# Patient Record
Sex: FEMALE | Race: WHITE | Employment: FULL TIME | URBAN - METROPOLITAN AREA
[De-identification: names, ages, dates, MRNs, and addresses within clinical notes are randomized per-mention and may not be internally consistent; named-entity substitution may affect disease eponyms.]

---

## 2017-04-07 ENCOUNTER — EMPLOYEE WELLNESS (OUTPATIENT)
Dept: OTHER | Age: 47
End: 2017-04-07

## 2017-04-07 LAB
CHOLESTEROL: 201 MG/DL
GLUCOSE BLD-MCNC: 90 MG/DL (ref 70–140)
HDLC SERPL-MCNC: 71 MG/DL
LDL CHOLESTEROL CALCULATED: 117 MG/DL
PATIENT FASTING?: YES
TRIGL SERPL-MCNC: 67 MG/DL

## 2017-09-12 ENCOUNTER — HOSPITAL ENCOUNTER (OUTPATIENT)
Dept: GENERAL RADIOLOGY | Age: 47
Discharge: HOME | End: 2017-09-12
Attending: FAMILY MEDICINE | Admitting: FAMILY MEDICINE

## 2017-09-12 DIAGNOSIS — R05.9 COUGH: ICD-10-CM

## 2017-09-12 DIAGNOSIS — R07.89 OTHER CHEST PAIN: ICD-10-CM

## 2017-09-18 ENCOUNTER — HOSPITAL ENCOUNTER (OUTPATIENT)
Dept: CT IMAGING | Age: 47
Discharge: HOME | End: 2017-09-18
Attending: FAMILY MEDICINE | Admitting: FAMILY MEDICINE

## 2017-09-18 DIAGNOSIS — S22.20XA CLOSED FRACTURE OF STERNUM, UNSPECIFIED PORTION OF STERNUM, INITIAL ENCOUNTER: ICD-10-CM

## 2017-09-18 DIAGNOSIS — S22.20XA CLOSED FRACTURE OF STERNUM: ICD-10-CM

## 2017-10-04 ENCOUNTER — HOSPITAL ENCOUNTER (OUTPATIENT)
Dept: NUCLEAR MEDICINE | Age: 47
Discharge: HOME | End: 2017-10-04
Attending: FAMILY MEDICINE | Admitting: FAMILY MEDICINE

## 2017-10-04 DIAGNOSIS — R07.89 PAIN OF STERNUM: ICD-10-CM

## 2017-10-04 RX ORDER — TC 99M MEDRONATE 20 MG/10ML
25 INJECTION, POWDER, LYOPHILIZED, FOR SOLUTION INTRAVENOUS
Status: COMPLETED | OUTPATIENT
Start: 2017-10-04 | End: 2017-10-04

## 2017-10-04 RX ADMIN — TC 99M MEDRONATE 25 MILLICURIE: 20 INJECTION, POWDER, LYOPHILIZED, FOR SOLUTION INTRAVENOUS at 07:55

## 2017-10-09 ENCOUNTER — HOSPITAL ENCOUNTER (OUTPATIENT)
Dept: OTHER | Age: 47
Discharge: HOME | End: 2017-10-09
Attending: INTERNAL MEDICINE | Admitting: INTERNAL MEDICINE

## 2017-10-09 LAB
ALBUMIN SERPL-MCNC: 4.4 GM/DL (ref 3.4–5)
ALP BLD-CCNC: 77 IU/L (ref 40–128)
ALT SERPL-CCNC: 11 U/L (ref 10–40)
ANION GAP SERPL CALCULATED.3IONS-SCNC: 10 MMOL/L (ref 4–16)
AST SERPL-CCNC: 15 IU/L (ref 15–37)
BILIRUB SERPL-MCNC: 0.5 MG/DL (ref 0–1)
BUN BLDV-MCNC: 14 MG/DL (ref 6–23)
CALCIUM SERPL-MCNC: 9.4 MG/DL (ref 8.3–10.6)
CHLORIDE BLD-SCNC: 104 MMOL/L (ref 99–110)
CO2: 27 MMOL/L (ref 21–32)
CREAT SERPL-MCNC: 0.9 MG/DL (ref 0.6–1.1)
ERYTHROCYTE SEDIMENTATION RATE: 10 MM/HR (ref 0–20)
GFR AFRICAN AMERICAN: >60 ML/MIN/1.73M2
GFR NON-AFRICAN AMERICAN: >60 ML/MIN/1.73M2
GLUCOSE BLD-MCNC: 78 MG/DL (ref 70–140)
LACTATE DEHYDROGENASE: 183 IU/L (ref 120–246)
POTASSIUM SERPL-SCNC: 4.7 MMOL/L (ref 3.5–5.1)
SODIUM BLD-SCNC: 141 MMOL/L (ref 135–145)
TOTAL PROTEIN: 7.1 GM/DL (ref 6.4–8.2)

## 2017-10-12 LAB
IMMUNOFIXATION ELECTROPHORESIS 1: NORMAL
KAPPA QUANT FREE LIGHT CHAINS: 1.47
KAPPA/LAMBDA FREE LIGHT CHAIN RATIO: 1.27
LAMBDA FREE LIGHT CHAINS URINE/ VOL: 1.16

## 2017-10-25 ENCOUNTER — HOSPITAL ENCOUNTER (OUTPATIENT)
Dept: ULTRASOUND IMAGING | Age: 47
Discharge: HOME | End: 2017-10-25
Attending: INTERNAL MEDICINE | Admitting: INTERNAL MEDICINE

## 2017-10-25 DIAGNOSIS — N63.0 BREAST MASS: ICD-10-CM

## 2017-10-25 DIAGNOSIS — R93.1 ABNORMAL COMPUTED TOMOGRAPHY ANGIOGRAPHY OF HEART: ICD-10-CM

## 2017-10-25 DIAGNOSIS — R93.89 ABNORMAL FINDING ON RADIOLOGY EXAM: ICD-10-CM

## 2017-11-02 ENCOUNTER — HOSPITAL ENCOUNTER (OUTPATIENT)
Dept: GENERAL RADIOLOGY | Age: 47
Discharge: HOME | End: 2017-11-02
Attending: INTERNAL MEDICINE | Admitting: INTERNAL MEDICINE

## 2017-11-02 ENCOUNTER — HOSPITAL ENCOUNTER (OUTPATIENT)
Dept: PET IMAGING | Age: 47
Discharge: HOME | End: 2017-11-02

## 2017-11-02 DIAGNOSIS — C50.411 MALIGNANT NEOPLASM OF UPPER-OUTER QUADRANT OF RIGHT FEMALE BREAST, UNSPECIFIED ESTROGEN RECEPTOR STATUS (HCC): ICD-10-CM

## 2017-11-10 ENCOUNTER — HOSPITAL ENCOUNTER (OUTPATIENT)
Dept: INFUSION THERAPY | Age: 47
Discharge: HOME | End: 2017-11-30
Attending: INTERNAL MEDICINE | Admitting: INTERNAL MEDICINE

## 2017-11-10 VITALS
SYSTOLIC BLOOD PRESSURE: 115 MMHG | TEMPERATURE: 98 F | DIASTOLIC BLOOD PRESSURE: 70 MMHG | RESPIRATION RATE: 16 BRPM | HEART RATE: 66 BPM

## 2017-11-10 RX ORDER — ARMODAFINIL 150 MG/1
150 TABLET ORAL DAILY
COMMUNITY
End: 2018-09-11

## 2017-11-10 RX ORDER — LAMOTRIGINE 25 MG/1
500 TABLET ORAL ONCE
Status: COMPLETED | OUTPATIENT
Start: 2017-11-10 | End: 2017-11-10

## 2017-11-10 RX ORDER — SODIUM CHLORIDE 9 MG/ML
INJECTION, SOLUTION INTRAVENOUS ONCE
Status: DISCONTINUED | OUTPATIENT
Start: 2017-11-10 | End: 2017-12-01 | Stop reason: HOSPADM

## 2017-11-10 RX ADMIN — LAMOTRIGINE 500 MG: 25 TABLET ORAL at 08:45

## 2017-11-10 NOTE — PLAN OF CARE
Pt taken to room 5 . Pt oriented to room, call light, bed/chair controls, TV, pt voiced understanding. Plan of care explained to pt, pt voiced understanding.

## 2017-11-10 NOTE — DISCHARGE SUMMARY
Tolerated Chemo very well. Home instructions given with understanding. Discharged walking with her good friend to the front entrance in fair condition to leave per private auto.

## 2017-11-24 ENCOUNTER — HOSPITAL ENCOUNTER (OUTPATIENT)
Dept: INFUSION THERAPY | Age: 47
Discharge: HOME | End: 2017-11-24
Attending: INTERNAL MEDICINE | Admitting: INTERNAL MEDICINE

## 2017-11-24 VITALS
TEMPERATURE: 98 F | SYSTOLIC BLOOD PRESSURE: 122 MMHG | HEART RATE: 69 BPM | RESPIRATION RATE: 16 BRPM | DIASTOLIC BLOOD PRESSURE: 67 MMHG

## 2017-11-24 RX ORDER — LAMOTRIGINE 25 MG/1
500 TABLET ORAL ONCE
Status: COMPLETED | OUTPATIENT
Start: 2017-11-24 | End: 2017-11-24

## 2017-11-24 RX ADMIN — LAMOTRIGINE 500 MG: 25 TABLET ORAL at 08:30

## 2017-11-24 NOTE — PLAN OF CARE
Arrived at Outpatient Infusion Unit for   Chemo. Oriented to room #5 with understanding. Plan of care discussed and understood.

## 2017-11-30 LAB — CA 27.29: 35.4 U/ML

## 2017-12-01 ENCOUNTER — HOSPITAL ENCOUNTER (OUTPATIENT)
Dept: OTHER | Age: 47
Discharge: HOME | End: 2017-12-31
Attending: INTERNAL MEDICINE | Admitting: INTERNAL MEDICINE

## 2017-12-06 DIAGNOSIS — R07.81 RIB PAIN ON RIGHT SIDE: Primary | ICD-10-CM

## 2017-12-07 ENCOUNTER — HOSPITAL ENCOUNTER (OUTPATIENT)
Dept: GENERAL RADIOLOGY | Age: 47
Discharge: HOME | End: 2017-12-07
Attending: SURGERY | Admitting: SURGERY

## 2017-12-07 DIAGNOSIS — R07.81 RIB PAIN ON RIGHT SIDE: ICD-10-CM

## 2017-12-08 ENCOUNTER — HOSPITAL ENCOUNTER (OUTPATIENT)
Dept: ULTRASOUND IMAGING | Age: 47
Discharge: HOME | End: 2017-12-08
Attending: INTERNAL MEDICINE | Admitting: INTERNAL MEDICINE

## 2017-12-08 DIAGNOSIS — C50.411 MALIGNANT NEOPLASM OF UPPER-OUTER QUADRANT OF RIGHT FEMALE BREAST, UNSPECIFIED ESTROGEN RECEPTOR STATUS (HCC): ICD-10-CM

## 2017-12-08 DIAGNOSIS — N63.0 BREAST LUMP: ICD-10-CM

## 2017-12-08 DIAGNOSIS — N63.0 LUMP OR MASS IN BREAST: ICD-10-CM

## 2018-01-01 ENCOUNTER — HOSPITAL ENCOUNTER (OUTPATIENT)
Dept: OTHER | Age: 48
Discharge: HOME | End: 2018-01-31
Attending: INTERNAL MEDICINE | Admitting: INTERNAL MEDICINE

## 2018-01-05 ENCOUNTER — HOSPITAL ENCOUNTER (OUTPATIENT)
Dept: INFUSION THERAPY | Age: 48
Discharge: HOME | End: 2018-01-05
Attending: INTERNAL MEDICINE | Admitting: INTERNAL MEDICINE

## 2018-01-05 VITALS
RESPIRATION RATE: 16 BRPM | DIASTOLIC BLOOD PRESSURE: 84 MMHG | HEART RATE: 66 BPM | SYSTOLIC BLOOD PRESSURE: 134 MMHG | TEMPERATURE: 99.7 F

## 2018-01-05 RX ORDER — LAMOTRIGINE 25 MG/1
500 TABLET ORAL ONCE
Status: COMPLETED | OUTPATIENT
Start: 2018-01-05 | End: 2018-01-05

## 2018-01-05 RX ADMIN — LAMOTRIGINE 500 MG: 25 TABLET ORAL at 08:50

## 2018-01-08 ENCOUNTER — HOSPITAL ENCOUNTER (OUTPATIENT)
Dept: OTHER | Age: 48
Discharge: HOME | End: 2018-01-08
Attending: INTERNAL MEDICINE | Admitting: INTERNAL MEDICINE

## 2018-01-08 LAB
ALBUMIN SERPL-MCNC: 4.7 GM/DL (ref 3.4–5)
ALP BLD-CCNC: 47 IU/L (ref 40–128)
ALT SERPL-CCNC: 14 U/L (ref 10–40)
ANION GAP SERPL CALCULATED.3IONS-SCNC: 16 MMOL/L (ref 4–16)
AST SERPL-CCNC: 19 IU/L (ref 15–37)
BILIRUB SERPL-MCNC: 0.4 MG/DL (ref 0–1)
BUN BLDV-MCNC: 14 MG/DL (ref 6–23)
CALCIUM SERPL-MCNC: 9.6 MG/DL (ref 8.3–10.6)
CHLORIDE BLD-SCNC: 101 MMOL/L (ref 99–110)
CO2: 28 MMOL/L (ref 21–32)
CREAT SERPL-MCNC: 1 MG/DL (ref 0.6–1.1)
GFR AFRICAN AMERICAN: >60 ML/MIN/1.73M2
GFR NON-AFRICAN AMERICAN: 59 ML/MIN/1.73M2
GLUCOSE BLD-MCNC: 91 MG/DL (ref 70–99)
POTASSIUM SERPL-SCNC: 4.3 MMOL/L (ref 3.5–5.1)
SODIUM BLD-SCNC: 145 MMOL/L (ref 135–145)
TOTAL PROTEIN: 7.3 GM/DL (ref 6.4–8.2)

## 2018-02-01 ENCOUNTER — HOSPITAL ENCOUNTER (OUTPATIENT)
Dept: OTHER | Age: 48
Discharge: HOME | End: 2018-02-28
Attending: INTERNAL MEDICINE | Admitting: INTERNAL MEDICINE

## 2018-02-02 ENCOUNTER — HOSPITAL ENCOUNTER (OUTPATIENT)
Dept: INFUSION THERAPY | Age: 48
Discharge: HOME | End: 2018-02-02
Attending: INTERNAL MEDICINE | Admitting: INTERNAL MEDICINE

## 2018-02-02 VITALS
SYSTOLIC BLOOD PRESSURE: 123 MMHG | TEMPERATURE: 98.2 F | HEART RATE: 56 BPM | OXYGEN SATURATION: 97 % | RESPIRATION RATE: 16 BRPM | DIASTOLIC BLOOD PRESSURE: 84 MMHG

## 2018-02-02 RX ORDER — LAMOTRIGINE 25 MG/1
500 TABLET ORAL ONCE
Status: COMPLETED | OUTPATIENT
Start: 2018-02-02 | End: 2018-02-02

## 2018-02-02 RX ADMIN — LAMOTRIGINE 500 MG: 25 TABLET ORAL at 09:10

## 2018-02-02 NOTE — DISCHARGE SUMMARY
Tolerated Chemo well. Home instructions given with undersatnding. Discharged walking with her family to the front entrance in fair condition to leave per private auto. Karyn Aquino

## 2018-02-02 NOTE — PLAN OF CARE
Arrived at Outpatient Infusion Unit for 28 day chemo session. Oriented to room #5 with understanding. Plan of care discussed and understood.

## 2018-03-02 ENCOUNTER — HOSPITAL ENCOUNTER (OUTPATIENT)
Dept: INFUSION THERAPY | Age: 48
Discharge: HOME | End: 2018-03-31
Attending: INTERNAL MEDICINE | Admitting: INTERNAL MEDICINE

## 2018-03-02 VITALS
HEART RATE: 58 BPM | TEMPERATURE: 98.3 F | SYSTOLIC BLOOD PRESSURE: 122 MMHG | DIASTOLIC BLOOD PRESSURE: 76 MMHG | RESPIRATION RATE: 16 BRPM

## 2018-03-02 RX ORDER — LAMOTRIGINE 25 MG/1
500 TABLET ORAL ONCE
Status: COMPLETED | OUTPATIENT
Start: 2018-03-02 | End: 2018-03-02

## 2018-03-02 RX ADMIN — LAMOTRIGINE 500 MG: 25 TABLET ORAL at 08:40

## 2018-03-19 ENCOUNTER — HOSPITAL ENCOUNTER (OUTPATIENT)
Dept: OTHER | Age: 48
Discharge: HOME | End: 2018-03-19
Attending: INTERNAL MEDICINE | Admitting: INTERNAL MEDICINE

## 2018-03-19 LAB
ALBUMIN SERPL-MCNC: 4.6 GM/DL (ref 3.4–5)
ALP BLD-CCNC: 40 IU/L (ref 40–129)
ALT SERPL-CCNC: 11 U/L (ref 10–40)
ANION GAP SERPL CALCULATED.3IONS-SCNC: 12 MMOL/L (ref 4–16)
AST SERPL-CCNC: 15 IU/L (ref 15–37)
BILIRUB SERPL-MCNC: 0.4 MG/DL (ref 0–1)
BUN BLDV-MCNC: 19 MG/DL (ref 6–23)
CALCIUM SERPL-MCNC: 9.3 MG/DL (ref 8.3–10.6)
CHLORIDE BLD-SCNC: 101 MMOL/L (ref 99–110)
CO2: 25 MMOL/L (ref 21–32)
CREAT SERPL-MCNC: 0.9 MG/DL (ref 0.6–1.1)
GFR AFRICAN AMERICAN: >60 ML/MIN/1.73M2
GFR NON-AFRICAN AMERICAN: >60 ML/MIN/1.73M2
GLUCOSE BLD-MCNC: 89 MG/DL (ref 70–99)
POTASSIUM SERPL-SCNC: 4.3 MMOL/L (ref 3.5–5.1)
SODIUM BLD-SCNC: 138 MMOL/L (ref 135–145)
TOTAL PROTEIN: 7.2 GM/DL (ref 6.4–8.2)

## 2018-03-20 VITALS — BODY MASS INDEX: 32.58 KG/M2 | WEIGHT: 208 LBS

## 2018-03-30 ENCOUNTER — HOSPITAL ENCOUNTER (OUTPATIENT)
Dept: INFUSION THERAPY | Age: 48
Discharge: HOME | End: 2018-03-30
Attending: INTERNAL MEDICINE | Admitting: INTERNAL MEDICINE

## 2018-03-30 VITALS
TEMPERATURE: 97.3 F | DIASTOLIC BLOOD PRESSURE: 68 MMHG | SYSTOLIC BLOOD PRESSURE: 117 MMHG | RESPIRATION RATE: 16 BRPM | HEART RATE: 60 BPM

## 2018-03-30 RX ORDER — LAMOTRIGINE 25 MG/1
500 TABLET ORAL ONCE
Status: COMPLETED | OUTPATIENT
Start: 2018-03-30 | End: 2018-03-30

## 2018-03-30 RX ORDER — ACETAMINOPHEN 325 MG/1
650 TABLET ORAL EVERY 6 HOURS PRN
COMMUNITY
End: 2018-09-11

## 2018-03-30 RX ADMIN — LAMOTRIGINE 500 MG: 25 TABLET ORAL at 08:40

## 2018-04-01 ENCOUNTER — HOSPITAL ENCOUNTER (OUTPATIENT)
Dept: OTHER | Age: 48
Discharge: HOME | End: 2018-04-30
Attending: INTERNAL MEDICINE | Admitting: INTERNAL MEDICINE

## 2018-04-20 ENCOUNTER — EMPLOYEE WELLNESS (OUTPATIENT)
Dept: INTERNAL MEDICINE CLINIC | Age: 48
End: 2018-04-20

## 2018-04-20 LAB
CHOLESTEROL: 232 MG/DL
GLUCOSE BLD-MCNC: 80 MG/DL (ref 70–99)
HDLC SERPL-MCNC: 72 MG/DL
LDL CHOLESTEROL CALCULATED: 132 MG/DL
PATIENT FASTING?: YES
TRIGL SERPL-MCNC: 140 MG/DL

## 2018-04-27 ENCOUNTER — HOSPITAL ENCOUNTER (OUTPATIENT)
Dept: INFUSION THERAPY | Age: 48
Discharge: HOME | End: 2018-04-27
Attending: INTERNAL MEDICINE | Admitting: INTERNAL MEDICINE

## 2018-04-27 VITALS
HEART RATE: 64 BPM | RESPIRATION RATE: 16 BRPM | DIASTOLIC BLOOD PRESSURE: 62 MMHG | TEMPERATURE: 98 F | SYSTOLIC BLOOD PRESSURE: 114 MMHG

## 2018-04-27 RX ORDER — SODIUM CHLORIDE 0.9 % (FLUSH) 0.9 %
10 SYRINGE (ML) INJECTION PRN
Status: DISCONTINUED | OUTPATIENT
Start: 2018-04-27 | End: 2018-04-28 | Stop reason: HOSPADM

## 2018-04-27 RX ORDER — LAMOTRIGINE 25 MG/1
500 TABLET ORAL ONCE
Status: COMPLETED | OUTPATIENT
Start: 2018-04-27 | End: 2018-04-27

## 2018-04-27 RX ADMIN — LAMOTRIGINE 500 MG: 25 TABLET ORAL at 09:00

## 2018-04-27 RX ADMIN — Medication 10 ML: at 10:15

## 2018-04-27 RX ADMIN — Medication 10 ML: at 09:30

## 2018-05-01 ENCOUNTER — HOSPITAL ENCOUNTER (OUTPATIENT)
Dept: OTHER | Age: 48
Discharge: HOME | End: 2018-05-31
Attending: INTERNAL MEDICINE | Admitting: INTERNAL MEDICINE

## 2018-05-02 VITALS — BODY MASS INDEX: 32.77 KG/M2 | WEIGHT: 203 LBS

## 2018-05-14 ENCOUNTER — HOSPITAL ENCOUNTER (OUTPATIENT)
Dept: GENERAL RADIOLOGY | Age: 48
Discharge: HOME | End: 2018-05-14
Attending: INTERNAL MEDICINE | Admitting: INTERNAL MEDICINE

## 2018-05-14 DIAGNOSIS — C50.411 MALIGNANT NEOPLASM OF UPPER-OUTER QUADRANT OF RIGHT FEMALE BREAST, UNSPECIFIED ESTROGEN RECEPTOR STATUS (HCC): ICD-10-CM

## 2018-05-25 ENCOUNTER — HOSPITAL ENCOUNTER (OUTPATIENT)
Dept: INFUSION THERAPY | Age: 48
Discharge: HOME | End: 2018-05-25
Attending: INTERNAL MEDICINE | Admitting: INTERNAL MEDICINE

## 2018-05-25 VITALS — HEART RATE: 64 BPM | SYSTOLIC BLOOD PRESSURE: 118 MMHG | RESPIRATION RATE: 16 BRPM | DIASTOLIC BLOOD PRESSURE: 60 MMHG

## 2018-05-25 RX ORDER — LAMOTRIGINE 25 MG/1
500 TABLET ORAL ONCE
Status: COMPLETED | OUTPATIENT
Start: 2018-05-25 | End: 2018-05-25

## 2018-05-25 RX ADMIN — LAMOTRIGINE 500 MG: 25 TABLET ORAL at 09:00

## 2018-06-22 ENCOUNTER — HOSPITAL ENCOUNTER (OUTPATIENT)
Dept: INFUSION THERAPY | Age: 48
Discharge: HOME | End: 2018-06-30
Attending: INTERNAL MEDICINE | Admitting: INTERNAL MEDICINE

## 2018-06-22 VITALS — HEART RATE: 62 BPM | DIASTOLIC BLOOD PRESSURE: 62 MMHG | SYSTOLIC BLOOD PRESSURE: 114 MMHG | RESPIRATION RATE: 16 BRPM

## 2018-06-22 RX ORDER — LAMOTRIGINE 25 MG/1
500 TABLET ORAL ONCE
Status: COMPLETED | OUTPATIENT
Start: 2018-06-22 | End: 2018-06-22

## 2018-06-22 RX ADMIN — LAMOTRIGINE 500 MG: 25 TABLET ORAL at 08:55

## 2018-07-01 ENCOUNTER — HOSPITAL ENCOUNTER (OUTPATIENT)
Dept: OTHER | Age: 48
Discharge: HOME | End: 2018-07-31
Attending: INTERNAL MEDICINE | Admitting: INTERNAL MEDICINE

## 2018-07-20 ENCOUNTER — HOSPITAL ENCOUNTER (OUTPATIENT)
Dept: INFUSION THERAPY | Age: 48
Discharge: HOME | End: 2018-07-20
Attending: INTERNAL MEDICINE | Admitting: INTERNAL MEDICINE

## 2018-07-20 VITALS — SYSTOLIC BLOOD PRESSURE: 108 MMHG | RESPIRATION RATE: 16 BRPM | HEART RATE: 58 BPM | DIASTOLIC BLOOD PRESSURE: 77 MMHG

## 2018-07-20 RX ORDER — LAMOTRIGINE 25 MG/1
500 TABLET ORAL ONCE
Status: COMPLETED | OUTPATIENT
Start: 2018-07-20 | End: 2018-07-20

## 2018-07-20 RX ORDER — 0.9 % SODIUM CHLORIDE 0.9 %
10 VIAL (ML) INJECTION PRN
Status: DISCONTINUED | OUTPATIENT
Start: 2018-07-20 | End: 2018-07-21 | Stop reason: HOSPADM

## 2018-07-20 RX ADMIN — LAMOTRIGINE 500 MG: 25 TABLET ORAL at 09:20

## 2018-08-01 ENCOUNTER — HOSPITAL ENCOUNTER (OUTPATIENT)
Dept: OTHER | Age: 48
Discharge: HOME | End: 2018-08-31
Attending: INTERNAL MEDICINE | Admitting: INTERNAL MEDICINE

## 2018-08-17 ENCOUNTER — HOSPITAL ENCOUNTER (OUTPATIENT)
Dept: INFUSION THERAPY | Age: 48
Discharge: HOME | End: 2018-08-17
Attending: INTERNAL MEDICINE | Admitting: INTERNAL MEDICINE

## 2018-08-17 VITALS
SYSTOLIC BLOOD PRESSURE: 107 MMHG | DIASTOLIC BLOOD PRESSURE: 72 MMHG | TEMPERATURE: 98.1 F | HEART RATE: 60 BPM | RESPIRATION RATE: 16 BRPM

## 2018-08-17 RX ORDER — LAMOTRIGINE 25 MG/1
500 TABLET ORAL ONCE
Status: COMPLETED | OUTPATIENT
Start: 2018-08-17 | End: 2018-08-17

## 2018-08-17 RX ADMIN — LAMOTRIGINE 500 MG: 25 TABLET ORAL at 09:00

## 2018-08-17 NOTE — DISCHARGE SUMMARY
Tolerated chemo well. Home instrucctions given with understanding. Discharged walking with her wonderful family to the front entrance in fair condition to leave per private auto.

## 2018-08-23 ENCOUNTER — HOSPITAL ENCOUNTER (OUTPATIENT)
Dept: OTHER | Age: 48
Discharge: HOME | End: 2018-08-23
Attending: INTERNAL MEDICINE | Admitting: INTERNAL MEDICINE

## 2018-08-23 LAB
ALBUMIN SERPL-MCNC: 4.5 GM/DL (ref 3.4–5)
ALP BLD-CCNC: 41 IU/L (ref 40–128)
ALT SERPL-CCNC: 15 U/L (ref 10–40)
ANION GAP SERPL CALCULATED.3IONS-SCNC: 17 MMOL/L (ref 4–16)
AST SERPL-CCNC: 20 IU/L (ref 15–37)
BILIRUB SERPL-MCNC: 0.4 MG/DL (ref 0–1)
BUN BLDV-MCNC: 20 MG/DL (ref 6–23)
CALCIUM SERPL-MCNC: 10.2 MG/DL (ref 8.3–10.6)
CHLORIDE BLD-SCNC: 100 MMOL/L (ref 99–110)
CO2: 27 MMOL/L (ref 21–32)
CREAT SERPL-MCNC: 1.1 MG/DL (ref 0.6–1.1)
GFR AFRICAN AMERICAN: >60 ML/MIN/1.73M2
GFR NON-AFRICAN AMERICAN: 53 ML/MIN/1.73M2
GLUCOSE BLD-MCNC: 84 MG/DL (ref 70–99)
POTASSIUM SERPL-SCNC: 4.8 MMOL/L (ref 3.5–5.1)
SODIUM BLD-SCNC: 144 MMOL/L (ref 135–145)
TOTAL PROTEIN: 7 GM/DL (ref 6.4–8.2)

## 2018-08-25 LAB — CA 27.29: 24.2 U/ML

## 2018-09-01 ENCOUNTER — HOSPITAL ENCOUNTER (OUTPATIENT)
Dept: OTHER | Age: 48
Discharge: HOME | End: 2018-09-01
Attending: INTERNAL MEDICINE | Admitting: INTERNAL MEDICINE

## 2018-09-14 ENCOUNTER — HOSPITAL ENCOUNTER (OUTPATIENT)
Dept: INFUSION THERAPY | Age: 48
Discharge: HOME | End: 2018-09-14
Attending: INTERNAL MEDICINE | Admitting: INTERNAL MEDICINE

## 2018-09-14 VITALS
DIASTOLIC BLOOD PRESSURE: 90 MMHG | RESPIRATION RATE: 16 BRPM | TEMPERATURE: 97.8 F | SYSTOLIC BLOOD PRESSURE: 126 MMHG | OXYGEN SATURATION: 99 % | HEART RATE: 69 BPM

## 2018-09-14 RX ORDER — LAMOTRIGINE 25 MG/1
500 TABLET ORAL ONCE
Status: COMPLETED | OUTPATIENT
Start: 2018-09-14 | End: 2018-09-14

## 2018-09-14 RX ADMIN — LAMOTRIGINE 500 MG: 25 TABLET ORAL at 09:01

## 2018-09-14 ASSESSMENT — PAIN DESCRIPTION - LOCATION: LOCATION: ABDOMEN

## 2018-09-14 ASSESSMENT — PAIN SCALES - GENERAL: PAINLEVEL_OUTOF10: 4

## 2018-09-14 ASSESSMENT — PAIN DESCRIPTION - DESCRIPTORS: DESCRIPTORS: BURNING

## 2018-09-14 NOTE — PROGRESS NOTES
Tolerated injections without incidence. Given discharge instructions and voiced understanding. Ambulated to exit with sister.

## 2018-10-12 ENCOUNTER — HOSPITAL ENCOUNTER (OUTPATIENT)
Dept: INFUSION THERAPY | Age: 48
Setting detail: INFUSION SERIES
Discharge: HOME | End: 2018-10-12
Payer: COMMERCIAL

## 2018-10-12 VITALS — RESPIRATION RATE: 16 BRPM | SYSTOLIC BLOOD PRESSURE: 119 MMHG | HEART RATE: 60 BPM | DIASTOLIC BLOOD PRESSURE: 79 MMHG

## 2018-10-12 PROCEDURE — 6360000002 HC RX W HCPCS: Performed by: INTERNAL MEDICINE

## 2018-10-12 PROCEDURE — 96372 THER/PROPH/DIAG INJ SC/IM: CPT

## 2018-10-12 PROCEDURE — 96402 CHEMO HORMON ANTINEOPL SQ/IM: CPT

## 2018-10-12 RX ORDER — LAMOTRIGINE 25 MG/1
500 TABLET ORAL ONCE
Status: COMPLETED | OUTPATIENT
Start: 2018-10-12 | End: 2018-10-12

## 2018-10-12 RX ADMIN — FULVESTRANT 500 MG: 50 INJECTION INTRAMUSCULAR at 09:15

## 2018-10-12 NOTE — LETTER
Janneth Pena      Dear, Dr. Britton Room    Your patient Quique Chi,  1970, received Faslodex/Zoladex on 10/12/2018 at the Infusion Unit.     Thank You,        Daquan Damico  TEAM  [phone]

## 2018-10-12 NOTE — PROGRESS NOTES
Pt received 3 injections and tolerated well. Next appt made for next month. Discharge instructions given, voiced understanding. To private auto per self.

## 2018-10-12 NOTE — PROGRESS NOTES
Pt arrived to unit for injections. Reports she had no side effects from last ones given and tolerated well. Agreeable for injections today. Zometa to be given in January 2019; saw  And reports he changed order to q 6 months. Called cancer center for verification and new orders.

## 2018-11-09 ENCOUNTER — HOSPITAL ENCOUNTER (OUTPATIENT)
Dept: ULTRASOUND IMAGING | Age: 48
Discharge: HOME | End: 2018-11-09
Payer: COMMERCIAL

## 2018-11-09 ENCOUNTER — HOSPITAL ENCOUNTER (OUTPATIENT)
Dept: INFUSION THERAPY | Age: 48
Setting detail: INFUSION SERIES
Discharge: HOME | End: 2018-11-09
Payer: COMMERCIAL

## 2018-11-09 VITALS
RESPIRATION RATE: 14 BRPM | SYSTOLIC BLOOD PRESSURE: 125 MMHG | HEART RATE: 86 BPM | OXYGEN SATURATION: 98 % | DIASTOLIC BLOOD PRESSURE: 85 MMHG | TEMPERATURE: 97.5 F

## 2018-11-09 DIAGNOSIS — C50.411 MALIGNANT NEOPLASM OF UPPER-OUTER QUADRANT OF RIGHT FEMALE BREAST, UNSPECIFIED ESTROGEN RECEPTOR STATUS (HCC): ICD-10-CM

## 2018-11-09 DIAGNOSIS — M79.604 RIGHT LEG PAIN: ICD-10-CM

## 2018-11-09 DIAGNOSIS — R60.9 EDEMA, UNSPECIFIED TYPE: ICD-10-CM

## 2018-11-09 PROCEDURE — 93970 EXTREMITY STUDY: CPT

## 2018-11-09 PROCEDURE — 96402 CHEMO HORMON ANTINEOPL SQ/IM: CPT

## 2018-11-09 PROCEDURE — 6360000002 HC RX W HCPCS: Performed by: INTERNAL MEDICINE

## 2018-11-09 PROCEDURE — 96401 CHEMO ANTI-NEOPL SQ/IM: CPT

## 2018-11-09 PROCEDURE — 99211 OFF/OP EST MAY X REQ PHY/QHP: CPT

## 2018-11-09 RX ORDER — LAMOTRIGINE 25 MG/1
500 TABLET ORAL ONCE
Status: COMPLETED | OUTPATIENT
Start: 2018-11-09 | End: 2018-11-09

## 2018-11-09 RX ADMIN — FULVESTRANT 500 MG: 50 INJECTION INTRAMUSCULAR at 09:34

## 2018-11-09 ASSESSMENT — PAIN SCALES - GENERAL
PAINLEVEL_OUTOF10: 0
PAINLEVEL_OUTOF10: 0

## 2018-11-09 NOTE — PROGRESS NOTES
Pt tolerated Faslodex injections and Zoladex injection to abd. Feeling well. Discharge instructions given and verbalized understanding. To private auto per self.

## 2018-11-09 NOTE — PROGRESS NOTES
Pt arrived on unit. Oriented to room, call light, and chair/bed controls with understanding voiced. Pt is aware of and agreeable to POC.

## 2018-11-15 ENCOUNTER — HOSPITAL ENCOUNTER (OUTPATIENT)
Age: 48
Setting detail: SPECIMEN
Discharge: HOME | End: 2018-11-15
Payer: COMMERCIAL

## 2018-11-15 LAB
ALBUMIN SERPL-MCNC: 4.5 GM/DL (ref 3.4–5)
ALP BLD-CCNC: 42 IU/L (ref 40–128)
ALT SERPL-CCNC: 91 U/L (ref 10–40)
ANION GAP SERPL CALCULATED.3IONS-SCNC: 13 MMOL/L (ref 4–16)
AST SERPL-CCNC: 60 IU/L (ref 15–37)
BILIRUB SERPL-MCNC: 0.4 MG/DL (ref 0–1)
BUN BLDV-MCNC: 15 MG/DL (ref 6–23)
CALCIUM SERPL-MCNC: 9.5 MG/DL (ref 8.3–10.6)
CHLORIDE BLD-SCNC: 105 MMOL/L (ref 99–110)
CO2: 24 MMOL/L (ref 21–32)
CREAT SERPL-MCNC: 1 MG/DL (ref 0.6–1.1)
GFR AFRICAN AMERICAN: >60 ML/MIN/1.73M2
GFR NON-AFRICAN AMERICAN: 59 ML/MIN/1.73M2
GLUCOSE BLD-MCNC: 85 MG/DL (ref 70–99)
POTASSIUM SERPL-SCNC: 4.3 MMOL/L (ref 3.5–5.1)
SODIUM BLD-SCNC: 142 MMOL/L (ref 135–145)
TOTAL PROTEIN: 6.9 GM/DL (ref 6.4–8.2)

## 2018-11-15 PROCEDURE — 86300 IMMUNOASSAY TUMOR CA 15-3: CPT

## 2018-11-15 PROCEDURE — 80053 COMPREHEN METABOLIC PANEL: CPT

## 2018-11-18 LAB — CA 27.29: 26.4 U/ML

## 2018-12-07 ENCOUNTER — HOSPITAL ENCOUNTER (OUTPATIENT)
Dept: INFUSION THERAPY | Age: 48
Setting detail: INFUSION SERIES
Discharge: HOME | End: 2018-12-07
Payer: COMMERCIAL

## 2018-12-07 VITALS
DIASTOLIC BLOOD PRESSURE: 79 MMHG | RESPIRATION RATE: 16 BRPM | OXYGEN SATURATION: 98 % | HEART RATE: 68 BPM | TEMPERATURE: 97.1 F | SYSTOLIC BLOOD PRESSURE: 118 MMHG

## 2018-12-07 LAB
ALBUMIN SERPL-MCNC: 4.8 GM/DL (ref 3.4–5)
ALP BLD-CCNC: 45 IU/L (ref 40–129)
ALT SERPL-CCNC: 48 U/L (ref 10–40)
ANION GAP SERPL CALCULATED.3IONS-SCNC: 15 MMOL/L (ref 4–16)
AST SERPL-CCNC: 33 IU/L (ref 15–37)
BILIRUB SERPL-MCNC: 0.5 MG/DL (ref 0–1)
BUN BLDV-MCNC: 15 MG/DL (ref 6–23)
CALCIUM SERPL-MCNC: 9.6 MG/DL (ref 8.3–10.6)
CHLORIDE BLD-SCNC: 101 MMOL/L (ref 99–110)
CO2: 21 MMOL/L (ref 21–32)
CREAT SERPL-MCNC: 0.9 MG/DL (ref 0.6–1.1)
GFR AFRICAN AMERICAN: >60 ML/MIN/1.73M2
GFR NON-AFRICAN AMERICAN: >60 ML/MIN/1.73M2
GLUCOSE BLD-MCNC: 143 MG/DL (ref 70–99)
POTASSIUM SERPL-SCNC: 3.6 MMOL/L (ref 3.5–5.1)
SODIUM BLD-SCNC: 137 MMOL/L (ref 135–145)
TOTAL PROTEIN: 7.6 GM/DL (ref 6.4–8.2)

## 2018-12-07 PROCEDURE — 96372 THER/PROPH/DIAG INJ SC/IM: CPT

## 2018-12-07 PROCEDURE — 99211 OFF/OP EST MAY X REQ PHY/QHP: CPT

## 2018-12-07 PROCEDURE — 80053 COMPREHEN METABOLIC PANEL: CPT

## 2018-12-07 RX ORDER — LAMOTRIGINE 25 MG/1
500 TABLET ORAL ONCE
Status: COMPLETED | OUTPATIENT
Start: 2018-12-07 | End: 2018-12-07

## 2018-12-07 RX ADMIN — LAMOTRIGINE 500 MG: 25 TABLET ORAL at 07:50

## 2018-12-07 ASSESSMENT — PAIN SCALES - GENERAL: PAINLEVEL_OUTOF10: 0

## 2019-01-04 ENCOUNTER — HOSPITAL ENCOUNTER (OUTPATIENT)
Dept: INFUSION THERAPY | Age: 49
Setting detail: INFUSION SERIES
Discharge: HOME | End: 2019-01-04
Payer: COMMERCIAL

## 2019-01-04 ENCOUNTER — HOSPITAL ENCOUNTER (OUTPATIENT)
Dept: INFUSION THERAPY | Age: 49
Setting detail: INFUSION SERIES
End: 2019-01-04
Payer: COMMERCIAL

## 2019-01-04 VITALS
SYSTOLIC BLOOD PRESSURE: 123 MMHG | RESPIRATION RATE: 16 BRPM | TEMPERATURE: 97.7 F | HEART RATE: 64 BPM | OXYGEN SATURATION: 96 % | DIASTOLIC BLOOD PRESSURE: 77 MMHG

## 2019-01-04 PROCEDURE — 96365 THER/PROPH/DIAG IV INF INIT: CPT

## 2019-01-04 PROCEDURE — 2580000003 HC RX 258: Performed by: INTERNAL MEDICINE

## 2019-01-04 PROCEDURE — 6360000002 HC RX W HCPCS: Performed by: INTERNAL MEDICINE

## 2019-01-04 PROCEDURE — 96402 CHEMO HORMON ANTINEOPL SQ/IM: CPT

## 2019-01-04 PROCEDURE — 99211 OFF/OP EST MAY X REQ PHY/QHP: CPT

## 2019-01-04 RX ORDER — LAMOTRIGINE 25 MG/1
500 TABLET ORAL ONCE
Status: COMPLETED | OUTPATIENT
Start: 2019-01-04 | End: 2019-01-04

## 2019-01-04 RX ADMIN — ZOLEDRONIC ACID 4 MG: 0.8 INJECTION, SOLUTION, CONCENTRATE INTRAVENOUS at 09:50

## 2019-01-04 RX ADMIN — FULVESTRANT 500 MG: 50 INJECTION INTRAMUSCULAR at 08:34

## 2019-01-04 ASSESSMENT — PAIN SCALES - GENERAL: PAINLEVEL_OUTOF10: 0

## 2019-01-04 NOTE — PROGRESS NOTES
Pt taken to room 3, oriented to room, bed/chair controls, and call light. Needs met at present. Call light in reach. Pt agreeable for plan of care.

## 2019-01-04 NOTE — DISCHARGE SUMMARY
Discharge instructions given and voiced understanding. Zometa given today and pt due again in 6 months. Pt to RTC in 4 weeks for Faslodex and Zoladex.

## 2019-01-08 ENCOUNTER — HOSPITAL ENCOUNTER (OUTPATIENT)
Dept: INFUSION THERAPY | Age: 49
End: 2019-01-08

## 2019-02-01 ENCOUNTER — HOSPITAL ENCOUNTER (OUTPATIENT)
Dept: INFUSION THERAPY | Age: 49
Setting detail: INFUSION SERIES
Discharge: HOME | End: 2019-02-01
Payer: COMMERCIAL

## 2019-02-01 VITALS
HEART RATE: 67 BPM | SYSTOLIC BLOOD PRESSURE: 123 MMHG | DIASTOLIC BLOOD PRESSURE: 79 MMHG | OXYGEN SATURATION: 100 % | RESPIRATION RATE: 16 BRPM

## 2019-02-01 PROCEDURE — 6360000002 HC RX W HCPCS: Performed by: INTERNAL MEDICINE

## 2019-02-01 PROCEDURE — 99211 OFF/OP EST MAY X REQ PHY/QHP: CPT

## 2019-02-01 PROCEDURE — 96402 CHEMO HORMON ANTINEOPL SQ/IM: CPT

## 2019-02-01 RX ORDER — LAMOTRIGINE 25 MG/1
500 TABLET ORAL ONCE
Status: COMPLETED | OUTPATIENT
Start: 2019-02-01 | End: 2019-02-01

## 2019-02-01 RX ADMIN — FULVESTRANT 500 MG: 50 INJECTION INTRAMUSCULAR at 08:43

## 2019-02-01 ASSESSMENT — PAIN SCALES - GENERAL: PAINLEVEL_OUTOF10: 0

## 2019-02-01 NOTE — DISCHARGE SUMMARY
Pt tolerated all 3 injections without incidence. LLQ Zoladex injection did bleed but pressure was applied with another band-aid and it did stop prior to pt leaving unit. She was instructed to monitor site for a while and call if bleeding continued. Voiced understanding. Pt agreeable for next appt time and date. (Mar. 1, 2019. @ 0800.

## 2019-02-26 ENCOUNTER — HOSPITAL ENCOUNTER (OUTPATIENT)
Age: 49
Setting detail: SPECIMEN
Discharge: HOME | End: 2019-02-26
Payer: COMMERCIAL

## 2019-02-26 LAB
ALBUMIN SERPL-MCNC: 4.7 GM/DL (ref 3.4–5)
ALP BLD-CCNC: 48 IU/L (ref 40–128)
ALT SERPL-CCNC: 18 U/L (ref 10–40)
ANION GAP SERPL CALCULATED.3IONS-SCNC: 12 MMOL/L (ref 4–16)
AST SERPL-CCNC: 20 IU/L (ref 15–37)
BILIRUB SERPL-MCNC: 0.3 MG/DL (ref 0–1)
BUN BLDV-MCNC: 20 MG/DL (ref 6–23)
CALCIUM SERPL-MCNC: 9.6 MG/DL (ref 8.3–10.6)
CHLORIDE BLD-SCNC: 102 MMOL/L (ref 99–110)
CO2: 23 MMOL/L (ref 21–32)
CREAT SERPL-MCNC: 1.1 MG/DL (ref 0.6–1.1)
GFR AFRICAN AMERICAN: >60 ML/MIN/1.73M2
GFR NON-AFRICAN AMERICAN: 53 ML/MIN/1.73M2
GLUCOSE BLD-MCNC: 92 MG/DL (ref 70–99)
POTASSIUM SERPL-SCNC: 4.2 MMOL/L (ref 3.5–5.1)
SODIUM BLD-SCNC: 137 MMOL/L (ref 135–145)
TOTAL PROTEIN: 7.5 GM/DL (ref 6.4–8.2)

## 2019-02-26 PROCEDURE — 86300 IMMUNOASSAY TUMOR CA 15-3: CPT

## 2019-02-26 PROCEDURE — 80053 COMPREHEN METABOLIC PANEL: CPT

## 2019-02-28 LAB — CA 27.29: 26.4 U/ML

## 2019-03-01 ENCOUNTER — HOSPITAL ENCOUNTER (OUTPATIENT)
Dept: INFUSION THERAPY | Age: 49
Setting detail: INFUSION SERIES
Discharge: HOME | End: 2019-03-01
Payer: COMMERCIAL

## 2019-03-01 VITALS
HEART RATE: 79 BPM | DIASTOLIC BLOOD PRESSURE: 83 MMHG | OXYGEN SATURATION: 98 % | SYSTOLIC BLOOD PRESSURE: 126 MMHG | RESPIRATION RATE: 14 BRPM | TEMPERATURE: 97.9 F

## 2019-03-01 PROCEDURE — 99211 OFF/OP EST MAY X REQ PHY/QHP: CPT

## 2019-03-01 PROCEDURE — 96402 CHEMO HORMON ANTINEOPL SQ/IM: CPT

## 2019-03-01 RX ORDER — LAMOTRIGINE 25 MG/1
500 TABLET ORAL ONCE
Status: COMPLETED | OUTPATIENT
Start: 2019-03-01 | End: 2019-03-01

## 2019-03-01 RX ADMIN — LAMOTRIGINE 500 MG: 25 TABLET ORAL at 08:37

## 2019-03-01 ASSESSMENT — PAIN SCALES - GENERAL: PAINLEVEL_OUTOF10: 0

## 2019-03-01 NOTE — DISCHARGE SUMMARY
Pt tolerated injections well. Denied any S&S of reaction. Discharge instructions given and voiced understanding. Next appt made for 3/29/2019. Agreeable for date and time.

## 2019-03-18 ENCOUNTER — OFFICE VISIT (OUTPATIENT)
Dept: INTERNAL MEDICINE CLINIC | Age: 49
End: 2019-03-18
Payer: COMMERCIAL

## 2019-03-18 VITALS
BODY MASS INDEX: 32.47 KG/M2 | OXYGEN SATURATION: 98 % | HEIGHT: 66 IN | HEART RATE: 73 BPM | RESPIRATION RATE: 16 BRPM | WEIGHT: 202 LBS | SYSTOLIC BLOOD PRESSURE: 106 MMHG | DIASTOLIC BLOOD PRESSURE: 72 MMHG

## 2019-03-18 DIAGNOSIS — G43.909 MIGRAINE WITHOUT STATUS MIGRAINOSUS, NOT INTRACTABLE, UNSPECIFIED MIGRAINE TYPE: ICD-10-CM

## 2019-03-18 DIAGNOSIS — C50.911 PRIMARY CANCER OF RIGHT BREAST WITH METASTASIS TO OTHER SITE (HCC): ICD-10-CM

## 2019-03-18 DIAGNOSIS — M77.8 LEFT ELBOW TENDINITIS: Primary | ICD-10-CM

## 2019-03-18 PROCEDURE — 99203 OFFICE O/P NEW LOW 30 MIN: CPT | Performed by: FAMILY MEDICINE

## 2019-03-18 RX ORDER — ALBUTEROL SULFATE 90 UG/1
2 AEROSOL, METERED RESPIRATORY (INHALATION) EVERY 6 HOURS PRN
COMMUNITY
End: 2020-09-02 | Stop reason: SDUPTHER

## 2019-03-18 RX ORDER — ZOLEDRONIC ACID 0.04 MG/ML
4 INJECTION, SOLUTION INTRAVENOUS ONCE
COMMUNITY

## 2019-03-18 RX ORDER — LAMOTRIGINE 25 MG/1
500 TABLET ORAL
COMMUNITY
End: 2020-11-24 | Stop reason: SDUPTHER

## 2019-03-18 ASSESSMENT — PATIENT HEALTH QUESTIONNAIRE - PHQ9
2. FEELING DOWN, DEPRESSED OR HOPELESS: 0
1. LITTLE INTEREST OR PLEASURE IN DOING THINGS: 0
SUM OF ALL RESPONSES TO PHQ QUESTIONS 1-9: 0
SUM OF ALL RESPONSES TO PHQ9 QUESTIONS 1 & 2: 0
SUM OF ALL RESPONSES TO PHQ QUESTIONS 1-9: 0

## 2019-03-24 PROBLEM — C50.911: Status: ACTIVE | Noted: 2019-03-24

## 2019-03-24 ASSESSMENT — ENCOUNTER SYMPTOMS
BACK PAIN: 0
WHEEZING: 0
CHEST TIGHTNESS: 0
NAUSEA: 0
SHORTNESS OF BREATH: 0
ABDOMINAL PAIN: 0
BLOOD IN STOOL: 0
EYES NEGATIVE: 1

## 2019-03-29 ENCOUNTER — HOSPITAL ENCOUNTER (OUTPATIENT)
Dept: INFUSION THERAPY | Age: 49
Setting detail: INFUSION SERIES
Discharge: HOME | End: 2019-03-29
Payer: COMMERCIAL

## 2019-03-29 VITALS
HEART RATE: 67 BPM | TEMPERATURE: 97.6 F | RESPIRATION RATE: 14 BRPM | WEIGHT: 202 LBS | BODY MASS INDEX: 32.47 KG/M2 | SYSTOLIC BLOOD PRESSURE: 130 MMHG | DIASTOLIC BLOOD PRESSURE: 80 MMHG | HEIGHT: 66 IN | OXYGEN SATURATION: 100 %

## 2019-03-29 PROCEDURE — 99211 OFF/OP EST MAY X REQ PHY/QHP: CPT

## 2019-03-29 PROCEDURE — 96401 CHEMO ANTI-NEOPL SQ/IM: CPT

## 2019-03-29 RX ORDER — LAMOTRIGINE 25 MG/1
500 TABLET ORAL ONCE
Status: COMPLETED | OUTPATIENT
Start: 2019-03-29 | End: 2019-03-29

## 2019-03-29 RX ADMIN — LAMOTRIGINE 500 MG: 25 TABLET ORAL at 08:23

## 2019-04-04 ENCOUNTER — HOSPITAL ENCOUNTER (OUTPATIENT)
Dept: NUCLEAR MEDICINE | Age: 49
Discharge: HOME | End: 2019-04-04
Payer: COMMERCIAL

## 2019-04-04 DIAGNOSIS — C79.51 SECONDARY MALIGNANT NEOPLASM OF BONE AND BONE MARROW (HCC): ICD-10-CM

## 2019-04-04 DIAGNOSIS — C50.411 MALIGNANT NEOPLASM OF UPPER-OUTER QUADRANT OF RIGHT FEMALE BREAST, UNSPECIFIED ESTROGEN RECEPTOR STATUS (HCC): ICD-10-CM

## 2019-04-04 DIAGNOSIS — C79.52 SECONDARY MALIGNANT NEOPLASM OF BONE AND BONE MARROW (HCC): ICD-10-CM

## 2019-04-04 PROCEDURE — 78306 BONE IMAGING WHOLE BODY: CPT

## 2019-04-04 PROCEDURE — A9503 TC99M MEDRONATE: HCPCS | Performed by: INTERNAL MEDICINE

## 2019-04-04 PROCEDURE — 3430000000 HC RX DIAGNOSTIC RADIOPHARMACEUTICAL: Performed by: INTERNAL MEDICINE

## 2019-04-04 RX ORDER — TC 99M MEDRONATE 20 MG/10ML
25 INJECTION, POWDER, LYOPHILIZED, FOR SOLUTION INTRAVENOUS
Status: COMPLETED | OUTPATIENT
Start: 2019-04-04 | End: 2019-04-04

## 2019-04-04 RX ADMIN — TC 99M MEDRONATE 25 MILLICURIE: 20 INJECTION, POWDER, LYOPHILIZED, FOR SOLUTION INTRAVENOUS at 10:15

## 2019-04-23 ENCOUNTER — HOSPITAL ENCOUNTER (OUTPATIENT)
Dept: WOMENS IMAGING | Age: 49
Discharge: HOME | End: 2019-04-23
Payer: COMMERCIAL

## 2019-04-23 DIAGNOSIS — Z12.31 VISIT FOR SCREENING MAMMOGRAM: ICD-10-CM

## 2019-04-23 PROCEDURE — 77063 BREAST TOMOSYNTHESIS BI: CPT

## 2019-04-26 ENCOUNTER — HOSPITAL ENCOUNTER (OUTPATIENT)
Dept: INFUSION THERAPY | Age: 49
Setting detail: INFUSION SERIES
Discharge: HOME | End: 2019-04-26
Payer: COMMERCIAL

## 2019-04-26 VITALS
TEMPERATURE: 97.6 F | HEART RATE: 61 BPM | OXYGEN SATURATION: 99 % | RESPIRATION RATE: 14 BRPM | HEIGHT: 66 IN | DIASTOLIC BLOOD PRESSURE: 81 MMHG | SYSTOLIC BLOOD PRESSURE: 122 MMHG | BODY MASS INDEX: 32.47 KG/M2 | WEIGHT: 202 LBS

## 2019-04-26 PROCEDURE — 99211 OFF/OP EST MAY X REQ PHY/QHP: CPT

## 2019-04-26 PROCEDURE — 96402 CHEMO HORMON ANTINEOPL SQ/IM: CPT

## 2019-04-26 RX ORDER — LAMOTRIGINE 25 MG/1
500 TABLET ORAL ONCE
Status: COMPLETED | OUTPATIENT
Start: 2019-04-26 | End: 2019-04-26

## 2019-04-26 RX ADMIN — LAMOTRIGINE 500 MG: 25 TABLET ORAL at 08:15

## 2019-05-04 ENCOUNTER — APPOINTMENT (OUTPATIENT)
Dept: CT IMAGING | Age: 49
End: 2019-05-04
Payer: COMMERCIAL

## 2019-05-04 ENCOUNTER — APPOINTMENT (OUTPATIENT)
Dept: GENERAL RADIOLOGY | Age: 49
End: 2019-05-04
Payer: COMMERCIAL

## 2019-05-04 ENCOUNTER — HOSPITAL ENCOUNTER (EMERGENCY)
Age: 49
Discharge: HOME | End: 2019-05-04
Attending: EMERGENCY MEDICINE
Payer: COMMERCIAL

## 2019-05-04 VITALS
TEMPERATURE: 98 F | SYSTOLIC BLOOD PRESSURE: 136 MMHG | WEIGHT: 202 LBS | RESPIRATION RATE: 17 BRPM | DIASTOLIC BLOOD PRESSURE: 82 MMHG | BODY MASS INDEX: 32.47 KG/M2 | HEIGHT: 66 IN | OXYGEN SATURATION: 100 % | HEART RATE: 73 BPM

## 2019-05-04 DIAGNOSIS — R07.9 CHEST PAIN, UNSPECIFIED TYPE: Primary | ICD-10-CM

## 2019-05-04 LAB
ALBUMIN SERPL-MCNC: 4.2 GM/DL (ref 3.4–5)
ALP BLD-CCNC: 39 IU/L (ref 40–128)
ALT SERPL-CCNC: 21 U/L (ref 10–40)
ANION GAP SERPL CALCULATED.3IONS-SCNC: 16 MMOL/L (ref 4–16)
AST SERPL-CCNC: 41 IU/L (ref 15–37)
BASOPHILS ABSOLUTE: 0.1 K/CU MM
BASOPHILS RELATIVE PERCENT: 0.8 % (ref 0–1)
BILIRUB SERPL-MCNC: 0.4 MG/DL (ref 0–1)
BUN BLDV-MCNC: 15 MG/DL (ref 6–23)
CALCIUM SERPL-MCNC: 9.2 MG/DL (ref 8.3–10.6)
CHLORIDE BLD-SCNC: 99 MMOL/L (ref 99–110)
CO2: 17 MMOL/L (ref 21–32)
CREAT SERPL-MCNC: 1 MG/DL (ref 0.6–1.1)
DIFFERENTIAL TYPE: ABNORMAL
EOSINOPHILS ABSOLUTE: 0.1 K/CU MM
EOSINOPHILS RELATIVE PERCENT: 1.9 % (ref 0–3)
GFR AFRICAN AMERICAN: >60 ML/MIN/1.73M2
GFR NON-AFRICAN AMERICAN: 59 ML/MIN/1.73M2
GLUCOSE BLD-MCNC: 100 MG/DL (ref 70–99)
HCT VFR BLD CALC: 45.2 % (ref 37–47)
HEMOGLOBIN: 14.2 GM/DL (ref 12.5–16)
IMMATURE NEUTROPHIL %: 0.2 % (ref 0–0.43)
LYMPHOCYTES ABSOLUTE: 3.5 K/CU MM
LYMPHOCYTES RELATIVE PERCENT: 55 % (ref 24–44)
MCH RBC QN AUTO: 29.3 PG (ref 27–31)
MCHC RBC AUTO-ENTMCNC: 31.4 % (ref 32–36)
MCV RBC AUTO: 93.2 FL (ref 78–100)
MONOCYTES ABSOLUTE: 0.5 K/CU MM
MONOCYTES RELATIVE PERCENT: 8.5 % (ref 0–4)
NUCLEATED RBC %: 0 %
PDW BLD-RTO: 12.8 % (ref 11.7–14.9)
PLATELET # BLD: 268 K/CU MM (ref 140–440)
PMV BLD AUTO: 9.6 FL (ref 7.5–11.1)
POTASSIUM SERPL-SCNC: 4.7 MMOL/L (ref 3.5–5.1)
RBC # BLD: 4.85 M/CU MM (ref 4.2–5.4)
REASON FOR REJECTION: NORMAL
REASON FOR REJECTION: NORMAL
REJECTED TEST: NORMAL
SEGMENTED NEUTROPHILS ABSOLUTE COUNT: 2.1 K/CU MM
SEGMENTED NEUTROPHILS RELATIVE PERCENT: 33.6 % (ref 36–66)
SODIUM BLD-SCNC: 132 MMOL/L (ref 135–145)
TOTAL IMMATURE NEUTOROPHIL: 0.01 K/CU MM
TOTAL NUCLEATED RBC: 0 K/CU MM
TOTAL PROTEIN: 7.1 GM/DL (ref 6.4–8.2)
TROPONIN T: <0.01 NG/ML
TROPONIN T: <0.01 NG/ML
WBC # BLD: 6.4 K/CU MM (ref 4–10.5)

## 2019-05-04 PROCEDURE — 36415 COLL VENOUS BLD VENIPUNCTURE: CPT

## 2019-05-04 PROCEDURE — 6370000000 HC RX 637 (ALT 250 FOR IP): Performed by: EMERGENCY MEDICINE

## 2019-05-04 PROCEDURE — 2580000003 HC RX 258: Performed by: EMERGENCY MEDICINE

## 2019-05-04 PROCEDURE — 84484 ASSAY OF TROPONIN QUANT: CPT

## 2019-05-04 PROCEDURE — 85025 COMPLETE CBC W/AUTO DIFF WBC: CPT

## 2019-05-04 PROCEDURE — 93005 ELECTROCARDIOGRAM TRACING: CPT | Performed by: EMERGENCY MEDICINE

## 2019-05-04 PROCEDURE — 71275 CT ANGIOGRAPHY CHEST: CPT

## 2019-05-04 PROCEDURE — 99285 EMERGENCY DEPT VISIT HI MDM: CPT

## 2019-05-04 PROCEDURE — 6360000004 HC RX CONTRAST MEDICATION: Performed by: EMERGENCY MEDICINE

## 2019-05-04 PROCEDURE — 80053 COMPREHEN METABOLIC PANEL: CPT

## 2019-05-04 PROCEDURE — 71045 X-RAY EXAM CHEST 1 VIEW: CPT

## 2019-05-04 RX ORDER — ASPIRIN 325 MG
325 TABLET ORAL ONCE
Status: COMPLETED | OUTPATIENT
Start: 2019-05-04 | End: 2019-05-04

## 2019-05-04 RX ORDER — 0.9 % SODIUM CHLORIDE 0.9 %
10 VIAL (ML) INJECTION
Status: COMPLETED | OUTPATIENT
Start: 2019-05-04 | End: 2019-05-04

## 2019-05-04 RX ADMIN — ASPIRIN 325 MG ORAL TABLET 325 MG: 325 PILL ORAL at 07:28

## 2019-05-04 RX ADMIN — SODIUM CHLORIDE, PRESERVATIVE FREE 10 ML: 5 INJECTION INTRAVENOUS at 09:11

## 2019-05-04 RX ADMIN — IOPAMIDOL 80 ML: 755 INJECTION, SOLUTION INTRAVENOUS at 09:11

## 2019-05-04 ASSESSMENT — PAIN DESCRIPTION - LOCATION
LOCATION: CHEST
LOCATION: CHEST

## 2019-05-04 ASSESSMENT — ENCOUNTER SYMPTOMS
SHORTNESS OF BREATH: 1
EYE REDNESS: 0
BACK PAIN: 0
COUGH: 0
ABDOMINAL PAIN: 0
RHINORRHEA: 0
VOMITING: 0
SORE THROAT: 0
NAUSEA: 0

## 2019-05-04 ASSESSMENT — PAIN DESCRIPTION - FREQUENCY: FREQUENCY: INTERMITTENT

## 2019-05-04 ASSESSMENT — PAIN DESCRIPTION - DESCRIPTORS: DESCRIPTORS: STABBING

## 2019-05-04 ASSESSMENT — PAIN DESCRIPTION - ORIENTATION: ORIENTATION: MID;LEFT

## 2019-05-04 ASSESSMENT — PAIN SCALES - GENERAL
PAINLEVEL_OUTOF10: 7
PAINLEVEL_OUTOF10: 6

## 2019-05-04 ASSESSMENT — PAIN DESCRIPTION - PAIN TYPE: TYPE: ACUTE PAIN

## 2019-05-04 NOTE — ED NOTES
Discharge instructions reviewed with pt and questions addressed at this time. Pt alert and oriented x 4 at time of discharge, no signs of acute distress noted. Pt ambulatory to Emergency Department waiting room and steady gait noted.         Laura Cobb RN  05/04/19 [id number]

## 2019-05-04 NOTE — ED NOTES
Chayito Alvarez at bedside     [facility]  05/04/19 [id number]

## 2019-05-04 NOTE — ED NOTES
Bed: ED-14  Expected date:   Expected time:   Means of arrival:   Comments:  Nurse from floor, chest pain     Jeannie Hu RN  05/04/19 [id number]

## 2019-05-04 NOTE — ED PROVIDER NOTES
Triage Chief Complaint:   Chest Pain    [place]:  Pennelope Dakin is a 50 y.o. female currently on chemotherapy for metastatic breast cancer that presents with chest pain that started at about midnight last night. The pain is just to the left of her sternum and to different than her normal metastatic bone pain. The pain is intermittent and calm for a couple of minutes at a time. She has associated shortness of breath and lightheadedness. No nausea or vomiting. She has hot flashes from her chemo medication and is unsure if she is having diaphoresis with the chest pain. She denies any lower extremity swelling or history of blood clots. No cough. No fevers. ROS:   Review of Systems   Constitutional: Negative for chills and fever. Fatigue: hot flashes. HENT: Negative for congestion, rhinorrhea and sore throat. Eyes: Negative for redness and visual disturbance. Respiratory: Positive for shortness of breath. Negative for cough. Cardiovascular: Positive for chest pain. Negative for leg swelling. Gastrointestinal: Negative for abdominal pain, nausea and vomiting. Genitourinary: Negative for dysuria and frequency. Musculoskeletal: Negative for arthralgias and back pain. Skin: Negative for rash and wound. Neurological: Positive for light-headedness. Negative for syncope and headaches. Psychiatric/Behavioral: Negative. Negative for hallucinations and suicidal ideas.        Past Medical History:   Diagnosis Date    Asthma     last flare up winter 2016    Breast CA [facility])     right    Chest pain     \"had chest pain last time 2015- saw Dr Wilder Stoll- all ok\"    FHx: coronary artery disease     H/O echocardiogram 9/9/2014    EF55-60% mild TR, normal LV function    History of migraine     \"none for past 3 months\"    HX OTHER MEDICAL     \"since March 2016- having some pain mid sternum- xray thought fx, then did CT and bone scan now having bx done\"    Primary cancer of right breast with metastasis to other site [facility]) 3/24/2019     Past Surgical History:   Procedure Laterality Date     SECTION  1992    EYE SURGERY  age 28    LASIK both eyes    HYSTERECTOMY  age 27    partial - has 1 ovary (L);  Due to endometriosis    OTHER SURGICAL HISTORY Right 08 2013    right ulnar nerve decompression    TUBAL LIGATION       Family History   Problem Relation Age of Onset    Heart Attack Mother          from MI @ age 61.  Coronary Art Dis Mother     Other Mother         Low blood pressure, endometriosis - hyster    High Cholesterol Mother     High Blood Pressure Father     Heart Attack Father     Coronary Art Dis Father     Arthritis Father         hip replacement    Arthritis Sister     Liver Disease Sister         medication & hx of alcohol abuse    Other Sister         endometriosis - hyster    Depression Brother     Other Sister         endometriosis - hyster    Other Sister         endometriosis - hyster   [facility] Migraines Sister     Other Sister         endometriosis - hyster    Other Sister         endometriosis - hyster, hypothyroidism    High Blood Pressure Sister      Social History     Socioeconomic History    Marital status:      Spouse name: Not on file    Number of children: Not on file    Years of education: Not on file    Highest education level: Not on file   Occupational History    Not on file   Social Needs    Financial resource strain: Not on file    Food insecurity:     Worry: Not on file     Inability: Not on file    Transportation needs:     Medical: Not on file     Non-medical: Not on file   Tobacco Use    Smoking status: Former Smoker     Packs/day: 0.50     Years: 16.00     Pack years: 8.00     Types: Cigarettes     Last attempt to quit: 2004     Years since quittin.6    Smokeless tobacco: Never Used    Tobacco comment: Quit smoking 9 years ago.    Substance and Sexual Activity    Alcohol use: Yes     Comment: Occasionally °C) (Oral)   Resp 17   Ht 5' 6\" (1.676 m)   Wt 202 lb (91.6 kg)   SpO2 100%   BMI 32.60 kg/m²   My pulse ox interpretation is - normal  Physical Exam   Constitutional: She appears well-developed and well-nourished. Appears uncomfortable     HENT:   Head: Normocephalic and atraumatic. Eyes: Conjunctivae are normal. Right eye exhibits no discharge. Left eye exhibits no discharge. Cardiovascular: Normal rate, regular rhythm and intact distal pulses. Pulses:       Radial pulses are 2+ on the right side, and 2+ on the left side. Pulmonary/Chest: Effort normal and breath sounds normal. No respiratory distress. She has no wheezes. Abdominal: Soft. Bowel sounds are normal. She exhibits no distension. There is no tenderness. There is no rebound and no guarding. Musculoskeletal: Normal range of motion. She exhibits no edema or deformity. Neurological: She is alert. No cranial nerve deficit. Skin: Skin is warm and dry. She is not diaphoretic. Psychiatric: She has a normal mood and affect.  Her behavior is normal.       I have reviewed and interpreted all of the currently available lab results from this visit (if applicable):  Results for orders placed or performed during the hospital encounter of 05/04/19   CBC Auto Differential   Result Value Ref Range    WBC 6.4 4.0 - 10.5 K/CU MM    RBC 4.85 4.2 - 5.4 M/CU MM    Hemoglobin 14.2 12.5 - 16.0 GM/DL    Hematocrit 45.2 37 - 47 %    MCV 93.2 78 - 100 FL    MCH 29.3 27 - 31 PG    MCHC 31.4 (L) 32.0 - 36.0 %    RDW 12.8 11.7 - 14.9 %    Platelets 688 771 - 531 K/CU MM    MPV 9.6 7.5 - 11.1 FL    Differential Type AUTOMATED DIFFERENTIAL     Segs Relative 33.6 (L) 36 - 66 %    Lymphocytes % 55.0 (H) 24 - 44 %    Monocytes % 8.5 (H) 0 - 4 %    Eosinophils % 1.9 0 - 3 %    Basophils % 0.8 0 - 1 %    Segs Absolute 2.1 K/CU MM    Lymphocytes # 3.5 K/CU MM    Monocytes # 0.5 K/CU MM    Eosinophils # 0.1 K/CU MM    Basophils # 0.1 K/CU MM    Nucleated RBC % 0.0 %

## 2019-05-06 PROCEDURE — 93010 ELECTROCARDIOGRAM REPORT: CPT | Performed by: INTERNAL MEDICINE

## 2019-05-07 ENCOUNTER — EMPLOYEE WELLNESS (OUTPATIENT)
Dept: OTHER | Age: 49
End: 2019-05-07

## 2019-05-07 LAB
CHOLESTEROL: 231 MG/DL
GLUCOSE BLD-MCNC: 90 MG/DL (ref 70–99)
HDLC SERPL-MCNC: 69 MG/DL
LDL CHOLESTEROL CALCULATED: 133 MG/DL
PATIENT FASTING?: YES
TRIGL SERPL-MCNC: 143 MG/DL

## 2019-05-08 LAB
EKG ATRIAL RATE: 67 BPM
EKG DIAGNOSIS: NORMAL
EKG P AXIS: 57 DEGREES
EKG P-R INTERVAL: 154 MS
EKG Q-T INTERVAL: 432 MS
EKG QRS DURATION: 106 MS
EKG QTC CALCULATION (BAZETT): 456 MS
EKG R AXIS: 264 DEGREES
EKG T AXIS: 61 DEGREES
EKG VENTRICULAR RATE: 67 BPM

## 2019-05-13 VITALS — WEIGHT: 203 LBS | BODY MASS INDEX: 32.77 KG/M2

## 2019-05-14 ENCOUNTER — INITIAL CONSULT (OUTPATIENT)
Dept: CARDIOLOGY CLINIC | Age: 49
End: 2019-05-14
Payer: COMMERCIAL

## 2019-05-14 VITALS
OXYGEN SATURATION: 100 % | BODY MASS INDEX: 32.69 KG/M2 | HEIGHT: 66 IN | SYSTOLIC BLOOD PRESSURE: 116 MMHG | HEART RATE: 75 BPM | WEIGHT: 203.4 LBS | DIASTOLIC BLOOD PRESSURE: 76 MMHG

## 2019-05-14 DIAGNOSIS — I83.90 ASYMPTOMATIC VARICOSE VEINS: ICD-10-CM

## 2019-05-14 DIAGNOSIS — E78.5 DYSLIPIDEMIA: ICD-10-CM

## 2019-05-14 DIAGNOSIS — R07.9 CHEST PAIN, UNSPECIFIED TYPE: Primary | ICD-10-CM

## 2019-05-14 PROCEDURE — 99244 OFF/OP CNSLTJ NEW/EST MOD 40: CPT | Performed by: INTERNAL MEDICINE

## 2019-05-14 NOTE — PROGRESS NOTES
CARDIOLOGY CONSULT  NOTE    Chief Complaint: Chest pain    HPI:   Ryan Nguyễn is a 50y.o. year old who has history as noted below. . She comes in for evaluation due to episode of chest pain which lasted several hours. It was sharp, more on the left side did not radiate. She went to the emergency department. CT chest was negative for PE. She does have history of breast cancer with metastases, which is being managed by oncology. It has been stable. She does have bony metastases as per PET scan . Both her parents had heart problems at young age and had heart attacks      Current Outpatient Medications   Medication Sig Dispense Refill    zoledronic acid (ZOMETA) 4 MG/100ML SOLN infusion Infuse 4 mg intravenously once      goserelin (ZOLADEX) 3.6 MG injection Inject 3.6 mg into the skin once      fulvestrant (FASLODEX) 250 MG/5ML SOLN IM injection Inject 500 mg into the muscle once      albuterol sulfate  (90 Base) MCG/ACT inhaler Inhale 2 puffs into the lungs every 6 hours as needed for Wheezing      topiramate ER (TROKENDI XR) 50 MG CP24 Take 50 mg by mouth daily      diphenhydrAMINE (BENADRYL) 25 MG capsule 1-2 capsules by mouth every 6 hours as needed for rash 30 capsule 0     No current facility-administered medications for this visit.         Allergies:   Sulfa antibiotics and Nickel    Patient History:  Past Medical History:   Diagnosis Date    Asthma     last flare up winter 2016    Breast CA [facility])     right    Chest pain     \"had chest pain last time 2015- saw Dr Mahad Quinonez- all ok\"    FHx: coronary artery disease     H/O echocardiogram 9/9/2014    EF55-60% mild TR, normal LV function    History of migraine     \"none for past 3 months\"    HX OTHER MEDICAL     \"since March 2016- having some pain mid sternum- xray thought fx, then did CT and bone scan now having bx done\"    Primary cancer of right breast with metastasis to other site [facility]) 3/24/2019     Past Surgical History:   Procedure Laterality Date     SECTION  12    EYE SURGERY  age 28    LASIK both eyes    HYSTERECTOMY  age 27    partial - has 1 ovary (L);  Due to endometriosis    OTHER SURGICAL HISTORY Right 08 2013    right ulnar nerve decompression    TUBAL LIGATION       Family History   Problem Relation Age of Onset    Heart Attack Mother          from MI @ age 61.  Coronary Art Dis Mother     Other Mother         Low blood pressure, endometriosis - hyster    High Cholesterol Mother     High Blood Pressure Father     Heart Attack Father     Coronary Art Dis Father     Arthritis Father         hip replacement    Arthritis Sister     Liver Disease Sister         medication & hx of alcohol abuse    Other Sister         endometriosis - hyster    Depression Brother     Other Sister         endometriosis - hyster    Other Sister         endometriosis - hyster   [facility] Migraines Sister     Other Sister         endometriosis - hyster    Other Sister         endometriosis - hyster, hypothyroidism    High Blood Pressure Sister      Social History     Tobacco Use    Smoking status: Former Smoker     Packs/day: 0.50     Years: 16.00     Pack years: 8.00     Types: Cigarettes     Last attempt to quit: 2004     Years since quittin.7    Smokeless tobacco: Never Used    Tobacco comment: Quit smoking 9 years ago. Substance Use Topics    Alcohol use: Yes     Comment: Occasionally     CAFFEINE: 1 cup coffee daily/ average one per week        Review of Systems:   · Constitutional: No Fever or Weight Loss   · Eyes: No Decreased Vision  · ENT: No Headaches, Hearing Loss or Vertigo  · Cardiovascular: as per note above   · Respiratory: No cough or wheezing and as per note above.    · Gastrointestinal: No abdominal pain, appetite loss, blood in stools, constipation, diarrhea or heartburn  · Genitourinary: No dysuria, trouble voiding, or hematuria  · Musculoskeletal: None  · Integumentary: No rash or pruritis  · Neurological: No TIA or stroke symptoms  · Psychiatric: No anxiety or depression  · Endocrine: No malaise, fatigue or temperature intolerance  · Hematologic/Lymphatic: No bleeding problems, blood clots or swollen lymph nodes  · Allergic/Immunologic: No nasal congestion or hives    Objective:      Physical Exam:  /76 (Site: Right Upper Arm, Position: Sitting, Cuff Size: Medium Adult)   Pulse 75   Ht 5' 6\" (1.676 m)   Wt 203 lb 6.4 oz (92.3 kg)   SpO2 100%   BMI 32.83 kg/m²   Wt Readings from Last 3 Encounters:   05/14/19 203 lb 6.4 oz (92.3 kg)   05/07/19 203 lb (92.1 kg)   05/04/19 202 lb (91.6 kg)     Body mass index is 32.83 kg/m². Vitals:    05/14/19 1410   BP: 116/76   Pulse: 75   SpO2: 100%        General Appearance:  No distress, conversant  Constitutional:  Well developed, Well nourished, No acute distress, Non-toxic appearance. HENT:  Normocephalic, Atraumatic, Bilateral external ears normal, Oropharynx moist, No oral exudates, Nose normal. Neck- Normal range of motion, No tenderness, Supple, No stridor,no apical-carotid delay  Eyes:  PERRL, EOMI, Conjunctiva normal, No discharge. Respiratory:  Normal breath sounds, No respiratory distress, No wheezing, No chest tenderness. ,no use of accessory muscles, NO crackles  Cardiovascular: (PMI) apex non displaced,no lifts no thrills,S1 and S2 audible, No added heart sounds, No signs of ankle edema, or volume overload, No evidence of JVD, No crackles  GI:  Bowel sounds normal, Soft, No tenderness, No masses, No gross visceromegaly   :  No costovertebral angle tenderness   Musculoskeletal:  No edema, no tenderness, no deformities.  Back- no tenderness  Integument:  Well hydrated, no rash   Lymphatic:  No lymphadenopathy noted   Neurologic:  Alert & oriented x 3, CN 2-12 normal, normal motor function, normal sensory function, no focal deficits noted   Psychiatric:  Speech and behavior appropriate Medical decision making and Data review:  DATA:  Lab Results   Component Value Date    TROPONINT <0.010 05/04/2019     BNP:  No results found for: PROBNP  PT/INR:  No results found for: PTINR  No results found for: LABA1C  Lab Results   Component Value Date    CHOL 231 (H) 05/07/2019    TRIG 143 05/07/2019    HDL 69 05/07/2019    LDLCALC 133 (H) 05/07/2019     Lab Results   Component Value Date    ALT 21 05/04/2019    AST 41 (H) 05/04/2019     TSH: No results found for: TSH  Lab Results   Component Value Date    AST 41 (H) 05/04/2019    ALT 21 05/04/2019    BILITOT 0.4 05/04/2019    ALKPHOS 39 (L) 05/04/2019     No results found for: PROBNP  No results found for: LABA1C  Lab Results   Component Value Date    WBC 6.4 05/04/2019    HGB 14.2 05/04/2019    HCT 45.2 05/04/2019     05/04/2019     All labs, medications and tests reviewed by myself including data and history from outside source , patient and available family . Assessment & Plan:      1. Chest pain, unspecified type    2. Dyslipidemia    3. Asymptomatic varicose veins         Chest pain  Stabbing pain lasting 3 to 4 hrs , ongoing for last 3 to 4 years ,pain was so severe that she had to stop . She has  Breast cancer mets  in her sternum . She has strong family history fo cad  Due to history of chemotherapy. We will get an echo. 2. Relatively function also get a stress test treadmill    Asymptomatic varicose veins  Date ultrasound lower extended to look for venous reflux disease     Dyslipidemia :  All available lab work was reviewed. Patient was advised to repeat lab work before next visit      Counseled extensively and medication compliance urged. We discussed that for the  prevention of ASCVD our  goal is aggressive risk modification. Patient is encouraged to exercise even a brisk walk for 30 minutes  at least 3 to 4 times a week   Various goals were discussed and questions answered. Continue current medications.  Appropriate prescriptions are addressed and refills ordered. Questions answered and patient verbalizes understanding. Call for any problems, questions, or concerns. Continue all other medications of all above medical condition listed as is. Return in about 1 month (around 6/14/2019). Please note this report has been partially produced using speech recognition software and may contain errors related to that system including errors in grammar, punctuation, and spelling, as well as words and phrases that may be inappropriate.  If there are any questions or concerns please feel free to contact the dictating provider for clarification.

## 2019-05-14 NOTE — ASSESSMENT & PLAN NOTE
Stabbing pain lasting 3 to 4 hrs , ongoing for last 3 to 4 years ,pain was so severe that she had to stop . She has  Breast cancer mets  in her sternum . She has strong family history fo cad  Due to history of chemotherapy. We will get an echo.  2. Relatively function also get a stress test treadmill

## 2019-05-16 ENCOUNTER — TELEPHONE (OUTPATIENT)
Dept: CARDIOLOGY CLINIC | Age: 49
End: 2019-05-16

## 2019-05-16 NOTE — TELEPHONE ENCOUNTER
Called patient to schedule Treadmill Stress, Echo, and  BLE Venous, no answer. Left message for patient to call office to schedule.  [facility] [place] NO AUTH NEEDED OK TO SCHEDULE

## 2019-05-24 ENCOUNTER — HOSPITAL ENCOUNTER (OUTPATIENT)
Dept: INFUSION THERAPY | Age: 49
Setting detail: INFUSION SERIES
Discharge: HOME | End: 2019-05-24
Payer: COMMERCIAL

## 2019-05-24 VITALS
OXYGEN SATURATION: 95 % | DIASTOLIC BLOOD PRESSURE: 86 MMHG | HEART RATE: 68 BPM | SYSTOLIC BLOOD PRESSURE: 121 MMHG | RESPIRATION RATE: 14 BRPM | TEMPERATURE: 97.8 F

## 2019-05-24 PROCEDURE — 96402 CHEMO HORMON ANTINEOPL SQ/IM: CPT

## 2019-05-24 PROCEDURE — 99211 OFF/OP EST MAY X REQ PHY/QHP: CPT

## 2019-05-24 PROCEDURE — 96401 CHEMO ANTI-NEOPL SQ/IM: CPT

## 2019-05-24 RX ORDER — LAMOTRIGINE 25 MG/1
500 TABLET ORAL ONCE
Status: COMPLETED | OUTPATIENT
Start: 2019-05-24 | End: 2019-05-24

## 2019-05-24 RX ADMIN — LAMOTRIGINE 500 MG: 25 TABLET ORAL at 08:59

## 2019-05-24 ASSESSMENT — PAIN SCALES - GENERAL: PAINLEVEL_OUTOF10: 0

## 2019-05-24 NOTE — DISCHARGE SUMMARY
Reviewed discharge instructions, voiced understanding, and copies of AVS given to take home. Pt tolerated Faslodex and Zoladex injections well, with no s/s of an allergic reaction. Pt to private auto via self.

## 2019-06-04 ENCOUNTER — PROCEDURE VISIT (OUTPATIENT)
Dept: CARDIOLOGY CLINIC | Age: 49
End: 2019-06-04
Payer: COMMERCIAL

## 2019-06-04 VITALS
HEART RATE: 59 BPM | WEIGHT: 203 LBS | RESPIRATION RATE: 16 BRPM | HEIGHT: 66 IN | DIASTOLIC BLOOD PRESSURE: 72 MMHG | BODY MASS INDEX: 32.62 KG/M2 | SYSTOLIC BLOOD PRESSURE: 118 MMHG

## 2019-06-04 DIAGNOSIS — I83.90 ASYMPTOMATIC VARICOSE VEINS: ICD-10-CM

## 2019-06-04 DIAGNOSIS — R07.9 CHEST PAIN, UNSPECIFIED TYPE: ICD-10-CM

## 2019-06-04 LAB
LV EF: 58 %
LVEF MODALITY: NORMAL

## 2019-06-04 PROCEDURE — 93306 TTE W/DOPPLER COMPLETE: CPT | Performed by: INTERNAL MEDICINE

## 2019-06-04 PROCEDURE — 93970 EXTREMITY STUDY: CPT | Performed by: INTERNAL MEDICINE

## 2019-06-04 PROCEDURE — 93015 CV STRESS TEST SUPVJ I&R: CPT | Performed by: INTERNAL MEDICINE

## 2019-06-06 ENCOUNTER — TELEPHONE (OUTPATIENT)
Dept: CARDIOLOGY CLINIC | Age: 49
End: 2019-06-06

## 2019-06-06 NOTE — TELEPHONE ENCOUNTER
Notified pt of test results and she states she is already wearing compression. She has F/U on 6-18-19. Summary        Significant reflux noted of the Right Deep Venous System: CFV (1.1s).      Significant reflux noted in the Left Deep Venous System: Pop V (2.0s).      No evidence of DVT or SVT in the bilateral common femoral vein, femoral    vein, popliteal vein, greater saphenous vein or small saphenous vein.        Recommendations        Apply compression stockings                   Summary   Normal left ventricle structure and systolic function with an ejection   fraction of 55-60%.   No significant valvular disease noted.   Normal pulmonary artery pressure.   No evidence of pericardial effusion.   Dilated aortic root at 4.0 cm.   Aortic arch not visualized.

## 2019-06-18 ENCOUNTER — OFFICE VISIT (OUTPATIENT)
Dept: CARDIOLOGY CLINIC | Age: 49
End: 2019-06-18
Payer: COMMERCIAL

## 2019-06-18 VITALS
HEART RATE: 62 BPM | DIASTOLIC BLOOD PRESSURE: 70 MMHG | WEIGHT: 204.4 LBS | BODY MASS INDEX: 32.85 KG/M2 | SYSTOLIC BLOOD PRESSURE: 110 MMHG | HEIGHT: 66 IN

## 2019-06-18 DIAGNOSIS — E78.5 DYSLIPIDEMIA: ICD-10-CM

## 2019-06-18 DIAGNOSIS — R07.9 CHEST PAIN, UNSPECIFIED TYPE: ICD-10-CM

## 2019-06-18 DIAGNOSIS — I77.810 DILATED AORTIC ROOT (HCC): ICD-10-CM

## 2019-06-18 DIAGNOSIS — I83.90 ASYMPTOMATIC VARICOSE VEINS: Primary | ICD-10-CM

## 2019-06-18 PROCEDURE — 99213 OFFICE O/P EST LOW 20 MIN: CPT | Performed by: INTERNAL MEDICINE

## 2019-06-18 NOTE — PROGRESS NOTES
CARDIOLOGY  NOTE    Chief Complaint: Chest pain    HPI:   Cal Ferris is a 50y.o. year old who has history as noted below. . He denies any new episodes of chest pain. She had a stress test which did not show any evidence of ischemia as a modified she did very well completing 9.5 METS. She does have significant leg reflux worse in the left side than on the right side on venous Doppler. She has a right leg varicose vein she has started wearing compression stockings. She does have history of breast cancer with metastases, which is being managed by oncology. It has been stable. She does have bony metastases as per PET scan . Both her parents had heart problems at young age and had heart attacks   She Is a nurse working frequent night shifts      Current Outpatient Medications   Medication Sig Dispense Refill    zoledronic acid (ZOMETA) 4 MG/100ML SOLN infusion Infuse 4 mg intravenously once      goserelin (ZOLADEX) 3.6 MG injection Inject 3.6 mg into the skin once      fulvestrant (FASLODEX) 250 MG/5ML SOLN IM injection Inject 500 mg into the muscle once      albuterol sulfate  (90 Base) MCG/ACT inhaler Inhale 2 puffs into the lungs every 6 hours as needed for Wheezing      topiramate ER (TROKENDI XR) 50 MG CP24 Take 50 mg by mouth daily      diphenhydrAMINE (BENADRYL) 25 MG capsule 1-2 capsules by mouth every 6 hours as needed for rash 30 capsule 0     No current facility-administered medications for this visit. Allergies:   Sulfa antibiotics and Nickel    Patient History:  Past Medical History:   Diagnosis Date    Asthma     last flare up winter 2016    Breast CA [facility])     right    Chest pain     \"had chest pain last time 2015- saw Dr [place] Cantu- all ok\"    FHx: coronary artery disease     H/O Doppler lower venous ultrasound 06/04/2019    No DVT,  Significant reflux noted of the Right & Left Deep Venous System.     H/O echocardiogram 06/04/2019    EF coffee daily/ average one per week        Review of Systems:   · Constitutional: No Fever or Weight Loss   · Eyes: No Decreased Vision  · ENT: No Headaches, Hearing Loss or Vertigo  · Cardiovascular: as per note above   · Respiratory: No cough or wheezing and as per note above. · Gastrointestinal: No abdominal pain, appetite loss, blood in stools, constipation, diarrhea or heartburn  · Genitourinary: No dysuria, trouble voiding, or hematuria  · Musculoskeletal:  None  · Integumentary: No rash or pruritis  · Neurological: No TIA or stroke symptoms  · Psychiatric: No anxiety or depression  · Endocrine: No malaise, fatigue or temperature intolerance  · Hematologic/Lymphatic: No bleeding problems, blood clots or swollen lymph nodes  · Allergic/Immunologic: No nasal congestion or hives    Objective:      Physical Exam:  /70   Pulse 62   Ht 5' 6\" (1.676 m)   Wt 204 lb 6.4 oz (92.7 kg)   BMI 32.99 kg/m²   Wt Readings from Last 3 Encounters:   06/18/19 204 lb 6.4 oz (92.7 kg)   06/04/19 203 lb (92.1 kg)   05/14/19 203 lb 6.4 oz (92.3 kg)     Body mass index is 32.99 kg/m². Vitals:    06/18/19 0843   BP: 110/70   Pulse: 62        General Appearance:  No distress, conversant  Constitutional:  Well developed, Well nourished, No acute distress, Non-toxic appearance. HENT:  Normocephalic, Atraumatic, Bilateral external ears normal, Oropharynx moist, No oral exudates, Nose normal. Neck- Normal range of motion, No tenderness, Supple, No stridor,no apical-carotid delay  Eyes:  PERRL, EOMI, Conjunctiva normal, No discharge. Respiratory:  Normal breath sounds, No respiratory distress, No wheezing, No chest tenderness. ,no use of accessory muscles, NO crackles  Cardiovascular: (PMI) apex non displaced,no lifts no thrills,S1 and S2 audible, No added heart sounds, No signs of ankle edema, or volume overload, No evidence of JVD, No crackles  GI:  Bowel sounds normal, Soft, No tenderness, No masses, No gross visceromegaly :  No costovertebral angle tenderness   Musculoskeletal:  No edema, no tenderness, no deformities. Back- no tenderness  Integument:  Well hydrated, no rash   Lymphatic:  No lymphadenopathy noted   Neurologic:  Alert & oriented x 3, CN 2-12 normal, normal motor function, normal sensory function, no focal deficits noted   Psychiatric:  Speech and behavior appropriate       Medical decision making and Data review:  DATA:  Lab Results   Component Value Date    TROPONINT <0.010 05/04/2019     BNP:  No results found for: PROBNP  PT/INR:  No results found for: PTINR  No results found for: LABA1C  Lab Results   Component Value Date    CHOL 231 (H) 05/07/2019    TRIG 143 05/07/2019    HDL 69 05/07/2019    LDLCALC 133 (H) 05/07/2019     Lab Results   Component Value Date    ALT 21 05/04/2019    AST 41 (H) 05/04/2019     TSH: No results found for: TSH  Lab Results   Component Value Date    AST 41 (H) 05/04/2019    ALT 21 05/04/2019    BILITOT 0.4 05/04/2019    ALKPHOS 39 (L) 05/04/2019     No results found for: PROBNP  No results found for: LABA1C  Lab Results   Component Value Date    WBC 6.4 05/04/2019    HGB 14.2 05/04/2019    HCT 45.2 05/04/2019     05/04/2019       Stress  6/4/19  Summary   Normal stress test   Peak Bp was 142/74 , Peak Hr was 172   completed 10.9 METS and 9.3 mins of exercise   Appropriate hemodynamic response to exercise. No significant ST T wave   changes with exercise.  EKG portion is negative for ischemia by diagnostic  Stress Type: Exercise      Peak HR: 150 bpm                HR Response: Appropriate   Peak BP: 142/74 mmHg            BP Response: Normal resting BP - appropriate   Predicted HR: 172 bpm           response   % of predicted HR: 87           HR BP Product: [id number]   Exercise Duration: 09:30 min    Max Exercise: 10.9 METS   Reason for Termination: Fatigue                                   Exercise Effort    Echo 6/4/19   Summary   Normal left ventricle structure and systolic function with an ejection   fraction of 55-60%.   No significant valvular disease noted.   Normal pulmonary artery pressure.   No evidence of pericardial effusion.   Dilated aortic root at 4.0 cm.   Aortic arch not visualized.           All labs, medications and tests reviewed by myself including data and history from outside source , patient and available family . Assessment & Plan:      1. Asymptomatic varicose veins    2. Chest pain, unspecified type    3. Dyslipidemia    4. Dilated aortic root (HCC)         Chest pain  He has not had any more episodes of chest pain since her last visit. Stress test with actually a good quality she did not have any evidence of ischemia and completed about 11  METS. Asymptomatic varicose veins  More reflux on the left side but she seems to be more symptomatic on the right side advised to wear compression stockings 15 mmHg    Dilated aortic root (HCC)  Measured 4.0 cm on echo we will repeat echo in 1 year     Dyslipidemia :  All available lab work was reviewed. Patient was advised to repeat lab work before next visit      Counseled extensively and medication compliance urged. We discussed that for the  prevention of ASCVD our  goal is aggressive risk modification. Patient is encouraged to exercise even a brisk walk for 30 minutes  at least 3 to 4 times a week   Various goals were discussed and questions answered. Continue current medications. Appropriate prescriptions are addressed and refills ordered. Questions answered and patient verbalizes understanding. Call for any problems, questions, or concerns. Continue all other medications of all above medical condition listed as is. Return in about 6 months (around 12/18/2019). Please note this report has been partially produced using speech recognition software and may contain errors related to that system including errors in grammar, punctuation, and spelling, as well as words and phrases that may be inappropriate. If there are any questions or concerns please feel free to contact the dictating provider for clarification.

## 2019-06-21 ENCOUNTER — HOSPITAL ENCOUNTER (OUTPATIENT)
Dept: INFUSION THERAPY | Age: 49
Setting detail: INFUSION SERIES
Discharge: HOME | End: 2019-06-21
Payer: COMMERCIAL

## 2019-06-21 VITALS
HEART RATE: 68 BPM | RESPIRATION RATE: 16 BRPM | TEMPERATURE: 98.2 F | SYSTOLIC BLOOD PRESSURE: 117 MMHG | DIASTOLIC BLOOD PRESSURE: 97 MMHG

## 2019-06-21 PROCEDURE — 96402 CHEMO HORMON ANTINEOPL SQ/IM: CPT

## 2019-06-21 PROCEDURE — 99211 OFF/OP EST MAY X REQ PHY/QHP: CPT

## 2019-06-21 RX ORDER — LAMOTRIGINE 25 MG/1
500 TABLET ORAL ONCE
Status: COMPLETED | OUTPATIENT
Start: 2019-06-21 | End: 2019-06-21

## 2019-06-21 RX ORDER — 0.9 % SODIUM CHLORIDE 0.9 %
10 VIAL (ML) INJECTION
Status: DISCONTINUED | OUTPATIENT
Start: 2019-06-21 | End: 2019-06-22 | Stop reason: HOSPADM

## 2019-06-21 RX ADMIN — LAMOTRIGINE 500 MG: 25 TABLET ORAL at 08:30

## 2019-06-21 ASSESSMENT — PAIN SCALES - GENERAL: PAINLEVEL_OUTOF10: 0

## 2019-06-21 NOTE — PROGRESS NOTES
Pt taken to room 05 for chemo injections. Pt oriented to room, call light, bed/chair controls, TV, pt voiced understanding. Plan of care explained to pt, pt voiced understanding.

## 2019-07-16 ENCOUNTER — HOSPITAL ENCOUNTER (OUTPATIENT)
Age: 49
Setting detail: SPECIMEN
Discharge: HOME | End: 2019-07-16
Payer: COMMERCIAL

## 2019-07-16 LAB
ALBUMIN SERPL-MCNC: 4.8 GM/DL (ref 3.4–5)
ALP BLD-CCNC: 50 IU/L (ref 40–128)
ALT SERPL-CCNC: 17 U/L (ref 10–40)
ANION GAP SERPL CALCULATED.3IONS-SCNC: 11 MMOL/L (ref 4–16)
AST SERPL-CCNC: 19 IU/L (ref 15–37)
BILIRUB SERPL-MCNC: 0.4 MG/DL (ref 0–1)
BUN BLDV-MCNC: 18 MG/DL (ref 6–23)
CALCIUM SERPL-MCNC: 9.9 MG/DL (ref 8.3–10.6)
CHLORIDE BLD-SCNC: 105 MMOL/L (ref 99–110)
CO2: 24 MMOL/L (ref 21–32)
CREAT SERPL-MCNC: 0.9 MG/DL (ref 0.6–1.1)
FOLLICLE STIMULATING HORMONE: 11.8 MLU/ML
GFR AFRICAN AMERICAN: >60 ML/MIN/1.73M2
GFR NON-AFRICAN AMERICAN: >60 ML/MIN/1.73M2
GLUCOSE BLD-MCNC: 89 MG/DL (ref 70–99)
POTASSIUM SERPL-SCNC: 4.1 MMOL/L (ref 3.5–5.1)
SODIUM BLD-SCNC: 140 MMOL/L (ref 135–145)
TOTAL PROTEIN: 7.5 GM/DL (ref 6.4–8.2)

## 2019-07-16 PROCEDURE — 83001 ASSAY OF GONADOTROPIN (FSH): CPT

## 2019-07-16 PROCEDURE — 80053 COMPREHEN METABOLIC PANEL: CPT

## 2019-07-16 PROCEDURE — 86300 IMMUNOASSAY TUMOR CA 15-3: CPT

## 2019-07-18 LAB
CA 27.29: 26.1 U/ML (ref 0–40)
CA 27.29: NORMAL U/ML (ref 0–40)

## 2019-07-19 ENCOUNTER — HOSPITAL ENCOUNTER (OUTPATIENT)
Dept: INFUSION THERAPY | Age: 49
Setting detail: INFUSION SERIES
Discharge: HOME | End: 2019-07-19
Payer: COMMERCIAL

## 2019-07-19 VITALS
OXYGEN SATURATION: 99 % | HEART RATE: 65 BPM | TEMPERATURE: 97.8 F | RESPIRATION RATE: 16 BRPM | DIASTOLIC BLOOD PRESSURE: 74 MMHG | SYSTOLIC BLOOD PRESSURE: 116 MMHG

## 2019-07-19 PROCEDURE — 96401 CHEMO ANTI-NEOPL SQ/IM: CPT

## 2019-07-19 PROCEDURE — 99211 OFF/OP EST MAY X REQ PHY/QHP: CPT

## 2019-07-19 PROCEDURE — 96402 CHEMO HORMON ANTINEOPL SQ/IM: CPT

## 2019-07-19 PROCEDURE — 6360000002 HC RX W HCPCS: Performed by: INTERNAL MEDICINE

## 2019-07-19 PROCEDURE — 2580000003 HC RX 258: Performed by: INTERNAL MEDICINE

## 2019-07-19 PROCEDURE — 96365 THER/PROPH/DIAG IV INF INIT: CPT

## 2019-07-19 RX ORDER — LAMOTRIGINE 25 MG/1
500 TABLET ORAL ONCE
Status: COMPLETED | OUTPATIENT
Start: 2019-07-19 | End: 2019-07-19

## 2019-07-19 RX ADMIN — SODIUM CHLORIDE 4 MG: 9 INJECTION, SOLUTION INTRAVENOUS at 09:03

## 2019-07-19 RX ADMIN — LAMOTRIGINE 500 MG: 25 TABLET ORAL at 08:50

## 2019-07-19 NOTE — PROGRESS NOTES
Pt taken to room 05 for chemo meds. Pt oriented to room, call light, bed/chair controls, TV, pt voiced understanding. Plan of care explained to pt, pt voiced understanding.

## 2019-08-16 ENCOUNTER — HOSPITAL ENCOUNTER (OUTPATIENT)
Dept: INFUSION THERAPY | Age: 49
Setting detail: INFUSION SERIES
Discharge: HOME | End: 2019-08-16
Payer: COMMERCIAL

## 2019-08-16 VITALS
BODY MASS INDEX: 32.47 KG/M2 | WEIGHT: 202 LBS | RESPIRATION RATE: 16 BRPM | OXYGEN SATURATION: 98 % | DIASTOLIC BLOOD PRESSURE: 77 MMHG | HEIGHT: 66 IN | TEMPERATURE: 98 F | SYSTOLIC BLOOD PRESSURE: 117 MMHG

## 2019-08-16 PROCEDURE — 96402 CHEMO HORMON ANTINEOPL SQ/IM: CPT

## 2019-08-16 PROCEDURE — 99211 OFF/OP EST MAY X REQ PHY/QHP: CPT

## 2019-08-16 RX ORDER — LAMOTRIGINE 25 MG/1
500 TABLET ORAL ONCE
Status: COMPLETED | OUTPATIENT
Start: 2019-08-16 | End: 2019-08-16

## 2019-08-16 RX ADMIN — LAMOTRIGINE 500 MG: 25 TABLET ORAL at 08:07

## 2019-08-16 NOTE — PROGRESS NOTES
Reviewed discharge instructions, voiced understanding, and copies of AVS given to take home. Pt tolerated Faslodex and Zoladex injections well, with no s/s of an allergic reaction. Pt to private auto via ambulation.

## 2019-08-16 NOTE — PLAN OF CARE
Ambulatory to unit room 3 for Chemo. Reorientated to unit. Procedure and plan of care explained. Questions answered. Understanding verbalized.

## 2019-09-13 ENCOUNTER — HOSPITAL ENCOUNTER (OUTPATIENT)
Dept: INFUSION THERAPY | Age: 49
Setting detail: INFUSION SERIES
Discharge: HOME | End: 2019-09-13
Payer: COMMERCIAL

## 2019-09-13 VITALS
HEART RATE: 60 BPM | TEMPERATURE: 97.5 F | RESPIRATION RATE: 14 BRPM | SYSTOLIC BLOOD PRESSURE: 106 MMHG | DIASTOLIC BLOOD PRESSURE: 80 MMHG | OXYGEN SATURATION: 99 %

## 2019-09-13 PROCEDURE — 96402 CHEMO HORMON ANTINEOPL SQ/IM: CPT

## 2019-09-13 PROCEDURE — 99211 OFF/OP EST MAY X REQ PHY/QHP: CPT

## 2019-09-13 RX ORDER — LAMOTRIGINE 25 MG/1
500 TABLET ORAL ONCE
Status: COMPLETED | OUTPATIENT
Start: 2019-09-13 | End: 2019-09-13

## 2019-09-13 RX ADMIN — LAMOTRIGINE 500 MG: 25 TABLET ORAL at 08:23

## 2019-09-13 NOTE — PROGRESS NOTES
Reviewed discharge instructions, voiced understanding, and copies of AVS given to take home. Pt tolerated injections well, with no s/s of an allergic reaction. Pt to private auto via ambulation.   Next appt confirmed on Oct. 11th, 2019

## 2019-09-17 ENCOUNTER — HOSPITAL ENCOUNTER (OUTPATIENT)
Age: 49
Setting detail: SPECIMEN
Discharge: HOME | End: 2019-09-17
Payer: COMMERCIAL

## 2019-09-17 LAB
ALBUMIN SERPL-MCNC: 4.7 GM/DL (ref 3.4–5)
ALP BLD-CCNC: 48 IU/L (ref 40–129)
ALT SERPL-CCNC: 22 U/L (ref 10–40)
ANION GAP SERPL CALCULATED.3IONS-SCNC: 10 MMOL/L (ref 4–16)
AST SERPL-CCNC: 25 IU/L (ref 15–37)
BILIRUB SERPL-MCNC: 0.4 MG/DL (ref 0–1)
BUN BLDV-MCNC: 18 MG/DL (ref 6–23)
CALCIUM SERPL-MCNC: 9.6 MG/DL (ref 8.3–10.6)
CHLORIDE BLD-SCNC: 99 MMOL/L (ref 99–110)
CO2: 26 MMOL/L (ref 21–32)
CREAT SERPL-MCNC: 0.9 MG/DL (ref 0.6–1.1)
GFR AFRICAN AMERICAN: >60 ML/MIN/1.73M2
GFR NON-AFRICAN AMERICAN: >60 ML/MIN/1.73M2
GLUCOSE BLD-MCNC: 99 MG/DL (ref 70–99)
POTASSIUM SERPL-SCNC: 4.3 MMOL/L (ref 3.5–5.1)
SODIUM BLD-SCNC: 135 MMOL/L (ref 135–145)
TOTAL PROTEIN: 7.4 GM/DL (ref 6.4–8.2)

## 2019-09-17 PROCEDURE — 86300 IMMUNOASSAY TUMOR CA 15-3: CPT

## 2019-09-17 PROCEDURE — 80053 COMPREHEN METABOLIC PANEL: CPT

## 2019-09-19 LAB
CA 27.29: 25.5 U/ML (ref 0–40)
CA 27.29: NORMAL U/ML (ref 0–40)

## 2019-10-11 ENCOUNTER — HOSPITAL ENCOUNTER (OUTPATIENT)
Dept: INFUSION THERAPY | Age: 49
Setting detail: INFUSION SERIES
Discharge: HOME | End: 2019-10-11
Payer: COMMERCIAL

## 2019-10-11 VITALS
RESPIRATION RATE: 14 BRPM | DIASTOLIC BLOOD PRESSURE: 78 MMHG | SYSTOLIC BLOOD PRESSURE: 115 MMHG | OXYGEN SATURATION: 98 % | TEMPERATURE: 97.4 F | HEART RATE: 60 BPM

## 2019-10-11 PROCEDURE — 96402 CHEMO HORMON ANTINEOPL SQ/IM: CPT

## 2019-10-11 PROCEDURE — 99211 OFF/OP EST MAY X REQ PHY/QHP: CPT

## 2019-10-11 PROCEDURE — 96401 CHEMO ANTI-NEOPL SQ/IM: CPT

## 2019-10-11 RX ORDER — VENLAFAXINE HYDROCHLORIDE 37.5 MG/1
37.5 CAPSULE, EXTENDED RELEASE ORAL DAILY
COMMUNITY
End: 2021-02-16 | Stop reason: SDUPTHER

## 2019-10-11 RX ORDER — LAMOTRIGINE 25 MG/1
500 TABLET ORAL ONCE
Status: COMPLETED | OUTPATIENT
Start: 2019-10-11 | End: 2019-10-11

## 2019-10-11 RX ADMIN — LAMOTRIGINE 500 MG: 25 TABLET ORAL at 08:31

## 2019-10-11 NOTE — PROGRESS NOTES
Reviewed discharge instructions, voiced understanding, and copies of AVS given to take home. Pt tolerated Zoladex and Faslodex injections well, with no s/s of an allergic reaction. Pt to private auto via ambulation.

## 2019-11-08 ENCOUNTER — HOSPITAL ENCOUNTER (OUTPATIENT)
Dept: INFUSION THERAPY | Age: 49
Setting detail: INFUSION SERIES
Discharge: HOME | End: 2019-11-08
Payer: COMMERCIAL

## 2019-11-08 VITALS
DIASTOLIC BLOOD PRESSURE: 78 MMHG | TEMPERATURE: 97.5 F | RESPIRATION RATE: 16 BRPM | SYSTOLIC BLOOD PRESSURE: 126 MMHG | HEART RATE: 74 BPM | OXYGEN SATURATION: 98 %

## 2019-11-08 PROCEDURE — 99211 OFF/OP EST MAY X REQ PHY/QHP: CPT

## 2019-11-08 PROCEDURE — 96402 CHEMO HORMON ANTINEOPL SQ/IM: CPT

## 2019-11-08 RX ORDER — LAMOTRIGINE 25 MG/1
500 TABLET ORAL ONCE
Status: COMPLETED | OUTPATIENT
Start: 2019-11-08 | End: 2019-11-08

## 2019-11-08 RX ADMIN — LAMOTRIGINE 500 MG: 25 TABLET ORAL at 08:23

## 2019-11-08 NOTE — PROGRESS NOTES
Prior to discharge, the After Visit Summary Discharge Instructions were reviewed with the patient. She was offered a printed version of the AVS, but declined the offer.

## 2019-12-03 ENCOUNTER — HOSPITAL ENCOUNTER (OUTPATIENT)
Age: 49
Setting detail: SPECIMEN
Discharge: HOME | End: 2019-12-03
Payer: COMMERCIAL

## 2019-12-03 LAB
ALBUMIN SERPL-MCNC: 4.2 GM/DL (ref 3.4–5)
ALP BLD-CCNC: 48 IU/L (ref 40–128)
ALT SERPL-CCNC: 14 U/L (ref 10–40)
ANION GAP SERPL CALCULATED.3IONS-SCNC: 11 MMOL/L (ref 4–16)
AST SERPL-CCNC: 19 IU/L (ref 15–37)
BILIRUB SERPL-MCNC: 0.3 MG/DL (ref 0–1)
BUN BLDV-MCNC: 14 MG/DL (ref 6–23)
CALCIUM SERPL-MCNC: 8.9 MG/DL (ref 8.3–10.6)
CHLORIDE BLD-SCNC: 103 MMOL/L (ref 99–110)
CO2: 25 MMOL/L (ref 21–32)
CREAT SERPL-MCNC: 0.8 MG/DL (ref 0.6–1.1)
GFR AFRICAN AMERICAN: >60 ML/MIN/1.73M2
GFR NON-AFRICAN AMERICAN: >60 ML/MIN/1.73M2
GLUCOSE BLD-MCNC: 95 MG/DL (ref 70–99)
POTASSIUM SERPL-SCNC: 4 MMOL/L (ref 3.5–5.1)
SODIUM BLD-SCNC: 139 MMOL/L (ref 135–145)
TOTAL PROTEIN: 6.5 GM/DL (ref 6.4–8.2)

## 2019-12-03 PROCEDURE — 86300 IMMUNOASSAY TUMOR CA 15-3: CPT

## 2019-12-03 PROCEDURE — 80053 COMPREHEN METABOLIC PANEL: CPT

## 2019-12-05 LAB
CA 27.29: 23.2 U/ML (ref 0–40)
CA 27.29: NORMAL U/ML (ref 0–40)

## 2019-12-06 ENCOUNTER — HOSPITAL ENCOUNTER (OUTPATIENT)
Dept: INFUSION THERAPY | Age: 49
Setting detail: INFUSION SERIES
Discharge: HOME | End: 2019-12-06
Payer: COMMERCIAL

## 2019-12-06 VITALS
TEMPERATURE: 97.6 F | SYSTOLIC BLOOD PRESSURE: 123 MMHG | HEART RATE: 57 BPM | OXYGEN SATURATION: 99 % | RESPIRATION RATE: 16 BRPM | DIASTOLIC BLOOD PRESSURE: 70 MMHG

## 2019-12-06 PROCEDURE — 96402 CHEMO HORMON ANTINEOPL SQ/IM: CPT

## 2019-12-06 PROCEDURE — 99211 OFF/OP EST MAY X REQ PHY/QHP: CPT

## 2019-12-06 RX ORDER — LAMOTRIGINE 25 MG/1
500 TABLET ORAL ONCE
Status: COMPLETED | OUTPATIENT
Start: 2019-12-06 | End: 2019-12-06

## 2019-12-06 RX ADMIN — LAMOTRIGINE 500 MG: 25 TABLET ORAL at 08:29

## 2019-12-06 ASSESSMENT — PAIN DESCRIPTION - LOCATION: LOCATION: HIP

## 2019-12-06 ASSESSMENT — PAIN SCALES - GENERAL: PAINLEVEL_OUTOF10: 6

## 2019-12-06 ASSESSMENT — PAIN DESCRIPTION - ORIENTATION: ORIENTATION: RIGHT

## 2019-12-06 ASSESSMENT — PAIN DESCRIPTION - PAIN TYPE: TYPE: CHRONIC PAIN

## 2019-12-06 NOTE — PROGRESS NOTES
Prior to discharge, the After Visit Summary Discharge Instructions were reviewed with the patient. She was offered a printed version of the AVS, but declined the offer. Next appt made and pt agreeable for time and date.

## 2020-01-03 ENCOUNTER — HOSPITAL ENCOUNTER (OUTPATIENT)
Dept: INFUSION THERAPY | Age: 50
Setting detail: INFUSION SERIES
Discharge: HOME | End: 2020-01-03
Payer: COMMERCIAL

## 2020-01-03 VITALS
HEART RATE: 71 BPM | DIASTOLIC BLOOD PRESSURE: 84 MMHG | RESPIRATION RATE: 16 BRPM | OXYGEN SATURATION: 97 % | TEMPERATURE: 97.8 F | SYSTOLIC BLOOD PRESSURE: 97 MMHG

## 2020-01-03 PROCEDURE — 96402 CHEMO HORMON ANTINEOPL SQ/IM: CPT

## 2020-01-03 PROCEDURE — 99211 OFF/OP EST MAY X REQ PHY/QHP: CPT

## 2020-01-03 RX ORDER — LAMOTRIGINE 25 MG/1
500 TABLET ORAL ONCE
Status: COMPLETED | OUTPATIENT
Start: 2020-01-03 | End: 2020-01-03

## 2020-01-03 RX ADMIN — LAMOTRIGINE 500 MG: 25 TABLET ORAL at 09:31

## 2020-01-03 ASSESSMENT — PAIN SCALES - GENERAL: PAINLEVEL_OUTOF10: 0

## 2020-01-03 NOTE — PROGRESS NOTES
Prior to discharge, the After Visit Summary Discharge Instructions were reviewed with the patient. She was offered a printed version of the AVS, but declined the offer.      Orders Placed This Encounter   Medications    fulvestrant (FASLODEX) IM injection 500 mg    goserelin (ZOLADEX) injection 3.6 mg

## 2020-01-24 NOTE — DISCHARGE SUMMARY
Pt tolerated all three injections well. No S&S of reactions noted. Reviewed discharge instructions, voiced understanding, and copies of AVS given to take home. Pt to private auto via ambulation per self.
Walking

## 2020-01-31 ENCOUNTER — HOSPITAL ENCOUNTER (OUTPATIENT)
Dept: INFUSION THERAPY | Age: 50
Setting detail: INFUSION SERIES
Discharge: HOME | End: 2020-01-31
Payer: COMMERCIAL

## 2020-01-31 VITALS
OXYGEN SATURATION: 100 % | TEMPERATURE: 97 F | RESPIRATION RATE: 16 BRPM | HEART RATE: 55 BPM | SYSTOLIC BLOOD PRESSURE: 126 MMHG | DIASTOLIC BLOOD PRESSURE: 77 MMHG

## 2020-01-31 PROCEDURE — 96402 CHEMO HORMON ANTINEOPL SQ/IM: CPT

## 2020-01-31 PROCEDURE — 99211 OFF/OP EST MAY X REQ PHY/QHP: CPT

## 2020-01-31 RX ORDER — LAMOTRIGINE 25 MG/1
500 TABLET ORAL ONCE
Status: COMPLETED | OUTPATIENT
Start: 2020-01-31 | End: 2020-01-31

## 2020-01-31 RX ADMIN — LAMOTRIGINE 500 MG: 25 TABLET ORAL at 08:25

## 2020-01-31 ASSESSMENT — PAIN SCALES - GENERAL: PAINLEVEL_OUTOF10: 0

## 2020-01-31 NOTE — PROGRESS NOTES
Prior to discharge, the After Visit Summary Discharge Instructions were reviewed with the patient. She was offered a printed version of the AVS, but declined the offer. Agreeable for next appt date and time. 2/28/2020 @ 0800.     Orders Placed This Encounter   Medications    fulvestrant (FASLODEX) IM injection 500 mg    goserelin (ZOLADEX) injection 3.6 mg

## 2020-02-28 ENCOUNTER — HOSPITAL ENCOUNTER (OUTPATIENT)
Dept: INFUSION THERAPY | Age: 50
Setting detail: INFUSION SERIES
Discharge: HOME | End: 2020-02-28
Payer: COMMERCIAL

## 2020-02-28 VITALS
RESPIRATION RATE: 16 BRPM | SYSTOLIC BLOOD PRESSURE: 126 MMHG | TEMPERATURE: 97.7 F | HEART RATE: 59 BPM | DIASTOLIC BLOOD PRESSURE: 74 MMHG

## 2020-02-28 PROCEDURE — 96402 CHEMO HORMON ANTINEOPL SQ/IM: CPT

## 2020-02-28 PROCEDURE — 99211 OFF/OP EST MAY X REQ PHY/QHP: CPT

## 2020-02-28 RX ORDER — LAMOTRIGINE 25 MG/1
500 TABLET ORAL ONCE
Status: DISCONTINUED | OUTPATIENT
Start: 2020-02-28 | End: 2020-02-28

## 2020-02-28 RX ORDER — LAMOTRIGINE 25 MG/1
500 TABLET ORAL ONCE
Status: COMPLETED | OUTPATIENT
Start: 2020-02-28 | End: 2020-02-28

## 2020-02-28 RX ADMIN — LAMOTRIGINE 500 MG: 25 TABLET ORAL at 08:51

## 2020-02-28 ASSESSMENT — PAIN SCALES - GENERAL: PAINLEVEL_OUTOF10: 0

## 2020-02-28 NOTE — PROGRESS NOTES
Tolerated Faslodex and zoladex injections well. Reviewed discharge instruction, voiced understanding. Copies of AVS given. Pt discharged home. Pt to exit via ambulation.     Orders Placed This Encounter   Medications    DISCONTD: fulvestrant (FASLODEX) IM injection 500 mg    DISCONTD: goserelin (ZOLADEX) injection 3.6 mg    fulvestrant (FASLODEX) IM injection 500 mg    goserelin (ZOLADEX) injection 3.6 mg

## 2020-02-28 NOTE — PROGRESS NOTES
Pt taken to room 03, oriented to room, bed/chair controls, and call light. Needs met at present. Call light in reach. Pt agreeable for plan of care.

## 2020-03-12 ENCOUNTER — HOSPITAL ENCOUNTER (OUTPATIENT)
Dept: INFUSION THERAPY | Age: 50
Discharge: HOME | End: 2020-03-12
Payer: COMMERCIAL

## 2020-03-12 LAB
ALBUMIN SERPL-MCNC: 4.3 GM/DL (ref 3.4–5)
ALP BLD-CCNC: 63 IU/L (ref 40–128)
ALT SERPL-CCNC: 14 U/L (ref 10–40)
ANION GAP SERPL CALCULATED.3IONS-SCNC: 12 MMOL/L (ref 4–16)
AST SERPL-CCNC: 18 IU/L (ref 15–37)
BASOPHILS ABSOLUTE: 0 K/CU MM
BASOPHILS RELATIVE PERCENT: 0.5 % (ref 0–1)
BILIRUB SERPL-MCNC: 0.5 MG/DL (ref 0–1)
BUN BLDV-MCNC: 17 MG/DL (ref 6–23)
CALCIUM SERPL-MCNC: 9.3 MG/DL (ref 8.3–10.6)
CHLORIDE BLD-SCNC: 102 MMOL/L (ref 99–110)
CO2: 25 MMOL/L (ref 21–32)
CREAT SERPL-MCNC: 1 MG/DL (ref 0.6–1.1)
DIFFERENTIAL TYPE: ABNORMAL
EOSINOPHILS ABSOLUTE: 0.1 K/CU MM
EOSINOPHILS RELATIVE PERCENT: 1.6 % (ref 0–3)
GFR AFRICAN AMERICAN: >60 ML/MIN/1.73M2
GFR NON-AFRICAN AMERICAN: 59 ML/MIN/1.73M2
GLUCOSE BLD-MCNC: 94 MG/DL (ref 70–99)
HCT VFR BLD CALC: 41.3 % (ref 37–47)
HEMOGLOBIN: 14.3 GM/DL (ref 12.5–16)
LYMPHOCYTES ABSOLUTE: 2.6 K/CU MM
LYMPHOCYTES RELATIVE PERCENT: 45.7 % (ref 24–44)
MCH RBC QN AUTO: 30.3 PG (ref 27–31)
MCHC RBC AUTO-ENTMCNC: 34.6 % (ref 32–36)
MCV RBC AUTO: 87.5 FL (ref 78–100)
MONOCYTES ABSOLUTE: 0.6 K/CU MM
MONOCYTES RELATIVE PERCENT: 10.9 % (ref 0–4)
PDW BLD-RTO: 13.2 % (ref 11.7–14.9)
PLATELET # BLD: 251 K/CU MM (ref 140–440)
PMV BLD AUTO: 9.6 FL (ref 7.5–11.1)
POTASSIUM SERPL-SCNC: 3.9 MMOL/L (ref 3.5–5.1)
RBC # BLD: 4.72 M/CU MM (ref 4.2–5.4)
SEGMENTED NEUTROPHILS ABSOLUTE COUNT: 2.3 K/CU MM
SEGMENTED NEUTROPHILS RELATIVE PERCENT: 41.3 % (ref 36–66)
SODIUM BLD-SCNC: 139 MMOL/L (ref 135–145)
TOTAL PROTEIN: 6.9 GM/DL (ref 6.4–8.2)
WBC # BLD: 5.7 K/CU MM (ref 4–10.5)

## 2020-03-12 PROCEDURE — 99211 OFF/OP EST MAY X REQ PHY/QHP: CPT

## 2020-03-12 PROCEDURE — 85025 COMPLETE CBC W/AUTO DIFF WBC: CPT

## 2020-03-12 PROCEDURE — 80053 COMPREHEN METABOLIC PANEL: CPT

## 2020-03-12 PROCEDURE — 86300 IMMUNOASSAY TUMOR CA 15-3: CPT

## 2020-03-12 PROCEDURE — 36415 COLL VENOUS BLD VENIPUNCTURE: CPT

## 2020-03-13 LAB
CA 27.29: 20.4 U/ML (ref 0–40)
CA 27.29: NORMAL U/ML (ref 0–40)

## 2020-03-24 ENCOUNTER — HOSPITAL ENCOUNTER (OUTPATIENT)
Dept: CT IMAGING | Age: 50
Discharge: HOME | End: 2020-03-24
Payer: COMMERCIAL

## 2020-03-24 PROCEDURE — 6360000004 HC RX CONTRAST MEDICATION: Performed by: INTERNAL MEDICINE

## 2020-03-24 PROCEDURE — 71260 CT THORAX DX C+: CPT

## 2020-03-24 RX ORDER — SODIUM CHLORIDE 0.9 % (FLUSH) 0.9 %
10 SYRINGE (ML) INJECTION PRN
Status: DISCONTINUED | OUTPATIENT
Start: 2020-03-24 | End: 2020-03-25 | Stop reason: HOSPADM

## 2020-03-24 RX ADMIN — IOPAMIDOL 75 ML: 755 INJECTION, SOLUTION INTRAVENOUS at 07:55

## 2020-03-27 ENCOUNTER — HOSPITAL ENCOUNTER (OUTPATIENT)
Dept: INFUSION THERAPY | Age: 50
Setting detail: INFUSION SERIES
Discharge: HOME | End: 2020-03-27
Payer: COMMERCIAL

## 2020-03-27 VITALS
SYSTOLIC BLOOD PRESSURE: 124 MMHG | HEART RATE: 62 BPM | RESPIRATION RATE: 14 BRPM | OXYGEN SATURATION: 98 % | DIASTOLIC BLOOD PRESSURE: 81 MMHG | TEMPERATURE: 97.6 F

## 2020-03-27 PROCEDURE — 99211 OFF/OP EST MAY X REQ PHY/QHP: CPT

## 2020-03-27 RX ORDER — LAMOTRIGINE 25 MG/1
500 TABLET ORAL ONCE
Status: DISCONTINUED | OUTPATIENT
Start: 2020-03-27 | End: 2020-03-27

## 2020-03-27 RX ORDER — LAMOTRIGINE 25 MG/1
500 TABLET ORAL ONCE
Status: COMPLETED | OUTPATIENT
Start: 2020-03-28 | End: 2020-03-27

## 2020-03-27 RX ADMIN — LAMOTRIGINE 500 MG: 25 TABLET ORAL at 08:36

## 2020-03-27 ASSESSMENT — PAIN SCALES - GENERAL: PAINLEVEL_OUTOF10: 3

## 2020-03-27 ASSESSMENT — PAIN DESCRIPTION - LOCATION: LOCATION: HIP

## 2020-03-27 ASSESSMENT — PAIN DESCRIPTION - ORIENTATION: ORIENTATION: RIGHT

## 2020-03-27 NOTE — PROGRESS NOTES
Tolerated  well. Reviewed discharge instruction, voiced understanding. Copies of AVS given. Pt discharged   fhome. Pt to exit via ambulation.     Orders Placed This Encounter   Medications    DISCONTD: fulvestrant (FASLODEX) IM injection 500 mg    goserelin (ZOLADEX) injection 3.6 mg    fulvestrant (FASLODEX) IM injection 500 mg

## 2020-04-20 ENCOUNTER — HOSPITAL ENCOUNTER (OUTPATIENT)
Dept: CT IMAGING | Age: 50
Discharge: HOME | End: 2020-04-20
Payer: COMMERCIAL

## 2020-04-20 ENCOUNTER — HOSPITAL ENCOUNTER (OUTPATIENT)
Dept: NUCLEAR MEDICINE | Age: 50
Discharge: HOME | End: 2020-04-20
Payer: COMMERCIAL

## 2020-04-20 PROCEDURE — 6360000004 HC RX CONTRAST MEDICATION: Performed by: INTERNAL MEDICINE

## 2020-04-20 PROCEDURE — 78306 BONE IMAGING WHOLE BODY: CPT

## 2020-04-20 PROCEDURE — A9503 TC99M MEDRONATE: HCPCS | Performed by: INTERNAL MEDICINE

## 2020-04-20 PROCEDURE — 3430000000 HC RX DIAGNOSTIC RADIOPHARMACEUTICAL: Performed by: INTERNAL MEDICINE

## 2020-04-20 PROCEDURE — 74177 CT ABD & PELVIS W/CONTRAST: CPT

## 2020-04-20 RX ORDER — TC 99M MEDRONATE 20 MG/10ML
26.2 INJECTION, POWDER, LYOPHILIZED, FOR SOLUTION INTRAVENOUS
Status: COMPLETED | OUTPATIENT
Start: 2020-04-20 | End: 2020-04-20

## 2020-04-20 RX ADMIN — TC 99M MEDRONATE 26.2 MILLICURIE: 20 INJECTION, POWDER, LYOPHILIZED, FOR SOLUTION INTRAVENOUS at 09:30

## 2020-04-20 RX ADMIN — IOPAMIDOL 80 ML: 755 INJECTION, SOLUTION INTRAVENOUS at 11:12

## 2020-04-24 ENCOUNTER — HOSPITAL ENCOUNTER (OUTPATIENT)
Dept: INFUSION THERAPY | Age: 50
Setting detail: INFUSION SERIES
Discharge: HOME | End: 2020-04-24
Payer: COMMERCIAL

## 2020-04-24 VITALS
SYSTOLIC BLOOD PRESSURE: 115 MMHG | OXYGEN SATURATION: 97 % | HEART RATE: 72 BPM | RESPIRATION RATE: 16 BRPM | TEMPERATURE: 97.8 F | DIASTOLIC BLOOD PRESSURE: 80 MMHG

## 2020-04-24 PROCEDURE — 96402 CHEMO HORMON ANTINEOPL SQ/IM: CPT

## 2020-04-24 RX ORDER — LAMOTRIGINE 25 MG/1
500 TABLET ORAL ONCE
Status: COMPLETED | OUTPATIENT
Start: 2020-04-24 | End: 2020-04-24

## 2020-04-24 RX ORDER — SODIUM CHLORIDE 0.9 % (FLUSH) 0.9 %
10 SYRINGE (ML) INJECTION 2 TIMES DAILY
Status: CANCELLED | OUTPATIENT
Start: 2020-04-24

## 2020-04-24 RX ADMIN — LAMOTRIGINE 500 MG: 25 TABLET ORAL at 08:50

## 2020-04-24 NOTE — PROGRESS NOTES
Pt tolerated well. Discharged instructions given to pt, pt voiced understanding. Pt discharged via AMBULULATORY by SELF TO EXIT.

## 2020-04-27 ENCOUNTER — HOSPITAL ENCOUNTER (OUTPATIENT)
Dept: CT IMAGING | Age: 50
Discharge: HOME | End: 2020-04-27
Payer: COMMERCIAL

## 2020-04-27 ENCOUNTER — HOSPITAL ENCOUNTER (OUTPATIENT)
Dept: ULTRASOUND IMAGING | Age: 50
Discharge: HOME | End: 2020-04-27
Payer: COMMERCIAL

## 2020-04-27 ENCOUNTER — HOSPITAL ENCOUNTER (OUTPATIENT)
Dept: INTERVENTIONAL RADIOLOGY/VASCULAR | Age: 50
Discharge: HOME | End: 2020-04-27
Payer: COMMERCIAL

## 2020-04-27 VITALS
RESPIRATION RATE: 16 BRPM | HEART RATE: 69 BPM | SYSTOLIC BLOOD PRESSURE: 91 MMHG | OXYGEN SATURATION: 99 % | DIASTOLIC BLOOD PRESSURE: 56 MMHG | TEMPERATURE: 96.7 F

## 2020-04-27 LAB
APTT: 28.2 SECONDS (ref 25.1–37.1)
HCT VFR BLD CALC: 42.9 % (ref 37–47)
HEMOGLOBIN: 15.5 GM/DL (ref 12.5–16)
INR BLD: 0.89 INDEX
MCH RBC QN AUTO: 31.7 PG (ref 27–31)
MCHC RBC AUTO-ENTMCNC: 36.1 % (ref 32–36)
MCV RBC AUTO: 87.7 FL (ref 78–100)
PDW BLD-RTO: 12.2 % (ref 11.7–14.9)
PLATELET # BLD: 278 K/CU MM (ref 140–440)
PMV BLD AUTO: 9.7 FL (ref 7.5–11.1)
PROTHROMBIN TIME: 10.7 SECONDS (ref 11.7–14.5)
RBC # BLD: 4.89 M/CU MM (ref 4.2–5.4)
WBC # BLD: 5.2 K/CU MM (ref 4–10.5)

## 2020-04-27 PROCEDURE — 85610 PROTHROMBIN TIME: CPT

## 2020-04-27 PROCEDURE — 88334 PATH CONSLTJ SURG CYTO XM EA: CPT

## 2020-04-27 PROCEDURE — 7100000010 HC PHASE II RECOVERY - FIRST 15 MIN

## 2020-04-27 PROCEDURE — 85027 COMPLETE CBC AUTOMATED: CPT

## 2020-04-27 PROCEDURE — 7100000011 HC PHASE II RECOVERY - ADDTL 15 MIN

## 2020-04-27 PROCEDURE — 2709999900 HC NON-CHARGEABLE SUPPLY

## 2020-04-27 PROCEDURE — 47000 NEEDLE BIOPSY OF LIVER PERQ: CPT

## 2020-04-27 PROCEDURE — 88307 TISSUE EXAM BY PATHOLOGIST: CPT

## 2020-04-27 PROCEDURE — 76705 ECHO EXAM OF ABDOMEN: CPT

## 2020-04-27 PROCEDURE — 76380 CAT SCAN FOLLOW-UP STUDY: CPT

## 2020-04-27 PROCEDURE — 88342 IMHCHEM/IMCYTCHM 1ST ANTB: CPT

## 2020-04-27 PROCEDURE — 88360 TUMOR IMMUNOHISTOCHEM/MANUAL: CPT

## 2020-04-27 PROCEDURE — 6360000002 HC RX W HCPCS: Performed by: RADIOLOGY

## 2020-04-27 PROCEDURE — 85730 THROMBOPLASTIN TIME PARTIAL: CPT

## 2020-04-27 PROCEDURE — 88341 IMHCHEM/IMCYTCHM EA ADD ANTB: CPT

## 2020-04-27 PROCEDURE — 88333 PATH CONSLTJ SURG CYTO XM 1: CPT

## 2020-04-27 PROCEDURE — 76942 ECHO GUIDE FOR BIOPSY: CPT

## 2020-04-27 RX ORDER — FENTANYL CITRATE 50 UG/ML
50 INJECTION, SOLUTION INTRAMUSCULAR; INTRAVENOUS ONCE
Status: COMPLETED | OUTPATIENT
Start: 2020-04-27 | End: 2020-04-27

## 2020-04-27 RX ORDER — MIDAZOLAM HYDROCHLORIDE 1 MG/ML
1 INJECTION INTRAMUSCULAR; INTRAVENOUS ONCE
Status: COMPLETED | OUTPATIENT
Start: 2020-04-27 | End: 2020-04-27

## 2020-04-27 RX ORDER — SODIUM CHLORIDE 0.9 % (FLUSH) 0.9 %
10 SYRINGE (ML) INJECTION 2 TIMES DAILY
Status: DISCONTINUED | OUTPATIENT
Start: 2020-04-27 | End: 2020-04-28 | Stop reason: HOSPADM

## 2020-04-27 RX ORDER — MIDAZOLAM HYDROCHLORIDE 1 MG/ML
2 INJECTION INTRAMUSCULAR; INTRAVENOUS ONCE
Status: COMPLETED | OUTPATIENT
Start: 2020-04-27 | End: 2020-04-27

## 2020-04-27 RX ADMIN — MIDAZOLAM 1 MG: 1 INJECTION INTRAMUSCULAR; INTRAVENOUS at 10:47

## 2020-04-27 RX ADMIN — FENTANYL CITRATE 50 MCG: 50 INJECTION INTRAMUSCULAR; INTRAVENOUS at 10:34

## 2020-04-27 RX ADMIN — MIDAZOLAM 2 MG: 1 INJECTION INTRAMUSCULAR; INTRAVENOUS at 10:33

## 2020-04-27 RX ADMIN — FENTANYL CITRATE 50 MCG: 50 INJECTION INTRAMUSCULAR; INTRAVENOUS at 10:46

## 2020-04-27 RX ADMIN — FENTANYL CITRATE 50 MCG: 50 INJECTION INTRAMUSCULAR; INTRAVENOUS at 10:32

## 2020-04-27 ASSESSMENT — PAIN DESCRIPTION - LOCATION: LOCATION: ABDOMEN

## 2020-04-27 ASSESSMENT — PAIN SCALES - GENERAL
PAINLEVEL_OUTOF10: 3
PAINLEVEL_OUTOF10: 8
PAINLEVEL_OUTOF10: 9
PAINLEVEL_OUTOF10: 0
PAINLEVEL_OUTOF10: 9

## 2020-04-27 ASSESSMENT — PAIN DESCRIPTION - FREQUENCY: FREQUENCY: INTERMITTENT

## 2020-04-27 ASSESSMENT — PAIN DESCRIPTION - ORIENTATION: ORIENTATION: MID;UPPER

## 2020-04-27 ASSESSMENT — PAIN DESCRIPTION - DESCRIPTORS: DESCRIPTORS: DISCOMFORT

## 2020-04-27 NOTE — PROGRESS NOTES
Pt received from IR, report from [facility], pt awake and alert. Dressing to upper mid abd, 4x4/tegaderm, dry and intact.

## 2020-04-28 NOTE — H&P
Date:2020  Name:Sharon Bojorquez   :1970   TF#:[id number]    SEX:female   Referring Physician:  BRAD  Primary Physician:  Jose Finney  Chief Complaint:  Liver mass  History of Present Illness:   Liver mass    HISTORY AND PHYSICAL  Liver mass [R16.0]    Past Medical History:  Past Medical History:   Diagnosis Date    Asthma     last flare up winter 2016    Breast CA [facility])     right    Chest pain     \"had chest pain last time - saw Dr Antoine Remedies- all ok\"    FHx: coronary artery disease     H/O Doppler lower venous ultrasound 2019    No DVT,  Significant reflux noted of the Right & Left Deep Venous System.  H/O echocardiogram 2019    EF 55-60%, Essentially normal echocardiogram. Dilated aortic root at 4.0 cm.     History of exercise stress test 2019    Treadmill, EKG portion is negative for ischemia by diagnostic criteria    History of migraine     \"none for past 3 months\"    HX OTHER MEDICAL     \"since 2016- having some pain mid sternum- xray thought fx, then did CT and bone scan now having bx done\"    Primary cancer of right breast with metastasis to other site [facility]) 3/24/2019       Past Surgical History:  Past Surgical History:   Procedure Laterality Date     SECTION  12    EYE SURGERY  age 28    LASIK both eyes    HYSTERECTOMY  age 27    partial - has 1 ovary (L);  Due to endometriosis    OTHER SURGICAL HISTORY Right 2013    right ulnar nerve decompression    TUBAL LIGATION         Social History:  Social History     Socioeconomic History    Marital status:      Spouse name: Not on file    Number of children: Not on file    Years of education: Not on file    Highest education level: Not on file   Occupational History    Not on file   Social Needs    Financial resource strain: Not on file    Food insecurity     Worry: Not on file     Inability: Not on file    Transportation needs     Medical: Not on file 2    PLAN OF CARE/PLANNED PROCEDURE    CT LIMITED FOLLOW UP [10791]

## 2020-05-06 ENCOUNTER — HOSPITAL ENCOUNTER (OUTPATIENT)
Dept: INFUSION THERAPY | Age: 50
Discharge: HOME | End: 2020-05-06
Payer: COMMERCIAL

## 2020-05-06 PROCEDURE — 99211 OFF/OP EST MAY X REQ PHY/QHP: CPT

## 2020-05-21 ENCOUNTER — HOSPITAL ENCOUNTER (OUTPATIENT)
Dept: PET IMAGING | Age: 50
Discharge: HOME | End: 2020-05-21
Payer: COMMERCIAL

## 2020-05-21 PROCEDURE — 78815 PET IMAGE W/CT SKULL-THIGH: CPT

## 2020-05-21 PROCEDURE — 3430000000 HC RX DIAGNOSTIC RADIOPHARMACEUTICAL: Performed by: INTERNAL MEDICINE

## 2020-05-21 PROCEDURE — A9552 F18 FDG: HCPCS | Performed by: INTERNAL MEDICINE

## 2020-05-21 PROCEDURE — 2580000003 HC RX 258: Performed by: INTERNAL MEDICINE

## 2020-05-21 RX ORDER — FLUDEOXYGLUCOSE F 18 200 MCI/ML
15.17 INJECTION, SOLUTION INTRAVENOUS
Status: COMPLETED | OUTPATIENT
Start: 2020-05-21 | End: 2020-05-21

## 2020-05-21 RX ORDER — SODIUM CHLORIDE 0.9 % (FLUSH) 0.9 %
10 SYRINGE (ML) INJECTION PRN
Status: COMPLETED | OUTPATIENT
Start: 2020-05-21 | End: 2020-05-21

## 2020-05-21 RX ADMIN — SODIUM CHLORIDE, PRESERVATIVE FREE 10 ML: 5 INJECTION INTRAVENOUS at 12:03

## 2020-05-21 RX ADMIN — FLUDEOXYGLUCOSE F 18 15.17 MILLICURIE: 200 INJECTION, SOLUTION INTRAVENOUS at 12:03

## 2020-05-22 ENCOUNTER — HOSPITAL ENCOUNTER (OUTPATIENT)
Dept: INFUSION THERAPY | Age: 50
Setting detail: INFUSION SERIES
Discharge: HOME | End: 2020-05-22
Payer: COMMERCIAL

## 2020-05-22 VITALS
BODY MASS INDEX: 34.53 KG/M2 | HEIGHT: 67 IN | RESPIRATION RATE: 16 BRPM | HEART RATE: 70 BPM | TEMPERATURE: 97.4 F | SYSTOLIC BLOOD PRESSURE: 108 MMHG | DIASTOLIC BLOOD PRESSURE: 71 MMHG | OXYGEN SATURATION: 100 % | WEIGHT: 220 LBS

## 2020-05-22 PROCEDURE — 6360000002 HC RX W HCPCS: Performed by: INTERNAL MEDICINE

## 2020-05-22 PROCEDURE — 96401 CHEMO ANTI-NEOPL SQ/IM: CPT

## 2020-05-22 PROCEDURE — 99211 OFF/OP EST MAY X REQ PHY/QHP: CPT

## 2020-05-22 PROCEDURE — 96402 CHEMO HORMON ANTINEOPL SQ/IM: CPT

## 2020-05-22 RX ORDER — LAMOTRIGINE 25 MG/1
500 TABLET ORAL ONCE
Status: COMPLETED | OUTPATIENT
Start: 2020-05-22 | End: 2020-05-22

## 2020-05-22 RX ORDER — ABEMACICLIB 150 MG/1
1 TABLET ORAL 2 TIMES DAILY
COMMUNITY
Start: 2020-05-21 | End: 2021-02-05 | Stop reason: SDUPTHER

## 2020-05-22 RX ADMIN — LAMOTRIGINE 500 MG: 25 TABLET ORAL at 08:50

## 2020-05-22 RX ADMIN — GOSERELIN ACETATE 3.6 MG: 3.6 IMPLANT SUBCUTANEOUS at 09:05

## 2020-05-22 NOTE — PROGRESS NOTES
Pt taken to room 02 for chemo infusion. Pt oriented to room, call light, bed/chair controls, TV, pt voiced understanding. Plan of care explained to pt, pt voiced understanding.

## 2020-05-28 ENCOUNTER — HOSPITAL ENCOUNTER (OUTPATIENT)
Dept: WOMENS IMAGING | Age: 50
Discharge: HOME | End: 2020-05-28
Payer: COMMERCIAL

## 2020-05-28 PROCEDURE — 77067 SCR MAMMO BI INCL CAD: CPT

## 2020-06-01 ENCOUNTER — HOSPITAL ENCOUNTER (OUTPATIENT)
Dept: INFUSION THERAPY | Age: 50
Discharge: HOME | End: 2020-06-01
Payer: COMMERCIAL

## 2020-06-01 LAB
ALBUMIN SERPL-MCNC: 4.7 GM/DL (ref 3.4–5)
ALP BLD-CCNC: 65 IU/L (ref 40–128)
ALT SERPL-CCNC: 20 U/L (ref 10–40)
ANION GAP SERPL CALCULATED.3IONS-SCNC: 13 MMOL/L (ref 4–16)
AST SERPL-CCNC: 20 IU/L (ref 15–37)
BASOPHILS ABSOLUTE: 0 K/CU MM
BASOPHILS RELATIVE PERCENT: 0.6 % (ref 0–1)
BILIRUB SERPL-MCNC: 0.5 MG/DL (ref 0–1)
BUN BLDV-MCNC: 13 MG/DL (ref 6–23)
CALCIUM SERPL-MCNC: 9.7 MG/DL (ref 8.3–10.6)
CHLORIDE BLD-SCNC: 104 MMOL/L (ref 99–110)
CO2: 23 MMOL/L (ref 21–32)
CREAT SERPL-MCNC: 1.2 MG/DL (ref 0.6–1.1)
DIFFERENTIAL TYPE: ABNORMAL
EOSINOPHILS ABSOLUTE: 0.1 K/CU MM
EOSINOPHILS RELATIVE PERCENT: 1.7 % (ref 0–3)
GFR AFRICAN AMERICAN: 58 ML/MIN/1.73M2
GFR NON-AFRICAN AMERICAN: 48 ML/MIN/1.73M2
GLUCOSE BLD-MCNC: 95 MG/DL (ref 70–99)
HCT VFR BLD CALC: 40.3 % (ref 37–47)
HEMOGLOBIN: 14.1 GM/DL (ref 12.5–16)
LYMPHOCYTES ABSOLUTE: 2.2 K/CU MM
LYMPHOCYTES RELATIVE PERCENT: 46.8 % (ref 24–44)
MCH RBC QN AUTO: 30.4 PG (ref 27–31)
MCHC RBC AUTO-ENTMCNC: 35 % (ref 32–36)
MCV RBC AUTO: 86.9 FL (ref 78–100)
MONOCYTES ABSOLUTE: 0.2 K/CU MM
MONOCYTES RELATIVE PERCENT: 5 % (ref 0–4)
PDW BLD-RTO: 12.8 % (ref 11.7–14.9)
PLATELET # BLD: 257 K/CU MM (ref 140–440)
PMV BLD AUTO: 9.6 FL (ref 7.5–11.1)
POTASSIUM SERPL-SCNC: 4.1 MMOL/L (ref 3.5–5.1)
RBC # BLD: 4.64 M/CU MM (ref 4.2–5.4)
SEGMENTED NEUTROPHILS ABSOLUTE COUNT: 2.2 K/CU MM
SEGMENTED NEUTROPHILS RELATIVE PERCENT: 45.9 % (ref 36–66)
SODIUM BLD-SCNC: 140 MMOL/L (ref 135–145)
TOTAL PROTEIN: 7.3 GM/DL (ref 6.4–8.2)
WBC # BLD: 4.8 K/CU MM (ref 4–10.5)

## 2020-06-01 PROCEDURE — 99211 OFF/OP EST MAY X REQ PHY/QHP: CPT

## 2020-06-01 PROCEDURE — 80053 COMPREHEN METABOLIC PANEL: CPT

## 2020-06-01 PROCEDURE — 85025 COMPLETE CBC W/AUTO DIFF WBC: CPT

## 2020-06-01 PROCEDURE — 36415 COLL VENOUS BLD VENIPUNCTURE: CPT

## 2020-06-11 ENCOUNTER — APPOINTMENT (OUTPATIENT)
Dept: INFUSION THERAPY | Age: 50
End: 2020-06-11
Payer: COMMERCIAL

## 2020-06-19 ENCOUNTER — HOSPITAL ENCOUNTER (OUTPATIENT)
Dept: INFUSION THERAPY | Age: 50
Setting detail: INFUSION SERIES
Discharge: HOME | End: 2020-06-19
Payer: COMMERCIAL

## 2020-06-19 VITALS
DIASTOLIC BLOOD PRESSURE: 78 MMHG | HEART RATE: 65 BPM | HEIGHT: 67 IN | TEMPERATURE: 97.2 F | SYSTOLIC BLOOD PRESSURE: 118 MMHG | RESPIRATION RATE: 16 BRPM | BODY MASS INDEX: 34.37 KG/M2 | OXYGEN SATURATION: 100 % | WEIGHT: 219 LBS

## 2020-06-19 PROCEDURE — 99211 OFF/OP EST MAY X REQ PHY/QHP: CPT

## 2020-06-19 PROCEDURE — 96402 CHEMO HORMON ANTINEOPL SQ/IM: CPT

## 2020-06-19 RX ORDER — LAMOTRIGINE 25 MG/1
500 TABLET ORAL ONCE
Status: COMPLETED | OUTPATIENT
Start: 2020-06-19 | End: 2020-06-19

## 2020-06-19 RX ADMIN — LAMOTRIGINE 500 MG: 25 TABLET ORAL at 08:25

## 2020-06-19 NOTE — PROGRESS NOTES
Pt taken to room 03 for chemo injections. Pt oriented to room, call light, bed/chair controls, TV, pt voiced understanding. Plan of care explained to pt, pt voiced understanding.

## 2020-06-22 ENCOUNTER — HOSPITAL ENCOUNTER (OUTPATIENT)
Age: 50
Discharge: HOME | End: 2020-06-22
Payer: COMMERCIAL

## 2020-06-22 PROCEDURE — U0002 COVID-19 LAB TEST NON-CDC: HCPCS

## 2020-06-22 NOTE — PROGRESS NOTES
Surgery:   [email or web address]                       Arrival time: 0630  Nothing to eat or drink after midnight unless instructed to take certain medications by the doctor or the nurse the am of surgery  Arrive at the front information desk -1st floor /[facility] is on [address]  Please leave money and all other valuables at home. Wear comfortable clothing. If you wear contacts please bring a case. No make up. You may be asked to remove rings or body piercing. Please bring insurance cards and picture ID am of procedure. Please bring any consent or paper work from your doctor. If you become ill,such as a cold, sore throat or fever contact your doctor. Please bathe or shower am of procedure.   Medications to take AM of procedure:     Any questions call [facility]  [phone]      Pt is coming in today between 12-2PM for COVID test.

## 2020-06-23 ENCOUNTER — ANESTHESIA EVENT (OUTPATIENT)
Dept: INTERVENTIONAL RADIOLOGY/VASCULAR | Age: 50
End: 2020-06-23

## 2020-06-23 LAB
SARS-COV-2: NOT DETECTED
SOURCE: NORMAL

## 2020-06-24 ENCOUNTER — HOSPITAL ENCOUNTER (OUTPATIENT)
Dept: CT IMAGING | Age: 50
Discharge: HOME | End: 2020-06-24
Payer: COMMERCIAL

## 2020-06-24 ENCOUNTER — ANESTHESIA (OUTPATIENT)
Dept: INTERVENTIONAL RADIOLOGY/VASCULAR | Age: 50
End: 2020-06-24

## 2020-06-24 ENCOUNTER — HOSPITAL ENCOUNTER (OUTPATIENT)
Dept: ULTRASOUND IMAGING | Age: 50
Discharge: HOME | End: 2020-06-24
Payer: COMMERCIAL

## 2020-06-24 ENCOUNTER — HOSPITAL ENCOUNTER (OUTPATIENT)
Dept: INTERVENTIONAL RADIOLOGY/VASCULAR | Age: 50
Setting detail: OBSERVATION
LOS: 1 days | Discharge: HOME | End: 2020-06-25
Attending: INTERNAL MEDICINE | Admitting: INTERNAL MEDICINE
Payer: COMMERCIAL

## 2020-06-24 VITALS
DIASTOLIC BLOOD PRESSURE: 88 MMHG | TEMPERATURE: 96.8 F | OXYGEN SATURATION: 94 % | RESPIRATION RATE: 8 BRPM | SYSTOLIC BLOOD PRESSURE: 126 MMHG

## 2020-06-24 PROBLEM — K76.9 LIVER DISEASE: Status: ACTIVE | Noted: 2020-06-24

## 2020-06-24 PROBLEM — K76.9 LIVER LESION: Status: ACTIVE | Noted: 2020-06-24

## 2020-06-24 LAB
ANION GAP SERPL CALCULATED.3IONS-SCNC: 9 MMOL/L (ref 4–16)
APTT: 28 SECONDS (ref 25.1–37.1)
BUN BLDV-MCNC: 15 MG/DL (ref 6–23)
CALCIUM SERPL-MCNC: 9.6 MG/DL (ref 8.3–10.6)
CHLORIDE BLD-SCNC: 102 MMOL/L (ref 99–110)
CO2: 27 MMOL/L (ref 21–32)
CREAT SERPL-MCNC: 1.2 MG/DL (ref 0.6–1.1)
GFR AFRICAN AMERICAN: 58 ML/MIN/1.73M2
GFR NON-AFRICAN AMERICAN: 48 ML/MIN/1.73M2
GLUCOSE BLD-MCNC: 88 MG/DL (ref 70–99)
HCT VFR BLD CALC: 39.6 % (ref 37–47)
HEMOGLOBIN: 13.2 GM/DL (ref 12.5–16)
INR BLD: 0.87 INDEX
MCH RBC QN AUTO: 30.2 PG (ref 27–31)
MCHC RBC AUTO-ENTMCNC: 33.3 % (ref 32–36)
MCV RBC AUTO: 90.6 FL (ref 78–100)
PDW BLD-RTO: 13.2 % (ref 11.7–14.9)
PLATELET # BLD: 230 K/CU MM (ref 140–440)
PMV BLD AUTO: 8.8 FL (ref 7.5–11.1)
POTASSIUM SERPL-SCNC: 3.8 MMOL/L (ref 3.5–5.1)
PROTHROMBIN TIME: 10.5 SECONDS (ref 11.7–14.5)
RBC # BLD: 4.37 M/CU MM (ref 4.2–5.4)
SODIUM BLD-SCNC: 138 MMOL/L (ref 135–145)
WBC # BLD: 4.6 K/CU MM (ref 4–10.5)

## 2020-06-24 PROCEDURE — 6360000002 HC RX W HCPCS: Performed by: NURSE PRACTITIONER

## 2020-06-24 PROCEDURE — 80048 BASIC METABOLIC PNL TOTAL CA: CPT

## 2020-06-24 PROCEDURE — 6370000000 HC RX 637 (ALT 250 FOR IP): Performed by: NURSE PRACTITIONER

## 2020-06-24 PROCEDURE — 77013 CT GUIDE FOR TISSUE ABLATION: CPT

## 2020-06-24 PROCEDURE — 85730 THROMBOPLASTIN TIME PARTIAL: CPT

## 2020-06-24 PROCEDURE — 6360000002 HC RX W HCPCS: Performed by: NURSE ANESTHETIST, CERTIFIED REGISTERED

## 2020-06-24 PROCEDURE — 76937 US GUIDE VASCULAR ACCESS: CPT

## 2020-06-24 PROCEDURE — 85610 PROTHROMBIN TIME: CPT

## 2020-06-24 PROCEDURE — 2709999900 HC NON-CHARGEABLE SUPPLY

## 2020-06-24 PROCEDURE — 6360000002 HC RX W HCPCS

## 2020-06-24 PROCEDURE — 3700000001 HC ADD 15 MINUTES (ANESTHESIA)

## 2020-06-24 PROCEDURE — 7100000001 HC PACU RECOVERY - ADDTL 15 MIN

## 2020-06-24 PROCEDURE — 3700000000 HC ANESTHESIA ATTENDED CARE

## 2020-06-24 PROCEDURE — 2500000003 HC RX 250 WO HCPCS

## 2020-06-24 PROCEDURE — 2580000003 HC RX 258: Performed by: NURSE PRACTITIONER

## 2020-06-24 PROCEDURE — 2580000003 HC RX 258: Performed by: RADIOLOGY

## 2020-06-24 PROCEDURE — 76940 US GUIDE TISSUE ABLATION: CPT

## 2020-06-24 PROCEDURE — 76998 US GUIDE INTRAOP: CPT

## 2020-06-24 PROCEDURE — 7100000000 HC PACU RECOVERY - FIRST 15 MIN

## 2020-06-24 PROCEDURE — 2500000003 HC RX 250 WO HCPCS: Performed by: NURSE ANESTHETIST, CERTIFIED REGISTERED

## 2020-06-24 PROCEDURE — G0378 HOSPITAL OBSERVATION PER HR: HCPCS

## 2020-06-24 PROCEDURE — 2580000003 HC RX 258: Performed by: NURSE ANESTHETIST, CERTIFIED REGISTERED

## 2020-06-24 PROCEDURE — 85027 COMPLETE CBC AUTOMATED: CPT

## 2020-06-24 PROCEDURE — 94761 N-INVAS EAR/PLS OXIMETRY MLT: CPT

## 2020-06-24 RX ORDER — ONDANSETRON 4 MG/1
4 TABLET, ORALLY DISINTEGRATING ORAL EVERY 8 HOURS PRN
Status: DISPENSED | OUTPATIENT
Start: 2020-06-24 | End: 2020-06-25

## 2020-06-24 RX ORDER — DIPHENHYDRAMINE HCL 25 MG
25 CAPSULE ORAL EVERY 6 HOURS PRN
Status: DISCONTINUED | OUTPATIENT
Start: 2020-06-24 | End: 2020-06-25 | Stop reason: HOSPADM

## 2020-06-24 RX ORDER — PROPOFOL 10 MG/ML
INJECTION, EMULSION INTRAVENOUS PRN
Status: DISCONTINUED | OUTPATIENT
Start: 2020-06-24 | End: 2020-06-24 | Stop reason: SDUPTHER

## 2020-06-24 RX ORDER — FENTANYL CITRATE 50 UG/ML
INJECTION, SOLUTION INTRAMUSCULAR; INTRAVENOUS PRN
Status: DISCONTINUED | OUTPATIENT
Start: 2020-06-24 | End: 2020-06-24 | Stop reason: SDUPTHER

## 2020-06-24 RX ORDER — ONDANSETRON 2 MG/ML
4 INJECTION INTRAMUSCULAR; INTRAVENOUS EVERY 6 HOURS PRN
Status: DISCONTINUED | OUTPATIENT
Start: 2020-06-24 | End: 2020-06-25 | Stop reason: HOSPADM

## 2020-06-24 RX ORDER — SODIUM CHLORIDE 9 MG/ML
INJECTION, SOLUTION INTRAVENOUS CONTINUOUS
Status: ACTIVE | OUTPATIENT
Start: 2020-06-24 | End: 2020-06-25

## 2020-06-24 RX ORDER — LIDOCAINE HYDROCHLORIDE 20 MG/ML
INJECTION, SOLUTION INTRAVENOUS PRN
Status: DISCONTINUED | OUTPATIENT
Start: 2020-06-24 | End: 2020-06-24 | Stop reason: SDUPTHER

## 2020-06-24 RX ORDER — PROMETHAZINE HYDROCHLORIDE 12.5 MG/1
12.5 TABLET ORAL EVERY 6 HOURS PRN
Status: DISCONTINUED | OUTPATIENT
Start: 2020-06-24 | End: 2020-06-25 | Stop reason: HOSPADM

## 2020-06-24 RX ORDER — ACETAMINOPHEN 325 MG/1
650 TABLET ORAL EVERY 6 HOURS PRN
Status: DISCONTINUED | OUTPATIENT
Start: 2020-06-24 | End: 2020-06-25 | Stop reason: HOSPADM

## 2020-06-24 RX ORDER — ROCURONIUM BROMIDE 10 MG/ML
INJECTION, SOLUTION INTRAVENOUS PRN
Status: DISCONTINUED | OUTPATIENT
Start: 2020-06-24 | End: 2020-06-24 | Stop reason: SDUPTHER

## 2020-06-24 RX ORDER — POLYETHYLENE GLYCOL 3350 17 G/17G
17 POWDER, FOR SOLUTION ORAL DAILY PRN
Status: DISCONTINUED | OUTPATIENT
Start: 2020-06-24 | End: 2020-06-25 | Stop reason: HOSPADM

## 2020-06-24 RX ORDER — ACETAMINOPHEN 650 MG/1
650 SUPPOSITORY RECTAL EVERY 6 HOURS PRN
Status: DISCONTINUED | OUTPATIENT
Start: 2020-06-24 | End: 2020-06-25 | Stop reason: HOSPADM

## 2020-06-24 RX ORDER — IPRATROPIUM BROMIDE AND ALBUTEROL SULFATE 2.5; .5 MG/3ML; MG/3ML
1 SOLUTION RESPIRATORY (INHALATION) EVERY 4 HOURS PRN
Status: DISCONTINUED | OUTPATIENT
Start: 2020-06-24 | End: 2020-06-25 | Stop reason: HOSPADM

## 2020-06-24 RX ORDER — ONDANSETRON 2 MG/ML
4 INJECTION INTRAMUSCULAR; INTRAVENOUS
Status: DISCONTINUED | OUTPATIENT
Start: 2020-06-24 | End: 2020-06-24 | Stop reason: SDUPTHER

## 2020-06-24 RX ORDER — FENTANYL CITRATE 50 UG/ML
25 INJECTION, SOLUTION INTRAMUSCULAR; INTRAVENOUS EVERY 5 MIN PRN
Status: DISCONTINUED | OUTPATIENT
Start: 2020-06-24 | End: 2020-06-25 | Stop reason: HOSPADM

## 2020-06-24 RX ORDER — PHENYLEPHRINE HYDROCHLORIDE 10 MG/ML
INJECTION INTRAVENOUS PRN
Status: DISCONTINUED | OUTPATIENT
Start: 2020-06-24 | End: 2020-06-24 | Stop reason: SDUPTHER

## 2020-06-24 RX ORDER — SODIUM CHLORIDE 0.9 % (FLUSH) 0.9 %
10 SYRINGE (ML) INJECTION PRN
Status: DISCONTINUED | OUTPATIENT
Start: 2020-06-24 | End: 2020-06-25 | Stop reason: HOSPADM

## 2020-06-24 RX ORDER — OXYCODONE HYDROCHLORIDE 5 MG/1
5 TABLET ORAL EVERY 4 HOURS PRN
Status: DISPENSED | OUTPATIENT
Start: 2020-06-24 | End: 2020-06-25

## 2020-06-24 RX ORDER — SODIUM CHLORIDE 0.9 % (FLUSH) 0.9 %
10 SYRINGE (ML) INJECTION EVERY 12 HOURS SCHEDULED
Status: DISCONTINUED | OUTPATIENT
Start: 2020-06-24 | End: 2020-06-25 | Stop reason: HOSPADM

## 2020-06-24 RX ORDER — PROPOFOL 10 MG/ML
INJECTION, EMULSION INTRAVENOUS CONTINUOUS PRN
Status: DISCONTINUED | OUTPATIENT
Start: 2020-06-24 | End: 2020-06-24 | Stop reason: SDUPTHER

## 2020-06-24 RX ORDER — VENLAFAXINE HYDROCHLORIDE 37.5 MG/1
37.5 CAPSULE, EXTENDED RELEASE ORAL DAILY
Status: DISCONTINUED | OUTPATIENT
Start: 2020-06-24 | End: 2020-06-25 | Stop reason: HOSPADM

## 2020-06-24 RX ORDER — HYDRALAZINE HYDROCHLORIDE 20 MG/ML
5 INJECTION INTRAMUSCULAR; INTRAVENOUS EVERY 10 MIN PRN
Status: DISCONTINUED | OUTPATIENT
Start: 2020-06-24 | End: 2020-06-25 | Stop reason: HOSPADM

## 2020-06-24 RX ORDER — ONDANSETRON 2 MG/ML
INJECTION INTRAMUSCULAR; INTRAVENOUS PRN
Status: DISCONTINUED | OUTPATIENT
Start: 2020-06-24 | End: 2020-06-24 | Stop reason: SDUPTHER

## 2020-06-24 RX ORDER — SODIUM CHLORIDE 9 MG/ML
INJECTION, SOLUTION INTRAVENOUS CONTINUOUS PRN
Status: DISCONTINUED | OUTPATIENT
Start: 2020-06-24 | End: 2020-06-24 | Stop reason: SDUPTHER

## 2020-06-24 RX ADMIN — FENTANYL CITRATE 100 MCG: 50 INJECTION INTRAMUSCULAR; INTRAVENOUS at 09:26

## 2020-06-24 RX ADMIN — FENTANYL CITRATE 100 MCG: 50 INJECTION INTRAMUSCULAR; INTRAVENOUS at 11:01

## 2020-06-24 RX ADMIN — PHENYLEPHRINE HYDROCHLORIDE 100 MCG: 10 INJECTION INTRAVENOUS at 10:00

## 2020-06-24 RX ADMIN — SODIUM CHLORIDE, PRESERVATIVE FREE 10 ML: 5 INJECTION INTRAVENOUS at 07:00

## 2020-06-24 RX ADMIN — PHENYLEPHRINE HYDROCHLORIDE 100 MCG: 10 INJECTION INTRAVENOUS at 09:48

## 2020-06-24 RX ADMIN — FENTANYL CITRATE 100 MCG: 50 INJECTION INTRAMUSCULAR; INTRAVENOUS at 10:30

## 2020-06-24 RX ADMIN — ONDANSETRON 4 MG: 2 INJECTION INTRAMUSCULAR; INTRAVENOUS at 10:33

## 2020-06-24 RX ADMIN — OXYCODONE HYDROCHLORIDE 5 MG: 5 TABLET ORAL at 19:00

## 2020-06-24 RX ADMIN — SODIUM CHLORIDE: 9 INJECTION, SOLUTION INTRAVENOUS at 15:19

## 2020-06-24 RX ADMIN — PROPOFOL 20 MG: 10 INJECTION, EMULSION INTRAVENOUS at 09:33

## 2020-06-24 RX ADMIN — LIDOCAINE HYDROCHLORIDE 40 MG: 20 INJECTION, SOLUTION INTRAVENOUS at 09:28

## 2020-06-24 RX ADMIN — ROCURONIUM BROMIDE 45 MG: 10 INJECTION INTRAVENOUS at 09:28

## 2020-06-24 RX ADMIN — SUGAMMADEX 400 MG: 100 INJECTION, SOLUTION INTRAVENOUS at 10:47

## 2020-06-24 RX ADMIN — ROCURONIUM BROMIDE 50 MG: 10 INJECTION INTRAVENOUS at 10:10

## 2020-06-24 RX ADMIN — LIDOCAINE HYDROCHLORIDE 40 MG: 20 INJECTION, SOLUTION INTRAVENOUS at 09:26

## 2020-06-24 RX ADMIN — PROPOFOL 10 MG: 10 INJECTION, EMULSION INTRAVENOUS at 09:26

## 2020-06-24 RX ADMIN — VENLAFAXINE HYDROCHLORIDE 37.5 MG: 37.5 CAPSULE, EXTENDED RELEASE ORAL at 13:15

## 2020-06-24 RX ADMIN — PROPOFOL 170 MG: 10 INJECTION, EMULSION INTRAVENOUS at 09:28

## 2020-06-24 RX ADMIN — PROPOFOL 50 MG: 10 INJECTION, EMULSION INTRAVENOUS at 10:10

## 2020-06-24 RX ADMIN — ROCURONIUM BROMIDE 5 MG: 10 INJECTION INTRAVENOUS at 09:26

## 2020-06-24 RX ADMIN — FENTANYL CITRATE 100 MCG: 50 INJECTION INTRAMUSCULAR; INTRAVENOUS at 10:57

## 2020-06-24 RX ADMIN — OXYCODONE HYDROCHLORIDE 5 MG: 5 TABLET ORAL at 13:38

## 2020-06-24 RX ADMIN — PROPOFOL: 10 INJECTION, EMULSION INTRAVENOUS at 10:20

## 2020-06-24 RX ADMIN — ONDANSETRON 4 MG: 4 TABLET, ORALLY DISINTEGRATING ORAL at 13:38

## 2020-06-24 RX ADMIN — SODIUM CHLORIDE: 9 INJECTION, SOLUTION INTRAVENOUS at 08:57

## 2020-06-24 RX ADMIN — PROPOFOL 200 MCG/KG/MIN: 10 INJECTION, EMULSION INTRAVENOUS at 09:30

## 2020-06-24 RX ADMIN — PROPOFOL 50 MG: 10 INJECTION, EMULSION INTRAVENOUS at 10:30

## 2020-06-24 ASSESSMENT — PULMONARY FUNCTION TESTS
PIF_VALUE: 3
PIF_VALUE: 33
PIF_VALUE: 32
PIF_VALUE: 1
PIF_VALUE: 1
PIF_VALUE: 17
PIF_VALUE: 18
PIF_VALUE: 1
PIF_VALUE: 11
PIF_VALUE: 19
PIF_VALUE: 6
PIF_VALUE: 18
PIF_VALUE: 2
PIF_VALUE: 17
PIF_VALUE: 1
PIF_VALUE: 0
PIF_VALUE: 1
PIF_VALUE: 17
PIF_VALUE: 17
PIF_VALUE: 1
PIF_VALUE: 0
PIF_VALUE: 1
PIF_VALUE: 17
PIF_VALUE: 23
PIF_VALUE: 1
PIF_VALUE: 0
PIF_VALUE: 12
PIF_VALUE: 18
PIF_VALUE: 2
PIF_VALUE: 17
PIF_VALUE: 48
PIF_VALUE: 1
PIF_VALUE: 3
PIF_VALUE: 1
PIF_VALUE: 16
PIF_VALUE: 17
PIF_VALUE: 17
PIF_VALUE: 23
PIF_VALUE: 18
PIF_VALUE: 19
PIF_VALUE: 21
PIF_VALUE: 19
PIF_VALUE: 21
PIF_VALUE: 1
PIF_VALUE: 20
PIF_VALUE: 17
PIF_VALUE: 3
PIF_VALUE: 20
PIF_VALUE: 18
PIF_VALUE: 1
PIF_VALUE: 17
PIF_VALUE: 17
PIF_VALUE: 5
PIF_VALUE: 17
PIF_VALUE: 0
PIF_VALUE: 30
PIF_VALUE: 1
PIF_VALUE: 17
PIF_VALUE: 17
PIF_VALUE: 1
PIF_VALUE: 3
PIF_VALUE: 1
PIF_VALUE: 18
PIF_VALUE: 17
PIF_VALUE: 1
PIF_VALUE: 19
PIF_VALUE: 1
PIF_VALUE: 20
PIF_VALUE: 0
PIF_VALUE: 1
PIF_VALUE: 21
PIF_VALUE: 16
PIF_VALUE: 17
PIF_VALUE: 2
PIF_VALUE: 19
PIF_VALUE: 17
PIF_VALUE: 17
PIF_VALUE: 0
PIF_VALUE: 16
PIF_VALUE: 18
PIF_VALUE: 17
PIF_VALUE: 16
PIF_VALUE: 2
PIF_VALUE: 34
PIF_VALUE: 1
PIF_VALUE: 20
PIF_VALUE: 21
PIF_VALUE: 17
PIF_VALUE: 17
PIF_VALUE: 19
PIF_VALUE: 17
PIF_VALUE: 18
PIF_VALUE: 31
PIF_VALUE: 18
PIF_VALUE: 19
PIF_VALUE: 18
PIF_VALUE: 17
PIF_VALUE: 34
PIF_VALUE: 17
PIF_VALUE: 1
PIF_VALUE: 33
PIF_VALUE: 19
PIF_VALUE: 18
PIF_VALUE: 1
PIF_VALUE: 21
PIF_VALUE: 18
PIF_VALUE: 18
PIF_VALUE: 17
PIF_VALUE: 19
PIF_VALUE: 1
PIF_VALUE: 16
PIF_VALUE: 18
PIF_VALUE: 17
PIF_VALUE: 18
PIF_VALUE: 1

## 2020-06-24 ASSESSMENT — PAIN DESCRIPTION - FREQUENCY
FREQUENCY: INTERMITTENT
FREQUENCY: CONTINUOUS

## 2020-06-24 ASSESSMENT — PAIN DESCRIPTION - ONSET: ONSET: ON-GOING

## 2020-06-24 ASSESSMENT — PAIN SCALES - GENERAL
PAINLEVEL_OUTOF10: 3
PAINLEVEL_OUTOF10: 0
PAINLEVEL_OUTOF10: 5
PAINLEVEL_OUTOF10: 5
PAINLEVEL_OUTOF10: 10
PAINLEVEL_OUTOF10: 0

## 2020-06-24 ASSESSMENT — PAIN - FUNCTIONAL ASSESSMENT
PAIN_FUNCTIONAL_ASSESSMENT: 0-10
PAIN_FUNCTIONAL_ASSESSMENT: ACTIVITIES ARE NOT PREVENTED

## 2020-06-24 ASSESSMENT — PAIN DESCRIPTION - ORIENTATION
ORIENTATION: MID
ORIENTATION: MID

## 2020-06-24 ASSESSMENT — PAIN DESCRIPTION - PAIN TYPE
TYPE: ACUTE PAIN;SURGICAL PAIN
TYPE: SURGICAL PAIN

## 2020-06-24 ASSESSMENT — PAIN DESCRIPTION - DESCRIPTORS
DESCRIPTORS: DISCOMFORT
DESCRIPTORS: SORE

## 2020-06-24 ASSESSMENT — PAIN DESCRIPTION - PROGRESSION: CLINICAL_PROGRESSION: NOT CHANGED

## 2020-06-24 ASSESSMENT — PAIN DESCRIPTION - LOCATION
LOCATION: ABDOMEN
LOCATION: CHEST

## 2020-06-24 NOTE — CONSULTS
Consult completed. Procedure/rationale explained to pt & consent obtained. #20ga 45mm-long PIV initiated to LUE anterior upper forearm using sterile, UltraSound-guided technique without difficulty/complications. Sterile dressing with SkinPrep & SwabCap applied. Pt tolerated well & denies other c/o or needs.

## 2020-06-24 NOTE — H&P
mg, 5 mg, Intravenous, Q10 Min PRN, Michael Leventhal, MD    Abemaciclib TABS 150 mg, 150 mg, Oral, BID, KIMBERLY Davila CNP    ipratropium-albuterol (DUONEB) nebulizer solution 1 ampule, 1 ampule, Inhalation, Q4H PRN, KIMBERLY Davila CNP    venlafaxine (EFFEXOR XR) extended release capsule 37.5 mg, 37.5 mg, Oral, Daily, KIMBERLY Davila CNP, 37.5 mg at 06/24/20 1315    diphenhydrAMINE (BENADRYL) capsule 25 mg, 25 mg, Oral, Q6H PRN, KIMBERLY Davila CNP    sodium chloride flush 0.9 % injection 10 mL, 10 mL, Intravenous, 2 times per day, KIMBERLY Davila CNP    sodium chloride flush 0.9 % injection 10 mL, 10 mL, Intravenous, PRN, KIMBERLY Davila CNP    acetaminophen (TYLENOL) tablet 650 mg, 650 mg, Oral, Q6H PRN **OR** acetaminophen (TYLENOL) suppository 650 mg, 650 mg, Rectal, Q6H PRN, KIMBERLY Davila CNP    polyethylene glycol (GLYCOLAX) packet 17 g, 17 g, Oral, Daily PRN, KIMBERLY Davila CNP    promethazine (PHENERGAN) tablet 12.5 mg, 12.5 mg, Oral, Q6H PRN **OR** ondansetron (ZOFRAN) injection 4 mg, 4 mg, Intravenous, Q6H PRN, KIMBERLY Diaz CNP    0.9 % sodium chloride infusion, , Intravenous, Continuous, KIMBERLY Diaz CNP, Last Rate: 100 mL/hr at 06/24/20 1519    oxyCODONE (ROXICODONE) immediate release tablet 5 mg, 5 mg, Oral, Q4H PRN, KIMBERLY Davila CNP, 5 mg at 06/24/20 1338    HYDROmorphone (DILAUDID) injection 1 mg, 1 mg, Intravenous, Q4H PRN, KIMBERLY Davila CNP    ondansetron (ZOFRAN-ODT) disintegrating tablet 4 mg, 4 mg, Oral, Q8H PRN, Mariajose Stallings, KIMBERLY - CNP, 4 mg at 06/24/20 4885    History of present illness     Chief Complaint: abdominal pain s/p liver ablation      Alonso Franco is a 52 y.o.  female  who presents with 10 out of 10 constant sharp epigastric pain S/P ablation today.   Patient has a history of primary right breast cancer with mets to the spine, bones and liver.  She was initially diagnosed in 2017 and has been following up with Dr. Chyna Ovalle. She is on oral, injection and infusion chemo. Will admit under observation. Other medical history of asthma, depression, and obesity. Review of Systems   Ten point ROS reviewed and negative, unless as noted below. GENERAL:  Denies fever, chills, night sweats, or changes in weight. EYES:  Denies recent visual changes. ENT:  Denies ear pain, hearing loss or tinnitus  RESP:  Denies any cough, dyspnea, or wheezing. CV:  Denies any chest pain with exertion or at rest, palpitations, syncope, or edema. GI:  Denies any dysphagia, nausea, vomiting, abdominal pain, heartburn, changes in bowel habit, melena or rectal bleeding  MUSCULOSKELETAL:  Denies any joint swelling, joint pain, or loss of range of motion. NEURO:  Denies any headaches, tremors, dizziness, vertigo, memory loss, confusion, weakness, numbness or tingling. PSYCH:  Denies any sleeping problems, history of abuse, marital discord. HEME/LYMPHATIC/IMMUNO:  Denies , bruising, bleeding abnormalities   ENDO:  Denies any heat or cold intolerance, panemiaolyuria or polydipsia. Objective: Intake/Output Summary (Last 24 hours) at 6/24/2020 1818  Last data filed at 6/24/2020 1210  Gross per 24 hour   Intake 1000 ml   Output --   Net 1000 ml      Vitals:   Vitals:    06/24/20 1322   BP: 133/80   Pulse: 69   Resp: 16   Temp: 97.6 °F (36.4 °C)   SpO2: 96%     Physical Exam:   Gen:  awake, alert, cooperative, mild distress due to pain  EYES:Lids and lashes normal, pupils equal, round ,extra ocular muscles intact, sclera clear, conjunctiva normal  ENT:  Normocephalic, oral pharynx with moist mucus membranes  NECK:  Supple, symmetrical, trachea midline, no adenopathy,  LUNGS:  Clear to auscultate bilaterally, no rales ronchi or wheezing noted.   CARDIOVASCULAR:  regular rate and rhythm, normal S1 and S2,no murmur noted, peripheral pulses 2+, no pitting edema  ABDOMEN: Normal BS, moderate epigastric tenderness, non distended, no HSM noted. Obese  MUSCULOSKELETAL:  ROM of all extremities grossly wnl  NEUROLOGIC: AOx 3,  Cranial nerves II-XII are grossly intact. Motor is 5 out of 5 bilaterally. Sensory is intact, no lateralizing findings. SKIN:  no bruising or bleeding, normal skin color, turgor, no redness, warmth, or swelling      Past Medical History:      Past Medical History:   Diagnosis Date    Asthma     last flare up winter 2016    Breast CA [facility])     right    Chest pain     \"had chest pain last time - saw Dr Marta Yang- all ok\"    FHx: coronary artery disease     H/O Doppler lower venous ultrasound 2019    No DVT,  Significant reflux noted of the Right & Left Deep Venous System.  H/O echocardiogram 2019    EF 55-60%, Essentially normal echocardiogram. Dilated aortic root at 4.0 cm.  History of exercise stress test 2019    Treadmill, EKG portion is negative for ischemia by diagnostic criteria    History of migraine     \"none for past 3 months\"    HX OTHER MEDICAL     \"since 2016- having some pain mid sternum- xray thought fx, then did CT and bone scan now having bx done\"    Primary cancer of right breast with metastasis to other site [facility]) 3/24/2019     PSHX:  has a past surgical history that includes Tubal ligation ();  section (); other surgical history (Right, 2013); Hysterectomy (age 27); and eye surgery (age 28). Allergies: Allergies   Allergen Reactions    Sulfa Antibiotics Other (See Comments)     Stop breathing.  Nickel        FAM HX: family history includes Arthritis in her father and sister; Coronary Art Dis in her father and mother; Depression in her brother; Heart Attack in her father and mother; High Blood Pressure in her father and sister; High Cholesterol in her mother; Liver Disease in her sister; Migraines in her sister;  Other in her mother, sister, sister, sister, sister, and sister. Family history reviewed and is essentially negative unless as stated above. Soc HX:   Social History     Socioeconomic History    Marital status:      Spouse name: None    Number of children: None    Years of education: None    Highest education level: None   Occupational History    None   Social Needs    Financial resource strain: None    Food insecurity     Worry: None     Inability: None    Transportation needs     Medical: None     Non-medical: None   Tobacco Use    Smoking status: Former Smoker     Packs/day: 0.50     Years: 16.00     Pack years: 8.00     Types: Cigarettes     Last attempt to quit: 8/27/2004     Years since quitting: 15.8    Smokeless tobacco: Never Used    Tobacco comment: Quit smoking 9 years ago. Substance and Sexual Activity    Alcohol use:  Yes     Alcohol/week: 2.0 standard drinks     Types: 2 Glasses of wine per week     Comment: Occasionally     CAFFEINE: 1 cup coffee daily/ average one per week    Drug use: No    Sexual activity: None   Lifestyle    Physical activity     Days per week: None     Minutes per session: None    Stress: None   Relationships    Social connections     Talks on phone: None     Gets together: None     Attends Gnosticist service: None     Active member of club or organization: None     Attends meetings of clubs or organizations: None     Relationship status: None    Intimate partner violence     Fear of current or ex partner: None     Emotionally abused: None     Physically abused: None     Forced sexual activity: None   Other Topics Concern    None   Social History Narrative    None       Electronically signed by KIMBERLY Gomez CNP on 6/24/2020 at 6:18 PM

## 2020-06-24 NOTE — PROGRESS NOTES
Pt arrived to CT for a liver ablation. Pt A&O, verbalizes understanding of procedure. Informed consent placed in soft chart. Warm blanket given for comfort. Pt c/o anxiety. 7332 Time Out  0934 Procedure started. Dr Kevin Jackson at bedside. Pt prepped and draped to the lower chest and  abdomen. US images taken. Pt cardiac and vitals monitored by [employer]. Pt is intubated, resting with eyes closed. Dr Vargas Block guidance to access the lesion. Ct imaging also utilized. 1029 Ablation time 10 minutes. 1045 Procedure complete. Pt to PACU post procedure. PACU notified.

## 2020-06-24 NOTE — PROGRESS NOTES
1103- arrived to PACU in semi-fowlers position, monitors applied alarms on oriented to unit. Pt moaning in pain, meds given per anesthesia. Handoff report received from KATYA Thacker  1120- encouraged to deep breath   1130- resting eyes closed  1145- Hospitalist notified of admission  1210- Phase 1 recovery complete, handoff report called to [address]- transferred to [address] [facility] per cart, stood at bedside transferred to bed

## 2020-06-24 NOTE — ANESTHESIA PRE PROCEDURE
Department of Anesthesiology  Preprocedure Note       Name:  Ricardo Crawford   Age:  52 y.o.  :  1970                                          MRN:  [id number]         Date:  2020      Surgeon: * No surgeons listed *    Procedure: * No procedures listed *    Medications prior to admission:   Prior to Admission medications    Medication Sig Start Date End Date Taking?  Authorizing Provider   Abemaciclib (VERZENIO) 150 MG TABS Take 1 tablet by mouth 2 times daily 20  Yes Historical Provider, MD   venlafaxine (EFFEXOR XR) 37.5 MG extended release capsule Take 37.5 mg by mouth daily   Yes Historical Provider, MD   topiramate ER (TROKENDI XR) 50 MG CP24 Take 50 mg by mouth as needed    Yes Historical Provider, MD   zoledronic acid (ZOMETA) 4 MG/100ML SOLN infusion Infuse 4 mg intravenously once    Historical Provider, MD   goserelin (ZOLADEX) 3.6 MG injection Inject 3.6 mg into the skin once    Historical Provider, MD   fulvestrant (FASLODEX) 250 MG/5ML SOLN IM injection Inject 500 mg into the muscle once    Historical Provider, MD   albuterol sulfate  (90 Base) MCG/ACT inhaler Inhale 2 puffs into the lungs every 6 hours as needed for Wheezing    Historical Provider, MD   diphenhydrAMINE (BENADRYL) 25 MG capsule 1-2 capsules by mouth every 6 hours as needed for rash 18   SARAH Irizarry       Current medications:    Current Outpatient Medications   Medication Sig Dispense Refill    Abemaciclib (VERZENIO) 150 MG TABS Take 1 tablet by mouth 2 times daily      venlafaxine (EFFEXOR XR) 37.5 MG extended release capsule Take 37.5 mg by mouth daily      topiramate ER (TROKENDI XR) 50 MG CP24 Take 50 mg by mouth as needed       zoledronic acid (ZOMETA) 4 MG/100ML SOLN infusion Infuse 4 mg intravenously once      goserelin (ZOLADEX) 3.6 MG injection Inject 3.6 mg into the skin once      fulvestrant (FASLODEX) 250 MG/5ML SOLN IM injection Inject 500 mg into the muscle once  albuterol sulfate  (90 Base) MCG/ACT inhaler Inhale 2 puffs into the lungs every 6 hours as needed for Wheezing      diphenhydrAMINE (BENADRYL) 25 MG capsule 1-2 capsules by mouth every 6 hours as needed for rash 30 capsule 0     Current Facility-Administered Medications   Medication Dose Route Frequency Provider Last Rate Last Dose    sodium chloride flush 0.9 % injection 10 mL  10 mL Intravenous PRN Max Boateng MD   10 mL at 20 0700       Allergies: Allergies   Allergen Reactions    Sulfa Antibiotics Other (See Comments)     Stop breathing.  Nickel        Problem List:    Patient Active Problem List   Diagnosis Code    Cubital tunnel syndrome on right G56.21    FHx: coronary artery disease Z82.49    Chest pain R07.9    Primary cancer of right breast with metastasis to other site ([facility] [address] C50.911    Dyslipidemia E78.5    Asymptomatic varicose veins I83.90    Dilated aortic root ([facility] [address] I77.810       Past Medical History:        Diagnosis Date    Asthma     last flare up winter 2016    Breast CA [facility])     right    Chest pain     \"had chest pain last time - saw Dr Fiona Howard- all ok\"    FHx: coronary artery disease     H/O Doppler lower venous ultrasound 2019    No DVT,  Significant reflux noted of the Right & Left Deep Venous System.  H/O echocardiogram 2019    EF 55-60%, Essentially normal echocardiogram. Dilated aortic root at 4.0 cm.     History of exercise stress test 2019    Treadmill, EKG portion is negative for ischemia by diagnostic criteria    History of migraine     \"none for past 3 months\"    HX OTHER MEDICAL     \"since 2016- having some pain mid sternum- xray thought fx, then did CT and bone scan now having bx done\"    Primary cancer of right breast with metastasis to other site [facility]) 3/24/2019       Past Surgical History:        Procedure Laterality Date     SECTION      EYE SURGERY  age 28    LASIK both eyes    HYSTERECTOMY CREATININE 1.2 06/24/2020    GFRAA 58 06/24/2020    LABGLOM 48 06/24/2020    GLUCOSE 88 06/24/2020    PROT 7.3 06/01/2020    CALCIUM 9.6 06/24/2020    BILITOT 0.5 06/01/2020    ALKPHOS 65 06/01/2020    AST 20 06/01/2020    ALT 20 06/01/2020       POC Tests: No results for input(s): POCGLU, POCNA, POCK, POCCL, POCBUN, POCHEMO, POCHCT in the last 72 hours. Coags:   Lab Results   Component Value Date    PROTIME 10.5 06/24/2020    INR 0.87 06/24/2020    APTT 28.0 06/24/2020       HCG (If Applicable): No results found for: PREGTESTUR, PREGSERUM, HCG, HCGQUANT     ABGs: No results found for: PHART, PO2ART, FIM2SYI, SSA4NQS, BEART, C4FRJFJN     Type & Screen (If Applicable):  No results found for: LABABO, LABRH    Drug/Infectious Status (If Applicable):  No results found for: HIV, HEPCAB    COVID-19 Screening (If Applicable):   Lab Results   Component Value Date    COVID19 NOT DETECTED 06/22/2020         Anesthesia Evaluation    Airway: Mallampati: I  TM distance: >3 FB   Neck ROM: full  Mouth opening: > = 3 FB Dental: normal exam         Pulmonary:normal exam    (+) asthma:                            Cardiovascular:                      Neuro/Psych:                ROS comment: migraines GI/Hepatic/Renal:             Endo/Other:    (+) malignancy/cancer. ROS comment: Right breast CA Abdominal:           Vascular:                                        Anesthesia Plan      general     ASA 2       Induction: intravenous. MIPS: Postoperative opioids intended. Anesthetic plan and risks discussed with patient. Plan discussed with CRNA.     Attending anesthesiologist reviewed and agrees with Senia Romero MD   6/24/2020

## 2020-06-25 ENCOUNTER — APPOINTMENT (OUTPATIENT)
Dept: CT IMAGING | Age: 50
End: 2020-06-25
Attending: INTERNAL MEDICINE
Payer: COMMERCIAL

## 2020-06-25 VITALS
DIASTOLIC BLOOD PRESSURE: 64 MMHG | SYSTOLIC BLOOD PRESSURE: 108 MMHG | WEIGHT: 219 LBS | BODY MASS INDEX: 34.37 KG/M2 | HEART RATE: 66 BPM | HEIGHT: 67 IN | RESPIRATION RATE: 16 BRPM | OXYGEN SATURATION: 91 % | TEMPERATURE: 98.2 F

## 2020-06-25 LAB
HCT VFR BLD CALC: 36.7 % (ref 37–47)
HEMOGLOBIN: 12.6 GM/DL (ref 12.5–16)
MCH RBC QN AUTO: 31 PG (ref 27–31)
MCHC RBC AUTO-ENTMCNC: 34.3 % (ref 32–36)
MCV RBC AUTO: 90.2 FL (ref 78–100)
PDW BLD-RTO: 13.2 % (ref 11.7–14.9)
PLATELET # BLD: 184 K/CU MM (ref 140–440)
PMV BLD AUTO: 8.8 FL (ref 7.5–11.1)
RBC # BLD: 4.07 M/CU MM (ref 4.2–5.4)
WBC # BLD: 3.5 K/CU MM (ref 4–10.5)

## 2020-06-25 PROCEDURE — G0378 HOSPITAL OBSERVATION PER HR: HCPCS

## 2020-06-25 PROCEDURE — 74150 CT ABDOMEN W/O CONTRAST: CPT

## 2020-06-25 PROCEDURE — 6370000000 HC RX 637 (ALT 250 FOR IP): Performed by: NURSE PRACTITIONER

## 2020-06-25 PROCEDURE — 94761 N-INVAS EAR/PLS OXIMETRY MLT: CPT

## 2020-06-25 PROCEDURE — 85027 COMPLETE CBC AUTOMATED: CPT

## 2020-06-25 PROCEDURE — 2580000003 HC RX 258: Performed by: NURSE PRACTITIONER

## 2020-06-25 PROCEDURE — 36415 COLL VENOUS BLD VENIPUNCTURE: CPT

## 2020-06-25 RX ORDER — HYDROCODONE BITARTRATE AND ACETAMINOPHEN 5; 325 MG/1; MG/1
1 TABLET ORAL EVERY 6 HOURS PRN
Qty: 12 TABLET | Refills: 0 | Status: SHIPPED | OUTPATIENT
Start: 2020-06-25 | End: 2020-06-28

## 2020-06-25 RX ADMIN — ACETAMINOPHEN 650 MG: 325 TABLET ORAL at 05:14

## 2020-06-25 RX ADMIN — SODIUM CHLORIDE, PRESERVATIVE FREE 10 ML: 5 INJECTION INTRAVENOUS at 10:36

## 2020-06-25 RX ADMIN — VENLAFAXINE HYDROCHLORIDE 37.5 MG: 37.5 CAPSULE, EXTENDED RELEASE ORAL at 10:31

## 2020-06-25 RX ADMIN — SODIUM CHLORIDE: 9 INJECTION, SOLUTION INTRAVENOUS at 01:11

## 2020-06-25 RX ADMIN — OXYCODONE HYDROCHLORIDE 5 MG: 5 TABLET ORAL at 01:13

## 2020-06-25 ASSESSMENT — PAIN DESCRIPTION - PAIN TYPE
TYPE: SURGICAL PAIN
TYPE: ACUTE PAIN

## 2020-06-25 ASSESSMENT — PAIN DESCRIPTION - ONSET
ONSET: ON-GOING
ONSET: ON-GOING

## 2020-06-25 ASSESSMENT — PAIN DESCRIPTION - PROGRESSION
CLINICAL_PROGRESSION: GRADUALLY WORSENING
CLINICAL_PROGRESSION: NOT CHANGED
CLINICAL_PROGRESSION: GRADUALLY WORSENING

## 2020-06-25 ASSESSMENT — PAIN SCALES - GENERAL
PAINLEVEL_OUTOF10: 8
PAINLEVEL_OUTOF10: 4

## 2020-06-25 ASSESSMENT — PAIN DESCRIPTION - LOCATION
LOCATION: CHEST
LOCATION: HEAD

## 2020-06-25 ASSESSMENT — PAIN DESCRIPTION - DESCRIPTORS
DESCRIPTORS: SORE
DESCRIPTORS: HEADACHE

## 2020-06-25 ASSESSMENT — PAIN DESCRIPTION - FREQUENCY
FREQUENCY: CONTINUOUS
FREQUENCY: CONTINUOUS

## 2020-06-25 ASSESSMENT — PAIN DESCRIPTION - ORIENTATION: ORIENTATION: MID

## 2020-06-25 ASSESSMENT — PAIN - FUNCTIONAL ASSESSMENT
PAIN_FUNCTIONAL_ASSESSMENT: ACTIVITIES ARE NOT PREVENTED
PAIN_FUNCTIONAL_ASSESSMENT: ACTIVITIES ARE NOT PREVENTED

## 2020-06-25 NOTE — PLAN OF CARE
Problem: Pain:  Goal: Pain level will decrease  Description: Pain level will decrease  Outcome: Ongoing  Goal: Control of acute pain  Description: Control of acute pain  Outcome: Ongoing  Goal: Control of chronic pain  Description: Control of chronic pain  Outcome: Ongoing     Problem: Discharge Planning:  Goal: Participates in care planning  Description: Participates in care planning  Outcome: Ongoing  Goal: Discharged to appropriate level of care  Description: Discharged to appropriate level of care  Outcome: Ongoing     Problem: Activity Intolerance:  Goal: Ability to tolerate increased activity will improve  Description: Ability to tolerate increased activity will improve  Outcome: Ongoing     Problem: Anxiety/Stress:  Goal: Level of anxiety will decrease  Description: Level of anxiety will decrease  Outcome: Ongoing     Problem:  Bowel Function - Altered:  Goal: Bowel elimination is within specified parameters  Description: Bowel elimination is within specified parameters  Outcome: Ongoing     Problem: Fluid Volume - Deficit:  Goal: Absence of fluid volume deficit signs and symptoms  Description: Absence of fluid volume deficit signs and symptoms  Outcome: Ongoing  Goal: Electrolytes within specified parameters  Description: Electrolytes within specified parameters  Outcome: Ongoing     Problem: Mental Status - Impaired:  Goal: Absence of continued neurological deterioration signs and symptoms  Description: Absence of continued neurological deterioration signs and symptoms  Outcome: Ongoing  Goal: Absence of physical injury  Description: Absence of physical injury  Outcome: Ongoing  Goal: Mental status will be restored to baseline  Description: Mental status will be restored to baseline  Outcome: Ongoing     Problem: Mobility - Impaired:  Goal: Mobility will improve to maximum level  Description: Mobility will improve to maximum level  Outcome: Ongoing     Problem: Nutrition Deficit:  Goal: Ability to achieve adequate nutritional intake will improve  Description: Ability to achieve adequate nutritional intake will improve  Outcome: Ongoing     Problem: Pain:  Goal: Ability to notify healthcare provider of pain before it becomes unmanageable or unbearable will improve  Description: Ability to notify healthcare provider of pain before it becomes unmanageable or unbearable will improve  Outcome: Ongoing  Goal: Control of acute pain  Description: Control of acute pain  Outcome: Ongoing  Goal: Control of chronic pain  Description: Control of chronic pain  Outcome: Ongoing     Problem: Serum Glucose Level - Abnormal:  Goal: Ability to maintain appropriate glucose levels will improve to within specified parameters  Description: Ability to maintain appropriate glucose levels will improve to within specified parameters  Outcome: Ongoing     Problem: Skin Integrity - Impaired:  Goal: Will show no infection signs and symptoms  Description: Will show no infection signs and symptoms  Outcome: Ongoing  Goal: Absence of new skin breakdown  Description: Absence of new skin breakdown  Outcome: Ongoing     Problem: Sleep Pattern Disturbance:  Goal: Appears well-rested  Description: Appears well-rested  Outcome: Ongoing

## 2020-06-25 NOTE — DISCHARGE SUMMARY
Discharge Summary    Name:  Josie Avery /Age/Sex: 1970  (52 y.o. female)   MRN & CSN:  [id number] & [id number] Admission Date/Time: 2020 12:21 PM   Attending:  Sofía Bear MD Discharging Physician: Stefani Lawson MD     Hospital Course:   Josie Avery is a 52 y.o.  female  who presents with  Abdominal pain S/P liver ablation    Patient was seen and examined  Denied any worsening abdominal pain  No fever, chills, N/V/D  No palpitations, dizziness  Feeling much better; just reported some soreness    Assessment and plan    Abdominal pain s/p liver ablation  -Admitted for pain control post procedure yesterday  -Will dc on few days of norco      # Breast cancer with mets to the liver and bones  -Continue oral chemo  -Follow-up with Dr. Marissa Landry     Migraine headache  -Continue Topiramate     Depression/anxiety   -Continue Effexor     Obesity class I with BMI of 34.4  -Educated about lifestyle changes     Asthma  -Continue albuterol       The patient expressed appropriate understanding of and agreement with the discharge recommendations, medications, and plan.      Consults this admission:  IP CONSULT TO HOSPITALIST  IP CONSULT TO IV TEAM  IP CONSULT TO IV TEAM    Discharge Instruction:   Follow up appointments: Oncology in 2 weeks  Primary care physician:  within 2 weeks    Diet:  regular diet   Activity: activity as tolerated  Disposition: Discharged to:   [x]Home, []C, []SNF, []Acute Rehab, []Hospice   Condition on discharge: Stable    Discharge Medications:      Imagene Elissa   Home Medication Instructions ADENIKE:[id number]    Printed on:20 3810   Medication Information                      Abemaciclib (VERZENIO) 150 MG TABS  Take 1 tablet by mouth 2 times daily             albuterol sulfate  (90 Base) MCG/ACT inhaler  Inhale 2 puffs into the lungs every 6 hours as needed for Wheezing             diphenhydrAMINE (BENADRYL) 25 MG capsule  1-2 capsules by mouth every 6 hours as needed for rash             fulvestrant (FASLODEX) 250 MG/5ML SOLN IM injection  Inject 500 mg into the muscle every 30 days              goserelin (ZOLADEX) 3.6 MG injection  Inject 3.6 mg into the skin every 30 days              HYDROcodone-acetaminophen (NORCO) 5-325 MG per tablet  Take 1 tablet by mouth every 6 hours as needed for Pain for up to 3 days. Intended supply: 3 days. Take lowest dose possible to manage pain             topiramate ER (TROKENDI XR) 50 MG CP24  Take 50 mg by mouth as needed              venlafaxine (EFFEXOR XR) 37.5 MG extended release capsule  Take 37.5 mg by mouth daily             zoledronic acid (ZOMETA) 4 MG/100ML SOLN infusion  Infuse 4 mg intravenously once Every year                 Objective Findings at Discharge:   /64   Pulse 66   Temp 98.2 °F (36.8 °C) (Oral)   Resp 16   Ht 5' 7\" (1.702 m)   Wt 219 lb (99.3 kg)   SpO2 91%   BMI 34.30 kg/m²            PHYSICAL EXAM   GEN Awake female, sitting upright in bed in no apparent distress. Appears given age. EYES Pupils are equally round. No scleral erythema, discharge, or conjunctivitis. HENT Mucous membranes are moist. Oral pharynx without exudates, no evidence of thrush. NECK Supple, no apparent thyromegaly or masses. RESP Clear to auscultation, no wheezes, rales or rhonchi. Symmetric chest movement while on room air. CARDIO/VASC S1/S2 auscultated. Regular rate without appreciable murmurs, rubs, or gallops. No JVD or carotid bruits. Peripheral pulses equal bilaterally and palpable. No peripheral edema. GI Abdomen is soft without significant tenderness, masses, or guarding. Bowel sounds are normoactive. Rectal exam deferred.  No costovertebral angle tenderness. Normal appearing external genitalia. Amaya catheter is not present. HEME/LYMPH No palpable cervical lymphadenopathy and no hepatosplenomegaly. No petechiae or ecchymoses.   MSK No gross joint

## 2020-06-25 NOTE — PROGRESS NOTES
CLINICAL PHARMACY NOTE: MEDS TO [address] Select Patient?: Yes  Total # of Prescriptions Filled: 1   The following medications were delivered to the patient:  · Hydrocodone/apap 5/325mg  Total # of Interventions Completed: 0  Time Spent (min): 5    Additional Documentation:

## 2020-06-26 ENCOUNTER — CARE COORDINATION (OUTPATIENT)
Dept: OTHER | Facility: CLINIC | Age: 50
End: 2020-06-26

## 2020-06-30 ENCOUNTER — CARE COORDINATION (OUTPATIENT)
Dept: OTHER | Facility: CLINIC | Age: 50
End: 2020-06-30

## 2020-06-30 SDOH — ECONOMIC STABILITY: TRANSPORTATION INSECURITY
IN THE PAST 12 MONTHS, HAS LACK OF TRANSPORTATION KEPT YOU FROM MEETINGS, WORK, OR FROM GETTING THINGS NEEDED FOR DAILY LIVING?: NO

## 2020-06-30 SDOH — ECONOMIC STABILITY: TRANSPORTATION INSECURITY
IN THE PAST 12 MONTHS, HAS THE LACK OF TRANSPORTATION KEPT YOU FROM MEDICAL APPOINTMENTS OR FROM GETTING MEDICATIONS?: NO

## 2020-07-02 ENCOUNTER — CARE COORDINATION (OUTPATIENT)
Dept: OTHER | Facility: CLINIC | Age: 50
End: 2020-07-02

## 2020-07-07 ENCOUNTER — CARE COORDINATION (OUTPATIENT)
Dept: OTHER | Facility: CLINIC | Age: 50
End: 2020-07-07

## 2020-07-07 SDOH — HEALTH STABILITY: PHYSICAL HEALTH: ON AVERAGE, HOW MANY DAYS PER WEEK DO YOU ENGAGE IN MODERATE TO STRENUOUS EXERCISE (LIKE A BRISK WALK)?: 3 DAYS

## 2020-07-07 SDOH — ECONOMIC STABILITY: FOOD INSECURITY: WITHIN THE PAST 12 MONTHS, YOU WORRIED THAT YOUR FOOD WOULD RUN OUT BEFORE YOU GOT MONEY TO BUY MORE.: NEVER TRUE

## 2020-07-07 SDOH — HEALTH STABILITY: MENTAL HEALTH
STRESS IS WHEN SOMEONE FEELS TENSE, NERVOUS, ANXIOUS, OR CAN'T SLEEP AT NIGHT BECAUSE THEIR MIND IS TROUBLED. HOW STRESSED ARE YOU?: ONLY A LITTLE

## 2020-07-07 SDOH — SOCIAL STABILITY: SOCIAL NETWORK: ARE YOU MARRIED, WIDOWED, DIVORCED, SEPARATED, NEVER MARRIED, OR LIVING WITH A PARTNER?: MARRIED

## 2020-07-07 SDOH — ECONOMIC STABILITY: FOOD INSECURITY: WITHIN THE PAST 12 MONTHS, THE FOOD YOU BOUGHT JUST DIDN'T LAST AND YOU DIDN'T HAVE MONEY TO GET MORE.: NEVER TRUE

## 2020-07-07 SDOH — ECONOMIC STABILITY: INCOME INSECURITY: HOW HARD IS IT FOR YOU TO PAY FOR THE VERY BASICS LIKE FOOD, HOUSING, MEDICAL CARE, AND HEATING?: NOT HARD AT ALL

## 2020-07-07 NOTE — CARE COORDINATION
Ambulatory Care Coordination Note  7/7/2020  CM Risk Score: 4  Charlson 10 Year Mortality Risk Score: 23%     ACC: Warden Wisdom, RN    Summary Note: Spoke with patient who sounded very upbeat today and said her pain is only 1/10. She is returning to work tomorrow night. Pt works 7p-7a at the hospital. She is agreeable to follow up call in 2 weeks. Pt said she has her follow ups scheduled already . Pt is taking all medications as prescribed . Care Coordination Interventions    Program Enrollment:  Rising Risk  Referral from Primary Care Provider:  No  Suggested Interventions and Community Resources         Goals Addressed                 This Visit's Progress     Patient Stated (pt-stated)        Pt's pain will be kept at a tolerable level for her    Barriers: none  Plan for overcoming my barriers: N/A  Confidence: 9/10  Anticipated Goal Completion Date: 7/25/2020 6/30/2020 - gabapentin and ibuprofen added to pain medication regimen today. Pt continues on Norco as prior. Pt rated her pain 9/10 during call, her sister was on her way to  new prescriptions for pain   7/7 - Pt states her pain is down to a 1/10. States she is going back to work tomorrow night on night shift. Says the Gabapentin has worked wonders. Prior to Admission medications    Medication Sig Start Date End Date Taking?  Authorizing Provider   Abemaciclib (VERZENIO) 150 MG TABS Take 1 tablet by mouth 2 times daily 5/21/20   Historical Provider, MD   venlafaxine (EFFEXOR XR) 37.5 MG extended release capsule Take 37.5 mg by mouth daily    Historical Provider, MD   zoledronic acid (ZOMETA) 4 MG/100ML SOLN infusion Infuse 4 mg intravenously once Every year    Historical Provider, MD   goserelin (ZOLADEX) 3.6 MG injection Inject 3.6 mg into the skin every 30 days     Historical Provider, MD   fulvestrant (FASLODEX) 250 MG/5ML SOLN IM injection Inject 500 mg into the muscle every 30 days     Historical Provider, MD   albuterol sulfate  (90 Base) MCG/ACT inhaler Inhale 2 puffs into the lungs every 6 hours as needed for Wheezing    Historical Provider, MD   topiramate ER (TROKENDI XR) 50 MG CP24 Take 50 mg by mouth as needed     Historical Provider, MD   diphenhydrAMINE (BENADRYL) 25 MG capsule 1-2 capsules by mouth every 6 hours as needed for rash 9/11/18   SARAH De León       Future Appointments   Date Time Provider Sin Regalado   7[facility]9/2020  [facility] [place]   7/14/2020  1:00 PM Chetan Almeida, MS, RD, LD [facility]   7/17/2020  8:00 AM INFUSION ROOM 2 - [facility] INF [place]   7/23/2020  8:30 AM SCHEDULE, [facility] MED ONC LAB [facility] MED ONC [place]   8/6/2020  8:30 AM SCHEDULE, [facility] MED ONC LAB [facility] MED ONC [place]   8/20/2020  8:30 AM [facility] [place]   9/3/2020  8:30 AM SCHEDULE, [facility] MED ONC LAB [facility] MED ONC [place]      and   General Assessment    Do you have any symptoms that are causing concern?:  No

## 2020-07-10 ENCOUNTER — HOSPITAL ENCOUNTER (OUTPATIENT)
Dept: INFUSION THERAPY | Age: 50
Discharge: HOME | End: 2020-07-10
Payer: COMMERCIAL

## 2020-07-10 LAB
ALBUMIN SERPL-MCNC: 4.2 GM/DL (ref 3.4–5)
ALP BLD-CCNC: 63 IU/L (ref 40–129)
ALT SERPL-CCNC: 16 U/L (ref 10–40)
ANION GAP SERPL CALCULATED.3IONS-SCNC: 12 MMOL/L (ref 4–16)
AST SERPL-CCNC: 17 IU/L (ref 15–37)
BASOPHILS ABSOLUTE: 0.1 K/CU MM
BASOPHILS RELATIVE PERCENT: 1.4 % (ref 0–1)
BILIRUB SERPL-MCNC: 0.2 MG/DL (ref 0–1)
BUN BLDV-MCNC: 17 MG/DL (ref 6–23)
CALCIUM SERPL-MCNC: 9.7 MG/DL (ref 8.3–10.6)
CHLORIDE BLD-SCNC: 105 MMOL/L (ref 99–110)
CO2: 26 MMOL/L (ref 21–32)
CREAT SERPL-MCNC: 1.3 MG/DL (ref 0.6–1.1)
DIFFERENTIAL TYPE: ABNORMAL
EOSINOPHILS ABSOLUTE: 0.1 K/CU MM
EOSINOPHILS RELATIVE PERCENT: 1.2 % (ref 0–3)
GFR AFRICAN AMERICAN: 53 ML/MIN/1.73M2
GFR NON-AFRICAN AMERICAN: 44 ML/MIN/1.73M2
GLUCOSE BLD-MCNC: 88 MG/DL (ref 70–99)
HCT VFR BLD CALC: 32 % (ref 37–47)
HEMOGLOBIN: 11 GM/DL (ref 12.5–16)
LYMPHOCYTES ABSOLUTE: 2.5 K/CU MM
LYMPHOCYTES RELATIVE PERCENT: 50.2 % (ref 24–44)
MCH RBC QN AUTO: 31.2 PG (ref 27–31)
MCHC RBC AUTO-ENTMCNC: 34.4 % (ref 32–36)
MCV RBC AUTO: 90.7 FL (ref 78–100)
MONOCYTES ABSOLUTE: 0.3 K/CU MM
MONOCYTES RELATIVE PERCENT: 6.3 % (ref 0–4)
PDW BLD-RTO: 13.9 % (ref 11.7–14.9)
PLATELET # BLD: 328 K/CU MM (ref 140–440)
PMV BLD AUTO: 8.5 FL (ref 7.5–11.1)
POTASSIUM SERPL-SCNC: 4.4 MMOL/L (ref 3.5–5.1)
RBC # BLD: 3.53 M/CU MM (ref 4.2–5.4)
SEGMENTED NEUTROPHILS ABSOLUTE COUNT: 2.1 K/CU MM
SEGMENTED NEUTROPHILS RELATIVE PERCENT: 40.9 % (ref 36–66)
SODIUM BLD-SCNC: 143 MMOL/L (ref 135–145)
TOTAL PROTEIN: 7.2 GM/DL (ref 6.4–8.2)
WBC # BLD: 5 K/CU MM (ref 4–10.5)

## 2020-07-10 PROCEDURE — 36415 COLL VENOUS BLD VENIPUNCTURE: CPT

## 2020-07-10 PROCEDURE — 85025 COMPLETE CBC W/AUTO DIFF WBC: CPT

## 2020-07-10 PROCEDURE — 80053 COMPREHEN METABOLIC PANEL: CPT

## 2020-07-17 ENCOUNTER — HOSPITAL ENCOUNTER (OUTPATIENT)
Dept: INFUSION THERAPY | Age: 50
Setting detail: INFUSION SERIES
Discharge: HOME | End: 2020-07-17
Payer: COMMERCIAL

## 2020-07-17 ENCOUNTER — HOSPITAL ENCOUNTER (OUTPATIENT)
Dept: INFUSION THERAPY | Age: 50
End: 2020-07-17
Payer: COMMERCIAL

## 2020-07-17 VITALS
SYSTOLIC BLOOD PRESSURE: 105 MMHG | HEART RATE: 80 BPM | DIASTOLIC BLOOD PRESSURE: 89 MMHG | RESPIRATION RATE: 16 BRPM | TEMPERATURE: 98.6 F

## 2020-07-17 PROCEDURE — 96402 CHEMO HORMON ANTINEOPL SQ/IM: CPT

## 2020-07-17 PROCEDURE — 99211 OFF/OP EST MAY X REQ PHY/QHP: CPT

## 2020-07-17 PROCEDURE — 96365 THER/PROPH/DIAG IV INF INIT: CPT

## 2020-07-17 RX ORDER — LAMOTRIGINE 25 MG/1
500 TABLET ORAL ONCE
Status: COMPLETED | OUTPATIENT
Start: 2020-07-17 | End: 2020-07-17

## 2020-07-17 RX ADMIN — LAMOTRIGINE 500 MG: 25 TABLET ORAL at 08:30

## 2020-07-17 ASSESSMENT — PAIN DESCRIPTION - PROGRESSION: CLINICAL_PROGRESSION: GRADUALLY WORSENING

## 2020-07-17 NOTE — PROGRESS NOTES
IV discontinued. DSD applied. Pt tolerated well. Discharged instructions given to pt, pt voiced understanding. Pt discharged via ambulatory by self with family member to exit.

## 2020-07-17 NOTE — PROGRESS NOTES
Pt taken to room 02 for chemo injections. Pt oriented to room, call light, bed/chair controls, TV, pt voiced understanding. Plan of care explained to pt, pt voiced understanding.

## 2020-07-20 ENCOUNTER — CARE COORDINATION (OUTPATIENT)
Dept: OTHER | Facility: CLINIC | Age: 50
End: 2020-07-20

## 2020-07-22 PROBLEM — C50.411 MALIGNANT NEOPLASM OF UPPER-OUTER QUADRANT OF RIGHT FEMALE BREAST (HCC): Status: ACTIVE | Noted: 2020-07-22

## 2020-07-22 PROBLEM — C79.51 SECONDARY MALIGNANT NEOPLASM OF BONE (HCC): Status: ACTIVE | Noted: 2020-07-22

## 2020-07-22 PROBLEM — R93.7 ABNORMAL FINDINGS ON DIAGNOSTIC IMAGING OF OTHER PARTS OF MUSCULOSKELETAL SYSTEM: Status: ACTIVE | Noted: 2020-07-22

## 2020-07-22 PROBLEM — C78.7 SECONDARY MALIGNANT NEOPLASM OF LIVER AND INTRAHEPATIC BILE DUCT (HCC): Status: ACTIVE | Noted: 2020-07-22

## 2020-07-27 ENCOUNTER — TELEPHONE (OUTPATIENT)
Dept: BARIATRICS/WEIGHT MGMT | Age: 50
End: 2020-07-27

## 2020-07-27 NOTE — TELEPHONE ENCOUNTER
Pt had questions regarding diet for weight loss. Not interested in formal program at this time. Discussed calorie goals [id number] to start, minimum protein goal  gms per day. Sent sample menu ans food lists. Discussed hydration and rate of weight loss goals. Pt verbalized understanding. Feeling very well currently. Will be available should patient have further questions or needs.   Shannon Jacques MS, RDN, LD  7/27/2020

## 2020-07-28 ENCOUNTER — CARE COORDINATION (OUTPATIENT)
Dept: OTHER | Facility: CLINIC | Age: 50
End: 2020-07-28

## 2020-07-28 NOTE — CARE COORDINATION
Ambulatory Care Coordination Note  7/28/2020  CM Risk Score: 4  Charlson 10 Year Mortality Risk Score: 100%     ACC: Terri Ku, RN    Summary Note: Spoke with patient who is totally independent. 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 She is very compliant and involved in her care. She is working with dietician at oncology office to begin a non formal weight loss plan. Pt said the dietician has sent her a lot of information. Pt received medication assistance for her cancer medications so she has no difficulty affording her medications. Pt denies any needs today so will graduate and sign off for care coordination. Pt has contact information for future reference if needed. Care Coordination Interventions    Program Enrollment:  Rising Risk  Referral from Primary Care Provider:  No  Suggested Interventions and Community Resources  Registered Dietician:  Completed         Goals Addressed                 This Visit's Progress     COMPLETED: Patient Stated (pt-stated)        Pt's pain will be kept at a tolerable level for her    Barriers: none  Plan for overcoming my barriers: N/A  Confidence: 9/10  Anticipated Goal Completion Date: 7/25/2020 6/30/2020 - gabapentin and ibuprofen added to pain medication regimen today. Pt continues on Norco as prior. Pt rated her pain 9/10 during call, her sister was on her way to  new prescriptions for pain   7/7 - Pt states her pain is down to a 1/10. States she is going back to work tomorrow night on night shift. Says the Gabapentin has worked wonders. Prior to Admission medications    Medication Sig Start Date End Date Taking?  Authorizing Provider   Abemaciclib (VERZENIO) 150 MG TABS Take 1 tablet by mouth 2 times daily 5/21/20   Historical Provider, MD   venlafaxine (EFFEXOR XR) 37.5 MG extended release capsule Take 37.5 mg by mouth daily    Historical Provider, MD   zoledronic acid (ZOMETA) 4 MG/100ML SOLN infusion Infuse 4 mg intravenously once Every year    Historical Provider, MD   goserelin (ZOLADEX) 3.6 MG injection Inject 3.6 mg into the skin every 30 days     Historical Provider, MD   fulvestrant (FASLODEX) 250 MG/5ML SOLN IM injection Inject 500 mg into the muscle every 30 days     Historical Provider, MD   albuterol sulfate  (90 Base) MCG/ACT inhaler Inhale 2 puffs into the lungs every 6 hours as needed for Wheezing    Historical Provider, MD   topiramate ER (TROKENDI XR) 50 MG CP24 Take 50 mg by mouth as needed     Historical Provider, MD   diphenhydrAMINE (BENADRYL) 25 MG capsule 1-2 capsules by mouth every 6 hours as needed for rash 9/11/18   SARAH Maradiaga       Future Appointments   Date Time Provider Sin Regalado   7/30/2020  8:30 AM Ale[address]andra [address] ONC LAB [address]   8/6/2020  8:30 AM SCHEDULE, [facility] MED ONC LAB [facility] ONC [place]   8/7/2020 11:30 AM Christa Aranda DO [address] [place] [address]   8/14/2020  8:00 AM INFUSION ROOM 2 - [address] [place] [address]   8/19/2020  8:45 AM [facility], MED ONC NURSE [facility] ONC [place]   8/19/2020  9:00 AM Shannen Muhammad MD [facility] MED ONC [facility]   8/20/2020  8:30 AM SCHEDULE, [address] [facility] MED ONC LAB [facility] ONC [place]   9/3/2020  8:30 AM SCHEDULE, [address] [facility] ONC LAB [address]

## 2020-07-30 ENCOUNTER — HOSPITAL ENCOUNTER (OUTPATIENT)
Dept: INFUSION THERAPY | Age: 50
Discharge: HOME | End: 2020-07-30
Payer: COMMERCIAL

## 2020-07-30 LAB — FOLLICLE STIMULATING HORMONE: 9.4 MLU/ML

## 2020-07-30 PROCEDURE — 36415 COLL VENOUS BLD VENIPUNCTURE: CPT

## 2020-07-30 PROCEDURE — 83001 ASSAY OF GONADOTROPIN (FSH): CPT

## 2020-08-04 ENCOUNTER — EMPLOYEE WELLNESS (OUTPATIENT)
Dept: OTHER | Age: 50
End: 2020-08-04

## 2020-08-04 LAB
CHOLESTEROL: 253 MG/DL
GLUCOSE BLD-MCNC: 103 MG/DL (ref 70–99)
HDLC SERPL-MCNC: 87 MG/DL
LDL CHOLESTEROL CALCULATED: 141 MG/DL
PATIENT FASTING?: YES
TRIGL SERPL-MCNC: 123 MG/DL

## 2020-08-13 RX ORDER — LAMOTRIGINE 25 MG/1
250 TABLET ORAL ONCE
Status: CANCELLED | OUTPATIENT
Start: 2020-08-14

## 2020-08-13 RX ORDER — SODIUM CHLORIDE 9 MG/ML
INJECTION, SOLUTION INTRAVENOUS CONTINUOUS
Status: CANCELLED | OUTPATIENT
Start: 2020-08-14

## 2020-08-13 RX ORDER — EPINEPHRINE 1 MG/ML
0.3 INJECTION, SOLUTION, CONCENTRATE INTRAVENOUS PRN
Status: CANCELLED | OUTPATIENT
Start: 2020-08-14

## 2020-08-13 RX ORDER — DIPHENHYDRAMINE HYDROCHLORIDE 50 MG/ML
50 INJECTION INTRAMUSCULAR; INTRAVENOUS ONCE
Status: CANCELLED | OUTPATIENT
Start: 2020-08-14

## 2020-08-13 RX ORDER — METHYLPREDNISOLONE SODIUM SUCCINATE 125 MG/2ML
125 INJECTION, POWDER, LYOPHILIZED, FOR SOLUTION INTRAMUSCULAR; INTRAVENOUS ONCE
Status: CANCELLED | OUTPATIENT
Start: 2020-08-14

## 2020-08-14 ENCOUNTER — HOSPITAL ENCOUNTER (OUTPATIENT)
Dept: INFUSION THERAPY | Age: 50
Setting detail: INFUSION SERIES
Discharge: HOME | End: 2020-08-14
Payer: COMMERCIAL

## 2020-08-14 VITALS
HEART RATE: 70 BPM | SYSTOLIC BLOOD PRESSURE: 118 MMHG | TEMPERATURE: 97.5 F | DIASTOLIC BLOOD PRESSURE: 77 MMHG | OXYGEN SATURATION: 100 % | RESPIRATION RATE: 16 BRPM

## 2020-08-14 DIAGNOSIS — C78.7 SECONDARY MALIGNANT NEOPLASM OF LIVER AND INTRAHEPATIC BILE DUCT (HCC): ICD-10-CM

## 2020-08-14 DIAGNOSIS — C50.411 MALIGNANT NEOPLASM OF UPPER-OUTER QUADRANT OF RIGHT BREAST IN FEMALE, ESTROGEN RECEPTOR POSITIVE (HCC): Primary | ICD-10-CM

## 2020-08-14 DIAGNOSIS — Z17.0 MALIGNANT NEOPLASM OF UPPER-OUTER QUADRANT OF RIGHT BREAST IN FEMALE, ESTROGEN RECEPTOR POSITIVE (HCC): Primary | ICD-10-CM

## 2020-08-14 LAB
BASOPHILS ABSOLUTE: 0.1 K/CU MM
BASOPHILS RELATIVE PERCENT: 2.4 % (ref 0–1)
DIFFERENTIAL TYPE: ABNORMAL
EOSINOPHILS ABSOLUTE: 0 K/CU MM
EOSINOPHILS RELATIVE PERCENT: 1.2 % (ref 0–3)
HCT VFR BLD CALC: 36.6 % (ref 37–47)
HEMOGLOBIN: 12.7 GM/DL (ref 12.5–16)
IMMATURE NEUTROPHIL %: 0 % (ref 0–0.43)
LYMPHOCYTES ABSOLUTE: 2.1 K/CU MM
LYMPHOCYTES RELATIVE PERCENT: 63.8 % (ref 24–44)
MCH RBC QN AUTO: 33 PG (ref 27–31)
MCHC RBC AUTO-ENTMCNC: 34.7 % (ref 32–36)
MCV RBC AUTO: 95.1 FL (ref 78–100)
MONOCYTES ABSOLUTE: 0.3 K/CU MM
MONOCYTES RELATIVE PERCENT: 8.8 % (ref 0–4)
NUCLEATED RBC %: 0 %
PDW BLD-RTO: 16.3 % (ref 11.7–14.9)
PLATELET # BLD: 226 K/CU MM (ref 140–440)
PMV BLD AUTO: 9 FL (ref 7.5–11.1)
RBC # BLD: 3.85 M/CU MM (ref 4.2–5.4)
SEGMENTED NEUTROPHILS ABSOLUTE COUNT: 0.8 K/CU MM
SEGMENTED NEUTROPHILS RELATIVE PERCENT: 23.8 % (ref 36–66)
TOTAL IMMATURE NEUTOROPHIL: 0 K/CU MM
TOTAL NUCLEATED RBC: 0 K/CU MM
WBC # BLD: 3.3 K/CU MM (ref 4–10.5)

## 2020-08-14 PROCEDURE — 85025 COMPLETE CBC W/AUTO DIFF WBC: CPT

## 2020-08-14 PROCEDURE — 96402 CHEMO HORMON ANTINEOPL SQ/IM: CPT

## 2020-08-14 RX ORDER — LAMOTRIGINE 25 MG/1
250 TABLET ORAL ONCE
Status: DISCONTINUED | OUTPATIENT
Start: 2020-08-14 | End: 2020-08-14 | Stop reason: SDUPTHER

## 2020-08-14 RX ORDER — LAMOTRIGINE 25 MG/1
500 TABLET ORAL ONCE
Status: COMPLETED | OUTPATIENT
Start: 2020-08-14 | End: 2020-08-14

## 2020-08-14 RX ADMIN — LAMOTRIGINE 500 MG: 25 TABLET ORAL at 09:30

## 2020-08-14 ASSESSMENT — PAIN DESCRIPTION - PROGRESSION
CLINICAL_PROGRESSION: GRADUALLY WORSENING

## 2020-08-14 NOTE — PLAN OF CARE
Ambulatory to unit room 3 for Hormonal Injections. Reorientated to unit. Procedure and plan of care explained. Questions answered. Understanding verbalized.

## 2020-08-14 NOTE — PLAN OF CARE
Lab results called to Dr Excell Alpers office. Order received to give injections but to have patient stop Verzinio. Explained to patient Understaning verbalized

## 2020-08-14 NOTE — DISCHARGE SUMMARY
Tolerated injections well. Reviewed discharge instructions, understanding verbalized. Copies of AVS given to take home. Patient discharged home. Down to exit per self.     Orders Placed This Encounter   Medications    DISCONTD: fulvestrant (FASLODEX) IM injection 250 mg    DISCONTD: fulvestrant (FASLODEX) IM injection 250 mg    goserelin (ZOLADEX) injection 3.6 mg    fulvestrant (FASLODEX) IM injection 500 mg

## 2020-08-19 ENCOUNTER — HOSPITAL ENCOUNTER (OUTPATIENT)
Dept: INFUSION THERAPY | Age: 50
Discharge: HOME | End: 2020-08-19
Payer: COMMERCIAL

## 2020-08-19 ENCOUNTER — OFFICE VISIT (OUTPATIENT)
Dept: ONCOLOGY | Age: 50
End: 2020-08-19
Payer: COMMERCIAL

## 2020-08-19 VITALS
WEIGHT: 218.4 LBS | SYSTOLIC BLOOD PRESSURE: 125 MMHG | DIASTOLIC BLOOD PRESSURE: 89 MMHG | RESPIRATION RATE: 16 BRPM | HEART RATE: 71 BPM | OXYGEN SATURATION: 97 % | TEMPERATURE: 96.4 F | BODY MASS INDEX: 35.1 KG/M2 | HEIGHT: 66 IN

## 2020-08-19 PROCEDURE — 99211 OFF/OP EST MAY X REQ PHY/QHP: CPT

## 2020-08-19 PROCEDURE — 99214 OFFICE O/P EST MOD 30 MIN: CPT | Performed by: INTERNAL MEDICINE

## 2020-08-19 ASSESSMENT — PATIENT HEALTH QUESTIONNAIRE - PHQ9
SUM OF ALL RESPONSES TO PHQ QUESTIONS 1-9: 0
2. FEELING DOWN, DEPRESSED OR HOPELESS: 0
SUM OF ALL RESPONSES TO PHQ9 QUESTIONS 1 & 2: 0
1. LITTLE INTEREST OR PLEASURE IN DOING THINGS: 0
SUM OF ALL RESPONSES TO PHQ QUESTIONS 1-9: 0

## 2020-08-19 NOTE — PROGRESS NOTES
Chief Complaint  Breast cancer, female ( Stage Date: Unknown, Stage IV (Right breast upper-outer quadrant, T1c, N0, M1)-Clinical  Date of Dx:10/2017 Extent of Disease: Evidence of metastatic disease; Disease State: Initial diagnosis; Metastatic Sites: Bone; Metastatic Sites: Liver; ER Status: Positive; HI Status: Positive; HER-2/jus Value-FISH: Negative; Menopausal Status: Premenopausal; PIK3CA gene: Unknown; )  Problem List  Breast cancer, female ( Stage Date: Unknown, Stage IV (Right breast upper-outer quadrant, T1c, N0, M1)-Clinical  Date of Dx:10/2017 Extent of Disease: Evidence of metastatic disease; Disease State: Initial diagnosis; Metastatic Sites: Bone; Metastatic Sites: Liver; ER Status: Positive; HI Status: Positive; HER-2/jus Value-FISH: Negative; Menopausal Status: Premenopausal; PIK3CA gene: Unknown; )   Abnormal radiological finding   Bony metastasis ( Date of Dx:10/2017 )   Chest pain   Metastasis to liver  HPI  Extremely pleasant lady patient of Dr. Betty Tavera RN at [facility]. Evaluated in September 2017 for some discomfort involving the sternum that had been present for a few weeks. She could not remember any injury to that area except maybe a few months earlier she had fallen face-forward. She did not remember having significant discomfort to the chest area involving the fall. September 12, 2017, chest x-ray was reported to be fairly unremarkable. X-rays of the sternum revealed questionable age-indeterminate factor of the mid to superior sternum. Additional CT was recommended which was done on September 18, 2017, revealing mottled appearance of the sternum. Findings could represent related to earlier trauma or even osteomyelitis or metastatic disease. Nonspecific sclerotic changes in the T12 vertebral body were noted too.      Triple phase bone scan done on October 4, 2017, had revealed only focal uptake in the body of the sternum corresponding to the changes noted on earlier CAT scan. Similar differential was again suggested. Visit here on October 9, 2017, though, she stated that she in general was doing actually better and pain was not severe and she did not have to take any pain medications and had been continuing to work on a full time basis and also remained fairly active. CMP on October 09, 2017, was normal and so was LDL of 183. Sed rate was 10. Kappa Lambda light chain ratios were normal. Immunoelectrophoresis was negative. CBC had revealed fairly normal numbers. CT biopsy of the sternum done on October 17, 2017, did reveal changes of metastatic carcinoma, possibly suggestive of breast primary. Biopsy from the sternum done on October 13, 2017, revealed metastatic carcinoma quite likely from breast primary. The tumor was strongly positive for estrogen receptor 95 percent and progesterone receptor is 95 percent, and negative for HER2. Visit here on October 20, 2017, those findings were discussed with her. October 25, 2017, diagnostic digital bilateral breast mammogram revealed 1.8 cm irregular, spiculated hyperechoic mass at the 10 oclock position in the right breast correlating to the mammographic area. Findings were highly suspicious for neoplasm. Ultrasound guided biopsy on October 27, 2017, did reveal invasive mammary carcinoma. Ultrasound-guided biopsy October 27, 2017, from this mass revealed invasive mammary carcinoma. Tumor was again strongly positive 95 percent for ER, 95 percent IL, HER-2 was negative both by IHC and FISH.      PET scan done on November 2, 2017, revealed hypermetabolic right breast mass as well as prominent right axillary lymph nodes and scattered osseous metastatic lesions involving the sternum, right scapula, left L4 vertebral body, and bilateral iliac crest.     Visit here on October 27, 2017, and afterwards metastatic disease involving the bones were discussed with her and clinically she was still considered to be premenopausal as she was not having any symptoms suggestive of menopausal state. She was started on treatment with Zoladex, Faslodex, and Zometa starting on November 10, 2017. She had to receive these treatments in outpatient facility at the hospital primarily because of insurance reasons. Saw me on December 7, 2017, sooner than her scheduled appointment, was concerned about possibly feeling a lump involving the right breast at around 6 oclock position. Otherwise she had not noticed any new issues. Mammogram and ultrasound done on December 8, 2017, showed dense breast tissue where she was feeling a mass. There was no sonographic evidence for any mass in that area too. MRI was suggested if needed to. Otherwise she was again noted to have known malignancy in the upper outer quadrant of the breast.     PET scan on May 14, 2018, revealed partial treatment response with improvement in the right axillary nodes as well as skeletal metastases. Metabolic activity associated with lytic bone lesions had improved with increased sclerosis of the previously seen lesions. Mild residual activity was noted at the L4 vertebral body. There were no new persistent or metabolically active aggressive osseous lesions. Visit here on February 26, 2019 overall I think she was doing fair but we were concerned about her starting to have more discomfort in her left elbow as well as the right hip area. She had not had a PET scan since May of last year and that too had revealed a partial response, we felt possibly repeating the PET scan would be helpful. In the meantime she was going to continue to receive the same treatments as before including Faslodex, Zoladex and Zometa    She was not able to undergo a PET scan but insurance. Did undergo a bone scan on April 4, 2019 revealing a stable bone scan without evidence of new osseous metastases.     May fall, 2019 was evaluated for symptoms of chest pain including a cardiac work-up including echo and stress test afterwards and those were unremarkable. She did have a CTA of the chest done looking for pulmonary embolism on May 4, 2019 which was negative for pulmonary embolism no other acute pulmonary illness. She still of course was noted to have several sclerotic lesions within the spine which were felt to be stable as compared to the prior PET scan which was done in May 2018. [facility] levels done on July 16, 0117 was 99.7, follicular phase. CA-27-29 level same day was 26, stable. [facility] September was 17, 2019 we talked about still being pre-or perimenopausal Darst will continue to stay on Zoladex along with Faslodex. Talked about disease overall having responded. Will possibly extend intervals between Zometa to may be once a year, may be due in July 2020. Did start her on Effexor for hot flashes    March 12, 2020 still being treated with same agents, still working on a long hours still in general feeling well only issue being occasional discomfort in the right rib cage as well as right breast area. Could not feel any distinct masses. Denied any history of injury. Otherwise negative for any new systemic complaints and overall both physically and emotionally was doing quite well    Telemedicine visit on April 23, 2020. We discussed results those were obtained on her previous visit on March 12, 2020. Chemistries CBC were fairly unremarkable CA-27-29 levels were still normal. She had undergone CT scan of the chest on March 24, 2020 was noted to have a stable bony lesions. But she was described to have a 2.8 cm lesion in liver which was worrisome for possibly metastatic disease. Following those studies she was advised to undergo PET scan and that was refused by insurance. She did undergo a bone scan on April 28, 2020 was noted to have stable appearance of skeletal metastatic disease no new metastatic lesions were noted.  She did undergo a CT scan of the abdomen and pelvis with contrast on the same day again was noted to have a 2.3 x 2.7 cm left hepatic lesion which was worrisome for metastatic disease and again of course skeletal metastases were noted. Telemedicine visit again on May 5, 2020. April 27, 2020 she did undergo liver biopsy. Pathology did reveal metastatic adenocarcinoma from a breast primary. Tumor was still positive for ER 50% MI 40%. HER-2 was equivocal by IHC though negative by FISH. May 6, 2020 had long discussion with her and her family. We talked about progressive nature of the disease. We talked about future plan including liver biopsy material being sent to [facility] 1 analysis. We talked about the need for us to change her treatment to a combination of Faslodex, Zoladex and add Verzenio. We also discussed possibly doing a PET scan. We do know she has disease involving the breast, bones and now liver. Our intention is that if indeed she has an isolated lesion in the liver we might consider doing local treatment to the liver besides this new systemic therapy. PET scan done on May 21, 7979 revealed metabolically active metastases in the left hepatic lobe no other metabolic activity was noted in the known skeletal metastases mammogram revealed stable appearance no new sites of malignancy were noted. Treatment was changed to Faslodex Zoladex and Verzenio in May 2020    [facility] analysis from the liver lesion did reveal a tumor to be positive for PI K3 CA. Otherwise microsatellite status was stable and no other alterations were noted especially in BRCA1 and BRCA2 as well as HER-2      As PET scan had revealed an isolated lesion involving the liver she did undergo radio frequency ablation of the lesion on June 24, 2020, tolerated procedure fairly well though had some discomfort afterwards.  CT scan of the abdomen done without contrast on June 25, 2020 did reveal sequela of treated metastatic lesion in the left lobe of the liver with central high attenuation area measuring 2.6 x 2.4 cm and the peripheral low-attenuation rim measuring around 4.8 x 4.1 cm related to the postprocedural edema. Again osseous metastatic disease was again noted without pathologic fracture. July 17, 2020 she was overall doing fairly decent had recovered from the discomfort related to the radiofrequency ablation. We talked about continuing on this present regimen does do plan to check her [address] levels to see if we can hold off giving her Zoladex. We of course will continue with Faslodex, Verzenio and Zometa. We of course will continue to monitor her progress through exams as well as CAT scans. [address] level done on July 30, 2020 was 9.4, premenopausal.    Visit here on August 19, 2020 still being treated with same agents and in general was feeling fairly well. Denied any bony pains. Working and in good spirits    Past Medical History    Basically healthy. Surgical History    1. Abdominal hysterectomy with unilateral oophorectomy for endometriosis in the year 2000. 2. Surgery on right elbow in 2013. Social History  Smoking Status Former smokerUsed to smoke, quit in 2004. Negative for significant ethanol intake. Occasional social usage. .  works at a manufacturing plant. Two sons ages 32 and 25. 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     Family History    0 FH  Current Therapy   Fulvestrant D1,15 (Initial) fb Fulvestrant D1 (Maintenance) + Goserelin Q28D Cycle Length: 28 Number Cycles: 36 Start: C1D1 on 10/27/2017 Assoc Dx: Breast cancer, female LOT: 1st Line Metastatic or Recurrent Stage: IV 10/27/2017 C1 D1  Fulvestrant IM, 500 mg   Fulvestrant IM, 500 mg   Goserelin Subcutaneous, 3.6 mg  Zoledronic acid (Zometa) Q6M Cycle Length: 84 Number Cycles: 6 Start: Max Crome on 10/27/2017 Assoc Dx:  Breast cancer, female LOT: 1st Line Metastatic or Recurrent 11/10/2017 C1 D1  Zoledronic Acid IV (Zometa), 4 mg  Abemaciclib + Fulvestrant + Goserelin Q28D Cycle Length: 28 Number Cycles: 6 Start: C1D1 on 05/06/2020 Assoc Dx: Breast cancer, female LOT: 2nd Line Metastatic Stage: IV 05/06/2020 C1 D15  Fulvestrant IM, 500 mg   Fulvestrant IM, 500 mg   Goserelin Subcutaneous, 3.6 mg   Abemaciclib Oral Dose Pack, 150 mg bid      Review of Systems   Per interval history; otherwise 10 point ROS is negative    Physical Exam   Very pleasant lady, no pallor or jaundice, no supraclavicular or axillary nodes, no breast masses. No abnormal cardiac, pulmonary, or abdominal findings. Negative for any pedal edema, skin changes, or neurologic findings.                      Labs  CBC   Lab Results 07/10/2020 06/01/2020 03/12/2020 12/03/2019 09/17/2019 07/16/2019   CBC                     WBC x 10^3/uL 5.0 4.8 5.7 4.3 5.5 5.9   RBC x 10^6/uL 3.53 (L) 4.64 4.72 4.56 4.96 4.75   HGB g/dL 11.0 (L) 14.1 14.3 13.8 15.0 14.4   HCT % 32.0 (L) 40.3 41.3 39.6 44.0 41.6   MCV fL 90.7 86.9 87.5 86.8 88.7 87.6   MCH pg 31.2 (H) 30.4 30.3 30.3 30.2 30.3   MCHC g/dL          34.8 34.1 34.6   MCHC, % 34.4 35.0 34.6            RDW % 13.9 12.8 13.2            RDW-CV, %          12.6 13.1 12.7   PLT x 10^3/uL 328 257 251 235.0 249.0 268.0   MPV fL 8.5 9.6 9.6            Kyle % 40.9 45.9 41.3 38.6 53.2 43.8   LY % 50.2 (H) 46.8 (H) 45.7 (H) 48.8 35.2 45.9   MO % 6.3 (H) 5.0 (H) 10.9 (H) 10.3 8.4 8.6   EO % 1.2 1.7 1.6 1.6 2.6 1.2   BA % 1.4 (H) 0.6 0.5 0.7 0.6 0.5   Kyle # (ANC) x 10^3/uL 2.1 2.2 2.3 1.64 2.90 2.58   LY # x 10^3/uL 2.5 2.2 2.6 2.08 1.92 2.71   MO # x 10^3/uL 0.3 0.2 0.6 0.44 0.46 0.51   EO # x 10^3/uL 0.1 0.1 0.1 0.07 0.14 0.07   BA # x 10^3/uL 0.1 0.0 0.0 0.03 0.03 0.03   Differential type AUTOMATED DIFFERENTIAL AUTOMATED DIFFERENTIAL AUTOMATED DIFFERENTIAL            CMP   Lab Results 07/10/2020 06/01/2020 03/12/2020 12/03/2019 09/17/2019 07/16/2019   Chemistries                     Glucose mg/dL 88 95 94 95 99 89   BUN mg/dL 17 13 17 14 18 18   Creatinine mg/dL 1.3 (H) 1.2 (H) 1.0 0.8 0.9 0.9   Sodium mmol/L 143 140 139 139 135 140   Potassium mmol/L 4.4 4.1 3.9 4.0 4.3 4.1   Chloride mmol/L 105 104 102 103 99 105   CO2 mmol/L 26 23 25 25 26 24   Anion gap 12 13 12 11 10 11   Calcium mg/dL 9.7 9.7 9.3 8.9 9.6 9.9   Albumin g/dL 4.2 4.7 4.3 4.2 4.7 4.8   Total protein g/dL 7.2 7.3 6.9 6.5 7.4 7.5   Bilirubin, total mg/dL 0.2 0.5 0.5 0.3 0.4 0.4   Alkaline phosphatase U/L 63 65 63 48 48 50   AST/SGOT U/L 17 20 18 19 25 19   ALT/SGPT U/L 16 20 14 14 22 17   GFR non-African American, estimated mL/min/1.73m2 44 (L) 48 (L) 59 (L) >60 >60 >60   GFR African American, estimated mL/min/1.73m2 53 (L) 58 (L) >60 >60 >60 >60     Lab Results 07/10/2020 06/01/2020 03/12/2020 12/03/2019 09/17/2019 07/16/2019   Tumor Markers                     CA 27-29 U/mL       20.4 23.2 25.5 26.1   T[facility] Markers Lab result note             See attached        Assessment & Plan  Extremely pleasant lady patient of Dr. Deonte Palmer, overall blessed with good health. Saw him in September for vague discomfort involving the sternum which led to chest x-ray and x-rays of the sternum revealing questionable age-indeterminate fracture of the mid to superior sternum. CT scan revealed mottled appearance of sternum. Etiology could be related to earlier trauma and/or even metastatic disease. Bone scan showed increased activity in the sternum area with the same differential. Mammogram in October 2016 was unremarkable. October 10, 2017, I did spend time with her and her sister. We did talk about future course of action including possibly just repeating the studies in a couple of months. Would consider doing biopsy of the sternal area. She I think would feel comfortable if we could have better answer for her and thus for that reason I did advise her to be seen in interventional radiology for possible biopsy. October 13, 2017, CT guided biopsy from the sternum revealed changes consistent with metastatic carcinoma from breast biopsy.  The tumor is strongly positive for ER and UT and negative for HER2. October 25, 2017, mammogram did reveal a lesion in the right breast of 1.8 cm in the upper outer quadrant. Biopsy for which did reveal invasive mammary carcinoma. October 27, 2017, I did discuss the findings with her and her extended family. We talked about basically the finding of carcinoma involving the right breast as well as bones. Biopsy from the sternum was positive. A bone scan also showed questionable changes involving the T12, though CT scan of the chest otherwise was negative for any pulmonary or hepatic metastases. Biopsy from the right breast October 27, 2017, also revealed invasive mammary carcinoma, strongly positive ER, CO, and HER-2. PET scan done on November 2, 2017, revealed hypermetabolic right breast as well as right axillary nodes and osseous metastases involving sternum, right scapula, and L4 vertebral body. She was considered a premenopausal lady with metastatic disease involving the bones. Treatment choices were discussed with her and on November 10, 2017, was started on treatment with Zoladex, Faslodex, and Zometa. Visit here on November 20, 2017, she was tolerating these treatments well following the Zometa. December 7, 2017, bony pains were better, but she was concerned about possibly a lump involving the 6 oclock position right breast. Mammogram and ultrasound did not reveal any mass in that area. She was still noted to have lesion in the upper outer quadrant which had been biopsied before. The PET scan done on May 14, 2018, had revealed improvement in disease involving the breast, axilla, as well as bones. Previous PET scan had shown changes involving the sternum, scapula, and L4 areas; those areas did show improvement on the PET scan. Visit here on February 26, 2019 overall I think she was doing fair but we were concerned about her starting to have more discomfort in her left elbow as well as the right hip area.  She had not had was still doing well. I did encourage her to undergo a biopsy of the liver lesion. And following the biopsy could decide about if we need to send material for [place] [id number] study also    Telemedicine visit again on May 5, 2020. April 27, 2020 she did undergo liver biopsy. Pathology did reveal metastatic adenocarcinoma from a breast primary. Tumor was still positive for ER 50% IL 40%. HER-2 was equivocal by IHC though negative by FISH. May 6, 2020 had long discussion with her and her family. We talked about progressive nature of the disease. We talked about future plan including liver biopsy material being sent to [place] [id number] analysis. We talked about the need for us to change her treatment to a combination of Faslodex, Zoladex and add Verzenio. We also discussed possibly doing a PET scan. We do know she has disease involving the breast, bones and now liver. Our intention is that if indeed she has an isolated lesion in the liver we might consider doing local treatment to the liver besides this new systemic therapy. June 1, 2020 I did spend time with her and her sister. We talked about PET scan revealing stable bony metastases and an isolated lesion involving the liver, biopsy-proven malignancy. Mammogram was unchanged from before. Couple weeks prior to this visit had started taking Verzenio along with Faslodex and Zoladex. We did talk about staying on the present systemic therapy and of course will continue to monitor her progress. We also discussed with her about possibly doing radiofrequency ablation treatment to the single lesion involving the liver. As described above on June 24, 2020 she did undergo radiofrequency ablation of the isolated lesion in the liver. July 17, 2020 she was overall doing fairly decent had recovered from the discomfort related to the radiofrequency ablation.  We talked about continuing on this present regimen does do plan to check her [address] [place] [address] levels to see if we can hold off giving

## 2020-08-19 NOTE — PROGRESS NOTES
MA Rooming Questions  Patient: Edi Rivera  MRN: [id number]    Date: 8/19/2020        1. Do you have any new issues?   no         2. Do you need any refills on medications?    no    3. Have you had any imaging done since your last visit?   no    4. Have you been hospitalized or seen in the emergency room since your last visit here?   no    5. Did the patient have a depression screening completed today?  Yes    PHQ-9 Total Score: 0 (8/19/2020  8:58 AM)       PHQ-9 Given to (if applicable):               PHQ-9 Score (if applicable):                     [] Positive     []  Negative              Does question #9 need addressed (if applicable)                     [] Yes    []  No               Isaac Espino MA

## 2020-08-28 ENCOUNTER — HOSPITAL ENCOUNTER (OUTPATIENT)
Dept: INFUSION THERAPY | Age: 50
Discharge: HOME | End: 2020-08-28
Payer: COMMERCIAL

## 2020-08-28 DIAGNOSIS — C50.411 MALIGNANT NEOPLASM OF UPPER-OUTER QUADRANT OF RIGHT FEMALE BREAST, UNSPECIFIED ESTROGEN RECEPTOR STATUS (HCC): ICD-10-CM

## 2020-08-28 LAB
BASOPHILS ABSOLUTE: 0.1 K/CU MM
BASOPHILS RELATIVE PERCENT: 2.1 % (ref 0–1)
DIFFERENTIAL TYPE: ABNORMAL
EOSINOPHILS ABSOLUTE: 0.1 K/CU MM
EOSINOPHILS RELATIVE PERCENT: 1.1 % (ref 0–3)
HCT VFR BLD CALC: 36.2 % (ref 37–47)
HEMOGLOBIN: 12.7 GM/DL (ref 12.5–16)
LYMPHOCYTES ABSOLUTE: 2.6 K/CU MM
LYMPHOCYTES RELATIVE PERCENT: 48.1 % (ref 24–44)
MCH RBC QN AUTO: 32.7 PG (ref 27–31)
MCHC RBC AUTO-ENTMCNC: 35.1 % (ref 32–36)
MCV RBC AUTO: 93.3 FL (ref 78–100)
MONOCYTES ABSOLUTE: 0.4 K/CU MM
MONOCYTES RELATIVE PERCENT: 7.5 % (ref 0–4)
PDW BLD-RTO: 14.9 % (ref 11.7–14.9)
PLATELET # BLD: 292 K/CU MM (ref 140–440)
PMV BLD AUTO: 9.4 FL (ref 7.5–11.1)
RBC # BLD: 3.88 M/CU MM (ref 4.2–5.4)
SEGMENTED NEUTROPHILS ABSOLUTE COUNT: 2.2 K/CU MM
SEGMENTED NEUTROPHILS RELATIVE PERCENT: 41.2 % (ref 36–66)
WBC # BLD: 5.3 K/CU MM (ref 4–10.5)

## 2020-08-28 PROCEDURE — 85025 COMPLETE CBC W/AUTO DIFF WBC: CPT

## 2020-08-28 PROCEDURE — 36415 COLL VENOUS BLD VENIPUNCTURE: CPT

## 2020-09-02 ENCOUNTER — OFFICE VISIT (OUTPATIENT)
Dept: INTERNAL MEDICINE CLINIC | Age: 50
End: 2020-09-02
Payer: COMMERCIAL

## 2020-09-02 VITALS
HEART RATE: 70 BPM | SYSTOLIC BLOOD PRESSURE: 122 MMHG | OXYGEN SATURATION: 97 % | BODY MASS INDEX: 36.49 KG/M2 | DIASTOLIC BLOOD PRESSURE: 80 MMHG | TEMPERATURE: 98.6 F | WEIGHT: 219 LBS | HEIGHT: 65 IN

## 2020-09-02 PROCEDURE — 99396 PREV VISIT EST AGE 40-64: CPT | Performed by: FAMILY MEDICINE

## 2020-09-02 RX ORDER — ONDANSETRON 4 MG/1
4 TABLET, FILM COATED ORAL EVERY 8 HOURS PRN
Qty: 30 TABLET | Refills: 0 | Status: SHIPPED | OUTPATIENT
Start: 2020-09-02 | End: 2021-08-03 | Stop reason: SDUPTHER

## 2020-09-02 RX ORDER — ALBUTEROL SULFATE 90 UG/1
2 AEROSOL, METERED RESPIRATORY (INHALATION) EVERY 6 HOURS PRN
Qty: 1 INHALER | Refills: 2 | Status: SHIPPED | OUTPATIENT
Start: 2020-09-02 | End: 2021-11-01 | Stop reason: SDUPTHER

## 2020-09-02 ASSESSMENT — ENCOUNTER SYMPTOMS
BACK PAIN: 0
EYES NEGATIVE: 1
DIARRHEA: 0
CONSTIPATION: 0
BLOOD IN STOOL: 0
ABDOMINAL PAIN: 0
SHORTNESS OF BREATH: 0
CHEST TIGHTNESS: 0

## 2020-09-02 NOTE — PROGRESS NOTES
Halima Castro  1970  52 y.o.  female    Chief Complaint   Patient presents with    Annual Exam         History of Present Illness  Halima Castro is a 52 y.o. female who presents today for an annual exam. Last labs reviewed. Be Well screen with glucose 103, +HLD- , HDL 87, Tg 123. +Wt gain. She states she had a partial hysterectomy and still has one ovary. 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 Diagnosed 2017. She follows with Dr. Huertas. She will notice nausea off and on with her treatment and will use Zofran prn. . She has asthma that has been stable. She continues to work at [address].     Review of Systems   Constitutional: Positive for fatigue. Negative for chills, diaphoresis and fever. HENT: Negative. Eyes: Negative. Respiratory: Negative for chest tightness and shortness of breath. Cardiovascular: Negative for chest pain and palpitations. Gastrointestinal: Positive for nausea (chronic off and on). Negative for abdominal pain, blood in stool, constipation and diarrhea. Genitourinary: Negative for difficulty urinating and dysuria. Musculoskeletal: Negative for back pain. Skin: Negative. Neurological: Negative for dizziness, light-headedness and headaches. Psychiatric/Behavioral:        No changes in mood       Allergies   Allergen Reactions    Sulfa Antibiotics Other (See Comments)     Stop breathing.  Nickel        Past Medical History:   Diagnosis Date    Asthma     last flare up winter 2016    Breast CA [facility])     right    Chest pain     \"had chest pain last time 2015- saw Dr Rylee Francis- all ok\"    FHx: coronary artery disease     H/O Doppler lower venous ultrasound 06/04/2019    No DVT,  Significant reflux noted of the Right & Left Deep Venous System.  H/O echocardiogram 06/04/2019    EF 55-60%, Essentially normal echocardiogram. Dilated aortic root at 4.0 cm.     History of exercise stress test 2019    Treadmill, EKG portion is negative for ischemia by diagnostic criteria    History of migraine     \"none for past 3 months\"    HX OTHER MEDICAL     \"since 2016- having some pain mid sternum- xray thought fx, then did CT and bone scan now having bx done\"    Hyperlipemia     Primary cancer of right breast with metastasis to other site [facility]) 3/24/2019       Past Surgical History:   Procedure Laterality Date     SECTION      EYE SURGERY  age 28    LASIK both eyes    HYSTERECTOMY  age 27    partial - has 1 ovary (L);  Due to endometriosis    OTHER SURGICAL HISTORY Right 2013    right ulnar nerve decompression    TUBAL LIGATION         Family History   Problem Relation Age of Onset    Heart Attack Mother          from MI @ age 61.  Coronary Art Dis Mother     Other Mother         Low blood pressure, endometriosis - hyster    High Cholesterol Mother     High Blood Pressure Father     Heart Attack Father     Coronary Art Dis Father     Arthritis Father         hip replacement    Arthritis Sister     Liver Disease Sister         medication & hx of alcohol abuse    Other Sister         endometriosis - hyster    Depression Brother     Other Sister         endometriosis - hyster    Other Sister         endometriosis - hyster   [place] Migraines Sister     Other Sister         endometriosis - hyster    Other Sister         endometriosis - hyster, hypothyroidism    High Blood Pressure Sister        Social History     Tobacco Use    Smoking status: Former Smoker     Packs/day: 0.50     Years: 16.00     Pack years: 8.00     Types: Cigarettes     Last attempt to quit: 2004     Years since quittin.0    Smokeless tobacco: Never Used    Tobacco comment: Quit smoking 9 years ago. Substance Use Topics    Alcohol use:  Yes     Alcohol/week: 2.0 standard drinks     Types: 2 Glasses of wine per week     Comment: Occasionally     CAFFEINE: 1 cup coffee daily/ average one per week    Drug use: No       Current Outpatient Medications   Medication Sig Dispense Refill    ondansetron (ZOFRAN) 4 MG tablet Take 1 tablet by mouth every 8 hours as needed for Nausea or Vomiting 30 tablet 0    albuterol sulfate  (90 Base) MCG/ACT inhaler Inhale 2 puffs into the lungs every 6 hours as needed for Wheezing 1 Inhaler 2    Abemaciclib (VERZENIO) 150 MG TABS Take 1 tablet by mouth 2 times daily Patient advised to hold medication until Office visit on 8/19/2020 with Dr Osei Lorenzo venlafaxine (EFFEXOR XR) 37.5 MG extended release capsule Take 37.5 mg by mouth daily      zoledronic acid (ZOMETA) 4 MG/100ML SOLN infusion Infuse 4 mg intravenously once Every year      goserelin (ZOLADEX) 3.6 MG injection Inject 3.6 mg into the skin every 30 days       fulvestrant (FASLODEX) 250 MG/5ML SOLN IM injection Inject 500 mg into the muscle every 30 days       diphenhydrAMINE (BENADRYL) 25 MG capsule 1-2 capsules by mouth every 6 hours as needed for rash 30 capsule 0    topiramate ER (TROKENDI XR) 50 MG CP24 Take 50 mg by mouth as needed        No current facility-administered medications for this visit. OBJECTIVE:    /80   Pulse 70   Temp 98.6 °F (37 °C)   Ht 5' 5\" (1.651 m)   Wt 219 lb (99.3 kg)   SpO2 97%   BMI 36.44 kg/m²     Physical Exam  Vitals signs reviewed. Constitutional:       General: She is not in acute distress. Appearance: She is well-developed. HENT:      Head: Normocephalic and atraumatic. Right Ear: Tympanic membrane normal.      Left Ear: Tympanic membrane normal.      Nose: Nose normal.      Mouth/Throat:      Mouth: Mucous membranes are moist.   Eyes:      Extraocular Movements: Extraocular movements intact. Pupils: Pupils are equal, round, and reactive to light. Neck:      Musculoskeletal: Neck supple. Cardiovascular:      Rate and Rhythm: Normal rate and regular rhythm. Heart sounds: Normal heart sounds. Pulmonary:      Effort: Pulmonary effort is normal. No respiratory distress. Breath sounds: Normal breath sounds. Abdominal:      General: Bowel sounds are normal. There is no distension. Palpations: Abdomen is soft. Tenderness: There is no abdominal tenderness. Musculoskeletal: Normal range of motion. Right lower leg: No edema. Left lower leg: No edema. Skin:     General: Skin is warm and dry. Neurological:      Mental Status: She is alert and oriented to person, place, and time. Cranial Nerves: No cranial nerve deficit. Psychiatric:         Mood and Affect: Mood normal.         ASSESSMENT:  1. Annual physical exam    2. Nausea    3. Hyperlipidemia, unspecified hyperlipidemia type    4. Mild intermittent asthma, unspecified whether complicated        PLAN:  Orders Placed This Encounter   Medications    ondansetron (ZOFRAN) 4 MG tablet     Sig: Take 1 tablet by mouth every 8 hours as needed for Nausea or Vomiting     Dispense:  30 tablet     Refill:  0    albuterol sulfate  (90 Base) MCG/ACT inhaler     Sig: Inhale 2 puffs into the lungs every 6 hours as needed for Wheezing     Dispense:  1 Inhaler     Refill:  2   Labs reviewed  Refills  ADR's explained  The patient is asked to make an attempt to improve diet and exercise patterns to aid in medical management of this problem.   Keep f/u with specialists         Return for [facility] up in 1 year fopr annual exam.    Electronically Signed by Ayana Dodd DO

## 2020-09-03 ENCOUNTER — APPOINTMENT (OUTPATIENT)
Dept: INFUSION THERAPY | Age: 50
End: 2020-09-03
Payer: COMMERCIAL

## 2020-09-06 ASSESSMENT — ENCOUNTER SYMPTOMS: NAUSEA: 1

## 2020-09-09 RX ORDER — EPINEPHRINE 1 MG/ML
0.3 INJECTION, SOLUTION, CONCENTRATE INTRAVENOUS PRN
Status: CANCELLED | OUTPATIENT
Start: 2020-09-11

## 2020-09-09 RX ORDER — LAMOTRIGINE 25 MG/1
250 TABLET ORAL ONCE
Status: CANCELLED | OUTPATIENT
Start: 2020-09-11

## 2020-09-09 RX ORDER — METHYLPREDNISOLONE SODIUM SUCCINATE 125 MG/2ML
125 INJECTION, POWDER, LYOPHILIZED, FOR SOLUTION INTRAMUSCULAR; INTRAVENOUS ONCE
Status: CANCELLED | OUTPATIENT
Start: 2020-09-11

## 2020-09-09 RX ORDER — DIPHENHYDRAMINE HYDROCHLORIDE 50 MG/ML
50 INJECTION INTRAMUSCULAR; INTRAVENOUS ONCE
Status: CANCELLED | OUTPATIENT
Start: 2020-09-11

## 2020-09-09 RX ORDER — SODIUM CHLORIDE 9 MG/ML
INJECTION, SOLUTION INTRAVENOUS CONTINUOUS
Status: CANCELLED | OUTPATIENT
Start: 2020-09-11

## 2020-09-11 ENCOUNTER — HOSPITAL ENCOUNTER (OUTPATIENT)
Dept: INFUSION THERAPY | Age: 50
Setting detail: INFUSION SERIES
Discharge: HOME | End: 2020-09-11
Payer: COMMERCIAL

## 2020-09-11 VITALS
BODY MASS INDEX: 36.49 KG/M2 | DIASTOLIC BLOOD PRESSURE: 79 MMHG | SYSTOLIC BLOOD PRESSURE: 136 MMHG | HEART RATE: 68 BPM | WEIGHT: 219 LBS | HEIGHT: 65 IN | RESPIRATION RATE: 16 BRPM | TEMPERATURE: 97.2 F

## 2020-09-11 DIAGNOSIS — Z17.0 MALIGNANT NEOPLASM OF UPPER-OUTER QUADRANT OF RIGHT BREAST IN FEMALE, ESTROGEN RECEPTOR POSITIVE (HCC): Primary | ICD-10-CM

## 2020-09-11 DIAGNOSIS — C78.7 SECONDARY MALIGNANT NEOPLASM OF LIVER AND INTRAHEPATIC BILE DUCT (HCC): ICD-10-CM

## 2020-09-11 DIAGNOSIS — C50.411 MALIGNANT NEOPLASM OF UPPER-OUTER QUADRANT OF RIGHT BREAST IN FEMALE, ESTROGEN RECEPTOR POSITIVE (HCC): Primary | ICD-10-CM

## 2020-09-11 LAB
BASOPHILS ABSOLUTE: 0.1 K/CU MM
BASOPHILS RELATIVE PERCENT: 1.7 % (ref 0–1)
DIFFERENTIAL TYPE: ABNORMAL
EOSINOPHILS ABSOLUTE: 0.1 K/CU MM
EOSINOPHILS RELATIVE PERCENT: 1.2 % (ref 0–3)
HCT VFR BLD CALC: 39.4 % (ref 37–47)
HEMOGLOBIN: 13.4 GM/DL (ref 12.5–16)
IMMATURE NEUTROPHIL %: 0.2 % (ref 0–0.43)
LYMPHOCYTES ABSOLUTE: 2.2 K/CU MM
LYMPHOCYTES RELATIVE PERCENT: 53.1 % (ref 24–44)
MCH RBC QN AUTO: 33 PG (ref 27–31)
MCHC RBC AUTO-ENTMCNC: 34 % (ref 32–36)
MCV RBC AUTO: 97 FL (ref 78–100)
MONOCYTES ABSOLUTE: 0.2 K/CU MM
MONOCYTES RELATIVE PERCENT: 5.7 % (ref 0–4)
NUCLEATED RBC %: 0 %
PDW BLD-RTO: 13.7 % (ref 11.7–14.9)
PLATELET # BLD: 233 K/CU MM (ref 140–440)
PMV BLD AUTO: 9.1 FL (ref 7.5–11.1)
RBC # BLD: 4.06 M/CU MM (ref 4.2–5.4)
SEGMENTED NEUTROPHILS ABSOLUTE COUNT: 1.6 K/CU MM
SEGMENTED NEUTROPHILS RELATIVE PERCENT: 38.1 % (ref 36–66)
TOTAL IMMATURE NEUTOROPHIL: 0.01 K/CU MM
TOTAL NUCLEATED RBC: 0 K/CU MM
WBC # BLD: 4.2 K/CU MM (ref 4–10.5)

## 2020-09-11 PROCEDURE — 96402 CHEMO HORMON ANTINEOPL SQ/IM: CPT

## 2020-09-11 PROCEDURE — 99211 OFF/OP EST MAY X REQ PHY/QHP: CPT

## 2020-09-11 PROCEDURE — 85025 COMPLETE CBC W/AUTO DIFF WBC: CPT

## 2020-09-11 RX ORDER — LAMOTRIGINE 25 MG/1
250 TABLET ORAL ONCE
Status: COMPLETED | OUTPATIENT
Start: 2020-09-11 | End: 2020-09-11

## 2020-09-11 RX ADMIN — LAMOTRIGINE 250 MG: 25 TABLET ORAL at 08:50

## 2020-09-15 ENCOUNTER — OFFICE VISIT (OUTPATIENT)
Dept: ONCOLOGY | Age: 50
End: 2020-09-15
Payer: COMMERCIAL

## 2020-09-15 ENCOUNTER — HOSPITAL ENCOUNTER (OUTPATIENT)
Dept: INFUSION THERAPY | Age: 50
Discharge: HOME | End: 2020-09-15
Payer: COMMERCIAL

## 2020-09-15 VITALS
HEIGHT: 65 IN | RESPIRATION RATE: 18 BRPM | BODY MASS INDEX: 35.96 KG/M2 | SYSTOLIC BLOOD PRESSURE: 106 MMHG | WEIGHT: 215.8 LBS | OXYGEN SATURATION: 98 % | TEMPERATURE: 96.8 F | DIASTOLIC BLOOD PRESSURE: 74 MMHG | HEART RATE: 79 BPM

## 2020-09-15 PROCEDURE — 99211 OFF/OP EST MAY X REQ PHY/QHP: CPT

## 2020-09-15 PROCEDURE — 99214 OFFICE O/P EST MOD 30 MIN: CPT | Performed by: INTERNAL MEDICINE

## 2020-09-15 NOTE — PROGRESS NOTES
MA Rooming Questions  Patient: Gita Kan  MRN: [id number]    Date: 9/15/2020        1. Do you have any new issues? yes - pain in hips         2. Do you need any refills on medications?    no    3. Have you had any imaging done since your last visit?   no    4. Have you been hospitalized or seen in the emergency room since your last visit here?   no    5. Did the patient have a depression screening completed today?  No    No data recorded     PHQ-9 Given to (if applicable):               PHQ-9 Score (if applicable):                     [] Positive     []  Negative              Does question #9 need addressed (if applicable)                     [] Yes    []  No               Jae Hammond MA

## 2020-09-15 NOTE — PROGRESS NOTES
Chief Complaint  Breast cancer, female ( Stage Date: Unknown, Stage IV (Right breast upper-outer quadrant, T1c, N0, M1)-Clinical  Date of Dx:10/2017 Extent of Disease: Evidence of metastatic disease; Disease State: Initial diagnosis; Metastatic Sites: Bone; Metastatic Sites: Liver; ER Status: Positive; AZ Status: Positive; HER-2/jus Value-FISH: Negative; Menopausal Status: Premenopausal; PIK3CA gene: Unknown; )  Problem List  Breast cancer, female ( Stage Date: Unknown, Stage IV (Right breast upper-outer quadrant, T1c, N0, M1)-Clinical  Date of Dx:10/2017 Extent of Disease: Evidence of metastatic disease; Disease State: Initial diagnosis; Metastatic Sites: Bone; Metastatic Sites: Liver; ER Status: Positive; AZ Status: Positive; HER-2/jus Value-FISH: Negative; Menopausal Status: Premenopausal; PIK3CA gene: Unknown; )   Abnormal radiological finding   Bony metastasis ( Date of Dx:10/2017 )   Chest pain   Metastasis to liver  HPI  Extremely pleasant lady patient of Dr. Aicha Smith. [facility] at [facility]. Evaluated in September 2017 for some discomfort involving the sternum that had been present for a few weeks. She could not remember any injury to that area except maybe a few months earlier she had fallen face-forward. She did not remember having significant discomfort to the chest area involving the fall. September 12, 2017, chest x-ray was reported to be fairly unremarkable. X-rays of the sternum revealed questionable age-indeterminate factor of the mid to superior sternum. Additional CT was recommended which was done on September 18, 2017, revealing mottled appearance of the sternum. Findings could represent related to earlier trauma or even osteomyelitis or metastatic disease. Nonspecific sclerotic changes in the T12 vertebral body were noted too.      Triple phase bone scan done on October 4, 2017, had revealed only focal uptake in the body of the sternum corresponding to the changes to be premenopausal as she was not having any symptoms suggestive of menopausal state. She was started on treatment with Zoladex, Faslodex, and Zometa starting on November 10, 2017. She had to receive these treatments in outpatient facility at the hospital primarily because of insurance reasons. Saw me on December 7, 2017, sooner than her scheduled appointment, was concerned about possibly feeling a lump involving the right breast at around 6 oclock position. Otherwise she had not noticed any new issues. Mammogram and ultrasound done on December 8, 2017, showed dense breast tissue where she was feeling a mass. There was no sonographic evidence for any mass in that area too. MRI was suggested if needed to. Otherwise she was again noted to have known malignancy in the upper outer quadrant of the breast.     PET scan on May 14, 2018, revealed partial treatment response with improvement in the right axillary nodes as well as skeletal metastases. Metabolic activity associated with lytic bone lesions had improved with increased sclerosis of the previously seen lesions. Mild residual activity was noted at the L4 vertebral body. There were no new persistent or metabolically active aggressive osseous lesions. Visit here on February 26, 2019 overall I think she was doing fair but we were concerned about her starting to have more discomfort in her left elbow as well as the right hip area. She had not had a PET scan since May of last year and that too had revealed a partial response, we felt possibly repeating the PET scan would be helpful. In the meantime she was going to continue to receive the same treatments as before including Faslodex, Zoladex and Zometa    She was not able to undergo a PET scan but insurance. Did undergo a bone scan on April 4, 2019 revealing a stable bone scan without evidence of new osseous metastases.     May fall, 2019 was evaluated for symptoms of chest pain including a cardiac work-up including echo and stress test afterwards and those were unremarkable. She did have a CTA of the chest done looking for pulmonary embolism on May 4, 2019 which was negative for pulmonary embolism no other acute pulmonary illness. She still of course was noted to have several sclerotic lesions within the spine which were felt to be stable as compared to the prior PET scan which was done in May 2018. [address] [facility] levels done on July 16, 0196 was 94.4, follicular phase. CA-27-29 level same day was 26, stable. [address] [facility] [address] [facility] [address] September was 17, 2019 we talked about still being pre-or perimenopausal Darst will continue to stay on Zoladex along with Faslodex. Talked about disease overall having responded. Will possibly extend intervals between Zometa to may be once a year, may be due in July 2020. Did start her on Effexor for hot flashes    March 12, 2020 still being treated with same agents, still working on a long hours still in general feeling well only issue being occasional discomfort in the right rib cage as well as right breast area. Could not feel any distinct masses. Denied any history of injury. Otherwise negative for any new systemic complaints and overall both physically and emotionally was doing quite well    Telemedicine visit on April 23, 2020. We discussed results those were obtained on her previous visit on March 12, 2020. Chemistries CBC were fairly unremarkable CA-27-29 levels were still normal. She had undergone CT scan of the chest on March 24, 2020 was noted to have a stable bony lesions. But she was described to have a 2.8 cm lesion in liver which was worrisome for possibly metastatic disease. Following those studies she was advised to undergo PET scan and that was refused by insurance. She did undergo a bone scan on April 28, 2020 was noted to have stable appearance of skeletal metastatic disease no new metastatic lesions were noted.  She did undergo a CT scan of the abdomen and pelvis with contrast on the same day again was noted to have a 2.3 x 2.7 cm left hepatic lesion which was worrisome for metastatic disease and again of course skeletal metastases were noted. Telemedicine visit again on May 5, 2020. April 27, 2020 she did undergo liver biopsy. Pathology did reveal metastatic adenocarcinoma from a breast primary. Tumor was still positive for ER 50% AZ 40%. HER-2 was equivocal by IHC though negative by FISH. May 6, 2020 had long discussion with her and her family. We talked about progressive nature of the disease. We talked about future plan including liver biopsy material being sent to [facility] analysis. We talked about the need for us to change her treatment to a combination of Faslodex, Zoladex and add Verzenio. We also discussed possibly doing a PET scan. We do know she has disease involving the breast, bones and now liver. Our intention is that if indeed she has an isolated lesion in the liver we might consider doing local treatment to the liver besides this new systemic therapy. PET scan done on May 21, 6229 revealed metabolically active metastases in the left hepatic lobe no other metabolic activity was noted in the known skeletal metastases mammogram revealed stable appearance no new sites of malignancy were noted. Treatment was changed to Faslodex Zoladex and Verzenio in May 2020    [facility] analysis from the liver lesion did reveal a tumor to be positive for PI K3 CA. Otherwise microsatellite status was stable and no other alterations were noted especially in BRCA1 and BRCA2 as well as HER-2      As PET scan had revealed an isolated lesion involving the liver she did undergo radio frequency ablation of the lesion on June 24, 2020, tolerated procedure fairly well though had some discomfort afterwards.  CT scan of the abdomen done without contrast on June 25, 2020 did reveal sequela of treated metastatic lesion in the left lobe of the liver with central high attenuation area measuring 2.6 x 2.4 cm and the peripheral low-attenuation rim measuring around 4.8 x 4.1 cm related to the postprocedural edema. Again osseous metastatic disease was again noted without pathologic fracture. July 17, 2020 she was overall doing fairly decent had recovered from the discomfort related to the radiofrequency ablation. We talked about continuing on this present regimen does do plan to check her [address] levels to see if we can hold off giving her Zoladex. We of course will continue with Faslodex, Verzenio and Zometa. We of course will continue to monitor her progress through exams as well as CAT scans. [address] level done on July 30, 2020 was 9.4, premenopausal.    Visit here on September 15, 2020. Was still being treated with Faslodex Zoladex and Verzenio. Tolerating dose well major issue was discomfort primarily from Faslodex injection sites otherwise she denied any bony pains. Still working. Getting excited about both of her right seventh grandchild in the near future. Otherwise emotionally also getting little tired for being on treatment for some time      Past Medical History    Basically healthy. Surgical History    1. Abdominal hysterectomy with unilateral oophorectomy for endometriosis in the year 2000. 2. Surgery on right elbow in 2013. Social History  Smoking Status Former smokerUsed to smoke, quit in 2004. Negative for significant ethanol intake. Occasional social usage. .  works at a manufacturing plant. Two sons ages 32 and 25. 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     Family History    0 FH  Current Therapy   Fulvestrant D1,15 (Initial) fb Fulvestrant D1 (Maintenance) + Goserelin Q28D Cycle Length: 28 Number Cycles: 36 Start: C1D1 on 10/27/2017 Assoc Dx:  Breast cancer, female LOT: 1st Line Metastatic or Recurrent Stage: IV 10/27/2017 C1 D1  Fulvestrant IM, 500 mg   Fulvestrant IM, 500 mg   Goserelin Subcutaneous, 3.6 mg  Zoledronic acid (Zometa) Q6M Cycle Length: 84 Number Cycles: 6 Start: C1D1 on 10/27/2017 Assoc Dx: Breast cancer, female LOT: 1st Line Metastatic or Recurrent 11/10/2017 C1 D1  Zoledronic Acid IV (Zometa), 4 mg  Abemaciclib + Fulvestrant + Goserelin Q28D Cycle Length: 28 Number Cycles: 6 Start: Kaleigh Simental on 05/06/2020 Assoc Dx: Breast cancer, female LOT: 2nd Line Metastatic Stage: IV 05/06/2020 C1 D15  Fulvestrant IM, 500 mg   Fulvestrant IM, 500 mg   Goserelin Subcutaneous, 3.6 mg   Abemaciclib Oral Dose Pack, 150 mg bid      Review of Systems   Per interval history; otherwise 10 point ROS is negative    Physical Exam   Very pleasant lady, no pallor or jaundice, no supraclavicular or axillary nodes, no breast masses. No abnormal cardiac, pulmonary, or abdominal findings. Negative for any pedal edema, skin changes, or neurologic findings. Negative for any new changes                      Assessment & Plan  Extremely pleasant lady patient of Dr. Marcos Torres, overall blessed with good health. Saw him in September for vague discomfort involving the sternum which led to chest x-ray and x-rays of the sternum revealing questionable age-indeterminate fracture of the mid to superior sternum. CT scan revealed mottled appearance of sternum. Etiology could be related to earlier trauma and/or even metastatic disease. Bone scan showed increased activity in the sternum area with the same differential. Mammogram in October 2016 was unremarkable. October 10, 2017, I did spend time with her and her sister. We did talk about future course of action including possibly just repeating the studies in a couple of months. Would consider doing biopsy of the sternal area. She I think would feel comfortable if we could have better answer for her and thus for that reason I did advise her to be seen in interventional radiology for possible biopsy. October 13, 2017, CT guided biopsy from the sternum revealed changes consistent with metastatic carcinoma from breast biopsy.  The tumor is strongly hip area. She had not had a PET scan since May of last year and that too had revealed a partial response, we felt possibly repeating the PET scan would be helpful. In the meantime she was going to continue to receive the same treatments as before including Faslodex, Zoladex and Zometa  She was not able to undergo a PET scan but insurance. Did undergo a bone scan on April 4, 2019 revealing a stable bone scan without evidence of new osseous metastases. May , 2019 was evaluated for symptoms of chest pain including a cardiac work-up including echo and stress test afterwards and those were unremarkable. She did have a CTA of the chest done looking for pulmonary embolism on May 4, 2019 which was negative for pulmonary embolism no other acute pulmonary illness. She still of course was noted to have several sclerotic lesions within the spine which were felt to be stable as compared to the prior PET scan which was done in May 2018. [address] [facility] September was 17, 2019 we talked about still being pre-or perimenopausal Darst will continue to stay on Zoladex along with Faslodex. Talked about disease overall having responded. Will possibly extend intervals between Zometa to may be once a year, may be due in July 2020. Did start her on Effexor for hot flashes  March 12, 2020 overall was doing fairly well Effexor was helpful with hot flashes. Denied significant new issues except for discomfort in the breast as well as rib cage area. Is due to have her bone scan mammogram and CT scan of the chest done to look for the discomfort in the chest area. Otherwise will continue on the same regimen. She will see me after the studies are completed. Telemedicine visit on April 23, 2020, as described above new issues being no obvious progression noted on a bone scan and blood work including tumor markers.  CT scan of the chest as well as CT scan of the abdomen showing 2.7 cm lesion in the left hepatic lobe quite worrisome for possible metastatic disease. Clinically she was still doing well. I did encourage her to undergo a biopsy of the liver lesion. And following the biopsy could decide about if we need to send material for [place] [id number] study also    Telemedicine visit again on May 5, 2020. April 27, 2020 she did undergo liver biopsy. Pathology did reveal metastatic adenocarcinoma from a breast primary. Tumor was still positive for ER 50% TN 40%. HER-2 was equivocal by IHC though negative by FISH. May 6, 2020 had long discussion with her and her family. We talked about progressive nature of the disease. We talked about future plan including liver biopsy material being sent to [place] [id number] analysis. We talked about the need for us to change her treatment to a combination of Faslodex, Zoladex and add Verzenio. We also discussed possibly doing a PET scan. We do know she has disease involving the breast, bones and now liver. Our intention is that if indeed she has an isolated lesion in the liver we might consider doing local treatment to the liver besides this new systemic therapy. June 1, 2020 I did spend time with her and her sister. We talked about PET scan revealing stable bony metastases and an isolated lesion involving the liver, biopsy-proven malignancy. Mammogram was unchanged from before. Couple weeks prior to this visit had started taking Verzenio along with Faslodex and Zoladex. We did talk about staying on the present systemic therapy and of course will continue to monitor her progress. We also discussed with her about possibly doing radiofrequency ablation treatment to the single lesion involving the liver. As described above on June 24, 2020 she did undergo radiofrequency ablation of the isolated lesion in the liver. July 17, 2020 she was overall doing fairly decent had recovered from the discomfort related to the radiofrequency ablation.  We talked about continuing on this present regimen does do plan to check her [address] [place] [address] levels to see if we can hold off giving her Zoladex. We of course will continue with Faslodex, Verzenio and Zometa. We of course will continue to monitor her progress through exams as well as CAT scans. September 15, 2020 was still being treated with Faslodex Zoladex and Verzenio. FSH levels were in premenopausal levels. We also planning to give Zoladex from time to time. She in general was feeling fairly well major issue was discomfort primarily from Faslodex injections. We talked about checking her blood work with the next injection on October 9 including chemistries and CA-27-29. We also talked about checking her a CT scan of the abdomen to see the status of the lesion following the radiofrequency ablation. Otherwise as before we will continue to monitor her blood progress exams CAT scans and possibly PET scans.

## 2020-09-25 ENCOUNTER — TELEPHONE (OUTPATIENT)
Dept: ONCOLOGY | Age: 50
End: 2020-09-25

## 2020-10-09 ENCOUNTER — HOSPITAL ENCOUNTER (OUTPATIENT)
Dept: INFUSION THERAPY | Age: 50
Setting detail: INFUSION SERIES
Discharge: HOME | End: 2020-10-09
Payer: COMMERCIAL

## 2020-10-09 VITALS
BODY MASS INDEX: 35.82 KG/M2 | TEMPERATURE: 97.7 F | HEIGHT: 65 IN | WEIGHT: 215 LBS | SYSTOLIC BLOOD PRESSURE: 123 MMHG | RESPIRATION RATE: 16 BRPM | HEART RATE: 76 BPM | DIASTOLIC BLOOD PRESSURE: 77 MMHG

## 2020-10-09 DIAGNOSIS — C78.7 SECONDARY MALIGNANT NEOPLASM OF LIVER AND INTRAHEPATIC BILE DUCT (HCC): ICD-10-CM

## 2020-10-09 DIAGNOSIS — Z17.0 MALIGNANT NEOPLASM OF UPPER-OUTER QUADRANT OF RIGHT BREAST IN FEMALE, ESTROGEN RECEPTOR POSITIVE (HCC): Primary | ICD-10-CM

## 2020-10-09 DIAGNOSIS — C50.411 MALIGNANT NEOPLASM OF UPPER-OUTER QUADRANT OF RIGHT BREAST IN FEMALE, ESTROGEN RECEPTOR POSITIVE (HCC): Primary | ICD-10-CM

## 2020-10-09 DIAGNOSIS — C50.411 MALIGNANT NEOPLASM OF UPPER-OUTER QUADRANT OF RIGHT FEMALE BREAST, UNSPECIFIED ESTROGEN RECEPTOR STATUS (HCC): ICD-10-CM

## 2020-10-09 LAB
ALBUMIN SERPL-MCNC: 4.8 GM/DL (ref 3.4–5)
ALP BLD-CCNC: 44 IU/L (ref 40–128)
ALT SERPL-CCNC: 16 U/L (ref 10–40)
ANION GAP SERPL CALCULATED.3IONS-SCNC: 11 MMOL/L (ref 4–16)
AST SERPL-CCNC: 23 IU/L (ref 15–37)
BASOPHILS ABSOLUTE: 0.1 K/CU MM
BASOPHILS RELATIVE PERCENT: 2.1 % (ref 0–1)
BILIRUB SERPL-MCNC: 0.2 MG/DL (ref 0–1)
BUN BLDV-MCNC: 15 MG/DL (ref 6–23)
CALCIUM SERPL-MCNC: 9.6 MG/DL (ref 8.3–10.6)
CHLORIDE BLD-SCNC: 102 MMOL/L (ref 99–110)
CO2: 25 MMOL/L (ref 21–32)
CREAT SERPL-MCNC: 1.2 MG/DL (ref 0.6–1.1)
DIFFERENTIAL TYPE: ABNORMAL
EOSINOPHILS ABSOLUTE: 0 K/CU MM
EOSINOPHILS RELATIVE PERCENT: 0.7 % (ref 0–3)
GFR AFRICAN AMERICAN: 58 ML/MIN/1.73M2
GFR NON-AFRICAN AMERICAN: 48 ML/MIN/1.73M2
GLUCOSE BLD-MCNC: 103 MG/DL (ref 70–99)
HCT VFR BLD CALC: 35.8 % (ref 37–47)
HEMOGLOBIN: 12.6 GM/DL (ref 12.5–16)
IMMATURE NEUTROPHIL %: 0.2 % (ref 0–0.43)
LYMPHOCYTES ABSOLUTE: 2.4 K/CU MM
LYMPHOCYTES RELATIVE PERCENT: 56.9 % (ref 24–44)
MCH RBC QN AUTO: 34.1 PG (ref 27–31)
MCHC RBC AUTO-ENTMCNC: 35.2 % (ref 32–36)
MCV RBC AUTO: 96.8 FL (ref 78–100)
MONOCYTES ABSOLUTE: 0.4 K/CU MM
MONOCYTES RELATIVE PERCENT: 9.5 % (ref 0–4)
NUCLEATED RBC %: 0 %
PDW BLD-RTO: 12.3 % (ref 11.7–14.9)
PLATELET # BLD: 213 K/CU MM (ref 140–440)
PMV BLD AUTO: 9.2 FL (ref 7.5–11.1)
POTASSIUM SERPL-SCNC: 4.3 MMOL/L (ref 3.5–5.1)
RBC # BLD: 3.7 M/CU MM (ref 4.2–5.4)
SEGMENTED NEUTROPHILS ABSOLUTE COUNT: 1.3 K/CU MM
SEGMENTED NEUTROPHILS RELATIVE PERCENT: 30.6 % (ref 36–66)
SODIUM BLD-SCNC: 138 MMOL/L (ref 135–145)
TOTAL IMMATURE NEUTOROPHIL: 0.01 K/CU MM
TOTAL NUCLEATED RBC: 0 K/CU MM
TOTAL PROTEIN: 7.2 GM/DL (ref 6.4–8.2)
WBC # BLD: 4.2 K/CU MM (ref 4–10.5)

## 2020-10-09 PROCEDURE — 86300 IMMUNOASSAY TUMOR CA 15-3: CPT

## 2020-10-09 PROCEDURE — 99211 OFF/OP EST MAY X REQ PHY/QHP: CPT

## 2020-10-09 PROCEDURE — 80053 COMPREHEN METABOLIC PANEL: CPT

## 2020-10-09 PROCEDURE — 85025 COMPLETE CBC W/AUTO DIFF WBC: CPT

## 2020-10-09 PROCEDURE — 96402 CHEMO HORMON ANTINEOPL SQ/IM: CPT

## 2020-10-09 RX ORDER — DIPHENHYDRAMINE HYDROCHLORIDE 50 MG/ML
50 INJECTION INTRAMUSCULAR; INTRAVENOUS
Status: ACTIVE | OUTPATIENT
Start: 2020-10-09 | End: 2020-10-09

## 2020-10-09 RX ORDER — EPINEPHRINE 1 MG/ML
0.3 INJECTION, SOLUTION, CONCENTRATE INTRAVENOUS PRN
Status: DISCONTINUED | OUTPATIENT
Start: 2020-10-09 | End: 2020-10-10 | Stop reason: HOSPADM

## 2020-10-09 RX ORDER — SODIUM CHLORIDE 9 MG/ML
INJECTION, SOLUTION INTRAVENOUS CONTINUOUS
Status: DISCONTINUED | OUTPATIENT
Start: 2020-10-09 | End: 2020-10-10 | Stop reason: HOSPADM

## 2020-10-09 RX ORDER — LAMOTRIGINE 25 MG/1
250 TABLET ORAL ONCE
Status: COMPLETED | OUTPATIENT
Start: 2020-10-09 | End: 2020-10-09

## 2020-10-09 RX ORDER — LAMOTRIGINE 25 MG/1
250 TABLET ORAL ONCE
Status: DISCONTINUED | OUTPATIENT
Start: 2020-10-09 | End: 2020-10-10 | Stop reason: HOSPADM

## 2020-10-09 RX ORDER — METHYLPREDNISOLONE SODIUM SUCCINATE 125 MG/2ML
125 INJECTION, POWDER, LYOPHILIZED, FOR SOLUTION INTRAMUSCULAR; INTRAVENOUS
Status: ACTIVE | OUTPATIENT
Start: 2020-10-09 | End: 2020-10-09

## 2020-10-09 RX ADMIN — LAMOTRIGINE 250 MG: 25 TABLET ORAL at 09:15

## 2020-10-12 LAB — CA 27.29: 47.4 U/ML (ref 0–40)

## 2020-10-15 ENCOUNTER — HOSPITAL ENCOUNTER (OUTPATIENT)
Dept: CT IMAGING | Age: 50
Discharge: HOME | End: 2020-10-15
Payer: COMMERCIAL

## 2020-10-15 PROCEDURE — 6360000004 HC RX CONTRAST MEDICATION: Performed by: INTERNAL MEDICINE

## 2020-10-15 PROCEDURE — 2580000003 HC RX 258: Performed by: INTERNAL MEDICINE

## 2020-10-15 PROCEDURE — 74177 CT ABD & PELVIS W/CONTRAST: CPT

## 2020-10-15 RX ORDER — SODIUM CHLORIDE 0.9 % (FLUSH) 0.9 %
10 SYRINGE (ML) INJECTION ONCE
Status: COMPLETED | OUTPATIENT
Start: 2020-10-15 | End: 2020-10-15

## 2020-10-15 RX ADMIN — Medication 10 ML: at 08:08

## 2020-10-15 RX ADMIN — IOPAMIDOL 75 ML: 755 INJECTION, SOLUTION INTRAVENOUS at 08:07

## 2020-10-19 VITALS — WEIGHT: 217 LBS | BODY MASS INDEX: 33.99 KG/M2

## 2020-10-27 ENCOUNTER — HOSPITAL ENCOUNTER (OUTPATIENT)
Dept: INFUSION THERAPY | Age: 50
Discharge: HOME | End: 2020-10-27
Payer: COMMERCIAL

## 2020-10-27 ENCOUNTER — OFFICE VISIT (OUTPATIENT)
Dept: ONCOLOGY | Age: 50
End: 2020-10-27
Payer: COMMERCIAL

## 2020-10-27 VITALS
WEIGHT: 215 LBS | SYSTOLIC BLOOD PRESSURE: 122 MMHG | DIASTOLIC BLOOD PRESSURE: 79 MMHG | BODY MASS INDEX: 35.82 KG/M2 | TEMPERATURE: 96.5 F | HEART RATE: 78 BPM | HEIGHT: 65 IN | RESPIRATION RATE: 16 BRPM | OXYGEN SATURATION: 98 %

## 2020-10-27 PROCEDURE — 99214 OFFICE O/P EST MOD 30 MIN: CPT | Performed by: INTERNAL MEDICINE

## 2020-10-27 PROCEDURE — 99211 OFF/OP EST MAY X REQ PHY/QHP: CPT

## 2020-10-27 NOTE — PROGRESS NOTES
MA Rooming Questions  Patient: Michelle Vick  MRN: [id number]    Date: 10/27/2020        1. Do you have any new issues? Pain in left side     2. Do you need any refills on medications?    no    3. Have you had any imaging done since your last visit? yes - CT     4. Have you been hospitalized or seen in the emergency room since your last visit here?   no    5. Did the patient have a depression screening completed today?  No    No data recorded     PHQ-9 Given to (if applicable):               PHQ-9 Score (if applicable):                     [] Positive     []  Negative              Does question #9 need addressed (if applicable)                     [] Yes    []  No               Mya Aguillon MA

## 2020-10-28 NOTE — PROGRESS NOTES
Chief Complaint  Breast cancer, female ( Stage Date: Unknown, Stage IV (Right breast upper-outer quadrant, T1c, N0, M1)-Clinical  Date of Dx:10/2017 Extent of Disease: Evidence of metastatic disease; Disease State: Initial diagnosis; Metastatic Sites: Bone; Metastatic Sites: Liver; ER Status: Positive; IN Status: Positive; HER-2/jus Value-FISH: Negative; Menopausal Status: Premenopausal; PIK3CA gene: Unknown; )  Problem List  Breast cancer, female ( Stage Date: Unknown, Stage IV (Right breast upper-outer quadrant, T1c, N0, M1)-Clinical  Date of Dx:10/2017 Extent of Disease: Evidence of metastatic disease; Disease State: Initial diagnosis; Metastatic Sites: Bone; Metastatic Sites: Liver; ER Status: Positive; IN Status: Positive; HER-2/jus Value-FISH: Negative; Menopausal Status: Premenopausal; PIK3CA gene: Unknown; )   Abnormal radiological finding   Bony metastasis ( Date of Dx:10/2017 )   Chest pain   Metastasis to liver  HPI  Extremely pleasant lady patient of Dr. Eileen Walter. [facility] at [facility]. Evaluated in September 2017 for some discomfort involving the sternum that had been present for a few weeks. She could not remember any injury to that area except maybe a few months earlier she had fallen face-forward. She did not remember having significant discomfort to the chest area involving the fall. September 12, 2017, chest x-ray was reported to be fairly unremarkable. X-rays of the sternum revealed questionable age-indeterminate factor of the mid to superior sternum. Additional CT was recommended which was done on September 18, 2017, revealing mottled appearance of the sternum. Findings could represent related to earlier trauma or even osteomyelitis or metastatic disease. Nonspecific sclerotic changes in the T12 vertebral body were noted too.      Triple phase bone scan done on October 4, 2017, had revealed only focal uptake in the body of the sternum corresponding to the changes to be premenopausal as she was not having any symptoms suggestive of menopausal state. She was started on treatment with Zoladex, Faslodex, and Zometa starting on November 10, 2017. She had to receive these treatments in outpatient facility at the hospital primarily because of insurance reasons. Saw me on December 7, 2017, sooner than her scheduled appointment, was concerned about possibly feeling a lump involving the right breast at around 6 oclock position. Otherwise she had not noticed any new issues. Mammogram and ultrasound done on December 8, 2017, showed dense breast tissue where she was feeling a mass. There was no sonographic evidence for any mass in that area too. MRI was suggested if needed to. Otherwise she was again noted to have known malignancy in the upper outer quadrant of the breast.     PET scan on May 14, 2018, revealed partial treatment response with improvement in the right axillary nodes as well as skeletal metastases. Metabolic activity associated with lytic bone lesions had improved with increased sclerosis of the previously seen lesions. Mild residual activity was noted at the L4 vertebral body. There were no new persistent or metabolically active aggressive osseous lesions. Visit here on February 26, 2019 overall I think she was doing fair but we were concerned about her starting to have more discomfort in her left elbow as well as the right hip area. She had not had a PET scan since May of last year and that too had revealed a partial response, we felt possibly repeating the PET scan would be helpful. In the meantime she was going to continue to receive the same treatments as before including Faslodex, Zoladex and Zometa    She was not able to undergo a PET scan but insurance. Did undergo a bone scan on April 4, 2019 revealing a stable bone scan without evidence of new osseous metastases.     May fall, 2019 was evaluated for symptoms of chest pain including a cardiac work-up including echo and stress test afterwards and those were unremarkable. She did have a CTA of the chest done looking for pulmonary embolism on May 4, 2019 which was negative for pulmonary embolism no other acute pulmonary illness. She still of course was noted to have several sclerotic lesions within the spine which were felt to be stable as compared to the prior PET scan which was done in May 2018. [facility] levels done on July 16, 2792 was 76.5, follicular phase. CA-27-29 level same day was 26, stable. [address] [facility] [address] September was 17, 2019 we talked about still being pre-or perimenopausal Darst will continue to stay on Zoladex along with Faslodex. Talked about disease overall having responded. Will possibly extend intervals between Zometa to may be once a year, may be due in July 2020. Did start her on Effexor for hot flashes    March 12, 2020 still being treated with same agents, still working on a long hours still in general feeling well only issue being occasional discomfort in the right rib cage as well as right breast area. Could not feel any distinct masses. Denied any history of injury. Otherwise negative for any new systemic complaints and overall both physically and emotionally was doing quite well    Telemedicine visit on April 23, 2020. We discussed results those were obtained on her previous visit on March 12, 2020. Chemistries CBC were fairly unremarkable CA-27-29 levels were still normal. She had undergone CT scan of the chest on March 24, 2020 was noted to have a stable bony lesions. But she was described to have a 2.8 cm lesion in liver which was worrisome for possibly metastatic disease. Following those studies she was advised to undergo PET scan and that was refused by insurance. She did undergo a bone scan on April 28, 2020 was noted to have stable appearance of skeletal metastatic disease no new metastatic lesions were noted.  She did undergo a CT scan of the abdomen and pelvis with contrast on the 2.6 x 2.4 cm and the peripheral low-attenuation rim measuring around 4.8 x 4.1 cm related to the postprocedural edema. Again osseous metastatic disease was again noted without pathologic fracture. July 17, 2020 she was overall doing fairly decent had recovered from the discomfort related to the radiofrequency ablation. We talked about continuing on this present regimen does do plan to check her [address] levels to see if we can hold off giving her Zoladex. We of course will continue with Faslodex, Verzenio and Zometa. We of course will continue to monitor her progress through exams as well as CAT scans. [address] level done on July 30, 2020 was 9.4, premenopausal.    Visit here on September 15, 2020. Was still being treated with Faslodex Zoladex and Verzenio. Studies done on October 9, 2020 fairly normal CBC, chemistries reveal creatinine of 1.2 CA 27-29 levels were mildly elevated to 47. A CT scan of the abdomen done on October 15, 2020 revealed 3.5 x 3.1 cm ablation zone, which had decreased in size since immediate post ablation changes done on June 25, 2020. It is difficult to assess residual tremor dedicated multiphasic CT or MRI was recommended. Multifocal osseous sclerotic metastases were felt to be stable. She was seen in the office on October 27, 2020. Overall she was feeling well did complain of having some discomfort in the pelvic area and was seriously considering having her oophorectomy. Otherwise she denied any bony pain symptoms enjoying her family and been able to do her day-to-day activities quite well. Past Medical History    Basically healthy. Surgical History    1. Abdominal hysterectomy with unilateral oophorectomy for endometriosis in the year 2000. 2. Surgery on right elbow in 2013. Social History  Smoking Status Former smokerUsed to smoke, quit in 2004. Negative for significant ethanol intake. Occasional social usage. .  works at a manufacturing plant.  Two sons ages 32 and 25. 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     Family History    0 FH  Current Therapy   Fulvestrant D1,15 (Initial) fb Fulvestrant D1 (Maintenance) + Goserelin Q28D Cycle Length: 28 Number Cycles: 36 Start: C1D1 on 10/27/2017 Assoc Dx: Breast cancer, female LOT: 1st Line Metastatic or Recurrent Stage: IV 10/27/2017 C1 D1  Fulvestrant IM, 500 mg   Fulvestrant IM, 500 mg   Goserelin Subcutaneous, 3.6 mg  Zoledronic acid (Zometa) Q6M Cycle Length: 84 Number Cycles: 6 Start: [place] on 10/27/2017 Assoc Dx: Breast cancer, female LOT: 1st Line Metastatic or Recurrent 11/10/2017 C1 D1  Zoledronic Acid IV (Zometa), 4 mg  Abemaciclib + Fulvestrant + Goserelin Q28D Cycle Length: 28 Number Cycles: 6 Start: Chip [place] on 05/06/2020 Assoc Dx: Breast cancer, female LOT: 2nd Line Metastatic Stage: IV 05/06/2020 C1 D15  Fulvestrant IM, 500 mg   Fulvestrant IM, 500 mg   Goserelin Subcutaneous, 3.6 mg   Abemaciclib Oral Dose Pack, 150 mg bid      Review of Systems   Per interval history; otherwise 10 point ROS is negative    Physical Exam   Very pleasant lady, no pallor or jaundice, no supraclavicular or axillary nodes, no breast masses. No abnormal cardiac, pulmonary, or abdominal findings. Negative for any pedal edema, skin changes, or neurologic findings. Negative for any new changes                      Assessment & Plan  Extremely pleasant lady patient of Dr. Darwin Felix, overall blessed with good health. Saw him in September for vague discomfort involving the sternum which led to chest x-ray and x-rays of the sternum revealing questionable age-indeterminate fracture of the mid to superior sternum. CT scan revealed mottled appearance of sternum. Etiology could be related to earlier trauma and/or even metastatic disease. Bone scan showed increased activity in the sternum area with the same differential. Mammogram in October 2016 was unremarkable. October 10, 2017, I did spend time with her and her sister.  We did talk about future course of action including possibly just repeating the studies in a couple of months. Would consider doing biopsy of the sternal area. She I think would feel comfortable if we could have better answer for her and thus for that reason I did advise her to be seen in interventional radiology for possible biopsy. October 13, 2017, CT guided biopsy from the sternum revealed changes consistent with metastatic carcinoma from breast biopsy. The tumor is strongly positive for ER and NV and negative for HER2. October 25, 2017, mammogram did reveal a lesion in the right breast of 1.8 cm in the upper outer quadrant. Biopsy for which did reveal invasive mammary carcinoma. October 27, 2017, I did discuss the findings with her and her extended family. We talked about basically the finding of carcinoma involving the right breast as well as bones. Biopsy from the sternum was positive. A bone scan also showed questionable changes involving the T12, though CT scan of the chest otherwise was negative for any pulmonary or hepatic metastases. Biopsy from the right breast October 27, 2017, also revealed invasive mammary carcinoma, strongly positive ER, NV, and HER-2. PET scan done on November 2, 2017, revealed hypermetabolic right breast as well as right axillary nodes and osseous metastases involving sternum, right scapula, and L4 vertebral body. She was considered a premenopausal lady with metastatic disease involving the bones. Treatment choices were discussed with her and on November 10, 2017, was started on treatment with Zoladex, Faslodex, and Zometa. Visit here on November 20, 2017, she was tolerating these treatments well following the Zometa. December 7, 2017, bony pains were better, but she was concerned about possibly a lump involving the 6 oclock position right breast. Mammogram and ultrasound did not reveal any mass in that area.  She was still noted to have lesion in the upper mammogram and CT scan of the chest done to look for the discomfort in the chest area. Otherwise will continue on the same regimen. She will see me after the studies are completed. Telemedicine visit on April 23, 2020, as described above new issues being no obvious progression noted on a bone scan and blood work including tumor markers. CT scan of the chest as well as CT scan of the abdomen showing 2.7 cm lesion in the left hepatic lobe quite worrisome for possible metastatic disease. Clinically she was still doing well. I did encourage her to undergo a biopsy of the liver lesion. And following the biopsy could decide about if we need to send material for foundation 1 study also    Telemedicine visit again on May 5, 2020. April 27, 2020 she did undergo liver biopsy. Pathology did reveal metastatic adenocarcinoma from a breast primary. Tumor was still positive for ER 50% HI 40%. HER-2 was equivocal by IHC though negative by FISH. May 6, 2020 had long discussion with her and her family. We talked about progressive nature of the disease. We talked about future plan including liver biopsy material being sent to foundation 1 analysis. We talked about the need for us to change her treatment to a combination of Faslodex, Zoladex and add Verzenio. We also discussed possibly doing a PET scan. We do know she has disease involving the breast, bones and now liver. Our intention is that if indeed she has an isolated lesion in the liver we might consider doing local treatment to the liver besides this new systemic therapy. June 1, 2020 I did spend time with her and her sister. We talked about PET scan revealing stable bony metastases and an isolated lesion involving the liver, biopsy-proven malignancy. Mammogram was unchanged from before. Couple weeks prior to this visit had started taking Verzenio along with Faslodex and Zoladex.  We did talk about staying on the present systemic therapy and of course will continue to monitor her progress. We also discussed with her about possibly doing radiofrequency ablation treatment to the single lesion involving the liver. As described above on June 24, 2020 she did undergo radiofrequency ablation of the isolated lesion in the liver. July 17, 2020 she was overall doing fairly decent had recovered from the discomfort related to the radiofrequency ablation. We talked about continuing on this present regimen does do plan to check her [address] [place] [address] levels to see if we can hold off giving her Zoladex. We of course will continue with Faslodex, Verzenio and Zometa. We of course will continue to monitor her progress through exams as well as CAT scans. September 15, 2020 was still being treated with Faslodex Zoladex and Verzenio. FSH levels were in premenopausal levels. We also planning to give zometa from time to time. She in general was feeling fairly well major issue was discomfort primarily from Faslodex injections. We talked about checking her blood work with the next injection on October 9 including chemistries and CA-27-29. We also talked about checking her a CT scan of the abdomen to see the status of the lesion following the radiofrequency ablation. Otherwise as before we will continue to monitor her blood progress exams CAT scans and possibly PET scans. October 27, 2020 overall she was feeling about the same there was some concern about slight increase in tumor marker CA 27-29. Rosa [place] Plan was to to multiphasic CT scan of the abdomen to assess the disease status. Also she was seriously considering having her unilateral oophorectomy I think that will be reasonable.   I do plan to see her after the CT scan of the abdomen and pelvis

## 2020-10-30 ENCOUNTER — CLINICAL DOCUMENTATION (OUTPATIENT)
Dept: ONCOLOGY | Age: 50
End: 2020-10-30

## 2020-11-03 ENCOUNTER — HOSPITAL ENCOUNTER (OUTPATIENT)
Dept: CT IMAGING | Age: 50
Discharge: HOME | End: 2020-11-03
Payer: COMMERCIAL

## 2020-11-03 PROCEDURE — 74170 CT ABD WO CNTRST FLWD CNTRST: CPT

## 2020-11-03 PROCEDURE — 6360000004 HC RX CONTRAST MEDICATION: Performed by: INTERNAL MEDICINE

## 2020-11-03 RX ORDER — SODIUM CHLORIDE 0.9 % (FLUSH) 0.9 %
10 SYRINGE (ML) INJECTION PRN
Status: DISCONTINUED | OUTPATIENT
Start: 2020-11-03 | End: 2020-11-04 | Stop reason: HOSPADM

## 2020-11-03 RX ADMIN — IOPAMIDOL 85 ML: 755 INJECTION, SOLUTION INTRAVENOUS at 08:55

## 2020-11-04 ENCOUNTER — HOSPITAL ENCOUNTER (OUTPATIENT)
Dept: INFUSION THERAPY | Age: 50
Discharge: HOME | End: 2020-11-04
Payer: COMMERCIAL

## 2020-11-04 ENCOUNTER — OFFICE VISIT (OUTPATIENT)
Dept: ONCOLOGY | Age: 50
End: 2020-11-04
Payer: COMMERCIAL

## 2020-11-04 VITALS
OXYGEN SATURATION: 96 % | HEIGHT: 65 IN | HEART RATE: 63 BPM | DIASTOLIC BLOOD PRESSURE: 74 MMHG | WEIGHT: 218.8 LBS | TEMPERATURE: 97.6 F | RESPIRATION RATE: 18 BRPM | BODY MASS INDEX: 36.46 KG/M2 | SYSTOLIC BLOOD PRESSURE: 135 MMHG

## 2020-11-04 DIAGNOSIS — C78.7 SECONDARY MALIGNANT NEOPLASM OF LIVER AND INTRAHEPATIC BILE DUCT (HCC): ICD-10-CM

## 2020-11-04 DIAGNOSIS — C50.411 MALIGNANT NEOPLASM OF UPPER-OUTER QUADRANT OF RIGHT FEMALE BREAST, UNSPECIFIED ESTROGEN RECEPTOR STATUS (HCC): ICD-10-CM

## 2020-11-04 LAB
BASOPHILS ABSOLUTE: 0.1 K/CU MM
BASOPHILS RELATIVE PERCENT: 1.3 % (ref 0–1)
DIFFERENTIAL TYPE: ABNORMAL
EOSINOPHILS ABSOLUTE: 0.1 K/CU MM
EOSINOPHILS RELATIVE PERCENT: 1.8 % (ref 0–3)
HCT VFR BLD CALC: 37.8 % (ref 37–47)
HEMOGLOBIN: 13.3 GM/DL (ref 12.5–16)
LYMPHOCYTES ABSOLUTE: 2.2 K/CU MM
LYMPHOCYTES RELATIVE PERCENT: 48.9 % (ref 24–44)
MCH RBC QN AUTO: 32.8 PG (ref 27–31)
MCHC RBC AUTO-ENTMCNC: 35.2 % (ref 32–36)
MCV RBC AUTO: 93.3 FL (ref 78–100)
MONOCYTES ABSOLUTE: 0.4 K/CU MM
MONOCYTES RELATIVE PERCENT: 8.8 % (ref 0–4)
PDW BLD-RTO: 12 % (ref 11.7–14.9)
PLATELET # BLD: 250 K/CU MM (ref 140–440)
PMV BLD AUTO: 9.2 FL (ref 7.5–11.1)
RBC # BLD: 4.05 M/CU MM (ref 4.2–5.4)
SEGMENTED NEUTROPHILS ABSOLUTE COUNT: 1.8 K/CU MM
SEGMENTED NEUTROPHILS RELATIVE PERCENT: 39.2 % (ref 36–66)
WBC # BLD: 4.5 K/CU MM (ref 4–10.5)

## 2020-11-04 PROCEDURE — 36415 COLL VENOUS BLD VENIPUNCTURE: CPT

## 2020-11-04 PROCEDURE — 85025 COMPLETE CBC W/AUTO DIFF WBC: CPT

## 2020-11-04 PROCEDURE — 99213 OFFICE O/P EST LOW 20 MIN: CPT

## 2020-11-04 PROCEDURE — 99213 OFFICE O/P EST LOW 20 MIN: CPT | Performed by: INTERNAL MEDICINE

## 2020-11-04 PROCEDURE — 99211 OFF/OP EST MAY X REQ PHY/QHP: CPT

## 2020-11-04 NOTE — PROGRESS NOTES
Chief Complaint  Breast cancer, female ( Stage Date: Unknown, Stage IV (Right breast upper-outer quadrant, T1c, N0, M1)-Clinical  Date of Dx:10/2017 Extent of Disease: Evidence of metastatic disease; Disease State: Initial diagnosis; Metastatic Sites: Bone; Metastatic Sites: Liver; ER Status: Positive; IL Status: Positive; HER-2/jus Value-FISH: Negative; Menopausal Status: Premenopausal; PIK3CA gene: Unknown; )  Problem List  Breast cancer, female ( Stage Date: Unknown, Stage IV (Right breast upper-outer quadrant, T1c, N0, M1)-Clinical  Date of Dx:10/2017 Extent of Disease: Evidence of metastatic disease; Disease State: Initial diagnosis; Metastatic Sites: Bone; Metastatic Sites: Liver; ER Status: Positive; IL Status: Positive; HER-2/jus Value-FISH: Negative; Menopausal Status: Premenopausal; PIK3CA gene: Unknown; )   Abnormal radiological finding   Bony metastasis ( Date of Dx:10/2017 )   Chest pain   Metastasis to liver  HPI  Extremely pleasant lady patient of Dr. Sayda Huber. RN at [facility]. Evaluated in September 2017 for some discomfort involving the sternum that had been present for a few weeks. She could not remember any injury to that area except maybe a few months earlier she had fallen face-forward. She did not remember having significant discomfort to the chest area involving the fall. September 12, 2017, chest x-ray was reported to be fairly unremarkable. X-rays of the sternum revealed questionable age-indeterminate factor of the mid to superior sternum. Additional CT was recommended which was done on September 18, 2017, revealing mottled appearance of the sternum. Findings could represent related to earlier trauma or even osteomyelitis or metastatic disease. Nonspecific sclerotic changes in the T12 vertebral body were noted too.      Triple phase bone scan done on October 4, 2017, had revealed only focal uptake in the body of the sternum corresponding to the changes noted on earlier CAT scan. Similar differential was again suggested. Visit here on October 9, 2017, though, she stated that she in general was doing actually better and pain was not severe and she did not have to take any pain medications and had been continuing to work on a full time basis and also remained fairly active. CMP on October 09, 2017, was normal and so was LDL of 183. Sed rate was 10. Kappa Lambda light chain ratios were normal. Immunoelectrophoresis was negative. CBC had revealed fairly normal numbers. CT biopsy of the sternum done on October 17, 2017, did reveal changes of metastatic carcinoma, possibly suggestive of breast primary. Biopsy from the sternum done on October 13, 2017, revealed metastatic carcinoma quite likely from breast primary. The tumor was strongly positive for estrogen receptor 95 percent and progesterone receptor is 95 percent, and negative for HER2. Visit here on October 20, 2017, those findings were discussed with her. October 25, 2017, diagnostic digital bilateral breast mammogram revealed 1.8 cm irregular, spiculated hyperechoic mass at the 10 oclock position in the right breast correlating to the mammographic area. Findings were highly suspicious for neoplasm. Ultrasound guided biopsy on October 27, 2017, did reveal invasive mammary carcinoma. Ultrasound-guided biopsy October 27, 2017, from this mass revealed invasive mammary carcinoma. Tumor was again strongly positive 95 percent for ER, 95 percent ND, HER-2 was negative both by IHC and FISH.      PET scan done on November 2, 2017, revealed hypermetabolic right breast mass as well as prominent right axillary lymph nodes and scattered osseous metastatic lesions involving the sternum, right scapula, left L4 vertebral body, and bilateral iliac crest.     Visit here on October 27, 2017, and afterwards metastatic disease involving the bones were discussed with her and clinically she was still considered to be premenopausal as she was not having any symptoms suggestive of menopausal state. She was started on treatment with Zoladex, Faslodex, and Zometa starting on November 10, 2017. She had to receive these treatments in outpatient facility at the hospital primarily because of insurance reasons. Saw me on December 7, 2017, sooner than her scheduled appointment, was concerned about possibly feeling a lump involving the right breast at around 6 oclock position. Otherwise she had not noticed any new issues. Mammogram and ultrasound done on December 8, 2017, showed dense breast tissue where she was feeling a mass. There was no sonographic evidence for any mass in that area too. MRI was suggested if needed to. Otherwise she was again noted to have known malignancy in the upper outer quadrant of the breast.     PET scan on May 14, 2018, revealed partial treatment response with improvement in the right axillary nodes as well as skeletal metastases. Metabolic activity associated with lytic bone lesions had improved with increased sclerosis of the previously seen lesions. Mild residual activity was noted at the L4 vertebral body. There were no new persistent or metabolically active aggressive osseous lesions. Visit here on February 26, 2019 overall I think she was doing fair but we were concerned about her starting to have more discomfort in her left elbow as well as the right hip area. She had not had a PET scan since May of last year and that too had revealed a partial response, we felt possibly repeating the PET scan would be helpful. In the meantime she was going to continue to receive the same treatments as before including Faslodex, Zoladex and Zometa    She was not able to undergo a PET scan but insurance. Did undergo a bone scan on April 4, 2019 revealing a stable bone scan without evidence of new osseous metastases.     May fall, 2019 was evaluated for symptoms of chest pain including a cardiac work-up including echo and stress test afterwards and those were unremarkable. She did have a CTA of the chest done looking for pulmonary embolism on May 4, 2019 which was negative for pulmonary embolism no other acute pulmonary illness. She still of course was noted to have several sclerotic lesions within the spine which were felt to be stable as compared to the prior PET scan which was done in May 2018. [facility] levels done on July 16, 3251 was 49.8, follicular phase. CA-27-29 level same day was 26, stable. [facility] September was 17, 2019 we talked about still being pre-or perimenopausal Darst will continue to stay on Zoladex along with Faslodex. Talked about disease overall having responded. Will possibly extend intervals between Zometa to may be once a year, may be due in July 2020. Did start her on Effexor for hot flashes    March 12, 2020 still being treated with same agents, still working on a long hours still in general feeling well only issue being occasional discomfort in the right rib cage as well as right breast area. Could not feel any distinct masses. Denied any history of injury. Otherwise negative for any new systemic complaints and overall both physically and emotionally was doing quite well    Telemedicine visit on April 23, 2020. We discussed results those were obtained on her previous visit on March 12, 2020. Chemistries CBC were fairly unremarkable CA-27-29 levels were still normal. She had undergone CT scan of the chest on March 24, 2020 was noted to have a stable bony lesions. But she was described to have a 2.8 cm lesion in liver which was worrisome for possibly metastatic disease. Following those studies she was advised to undergo PET scan and that was refused by insurance. She did undergo a bone scan on April 28, 2020 was noted to have stable appearance of skeletal metastatic disease no new metastatic lesions were noted.  She did undergo a CT scan of the abdomen and pelvis with contrast on the same day again was noted to have a 2.3 x 2.7 cm left hepatic lesion which was worrisome for metastatic disease and again of course skeletal metastases were noted. Telemedicine visit again on May 5, 2020. April 27, 2020 she did undergo liver biopsy. Pathology did reveal metastatic adenocarcinoma from a breast primary. Tumor was still positive for ER 50% SC 40%. HER-2 was equivocal by IHC though negative by FISH. May 6, 2020 had long discussion with her and her family. We talked about progressive nature of the disease. We talked about future plan including liver biopsy material being sent to [facility] analysis. We talked about the need for us to change her treatment to a combination of Faslodex, Zoladex and add Verzenio. We also discussed possibly doing a PET scan. We do know she has disease involving the breast, bones and now liver. Our intention is that if indeed she has an isolated lesion in the liver we might consider doing local treatment to the liver besides this new systemic therapy. PET scan done on May 21, 8608 revealed metabolically active metastases in the left hepatic lobe no other metabolic activity was noted in the known skeletal metastases mammogram revealed stable appearance no new sites of malignancy were noted. Treatment was changed to Faslodex Zoladex and Verzenio in May 2020    [facility] analysis from the liver lesion did reveal a tumor to be positive for PI K3 CA. Otherwise microsatellite status was stable and no other alterations were noted especially in BRCA1 and BRCA2 as well as HER-2      As PET scan had revealed an isolated lesion involving the liver she did undergo radio frequency ablation of the lesion on June 24, 2020, tolerated procedure fairly well though had some discomfort afterwards.  CT scan of the abdomen done without contrast on June 25, 2020 did reveal sequela of treated metastatic lesion in the left lobe of the liver with central high attenuation area measuring 2.6 x 2.4 cm and the peripheral low-attenuation rim measuring around 4.8 x 4.1 cm related to the postprocedural edema. Again osseous metastatic disease was again noted without pathologic fracture. July 17, 2020 she was overall doing fairly decent had recovered from the discomfort related to the radiofrequency ablation. We talked about continuing on this present regimen does do plan to check her [address] levels to see if we can hold off giving her Zoladex. We of course will continue with Faslodex, Verzenio and Zometa. We of course will continue to monitor her progress through exams as well as CAT scans. [address] level done on July 30, 2020 was 9.4, premenopausal.    Visit here on September 15, 2020. Was still being treated with Faslodex Zoladex and Verzenio. Studies done on October 9, 2020 fairly normal CBC, chemistries reveal creatinine of 1.2 CA 27-29 levels were mildly elevated to 47. A CT scan of the abdomen done on October 15, 2020 revealed 3.5 x 3.1 cm ablation zone, which had decreased in size since immediate post ablation changes done on June 25, 2020. It is difficult to assess residual tremor dedicated multiphasic CT or MRI was recommended. Multifocal osseous sclerotic metastases were felt to be stable. October 27, 2020 overall she was feeling about the same there was some concern about slight increase in tumor marker CA 27-29. [place] Plan was to to multiphasic CT scan of the abdomen to assess the disease status. Also she was seriously considering having her unilateral oophorectomy I think that will be reasonable. 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     Of visit here on November 4, 2020 she overall was doing well.  Was working. Denied any pain or discomfort good appetite so far was tolerating therapy quite well. Past Medical History    Basically healthy. Surgical History    1. Abdominal hysterectomy with unilateral oophorectomy for endometriosis in the year 2000. 2. Surgery on right elbow in 2013. Social History  Smoking Status Former smokerUsed to smoke, quit in 2004. Negative for significant ethanol intake. Occasional social usage. .  works at a manufacturing plant. Two sons ages 32 and 25. 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     Family History    0 FH  Current Therapy   Fulvestrant D1,15 (Initial) fb Fulvestrant D1 (Maintenance) + Goserelin Q28D Cycle Length: 28 Number Cycles: 36 Start: C1D1 on 10/27/2017 Assoc Dx: Breast cancer, female LOT: 1st Line Metastatic or Recurrent Stage: IV 10/27/2017 C1 D1  Fulvestrant IM, 500 mg   Fulvestrant IM, 500 mg   Goserelin Subcutaneous, 3.6 mg  Zoledronic acid (Zometa) Q6M Cycle Length: 84 Number Cycles: 6 Start: Janay Bryson on 10/27/2017 Assoc Dx: Breast cancer, female LOT: 1st Line Metastatic or Recurrent 11/10/2017 C1 D1  Zoledronic Acid IV (Zometa), 4 mg  Abemaciclib + Fulvestrant + Goserelin Q28D Cycle Length: 28 Number Cycles: 6 Start: Janay Bryson on 05/06/2020 Assoc Dx: Breast cancer, female LOT: 2nd Line Metastatic Stage: IV 05/06/2020 C1 D15  Fulvestrant IM, 500 mg   Fulvestrant IM, 500 mg   Goserelin Subcutaneous, 3.6 mg   Abemaciclib Oral Dose Pack, 150 mg bid      Review of Systems   Per interval history; otherwise 10 point ROS is negative    Physical Exam   Very pleasant lady, no pallor or jaundice, no supraclavicular or axillary nodes, no breast masses. No abnormal cardiac, pulmonary, or abdominal findings. Negative for any pedal edema, skin changes, or neurologic findings. Negative for any new changes                      Assessment & Plan  Extremely pleasant lady patient of Dr. Markie Morales, overall blessed with good health.  Saw him in September for vague discomfort involving the sternum which led to chest x-ray and x-rays of the sternum revealing questionable age-indeterminate fracture of the mid to superior sternum. CT scan revealed mottled appearance of sternum. Etiology could be related to earlier trauma and/or even metastatic disease. Bone scan showed increased activity in the sternum area with the same differential. Mammogram in October 2016 was unremarkable. October 10, 2017, I did spend time with her and her sister. We did talk about future course of action including possibly just repeating the studies in a couple of months. Would consider doing biopsy of the sternal area. She I think would feel comfortable if we could have better answer for her and thus for that reason I did advise her to be seen in interventional radiology for possible biopsy. October 13, 2017, CT guided biopsy from the sternum revealed changes consistent with metastatic carcinoma from breast biopsy. The tumor is strongly positive for ER and OH and negative for HER2. October 25, 2017, mammogram did reveal a lesion in the right breast of 1.8 cm in the upper outer quadrant. Biopsy for which did reveal invasive mammary carcinoma. October 27, 2017, I did discuss the findings with her and her extended family. We talked about basically the finding of carcinoma involving the right breast as well as bones. Biopsy from the sternum was positive. A bone scan also showed questionable changes involving the T12, though CT scan of the chest otherwise was negative for any pulmonary or hepatic metastases. Biopsy from the right breast October 27, 2017, also revealed invasive mammary carcinoma, strongly positive ER, OH, and HER-2. PET scan done on November 2, 2017, revealed hypermetabolic right breast as well as right axillary nodes and osseous metastases involving sternum, right scapula, and L4 vertebral body.      She was considered a premenopausal lady with metastatic disease involving the bones. Treatment choices were discussed with her and on November 10, 2017, was started on treatment with Zoladex, Faslodex, and Zometa. Visit here on November 20, 2017, she was tolerating these treatments well following the Zometa. December 7, 2017, bony pains were better, but she was concerned about possibly a lump involving the 6 oclock position right breast. Mammogram and ultrasound did not reveal any mass in that area. She was still noted to have lesion in the upper outer quadrant which had been biopsied before. The PET scan done on May 14, 2018, had revealed improvement in disease involving the breast, axilla, as well as bones. Previous PET scan had shown changes involving the sternum, scapula, and L4 areas; those areas did show improvement on the PET scan. Visit here on February 26, 2019 overall I think she was doing fair but we were concerned about her starting to have more discomfort in her left elbow as well as the right hip area. She had not had a PET scan since May of last year and that too had revealed a partial response, we felt possibly repeating the PET scan would be helpful. In the meantime she was going to continue to receive the same treatments as before including Faslodex, Zoladex and Zometa  She was not able to undergo a PET scan but insurance. Did undergo a bone scan on April 4, 2019 revealing a stable bone scan without evidence of new osseous metastases. May , 2019 was evaluated for symptoms of chest pain including a cardiac work-up including echo and stress test afterwards and those were unremarkable. She did have a CTA of the chest done looking for pulmonary embolism on May 4, 2019 which was negative for pulmonary embolism no other acute pulmonary illness. She still of course was noted to have several sclerotic lesions within the spine which were felt to be stable as compared to the prior PET scan which was done in May 2018.     [facility] September was 17, 2019 we might consider doing local treatment to the liver besides this new systemic therapy. June 1, 2020 I did spend time with her and her sister. We talked about PET scan revealing stable bony metastases and an isolated lesion involving the liver, biopsy-proven malignancy. Mammogram was unchanged from before. Couple weeks prior to this visit had started taking Verzenio along with Faslodex and Zoladex. We did talk about staying on the present systemic therapy and of course will continue to monitor her progress. We also discussed with her about possibly doing radiofrequency ablation treatment to the single lesion involving the liver. As described above on June 24, 2020 she did undergo radiofrequency ablation of the isolated lesion in the liver. July 17, 2020 she was overall doing fairly decent had recovered from the discomfort related to the radiofrequency ablation. We talked about continuing on this present regimen does do plan to check her [address] [place] [address] levels to see if we can hold off giving her Zoladex. We of course will continue with Faslodex, Verzenio and Zometa. We of course will continue to monitor her progress through exams as well as CAT scans. September 15, 2020 was still being treated with Faslodex Zoladex and Verzenio. FSH levels were in premenopausal levels. We also planning to give zometa from time to time. She in general was feeling fairly well major issue was discomfort primarily from Faslodex injections. We talked about checking her blood work with the next injection on October 9 including chemistries and CA-27-29. We also talked about checking her a CT scan of the abdomen to see the status of the lesion following the radiofrequency ablation. Otherwise as before we will continue to monitor her blood progress exams CAT scans and possibly PET scans. November 4, 2020 again spent time with her that the CT scan of the abdomen actually had not shown any new or residual disease. .  We talked about still concern of elevated tumor markers that we need to monitor. We talked about continuing to monitor her CBC being on Verzenio. I will be seeing her again in 3 or 4 weeks prior to that she will have another tumor marker.

## 2020-11-04 NOTE — PROGRESS NOTES
MA Rooming Questions  Patient: Merlyn Burns  MRN: [id number]    Date: 11/4/2020        1. Do you have any new issues?   no         2. Do you need any refills on medications?    no    3. Have you had any imaging done since your last visit?   ct  4. Have you been hospitalized or seen in the emergency room since your last visit here?   no    5. Did the patient have a depression screening completed today?  No    No data recorded     PHQ-9 Given to (if applicable):               PHQ-9 Score (if applicable):                     [] Positive     []  Negative              Does question #9 need addressed (if applicable)                     [] Yes    []  No               Aide Cowan MA

## 2020-11-06 ENCOUNTER — HOSPITAL ENCOUNTER (OUTPATIENT)
Dept: INFUSION THERAPY | Age: 50
Setting detail: INFUSION SERIES
Discharge: HOME | End: 2020-11-06
Payer: COMMERCIAL

## 2020-11-06 VITALS
OXYGEN SATURATION: 99 % | RESPIRATION RATE: 16 BRPM | SYSTOLIC BLOOD PRESSURE: 123 MMHG | HEART RATE: 62 BPM | TEMPERATURE: 96.8 F | DIASTOLIC BLOOD PRESSURE: 94 MMHG

## 2020-11-06 DIAGNOSIS — C50.411 MALIGNANT NEOPLASM OF UPPER-OUTER QUADRANT OF RIGHT BREAST IN FEMALE, ESTROGEN RECEPTOR POSITIVE (HCC): Primary | ICD-10-CM

## 2020-11-06 DIAGNOSIS — C78.7 SECONDARY MALIGNANT NEOPLASM OF LIVER AND INTRAHEPATIC BILE DUCT (HCC): ICD-10-CM

## 2020-11-06 DIAGNOSIS — Z17.0 MALIGNANT NEOPLASM OF UPPER-OUTER QUADRANT OF RIGHT BREAST IN FEMALE, ESTROGEN RECEPTOR POSITIVE (HCC): Primary | ICD-10-CM

## 2020-11-06 PROCEDURE — 96402 CHEMO HORMON ANTINEOPL SQ/IM: CPT

## 2020-11-06 PROCEDURE — 99211 OFF/OP EST MAY X REQ PHY/QHP: CPT

## 2020-11-06 RX ORDER — SODIUM CHLORIDE 9 MG/ML
INJECTION, SOLUTION INTRAVENOUS CONTINUOUS
Status: DISCONTINUED | OUTPATIENT
Start: 2020-11-06 | End: 2020-11-06 | Stop reason: CLARIF

## 2020-11-06 RX ORDER — LAMOTRIGINE 25 MG/1
250 TABLET ORAL ONCE
Status: COMPLETED | OUTPATIENT
Start: 2020-11-06 | End: 2020-11-06

## 2020-11-06 RX ORDER — DIPHENHYDRAMINE HYDROCHLORIDE 50 MG/ML
50 INJECTION INTRAMUSCULAR; INTRAVENOUS ONCE
Status: DISCONTINUED | OUTPATIENT
Start: 2020-11-06 | End: 2020-11-06 | Stop reason: CLARIF

## 2020-11-06 RX ORDER — METHYLPREDNISOLONE SODIUM SUCCINATE 125 MG/2ML
125 INJECTION, POWDER, LYOPHILIZED, FOR SOLUTION INTRAMUSCULAR; INTRAVENOUS ONCE
Status: CANCELLED | OUTPATIENT
Start: 2020-11-06

## 2020-11-06 RX ORDER — DIPHENHYDRAMINE HYDROCHLORIDE 50 MG/ML
50 INJECTION INTRAMUSCULAR; INTRAVENOUS ONCE
Status: CANCELLED | OUTPATIENT
Start: 2020-11-06

## 2020-11-06 RX ORDER — EPINEPHRINE 1 MG/ML
0.3 INJECTION, SOLUTION, CONCENTRATE INTRAVENOUS PRN
Status: CANCELLED | OUTPATIENT
Start: 2020-11-06

## 2020-11-06 RX ORDER — METHYLPREDNISOLONE SODIUM SUCCINATE 125 MG/2ML
125 INJECTION, POWDER, LYOPHILIZED, FOR SOLUTION INTRAMUSCULAR; INTRAVENOUS ONCE
Status: DISCONTINUED | OUTPATIENT
Start: 2020-11-06 | End: 2020-11-06 | Stop reason: CLARIF

## 2020-11-06 RX ORDER — LAMOTRIGINE 25 MG/1
250 TABLET ORAL ONCE
Status: CANCELLED | OUTPATIENT
Start: 2020-11-06

## 2020-11-06 RX ORDER — SODIUM CHLORIDE 9 MG/ML
INJECTION, SOLUTION INTRAVENOUS CONTINUOUS
Status: CANCELLED | OUTPATIENT
Start: 2020-11-06

## 2020-11-06 RX ORDER — EPINEPHRINE 1 MG/ML
0.3 INJECTION, SOLUTION, CONCENTRATE INTRAVENOUS PRN
Status: DISCONTINUED | OUTPATIENT
Start: 2020-11-06 | End: 2020-11-06 | Stop reason: CLARIF

## 2020-11-06 RX ADMIN — LAMOTRIGINE 250 MG: 25 TABLET ORAL at 08:25

## 2020-11-06 ASSESSMENT — PAIN SCALES - GENERAL: PAINLEVEL_OUTOF10: 0

## 2020-11-06 NOTE — PROGRESS NOTES
Prior to discharge, the After Visit Summary Discharge Instructions were reviewed with the patient. She was offered a printed version of the AVS, but declined the offer. Recurrent appointment, pt tolerated injections well. Pt discharged HOME. Pt to exit via ambulation.  To return 12/4/2020, voiced understanding    Orders Placed This Encounter   Medications    fulvestrant (FASLODEX) IM injection 250 mg    fulvestrant (FASLODEX) IM injection 250 mg    DISCONTD: 0.9 % sodium chloride infusion    DISCONTD: diphenhydrAMINE (BENADRYL) injection 50 mg    DISCONTD: hydrocortisone sodium succinate PF (SOLU-CORTEF) injection 100 mg    DISCONTD: methylPREDNISolone sodium (SOLU-MEDROL) injection 125 mg    DISCONTD: famotidine (PEPCID) injection 20 mg    DISCONTD: EPINEPHrine PF 1 MG/ML injection 0.3 mg    goserelin (ZOLADEX) injection 3.6 mg

## 2020-11-24 RX ORDER — LAMOTRIGINE 25 MG/1
500 TABLET ORAL
Qty: 10 ML | Refills: 1 | Status: SHIPPED | OUTPATIENT
Start: 2020-11-24 | End: 2021-08-03 | Stop reason: ALTCHOICE

## 2020-11-25 ENCOUNTER — HOSPITAL ENCOUNTER (OUTPATIENT)
Dept: INFUSION THERAPY | Age: 50
Discharge: HOME | End: 2020-11-25
Payer: COMMERCIAL

## 2020-11-25 DIAGNOSIS — C50.411 MALIGNANT NEOPLASM OF UPPER-OUTER QUADRANT OF RIGHT FEMALE BREAST, UNSPECIFIED ESTROGEN RECEPTOR STATUS (HCC): ICD-10-CM

## 2020-11-25 DIAGNOSIS — C78.7 SECONDARY MALIGNANT NEOPLASM OF LIVER AND INTRAHEPATIC BILE DUCT (HCC): ICD-10-CM

## 2020-11-25 LAB
BASOPHILS ABSOLUTE: 0.1 K/CU MM
BASOPHILS RELATIVE PERCENT: 1.2 % (ref 0–1)
DIFFERENTIAL TYPE: ABNORMAL
EOSINOPHILS ABSOLUTE: 0.1 K/CU MM
EOSINOPHILS RELATIVE PERCENT: 1.5 % (ref 0–3)
HCT VFR BLD CALC: 39.4 % (ref 37–47)
HEMOGLOBIN: 13.9 GM/DL (ref 12.5–16)
LYMPHOCYTES ABSOLUTE: 2.5 K/CU MM
LYMPHOCYTES RELATIVE PERCENT: 62.6 % (ref 24–44)
MCH RBC QN AUTO: 33.2 PG (ref 27–31)
MCHC RBC AUTO-ENTMCNC: 35.3 % (ref 32–36)
MCV RBC AUTO: 94 FL (ref 78–100)
MONOCYTES ABSOLUTE: 0.4 K/CU MM
MONOCYTES RELATIVE PERCENT: 8.9 % (ref 0–4)
PDW BLD-RTO: 12.4 % (ref 11.7–14.9)
PLATELET # BLD: 206 K/CU MM (ref 140–440)
PMV BLD AUTO: 8.7 FL (ref 7.5–11.1)
RBC # BLD: 4.19 M/CU MM (ref 4.2–5.4)
SEGMENTED NEUTROPHILS ABSOLUTE COUNT: 1 K/CU MM
SEGMENTED NEUTROPHILS RELATIVE PERCENT: 25.8 % (ref 36–66)
WBC # BLD: 4 K/CU MM (ref 4–10.5)

## 2020-11-25 PROCEDURE — 86300 IMMUNOASSAY TUMOR CA 15-3: CPT

## 2020-11-25 PROCEDURE — 36415 COLL VENOUS BLD VENIPUNCTURE: CPT

## 2020-11-25 PROCEDURE — 85025 COMPLETE CBC W/AUTO DIFF WBC: CPT

## 2020-11-27 LAB — CA 27.29: 47.2 U/ML (ref 0–40)

## 2020-11-30 RX ORDER — GOSERELIN ACETATE 3.6 MG/1
IMPLANT SUBCUTANEOUS
Qty: 1 EACH | Refills: 5 | Status: SHIPPED | OUTPATIENT
Start: 2020-11-30 | End: 2021-02-05 | Stop reason: ALTCHOICE

## 2020-12-03 RX ORDER — LAMOTRIGINE 25 MG/1
250 TABLET ORAL ONCE
Status: CANCELLED | OUTPATIENT
Start: 2020-12-04

## 2020-12-03 RX ORDER — DIPHENHYDRAMINE HYDROCHLORIDE 50 MG/ML
50 INJECTION INTRAMUSCULAR; INTRAVENOUS ONCE
Status: CANCELLED | OUTPATIENT
Start: 2020-12-04

## 2020-12-03 RX ORDER — EPINEPHRINE 1 MG/ML
0.3 INJECTION, SOLUTION, CONCENTRATE INTRAVENOUS PRN
Status: CANCELLED | OUTPATIENT
Start: 2020-12-04

## 2020-12-03 RX ORDER — SODIUM CHLORIDE 9 MG/ML
INJECTION, SOLUTION INTRAVENOUS CONTINUOUS
Status: CANCELLED | OUTPATIENT
Start: 2020-12-04

## 2020-12-03 RX ORDER — METHYLPREDNISOLONE SODIUM SUCCINATE 125 MG/2ML
125 INJECTION, POWDER, LYOPHILIZED, FOR SOLUTION INTRAMUSCULAR; INTRAVENOUS ONCE
Status: CANCELLED | OUTPATIENT
Start: 2020-12-04

## 2020-12-04 ENCOUNTER — HOSPITAL ENCOUNTER (OUTPATIENT)
Dept: INFUSION THERAPY | Age: 50
Setting detail: INFUSION SERIES
Discharge: HOME | End: 2020-12-04
Payer: COMMERCIAL

## 2020-12-04 VITALS
RESPIRATION RATE: 16 BRPM | HEART RATE: 73 BPM | SYSTOLIC BLOOD PRESSURE: 123 MMHG | DIASTOLIC BLOOD PRESSURE: 79 MMHG | TEMPERATURE: 97.5 F | OXYGEN SATURATION: 97 %

## 2020-12-04 DIAGNOSIS — C50.411 MALIGNANT NEOPLASM OF UPPER-OUTER QUADRANT OF RIGHT BREAST IN FEMALE, ESTROGEN RECEPTOR POSITIVE (HCC): Primary | ICD-10-CM

## 2020-12-04 DIAGNOSIS — Z17.0 MALIGNANT NEOPLASM OF UPPER-OUTER QUADRANT OF RIGHT BREAST IN FEMALE, ESTROGEN RECEPTOR POSITIVE (HCC): Primary | ICD-10-CM

## 2020-12-04 DIAGNOSIS — C78.7 SECONDARY MALIGNANT NEOPLASM OF LIVER AND INTRAHEPATIC BILE DUCT (HCC): ICD-10-CM

## 2020-12-04 PROCEDURE — 99211 OFF/OP EST MAY X REQ PHY/QHP: CPT

## 2020-12-04 PROCEDURE — 96402 CHEMO HORMON ANTINEOPL SQ/IM: CPT

## 2020-12-04 RX ORDER — LAMOTRIGINE 25 MG/1
250 TABLET ORAL ONCE
Status: COMPLETED | OUTPATIENT
Start: 2020-12-04 | End: 2020-12-04

## 2020-12-04 RX ADMIN — LAMOTRIGINE 250 MG: 25 TABLET ORAL at 08:28

## 2020-12-04 RX ADMIN — LAMOTRIGINE 250 MG: 25 TABLET ORAL at 08:27

## 2020-12-04 ASSESSMENT — PAIN DESCRIPTION - LOCATION: LOCATION: BACK

## 2020-12-04 ASSESSMENT — PAIN SCALES - GENERAL: PAINLEVEL_OUTOF10: 4

## 2020-12-04 ASSESSMENT — PAIN DESCRIPTION - PAIN TYPE: TYPE: NEUROPATHIC PAIN

## 2020-12-04 NOTE — PROGRESS NOTES
Called and spoke with Kamilah Nava Nurse navigator with Dr. David Grant. Advised pt was having surgery to have her remaining ovary removed and was wanting to know if pt needed to continue with Zoladex injections. Isaac Watson will talk with Dr. David Grant and get back with this nurse.

## 2020-12-04 NOTE — PROGRESS NOTES
Prior to discharge, the After Visit Summary Discharge Instructions were reviewed with the patient. She was offered a printed version of the AVS, but declined the offer. Recurrent appointment, pt tolerated infusion well. Pt discharged home. Pt to exit via ambulation. Next appt's made, pt agreeable for time and date.     Orders Placed This Encounter   Medications    fulvestrant (FASLODEX) IM injection 250 mg    fulvestrant (FASLODEX) IM injection 250 mg    goserelin (ZOLADEX) injection 3.6 mg

## 2020-12-09 ENCOUNTER — ANESTHESIA EVENT (OUTPATIENT)
Dept: OPERATING ROOM | Age: 50
End: 2020-12-09
Payer: COMMERCIAL

## 2020-12-09 NOTE — ANESTHESIA PRE PROCEDURE
tablet by mouth every 8 hours as needed for Nausea or Vomiting 30 tablet 0    albuterol sulfate  (90 Base) MCG/ACT inhaler Inhale 2 puffs into the lungs every 6 hours as needed for Wheezing 1 Inhaler 2    Abemaciclib (VERZENIO) 150 MG TABS Take 1 tablet by mouth 2 times daily Patient advised to hold medication until Office visit on 8/19/2020 with Dr Rebecca Gupta venlafaxine (EFFEXOR XR) 37.5 MG extended release capsule Take 37.5 mg by mouth daily      zoledronic acid (ZOMETA) 4 MG/100ML SOLN infusion Infuse 4 mg intravenously once Every year      topiramate ER (TROKENDI XR) 50 MG CP24 Take 50 mg by mouth as needed       diphenhydrAMINE (BENADRYL) 25 MG capsule 1-2 capsules by mouth every 6 hours as needed for rash 30 capsule 0     No current facility-administered medications for this encounter. Allergies: Allergies   Allergen Reactions    Sulfa Antibiotics Other (See Comments)     Stop breathing.  Nickel        Problem List:    Patient Active Problem List   Diagnosis Code    Cubital tunnel syndrome on right G56.21    FHx: coronary artery disease Z82.49    Chest pain R07.9    Primary cancer of right breast with metastasis to other site ([facility] [address] C50.911    Dyslipidemia E78.5    Asymptomatic varicose veins I83.90    Dilated aortic root ([facility] [address] I77.810    Liver disease K76.9    Liver lesion K76.9    Breast cancer, female C50.411    Abnormal radiological finding R93.7    Bone metastasis C79.51    Metastasis to liver C78.7       Past Medical History:        Diagnosis Date    Asthma     last flare up winter 2016    Breast CA [facility])     right    Chest pain     \"had chest pain last time 2015- saw Dr Arjun Contreras- all ok\"    FHx: coronary artery disease     H/O Doppler lower venous ultrasound 06/04/2019    No DVT,  Significant reflux noted of the Right & Left Deep Venous System.  H/O echocardiogram 06/04/2019    EF 55-60%, Essentially normal echocardiogram. Dilated aortic root at 4.0 cm.     History of exercise stress test 2019    Treadmill, EKG portion is negative for ischemia by diagnostic criteria    History of migraine     \"none for past 3 months\"    HX OTHER MEDICAL     \"since 2016- having some pain mid sternum- xray thought fx, then did CT and bone scan now having bx done\"    Hyperlipemia     Primary cancer of right breast with metastasis to other site [facility]) 3/24/2019       Past Surgical History:        Procedure Laterality Date     SECTION      EYE SURGERY  age 28    LASIK both eyes    HYSTERECTOMY  age 27    partial - has 1 ovary (L);  Due to endometriosis    OTHER SURGICAL HISTORY Right 2013    right ulnar nerve decompression    TUBAL LIGATION         Social History:    Social History     Tobacco Use    Smoking status: Former Smoker     Packs/day: 0.50     Years: 16.00     Pack years: 8.00     Types: Cigarettes     Last attempt to quit: 2004     Years since quittin.2    Smokeless tobacco: Never Used    Tobacco comment: Quit smoking 9 years ago. Substance Use Topics    Alcohol use: Yes     Alcohol/week: 2.0 standard drinks     Types: 2 Glasses of wine per week     Comment: Occasionally     CAFFEINE: 1 cup coffee daily/ average one per week                                Counseling given: Not Answered  Comment: Quit smoking 9 years ago. Vital Signs (Current): There were no vitals filed for this visit. BP Readings from Last 3 Encounters:   20 123/79   20 (!) 123/94   20 135/74       NPO Status:                                                                                 BMI:   Wt Readings from Last 3 Encounters:   20 218 lb 12.8 oz (99.2 kg)   10/27/20 215 lb (97.5 kg)   10/09/20 215 lb (97.5 kg)     There is no height or weight on file to calculate BMI.       CBC  Lab Results   Component Value Date/Time    WBC 4.4 2020 09:14 AM    HGB 13.1 2020 09:14 AM axis deviation   Pulmonary disease pattern   Abnormal ECG      Neuro/Psych:   (+) neuromuscular disease (Cubital tunnel release, 2013):, headaches (last ~12/6/2020): migraine headaches,             GI/Hepatic/Renal:   (+) liver disease (metastasis in the left hepatic lobe, bx 4/2020 s/p ablation, on zometa):, morbid obesity         ROS comment: CT abdm 11/2020  1. Stable postablation appearance of the liver.  No suspicious contrast   enhancement to indicate the presence of residual tumor.  No new hepatic abnormality   2. 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 Endo/Other:    (+) malignancy/cancer ( right breast CA, diag 2017. Treated with chemo. 3/2019 Osteoblastic metastatic dz to sternum, right scapula, clavical, and lumbar 4-5. Chemo pill and zoladex/faslodex injections. Followed by Dr. Kandy Meckel). ROS comment: Estrogen receptor positive   Endometriosis   S/p partial hysterectomy, 2000 Abdominal:   (+) obese,         Vascular:                                    Anesthesia Plan      general     ASA 4       Induction: intravenous. MIPS: Postoperative opioids intended. Anesthetic plan and risks discussed with patient. Use of blood products discussed with patient whom consented to blood products. Plan discussed with CRNA.     Attending anesthesiologist reviewed and agrees with Pre Eval content              Riley Gabriel, KIMBERLY - HOSSEIN   12/9/2020

## 2020-12-11 ENCOUNTER — HOSPITAL ENCOUNTER (OUTPATIENT)
Dept: PREADMISSION TESTING | Age: 50
Discharge: HOME | End: 2020-12-15
Payer: COMMERCIAL

## 2020-12-11 ENCOUNTER — HOSPITAL ENCOUNTER (OUTPATIENT)
Dept: LAB | Age: 50
Discharge: HOME | End: 2020-12-11
Payer: COMMERCIAL

## 2020-12-11 VITALS
DIASTOLIC BLOOD PRESSURE: 87 MMHG | BODY MASS INDEX: 33.74 KG/M2 | SYSTOLIC BLOOD PRESSURE: 127 MMHG | HEIGHT: 67 IN | RESPIRATION RATE: 16 BRPM | HEART RATE: 74 BPM | WEIGHT: 215 LBS | OXYGEN SATURATION: 99 %

## 2020-12-11 LAB
ANION GAP SERPL CALCULATED.3IONS-SCNC: 10 MMOL/L (ref 4–16)
BASOPHILS ABSOLUTE: 0.1 K/CU MM
BASOPHILS RELATIVE PERCENT: 1.4 % (ref 0–1)
BUN BLDV-MCNC: 15 MG/DL (ref 6–23)
CALCIUM SERPL-MCNC: 9.6 MG/DL (ref 8.3–10.6)
CHLORIDE BLD-SCNC: 102 MMOL/L (ref 99–110)
CO2: 26 MMOL/L (ref 21–32)
CREAT SERPL-MCNC: 1.1 MG/DL (ref 0.6–1.1)
DIFFERENTIAL TYPE: ABNORMAL
EOSINOPHILS ABSOLUTE: 0.1 K/CU MM
EOSINOPHILS RELATIVE PERCENT: 1.1 % (ref 0–3)
GFR AFRICAN AMERICAN: >60 ML/MIN/1.73M2
GFR NON-AFRICAN AMERICAN: 53 ML/MIN/1.73M2
GLUCOSE BLD-MCNC: 92 MG/DL (ref 70–99)
HCT VFR BLD CALC: 39.1 % (ref 37–47)
HEMOGLOBIN: 13.1 GM/DL (ref 12.5–16)
IMMATURE NEUTROPHIL %: 0 % (ref 0–0.43)
LYMPHOCYTES ABSOLUTE: 2.4 K/CU MM
LYMPHOCYTES RELATIVE PERCENT: 54.2 % (ref 24–44)
MCH RBC QN AUTO: 32.4 PG (ref 27–31)
MCHC RBC AUTO-ENTMCNC: 33.5 % (ref 32–36)
MCV RBC AUTO: 96.8 FL (ref 78–100)
MONOCYTES ABSOLUTE: 0.4 K/CU MM
MONOCYTES RELATIVE PERCENT: 8.5 % (ref 0–4)
NUCLEATED RBC %: 0 %
PDW BLD-RTO: 12.6 % (ref 11.7–14.9)
PLATELET # BLD: 227 K/CU MM (ref 140–440)
PMV BLD AUTO: 9.1 FL (ref 7.5–11.1)
POTASSIUM SERPL-SCNC: 4.2 MMOL/L (ref 3.5–5.1)
RBC # BLD: 4.04 M/CU MM (ref 4.2–5.4)
SEGMENTED NEUTROPHILS ABSOLUTE COUNT: 1.5 K/CU MM
SEGMENTED NEUTROPHILS RELATIVE PERCENT: 34.8 % (ref 36–66)
SODIUM BLD-SCNC: 138 MMOL/L (ref 135–145)
TOTAL IMMATURE NEUTOROPHIL: 0 K/CU MM
TOTAL NUCLEATED RBC: 0 K/CU MM
WBC # BLD: 4.4 K/CU MM (ref 4–10.5)

## 2020-12-11 PROCEDURE — U0002 COVID-19 LAB TEST NON-CDC: HCPCS

## 2020-12-11 PROCEDURE — C9803 HOPD COVID-19 SPEC COLLECT: HCPCS

## 2020-12-11 PROCEDURE — 36415 COLL VENOUS BLD VENIPUNCTURE: CPT

## 2020-12-11 PROCEDURE — 80048 BASIC METABOLIC PNL TOTAL CA: CPT

## 2020-12-11 PROCEDURE — 85025 COMPLETE CBC W/AUTO DIFF WBC: CPT

## 2020-12-12 LAB
SARS-COV-2: NOT DETECTED
SOURCE: NORMAL

## 2020-12-14 ENCOUNTER — CLINICAL DOCUMENTATION (OUTPATIENT)
Dept: ONCOLOGY | Age: 50
End: 2020-12-14

## 2020-12-17 ENCOUNTER — HOSPITAL ENCOUNTER (OUTPATIENT)
Age: 50
Setting detail: OUTPATIENT SURGERY
Discharge: HOME | End: 2020-12-17
Attending: OBSTETRICS & GYNECOLOGY | Admitting: OBSTETRICS & GYNECOLOGY
Payer: COMMERCIAL

## 2020-12-17 ENCOUNTER — ANESTHESIA (OUTPATIENT)
Dept: OPERATING ROOM | Age: 50
End: 2020-12-17
Payer: COMMERCIAL

## 2020-12-17 VITALS
TEMPERATURE: 95.9 F | OXYGEN SATURATION: 85 % | RESPIRATION RATE: 22 BRPM | SYSTOLIC BLOOD PRESSURE: 118 MMHG | DIASTOLIC BLOOD PRESSURE: 75 MMHG

## 2020-12-17 VITALS
SYSTOLIC BLOOD PRESSURE: 139 MMHG | HEART RATE: 66 BPM | OXYGEN SATURATION: 95 % | DIASTOLIC BLOOD PRESSURE: 86 MMHG | TEMPERATURE: 97.1 F | RESPIRATION RATE: 14 BRPM

## 2020-12-17 PROCEDURE — 2709999900 HC NON-CHARGEABLE SUPPLY: Performed by: OBSTETRICS & GYNECOLOGY

## 2020-12-17 PROCEDURE — 2500000003 HC RX 250 WO HCPCS: Performed by: NURSE ANESTHETIST, CERTIFIED REGISTERED

## 2020-12-17 PROCEDURE — 3700000001 HC ADD 15 MINUTES (ANESTHESIA): Performed by: OBSTETRICS & GYNECOLOGY

## 2020-12-17 PROCEDURE — 7100000010 HC PHASE II RECOVERY - FIRST 15 MIN: Performed by: OBSTETRICS & GYNECOLOGY

## 2020-12-17 PROCEDURE — 3700000000 HC ANESTHESIA ATTENDED CARE: Performed by: OBSTETRICS & GYNECOLOGY

## 2020-12-17 PROCEDURE — 2720000010 HC SURG SUPPLY STERILE: Performed by: OBSTETRICS & GYNECOLOGY

## 2020-12-17 PROCEDURE — 6360000002 HC RX W HCPCS: Performed by: ANESTHESIOLOGY

## 2020-12-17 PROCEDURE — 6370000000 HC RX 637 (ALT 250 FOR IP): Performed by: ANESTHESIOLOGY

## 2020-12-17 PROCEDURE — 7100000011 HC PHASE II RECOVERY - ADDTL 15 MIN: Performed by: OBSTETRICS & GYNECOLOGY

## 2020-12-17 PROCEDURE — 44800 EXCISION OF BOWEL POUCH: CPT | Performed by: SURGERY

## 2020-12-17 PROCEDURE — 2500000003 HC RX 250 WO HCPCS: Performed by: OBSTETRICS & GYNECOLOGY

## 2020-12-17 PROCEDURE — 6360000002 HC RX W HCPCS: Performed by: NURSE ANESTHETIST, CERTIFIED REGISTERED

## 2020-12-17 PROCEDURE — 2580000003 HC RX 258: Performed by: ANESTHESIOLOGY

## 2020-12-17 PROCEDURE — 3600000014 HC SURGERY LEVEL 4 ADDTL 15MIN: Performed by: OBSTETRICS & GYNECOLOGY

## 2020-12-17 PROCEDURE — 7100000001 HC PACU RECOVERY - ADDTL 15 MIN: Performed by: OBSTETRICS & GYNECOLOGY

## 2020-12-17 PROCEDURE — 88307 TISSUE EXAM BY PATHOLOGIST: CPT | Performed by: PATHOLOGY

## 2020-12-17 PROCEDURE — 3600000004 HC SURGERY LEVEL 4 BASE: Performed by: OBSTETRICS & GYNECOLOGY

## 2020-12-17 PROCEDURE — 2580000003 HC RX 258: Performed by: NURSE ANESTHETIST, CERTIFIED REGISTERED

## 2020-12-17 PROCEDURE — 7100000000 HC PACU RECOVERY - FIRST 15 MIN: Performed by: OBSTETRICS & GYNECOLOGY

## 2020-12-17 PROCEDURE — 88304 TISSUE EXAM BY PATHOLOGIST: CPT | Performed by: PATHOLOGY

## 2020-12-17 RX ORDER — SODIUM CHLORIDE, SODIUM LACTATE, POTASSIUM CHLORIDE, CALCIUM CHLORIDE 600; 310; 30; 20 MG/100ML; MG/100ML; MG/100ML; MG/100ML
INJECTION, SOLUTION INTRAVENOUS CONTINUOUS PRN
Status: DISCONTINUED | OUTPATIENT
Start: 2020-12-17 | End: 2020-12-17 | Stop reason: SDUPTHER

## 2020-12-17 RX ORDER — IBUPROFEN 800 MG/1
800 TABLET ORAL EVERY 8 HOURS PRN
Qty: 30 TABLET | Refills: 1 | Status: SHIPPED | OUTPATIENT
Start: 2020-12-17

## 2020-12-17 RX ORDER — BUPIVACAINE HYDROCHLORIDE AND EPINEPHRINE 5; 5 MG/ML; UG/ML
INJECTION, SOLUTION EPIDURAL; INTRACAUDAL; PERINEURAL
Status: COMPLETED | OUTPATIENT
Start: 2020-12-17 | End: 2020-12-17

## 2020-12-17 RX ORDER — FENTANYL CITRATE 50 UG/ML
INJECTION, SOLUTION INTRAMUSCULAR; INTRAVENOUS PRN
Status: DISCONTINUED | OUTPATIENT
Start: 2020-12-17 | End: 2020-12-17 | Stop reason: SDUPTHER

## 2020-12-17 RX ORDER — FENTANYL CITRATE 50 UG/ML
50 INJECTION, SOLUTION INTRAMUSCULAR; INTRAVENOUS EVERY 5 MIN PRN
Status: DISCONTINUED | OUTPATIENT
Start: 2020-12-17 | End: 2020-12-17 | Stop reason: HOSPADM

## 2020-12-17 RX ORDER — ONDANSETRON 2 MG/ML
4 INJECTION INTRAMUSCULAR; INTRAVENOUS
Status: DISCONTINUED | OUTPATIENT
Start: 2020-12-17 | End: 2020-12-17 | Stop reason: HOSPADM

## 2020-12-17 RX ORDER — LIDOCAINE HYDROCHLORIDE 20 MG/ML
INJECTION, SOLUTION INTRAVENOUS PRN
Status: DISCONTINUED | OUTPATIENT
Start: 2020-12-17 | End: 2020-12-17 | Stop reason: SDUPTHER

## 2020-12-17 RX ORDER — HYDROCODONE BITARTRATE AND ACETAMINOPHEN 5; 325 MG/1; MG/1
1 TABLET ORAL ONCE
Status: COMPLETED | OUTPATIENT
Start: 2020-12-17 | End: 2020-12-17

## 2020-12-17 RX ORDER — FENTANYL CITRATE 50 UG/ML
25 INJECTION, SOLUTION INTRAMUSCULAR; INTRAVENOUS EVERY 5 MIN PRN
Status: DISCONTINUED | OUTPATIENT
Start: 2020-12-17 | End: 2020-12-17 | Stop reason: HOSPADM

## 2020-12-17 RX ORDER — CEFAZOLIN SODIUM 2 G/50ML
SOLUTION INTRAVENOUS PRN
Status: DISCONTINUED | OUTPATIENT
Start: 2020-12-17 | End: 2020-12-17 | Stop reason: SDUPTHER

## 2020-12-17 RX ORDER — LABETALOL HYDROCHLORIDE 5 MG/ML
5 INJECTION, SOLUTION INTRAVENOUS EVERY 10 MIN PRN
Status: DISCONTINUED | OUTPATIENT
Start: 2020-12-17 | End: 2020-12-17 | Stop reason: HOSPADM

## 2020-12-17 RX ORDER — ROCURONIUM BROMIDE 10 MG/ML
INJECTION, SOLUTION INTRAVENOUS PRN
Status: DISCONTINUED | OUTPATIENT
Start: 2020-12-17 | End: 2020-12-17 | Stop reason: SDUPTHER

## 2020-12-17 RX ORDER — HYDRALAZINE HYDROCHLORIDE 20 MG/ML
5 INJECTION INTRAMUSCULAR; INTRAVENOUS EVERY 10 MIN PRN
Status: DISCONTINUED | OUTPATIENT
Start: 2020-12-17 | End: 2020-12-17 | Stop reason: HOSPADM

## 2020-12-17 RX ORDER — SODIUM CHLORIDE, SODIUM LACTATE, POTASSIUM CHLORIDE, CALCIUM CHLORIDE 600; 310; 30; 20 MG/100ML; MG/100ML; MG/100ML; MG/100ML
INJECTION, SOLUTION INTRAVENOUS ONCE
Status: COMPLETED | OUTPATIENT
Start: 2020-12-17 | End: 2020-12-17

## 2020-12-17 RX ORDER — PROPOFOL 10 MG/ML
INJECTION, EMULSION INTRAVENOUS PRN
Status: DISCONTINUED | OUTPATIENT
Start: 2020-12-17 | End: 2020-12-17 | Stop reason: SDUPTHER

## 2020-12-17 RX ORDER — MIDAZOLAM HYDROCHLORIDE 1 MG/ML
INJECTION INTRAMUSCULAR; INTRAVENOUS PRN
Status: DISCONTINUED | OUTPATIENT
Start: 2020-12-17 | End: 2020-12-17 | Stop reason: SDUPTHER

## 2020-12-17 RX ORDER — DEXAMETHASONE SODIUM PHOSPHATE 4 MG/ML
INJECTION, SOLUTION INTRA-ARTICULAR; INTRALESIONAL; INTRAMUSCULAR; INTRAVENOUS; SOFT TISSUE PRN
Status: DISCONTINUED | OUTPATIENT
Start: 2020-12-17 | End: 2020-12-17 | Stop reason: SDUPTHER

## 2020-12-17 RX ORDER — ONDANSETRON 2 MG/ML
INJECTION INTRAMUSCULAR; INTRAVENOUS PRN
Status: DISCONTINUED | OUTPATIENT
Start: 2020-12-17 | End: 2020-12-17 | Stop reason: SDUPTHER

## 2020-12-17 RX ORDER — HYDROCODONE BITARTRATE AND ACETAMINOPHEN 5; 325 MG/1; MG/1
1 TABLET ORAL EVERY 6 HOURS PRN
Qty: 20 TABLET | Refills: 0 | Status: SHIPPED | OUTPATIENT
Start: 2020-12-17 | End: 2020-12-24

## 2020-12-17 RX ADMIN — FENTANYL CITRATE 50 MCG: 50 INJECTION INTRAMUSCULAR; INTRAVENOUS at 08:03

## 2020-12-17 RX ADMIN — LIDOCAINE HYDROCHLORIDE 100 MG: 20 INJECTION, SOLUTION INTRAVENOUS at 07:50

## 2020-12-17 RX ADMIN — FENTANYL CITRATE 50 MCG: 50 INJECTION INTRAMUSCULAR; INTRAVENOUS at 08:51

## 2020-12-17 RX ADMIN — FENTANYL CITRATE 25 MCG: 50 INJECTION INTRAMUSCULAR; INTRAVENOUS at 09:25

## 2020-12-17 RX ADMIN — MIDAZOLAM 2 MG: 1 INJECTION INTRAMUSCULAR; INTRAVENOUS at 07:45

## 2020-12-17 RX ADMIN — ONDANSETRON 4 MG: 2 INJECTION INTRAMUSCULAR; INTRAVENOUS at 08:56

## 2020-12-17 RX ADMIN — FENTANYL CITRATE 25 MCG: 50 INJECTION INTRAMUSCULAR; INTRAVENOUS at 09:35

## 2020-12-17 RX ADMIN — CEFAZOLIN SODIUM 2 G: 2 SOLUTION INTRAVENOUS at 08:33

## 2020-12-17 RX ADMIN — SUGAMMADEX 200 MG: 100 INJECTION, SOLUTION INTRAVENOUS at 08:56

## 2020-12-17 RX ADMIN — ROCURONIUM BROMIDE 15 MG: 10 INJECTION INTRAVENOUS at 08:49

## 2020-12-17 RX ADMIN — SODIUM CHLORIDE, POTASSIUM CHLORIDE, SODIUM LACTATE AND CALCIUM CHLORIDE: 600; 310; 30; 20 INJECTION, SOLUTION INTRAVENOUS at 06:57

## 2020-12-17 RX ADMIN — FENTANYL CITRATE 100 MCG: 50 INJECTION INTRAMUSCULAR; INTRAVENOUS at 07:50

## 2020-12-17 RX ADMIN — HYDROCODONE BITARTRATE AND ACETAMINOPHEN 1 TABLET: 5; 325 TABLET ORAL at 10:29

## 2020-12-17 RX ADMIN — SODIUM CHLORIDE, POTASSIUM CHLORIDE, SODIUM LACTATE AND CALCIUM CHLORIDE: 600; 310; 30; 20 INJECTION, SOLUTION INTRAVENOUS at 08:58

## 2020-12-17 RX ADMIN — SODIUM CHLORIDE, POTASSIUM CHLORIDE, SODIUM LACTATE AND CALCIUM CHLORIDE: 600; 310; 30; 20 INJECTION, SOLUTION INTRAVENOUS at 07:46

## 2020-12-17 RX ADMIN — DEXAMETHASONE SODIUM PHOSPHATE 8 MG: 4 INJECTION, SOLUTION INTRAMUSCULAR; INTRAVENOUS at 07:53

## 2020-12-17 RX ADMIN — PROPOFOL 180 MG: 10 INJECTION, EMULSION INTRAVENOUS at 07:50

## 2020-12-17 RX ADMIN — ROCURONIUM BROMIDE 50 MG: 10 INJECTION INTRAVENOUS at 07:50

## 2020-12-17 ASSESSMENT — PULMONARY FUNCTION TESTS
PIF_VALUE: 29
PIF_VALUE: 21
PIF_VALUE: 30
PIF_VALUE: 28
PIF_VALUE: 19
PIF_VALUE: 22
PIF_VALUE: 30
PIF_VALUE: 29
PIF_VALUE: 30
PIF_VALUE: 22
PIF_VALUE: 1
PIF_VALUE: 19
PIF_VALUE: 20
PIF_VALUE: 31
PIF_VALUE: 20
PIF_VALUE: 30
PIF_VALUE: 27
PIF_VALUE: 27
PIF_VALUE: 30
PIF_VALUE: 0
PIF_VALUE: 19
PIF_VALUE: 27
PIF_VALUE: 30
PIF_VALUE: 0
PIF_VALUE: 24
PIF_VALUE: 30
PIF_VALUE: 2
PIF_VALUE: 29
PIF_VALUE: 30
PIF_VALUE: 30
PIF_VALUE: 26
PIF_VALUE: 30
PIF_VALUE: 21
PIF_VALUE: 30
PIF_VALUE: 23
PIF_VALUE: 29
PIF_VALUE: 28
PIF_VALUE: 19
PIF_VALUE: 29
PIF_VALUE: 30
PIF_VALUE: 19
PIF_VALUE: 30
PIF_VALUE: 29
PIF_VALUE: 21
PIF_VALUE: 30
PIF_VALUE: 22
PIF_VALUE: 4
PIF_VALUE: 30
PIF_VALUE: 0
PIF_VALUE: 30
PIF_VALUE: 30
PIF_VALUE: 29
PIF_VALUE: 30
PIF_VALUE: 1
PIF_VALUE: 1
PIF_VALUE: 30
PIF_VALUE: 8
PIF_VALUE: 30
PIF_VALUE: 29
PIF_VALUE: 29
PIF_VALUE: 30
PIF_VALUE: 19
PIF_VALUE: 23
PIF_VALUE: 30
PIF_VALUE: 1
PIF_VALUE: 30
PIF_VALUE: 5
PIF_VALUE: 7
PIF_VALUE: 33
PIF_VALUE: 30
PIF_VALUE: 19
PIF_VALUE: 30
PIF_VALUE: 19
PIF_VALUE: 31
PIF_VALUE: 22

## 2020-12-17 ASSESSMENT — PAIN DESCRIPTION - LOCATION
LOCATION: ABDOMEN

## 2020-12-17 ASSESSMENT — PAIN DESCRIPTION - ONSET
ONSET: ON-GOING
ONSET: ON-GOING

## 2020-12-17 ASSESSMENT — PAIN DESCRIPTION - ORIENTATION
ORIENTATION: MID

## 2020-12-17 ASSESSMENT — PAIN DESCRIPTION - FREQUENCY
FREQUENCY: CONTINUOUS
FREQUENCY: INTERMITTENT
FREQUENCY: CONTINUOUS
FREQUENCY: CONTINUOUS

## 2020-12-17 ASSESSMENT — PAIN SCALES - GENERAL
PAINLEVEL_OUTOF10: 6
PAINLEVEL_OUTOF10: 2
PAINLEVEL_OUTOF10: 5
PAINLEVEL_OUTOF10: 5
PAINLEVEL_OUTOF10: 4

## 2020-12-17 ASSESSMENT — PAIN DESCRIPTION - PAIN TYPE
TYPE: SURGICAL PAIN

## 2020-12-17 ASSESSMENT — PAIN DESCRIPTION - DESCRIPTORS
DESCRIPTORS: ACHING;SORE
DESCRIPTORS: DISCOMFORT

## 2020-12-17 ASSESSMENT — PAIN - FUNCTIONAL ASSESSMENT: PAIN_FUNCTIONAL_ASSESSMENT: 0-10

## 2020-12-17 ASSESSMENT — PAIN DESCRIPTION - PROGRESSION
CLINICAL_PROGRESSION: NOT CHANGED
CLINICAL_PROGRESSION: GRADUALLY IMPROVING

## 2020-12-17 NOTE — PROGRESS NOTES
3377- arrived to PACU in semi-fowlers position, monitors applied alarms on oriented to unit. Handoff report received from MICHEL Priest 94  2957- c/o pain, meds given  0945- turned and repositioned tolerated    1001- Phase 1 recovery complete  1005- Transferred to [facility] per cart, handoff report given to [address] [place] [address] Street

## 2020-12-17 NOTE — DISCHARGE SUMMARY
Physician Discharge Summary     Patient ID:  Amanda Sierra  [id number]  12 y.o.  1970    Admit date: 2020    Discharge date and time:  2020    Admitting Physician: Brett Jung*    Discharge Diagnoses: Estrogen receptor positive [Z17.0]  Endometriosis [N80.9]    Discharged Condition: good    Indication for Admission: ER + breast cancer, endometriosis    Procedures Performed: Laparoscopic LSO and removal of Meckels diverticulum    Hospital Course: Patient was admitted on the day of surgery, and underwent an uncomplicated procedure. Discharge Exam:  /80   Pulse 64   Temp 97 °F (36.1 °C) (Temporal)   Resp 16   SpO2 99%     General Appearance:    Alert, cooperative, no distress, appears stated age   Head:    Normocephalic, without obvious abnormality, atraumatic                       Back:     Symmetric, no curvature, ROM normal, no CVA tenderness   Lungs:     Clear to auscultation bilaterally, respirations unlabored   Chest Wall:    No tenderness or deformity    Heart:    Regular rate and rhythm, S1 and S2 normal, no murmur, rub   or gallop       Abdomen:     Soft, non-tender, bowel sounds active all four quadrants,     no masses, no organomegaly. Inc c/d/i           Extremities:   Extremities normal, atraumatic, no cyanosis or edema   Pulses:   2+ and symmetric all extremities   Skin:   Skin color, texture, turgor normal, no rashes or lesions       Neurologic:   CNII-XII intact, normal strength       Disposition: home    Patient Instructions:    Activity: activity as tolerated  Diet: regular diet  Wound Care: keep wound clean and dry    Discharge Medication:    Serge Braxton   Home Medication Instructions     Printed on:20 0838   Medication Information                      Abemaciclib (VERZENIO) 150 MG TABS  Take 1 tablet by mouth 2 times daily              albuterol sulfate  (90 Base) MCG/ACT inhaler Inhale 2 puffs into the lungs every 6 hours as needed for Wheezing             diphenhydrAMINE (BENADRYL) 25 MG capsule  1-2 capsules by mouth every 6 hours as needed for rash             fulvestrant (FASLODEX) 250 MG/5ML SOLN IM injection  Inject 10 mLs into the muscle every 30 days             ondansetron (ZOFRAN) 4 MG tablet  Take 1 tablet by mouth every 8 hours as needed for Nausea or Vomiting             topiramate ER (TROKENDI XR) 50 MG CP24  Take 50 mg by mouth as needed (headaches)              venlafaxine (EFFEXOR XR) 37.5 MG extended release capsule  Take 37.5 mg by mouth daily             ZOLADEX 3.6 MG injection  INJECT 3.6MG UNDER THE SKIN EVERY 28 DAYS             zoledronic acid (ZOMETA) 4 MG/100ML SOLN infusion  Infuse 4 mg intravenously once Every year              Norco and Motrin 800    Follow-up with Albino Vick in 10 days via phone.     Signed:  Albino Vick  12/17/2020  8:45 AM

## 2020-12-17 NOTE — PROGRESS NOTES
Patient resting in bed, eyes closed, patient states relief from pain medication. Patient states she is ready to go home. Will proceed with discharge.

## 2020-12-17 NOTE — OP NOTE
PATIENT:     Sabina Salter  DATE OF PROCEDURE:    12/17/2020   SURGEON:     Stephany Kaba   ASSISTANT:     none  PREOPERATIVE DIAGNOSIS:  ER+ metastatic breast cancer, endometriosis  POSTOPERATIVE DIAGNOSES:  Same +Meckels diverticulum   OPERATION:    Laparoscopic LSO  Jessie Du   Dr Katelynn Bunch for Meckels diverticulum    ANESTHESIA:    General.   ESTIMATED BLOOD LOSS:  .           30  COMPLICATIONS:    None. PROCEDURE:        With the patient in the supine position, general anesthesia was administered without any complications. The patient was then placed in the dorsal lithotomy position using adjustable Delon stirrups. Attention was directed towards the umbilicus of the patient where a small incision was performed. A #5 trocar and sleeve were placed under direct visualization. Co2 insufflation was performed, and the patient placed in steep trendelenberg. Immediately, we noted the abnormality on the small bowel and general surgery was consulted. A #5 port was placed in the LLQ and a 11 port suprapubically. Each site was injected with local, then placed. The Left tube and ovary were removed from the left sidewall with the ligasure. Hemostasis visualized. The adnexa was placed in the bag and removed out the 11 port site and sent to pathology. The pelvis was inspected as well as the appendix and liver edge which appeared normal.     At this point the case was turned over to Dr Katelynn Bunch for removal of the diverticulum. I called the , Brice Saleh and got his verbal consent. She tolerated my portion of the procedure well.     Severino Lia  12/17/2020  8:33 AM

## 2020-12-17 NOTE — PROGRESS NOTES
Patient discharge instructions and prescriptions reviewed and verified. All questions answered. Prescriptions given to patient. Discharge paperwork signed by RN and patient. Armband removed.

## 2020-12-17 NOTE — ANESTHESIA POSTPROCEDURE EVALUATION
Department of Anesthesiology  Postprocedure Note    Patient: Ferny Santigao  MRN: [id number]  YOB: 1970  Date of evaluation: 12/17/2020  Time:  9:13 AM     Procedure Summary     Date: 12/17/20 Room / Location: [address] [place] [address]    Anesthesia Start: 1186 Anesthesia Stop: 0913    Procedures:       EXPLORATORY LAPAROSCOPY, LEFT SALPINGO OOPHORECTOMY LAPAROSCOPIC (Left Abdomen)      LAPAROSCOPY MECKLE'S DIVERTICULECTOMY (N/A Abdomen) Diagnosis:       Estrogen receptor positive      Endometriosis      (Estrogen receptor positive [Z17.0]- Endometriosis [N80.9])    Surgeons: Clemente Dixon MD Responsible Provider: KIMBERLY Villanueva CRNA    Anesthesia Type: general ASA Status: 4          Anesthesia Type: general    David Phase I:      David Phase II:      Last vitals: Reviewed and per EMR flowsheets.        Anesthesia Post Evaluation    Patient location during evaluation: PACU  Patient participation: complete - patient participated  Level of consciousness: awake and alert  Pain score: 0  Airway patency: patent  Nausea & Vomiting: no nausea and no vomiting  Complications: no  Cardiovascular status: blood pressure returned to baseline  Respiratory status: acceptable, nasal cannula, spontaneous ventilation and nonlabored ventilation

## 2020-12-17 NOTE — H&P
 Arthritis Sister     Liver Disease Sister         medication & hx of alcohol abuse    Other Sister         endometriosis - hyster    Depression Brother     Other Sister         endometriosis - hyster    Other Sister         endometriosis - hyster    Migraines Sister     Other Sister         endometriosis - hyster    Other Sister         endometriosis - hyster, hypothyroidism    High Blood Pressure Sister      Medications Prior to Admission:  Medications Prior to Admission: Abemaciclib (VERZENIO) 150 MG TABS, Take 1 tablet by mouth 2 times daily   ZOLADEX 3.6 MG injection, INJECT 3.6MG UNDER THE SKIN EVERY 28 DAYS  fulvestrant (FASLODEX) 250 MG/5ML SOLN IM injection, Inject 10 mLs into the muscle every 30 days  ondansetron (ZOFRAN) 4 MG tablet, Take 1 tablet by mouth every 8 hours as needed for Nausea or Vomiting  albuterol sulfate  (90 Base) MCG/ACT inhaler, Inhale 2 puffs into the lungs every 6 hours as needed for Wheezing  venlafaxine (EFFEXOR XR) 37.5 MG extended release capsule, Take 37.5 mg by mouth daily  zoledronic acid (ZOMETA) 4 MG/100ML SOLN infusion, Infuse 4 mg intravenously once Every year  topiramate ER (TROKENDI XR) 50 MG CP24, Take 50 mg by mouth as needed (headaches)   diphenhydrAMINE (BENADRYL) 25 MG capsule, 1-2 capsules by mouth every 6 hours as needed for rash    REVIEW OF SYSTEMS:    CONSTITUTIONAL:  negative  RESPIRATORY:  negative  CARDIOVASCULAR:  negative  GASTROINTESTINAL:  negative  ALLERGIC/IMMUNOLOGIC:  negative  NEUROLOGICAL:  negative  BEHAVIOR/PSYCH:  negative    PHYSICAL EXAM:  Blood pressure 129/80, pulse 64, temperature 97 °F (36.1 °C), temperature source Temporal, resp. rate 16, SpO2 99 %, not currently breastfeeding.   General appearance:  awake, alert, cooperative, no apparent distress, and appears stated age  Heart:  S1/S2 sounds, no murmurs/gallops/rubs  Lungs:  No increased work of breathing, good air exchange  Abdomen:  Soft, non tender Extremities:  No cyanosis/clubbing/edema, non-tender  Skin:  Warm, dry, intact  Neurologic:  CN 2-12 grossly intact, no focal deficits  Psych:  Appropriate mood and affect    LAB:    CBC:   Lab Results   Component Value Date    WBC 4.4 12/11/2020    RBC 4.04 12/11/2020    HGB 13.1 12/11/2020    HCT 39.1 12/11/2020    MCV 96.8 12/11/2020    MCH 32.4 12/11/2020    MCHC 33.5 12/11/2020    RDW 12.6 12/11/2020     12/11/2020    MPV 9.1 12/11/2020     IMAGING:     ASSESSMENT:  1. ER + Breast cancer  2. Endometriosis. PLAN:  1. Admit for Laparoscopic LSO.

## 2020-12-22 NOTE — OP NOTE
Procedure Note:      Patient ID:  Jerardo Martínez  [id number]  48 y.o.  1970    Indications: Intraoperative consultation for Meckel's diverticulum    Pre-operative Diagnosis: Meckel's diverticulum    Post-operative Diagnosis: same    Procedure: Meckel's resection    Surgeon: Jorge Orantes MD    Findings: Same    Estimated Blood Loss:  Minimal           Total IV Fluids: 500 ml            Complications:  None; patient tolerated the procedure well. Disposition: PACU - hemodynamically stable. Condition: stable    Procedure Details   The patient was seen in the Holding Room. The risks, benefits, complications, treatment options, and expected outcomes were discussed with the patient. The possibilities of reaction to medication, pulmonary aspiration, perforation of viscus, bleeding, recurrent infection, finding a normal colon, the need for additional procedures, failure to diagnose a condition, and creating a complication requiring transfusion or operation were discussed with the patient. The patient concurred with the proposed plan, giving informed consent. The patient was taken to the operating room, identified and the procedure verified as the consent form. A Time Out was held and the above information confirmed.     Procedure Description The patient was brought into the operating room having undergone oophorectomy. Dr. Joby Avelar noted abnormal tissue in the small bowel. Please refer to Dr. Sahil Avelar operative note for details of the oophorectomy. I was consulted to evaluate this. Upon further examination laparoscopically, the area of concern appeared to be definite Meckel's diverticulum. There was no abnormal malignancy noted. However I recommended excision. The mesentery of the Meckel's diverticulum was taken down using LigaSure device. Then the Meckel's diverticulum was divided transversely using a laparoscopic stapler blue load. There was minor bleeding at the staple line which was reinforced using 3-0 PDS intracorporeally. This was all done with the available laparoscopic ports. Specimen was placed in Endobag and sent to pathology for further evaluation. The port sites were closed using 4-0 Vicryl and Dermabond was applied for skin. Davonte Vuong MD

## 2020-12-31 RX ORDER — METHYLPREDNISOLONE SODIUM SUCCINATE 125 MG/2ML
125 INJECTION, POWDER, LYOPHILIZED, FOR SOLUTION INTRAMUSCULAR; INTRAVENOUS ONCE
Status: CANCELLED | OUTPATIENT
Start: 2021-01-01 | End: 2021-01-01

## 2020-12-31 RX ORDER — EPINEPHRINE 1 MG/ML
0.3 INJECTION, SOLUTION, CONCENTRATE INTRAVENOUS PRN
Status: CANCELLED | OUTPATIENT
Start: 2021-01-01

## 2020-12-31 RX ORDER — SODIUM CHLORIDE 9 MG/ML
INJECTION, SOLUTION INTRAVENOUS CONTINUOUS
Status: CANCELLED | OUTPATIENT
Start: 2021-01-01

## 2020-12-31 RX ORDER — DIPHENHYDRAMINE HYDROCHLORIDE 50 MG/ML
50 INJECTION INTRAMUSCULAR; INTRAVENOUS ONCE
Status: CANCELLED | OUTPATIENT
Start: 2021-01-01 | End: 2021-01-01

## 2020-12-31 RX ORDER — LAMOTRIGINE 25 MG/1
250 TABLET ORAL ONCE
Status: CANCELLED | OUTPATIENT
Start: 2021-01-01 | End: 2021-01-01

## 2021-01-04 ENCOUNTER — HOSPITAL ENCOUNTER (OUTPATIENT)
Dept: INFUSION THERAPY | Age: 51
Setting detail: INFUSION SERIES
Discharge: HOME | End: 2021-01-04
Payer: COMMERCIAL

## 2021-01-04 ENCOUNTER — CLINICAL DOCUMENTATION (OUTPATIENT)
Dept: ONCOLOGY | Age: 51
End: 2021-01-04

## 2021-01-04 VITALS
DIASTOLIC BLOOD PRESSURE: 79 MMHG | HEART RATE: 60 BPM | SYSTOLIC BLOOD PRESSURE: 112 MMHG | RESPIRATION RATE: 14 BRPM | TEMPERATURE: 97.3 F | OXYGEN SATURATION: 96 %

## 2021-01-04 DIAGNOSIS — C50.411 MALIGNANT NEOPLASM OF UPPER-OUTER QUADRANT OF RIGHT BREAST IN FEMALE, ESTROGEN RECEPTOR POSITIVE (HCC): Primary | ICD-10-CM

## 2021-01-04 DIAGNOSIS — C78.7 SECONDARY MALIGNANT NEOPLASM OF LIVER AND INTRAHEPATIC BILE DUCT (HCC): ICD-10-CM

## 2021-01-04 DIAGNOSIS — Z17.0 MALIGNANT NEOPLASM OF UPPER-OUTER QUADRANT OF RIGHT BREAST IN FEMALE, ESTROGEN RECEPTOR POSITIVE (HCC): Primary | ICD-10-CM

## 2021-01-04 PROCEDURE — 96402 CHEMO HORMON ANTINEOPL SQ/IM: CPT

## 2021-01-04 PROCEDURE — 6360000002 HC RX W HCPCS: Performed by: INTERNAL MEDICINE

## 2021-01-04 PROCEDURE — 99211 OFF/OP EST MAY X REQ PHY/QHP: CPT

## 2021-01-04 RX ORDER — LAMOTRIGINE 25 MG/1
250 TABLET ORAL ONCE
Status: COMPLETED | OUTPATIENT
Start: 2021-01-04 | End: 2021-01-04

## 2021-01-04 RX ADMIN — FULVESTRANT 250 MG: 50 INJECTION INTRAMUSCULAR at 09:48

## 2021-01-04 RX ADMIN — FULVESTRANT 250 MG: 50 INJECTION INTRAMUSCULAR at 09:47

## 2021-01-04 ASSESSMENT — PAIN SCALES - GENERAL: PAINLEVEL_OUTOF10: 0

## 2021-01-04 NOTE — PROGRESS NOTES
Prior to discharge, the After Visit Summary Discharge Instructions were reviewed with the patient. She was offered a printed version of the AVS, but declined the offer. Recurrent appointment, pt tolerated injection well. Pt discharged home. Pt to exit via ambulation. Next appt made for 2/5/21. Pt wanting to get her appt back on Fridays.      Orders Placed This Encounter   Medications    fulvestrant (FASLODEX) IM injection 250 mg    fulvestrant (FASLODEX) IM injection 250 mg

## 2021-01-05 ENCOUNTER — HOSPITAL ENCOUNTER (OUTPATIENT)
Dept: INFUSION THERAPY | Age: 51
Discharge: HOME | End: 2021-01-05
Payer: COMMERCIAL

## 2021-01-05 ENCOUNTER — OFFICE VISIT (OUTPATIENT)
Dept: ONCOLOGY | Age: 51
End: 2021-01-05
Payer: COMMERCIAL

## 2021-01-05 VITALS
HEIGHT: 67 IN | RESPIRATION RATE: 16 BRPM | WEIGHT: 219.6 LBS | BODY MASS INDEX: 34.47 KG/M2 | OXYGEN SATURATION: 95 % | HEART RATE: 70 BPM | SYSTOLIC BLOOD PRESSURE: 122 MMHG | DIASTOLIC BLOOD PRESSURE: 73 MMHG | TEMPERATURE: 96.7 F

## 2021-01-05 DIAGNOSIS — C79.51 SECONDARY MALIGNANT NEOPLASM OF BONE (HCC): ICD-10-CM

## 2021-01-05 DIAGNOSIS — C78.7 SECONDARY MALIGNANT NEOPLASM OF LIVER AND INTRAHEPATIC BILE DUCT (HCC): ICD-10-CM

## 2021-01-05 DIAGNOSIS — C50.911 PRIMARY CANCER OF RIGHT BREAST WITH METASTASIS TO OTHER SITE (HCC): Primary | ICD-10-CM

## 2021-01-05 DIAGNOSIS — C50.911 PRIMARY CANCER OF RIGHT BREAST WITH METASTASIS TO OTHER SITE (HCC): ICD-10-CM

## 2021-01-05 LAB
BASOPHILS ABSOLUTE: 0.1 K/CU MM
BASOPHILS RELATIVE PERCENT: 1 % (ref 0–1)
DIFFERENTIAL TYPE: ABNORMAL
EOSINOPHILS ABSOLUTE: 0 K/CU MM
EOSINOPHILS RELATIVE PERCENT: 0.8 % (ref 0–3)
HCT VFR BLD CALC: 36.6 % (ref 37–47)
HEMOGLOBIN: 12.9 GM/DL (ref 12.5–16)
LYMPHOCYTES ABSOLUTE: 2.2 K/CU MM
LYMPHOCYTES RELATIVE PERCENT: 43.3 % (ref 24–44)
MCH RBC QN AUTO: 33.2 PG (ref 27–31)
MCHC RBC AUTO-ENTMCNC: 35.2 % (ref 32–36)
MCV RBC AUTO: 94.3 FL (ref 78–100)
MONOCYTES ABSOLUTE: 0.5 K/CU MM
MONOCYTES RELATIVE PERCENT: 9.4 % (ref 0–4)
PDW BLD-RTO: 12.9 % (ref 11.7–14.9)
PLATELET # BLD: 235 K/CU MM (ref 140–440)
PMV BLD AUTO: 8.8 FL (ref 7.5–11.1)
RBC # BLD: 3.88 M/CU MM (ref 4.2–5.4)
SEGMENTED NEUTROPHILS ABSOLUTE COUNT: 2.3 K/CU MM
SEGMENTED NEUTROPHILS RELATIVE PERCENT: 45.5 % (ref 36–66)
WBC # BLD: 5.1 K/CU MM (ref 4–10.5)

## 2021-01-05 PROCEDURE — 36415 COLL VENOUS BLD VENIPUNCTURE: CPT

## 2021-01-05 PROCEDURE — 85025 COMPLETE CBC W/AUTO DIFF WBC: CPT

## 2021-01-05 PROCEDURE — 99211 OFF/OP EST MAY X REQ PHY/QHP: CPT

## 2021-01-05 PROCEDURE — 99214 OFFICE O/P EST MOD 30 MIN: CPT | Performed by: INTERNAL MEDICINE

## 2021-01-05 ASSESSMENT — PATIENT HEALTH QUESTIONNAIRE - PHQ9
2. FEELING DOWN, DEPRESSED OR HOPELESS: 0
1. LITTLE INTEREST OR PLEASURE IN DOING THINGS: 0
SUM OF ALL RESPONSES TO PHQ QUESTIONS 1-9: 0

## 2021-01-05 NOTE — PROGRESS NOTES
APPROVED PA for Faslodex for max of 12 fills from 01/04/2021 to 01/03/2022, this request has been approved with a qty limit of 2 syringes per month, notice scanned into chart

## 2021-01-05 NOTE — PROGRESS NOTES
Chief Complaint  Breast cancer, female ( Stage Date: Unknown, Stage IV (Right breast upper-outer quadrant, T1c, N0, M1)-Clinical  Date of Dx:10/2017 Extent of Disease: Evidence of metastatic disease; Disease State: Initial diagnosis; Metastatic Sites: Bone; Metastatic Sites: Liver; ER Status: Positive; NV Status: Positive; HER-2/jus Value-FISH: Negative; Menopausal Status: Premenopausal; PIK3CA gene: Unknown; )  Problem List  Breast cancer, female ( Stage Date: Unknown, Stage IV (Right breast upper-outer quadrant, T1c, N0, M1)-Clinical  Date of Dx:10/2017 Extent of Disease: Evidence of metastatic disease; Disease State: Initial diagnosis; Metastatic Sites: Bone; Metastatic Sites: Liver; ER Status: Positive; NV Status: Positive; HER-2/jus Value-FISH: Negative; Menopausal Status: Premenopausal; PIK3CA gene: Unknown; )   Abnormal radiological finding   Bony metastasis ( Date of Dx:10/2017 )   Chest pain   Metastasis to liver  HPI  Extremely pleasant lady patient of Dr. Victoria Diaz. RN at [facility]. Evaluated in September 2017 for some discomfort involving the sternum that had been present for a few weeks. She could not remember any injury to that area except maybe a few months earlier she had fallen face-forward. She did not remember having significant discomfort to the chest area involving the fall. September 12, 2017, chest x-ray was reported to be fairly unremarkable. X-rays of the sternum revealed questionable age-indeterminate factor of the mid to superior sternum. Additional CT was recommended which was done on September 18, 2017, revealing mottled appearance of the sternum. Findings could represent related to earlier trauma or even osteomyelitis or metastatic disease. Nonspecific sclerotic changes in the T12 vertebral body were noted too. Triple phase bone scan done on October 4, 2017, had revealed only focal uptake in the body of the sternum corresponding to the changes noted on earlier CAT scan. Similar differential was again suggested. Visit here on October 9, 2017, though, she stated that she in general was doing actually better and pain was not severe and she did not have to take any pain medications and had been continuing to work on a full time basis and also remained fairly active. CMP on October 09, 2017, was normal and so was LDL of 183. Sed rate was 10. Kappa Lambda light chain ratios were normal. Immunoelectrophoresis was negative. CBC had revealed fairly normal numbers. CT biopsy of the sternum done on October 17, 2017, did reveal changes of metastatic carcinoma, possibly suggestive of breast primary. Biopsy from the sternum done on October 13, 2017, revealed metastatic carcinoma quite likely from breast primary. The tumor was strongly positive for estrogen receptor 95 percent and progesterone receptor is 95 percent, and negative for HER2. Visit here on October 20, 2017, those findings were discussed with her. October 25, 2017, diagnostic digital bilateral breast mammogram revealed 1.8 cm irregular, spiculated hyperechoic mass at the 10 oclock position in the right breast correlating to the mammographic area. Findings were highly suspicious for neoplasm. Ultrasound guided biopsy on October 27, 2017, did reveal invasive mammary carcinoma. Ultrasound-guided biopsy October 27, 2017, from this mass revealed invasive mammary carcinoma. Tumor was again strongly positive 95 percent for ER, 95 percent [place], HER-2 was negative both by IHC and FISH.      PET scan done on November 2, 2017, revealed hypermetabolic right breast mass as well as prominent right axillary lymph nodes and scattered osseous metastatic lesions involving the sternum, right scapula, left L4 vertebral body, and bilateral iliac crest. Visit here on October 27, 2017, and afterwards metastatic disease involving the bones were discussed with her and clinically she was still considered to be premenopausal as she was not having any symptoms suggestive of menopausal state. She was started on treatment with Zoladex, Faslodex, and Zometa starting on November 10, 2017. She had to receive these treatments in outpatient facility at the hospital primarily because of insurance reasons. Saw me on December 7, 2017, sooner than her scheduled appointment, was concerned about possibly feeling a lump involving the right breast at around 6 oclock position. Otherwise she had not noticed any new issues. Mammogram and ultrasound done on December 8, 2017, showed dense breast tissue where she was feeling a mass. There was no sonographic evidence for any mass in that area too. MRI was suggested if needed to. Otherwise she was again noted to have known malignancy in the upper outer quadrant of the breast.     PET scan on May 14, 2018, revealed partial treatment response with improvement in the right axillary nodes as well as skeletal metastases. Metabolic activity associated with lytic bone lesions had improved with increased sclerosis of the previously seen lesions. Mild residual activity was noted at the L4 vertebral body. There were no new persistent or metabolically active aggressive osseous lesions. Visit here on February 26, 2019 overall I think she was doing fair but we were concerned about her starting to have more discomfort in her left elbow as well as the right hip area. She had not had a PET scan since May of last year and that too had revealed a partial response, we felt possibly repeating the PET scan would be helpful.  In the meantime she was going to continue to receive the same treatments as before including Faslodex, Zoladex and Zometa She was not able to undergo a PET scan but insurance. Did undergo a bone scan on April 4, 2019 revealing a stable bone scan without evidence of new osseous metastases. May fall, 2019 was evaluated for symptoms of chest pain including a cardiac work-up including echo and stress test afterwards and those were unremarkable. She did have a CTA of the chest done looking for pulmonary embolism on May 4, 2019 which was negative for pulmonary embolism no other acute pulmonary illness. She still of course was noted to have several sclerotic lesions within the spine which were felt to be stable as compared to the prior PET scan which was done in May 2018. [address] levels done on July 16, 2463 was 88.6, follicular phase. CA-27-29 level same day was 26, stable. [address] September was 17, 2019 we talked about still being pre-or perimenopausal Darst will continue to stay on Zoladex along with Faslodex. Talked about disease overall having responded. Will possibly extend intervals between Zometa to may be once a year, may be due in July 2020. Did start her on Effexor for hot flashes    March 12, 2020 still being treated with same agents, still working on a long hours still in general feeling well only issue being occasional discomfort in the right rib cage as well as right breast area. Could not feel any distinct masses. Denied any history of injury.  Otherwise negative for any new systemic complaints and overall both physically and emotionally was doing quite well Telemedicine visit on April 23, 2020. We discussed results those were obtained on her previous visit on March 12, 2020. Chemistries CBC were fairly unremarkable CA-27-29 levels were still normal. She had undergone CT scan of the chest on March 24, 2020 was noted to have a stable bony lesions. But she was described to have a 2.8 cm lesion in liver which was worrisome for possibly metastatic disease. Following those studies she was advised to undergo PET scan and that was refused by insurance. She did undergo a bone scan on April 28, 2020 was noted to have stable appearance of skeletal metastatic disease no new metastatic lesions were noted. She did undergo a CT scan of the abdomen and pelvis with contrast on the same day again was noted to have a 2.3 x 2.7 cm left hepatic lesion which was worrisome for metastatic disease and again of course skeletal metastases were noted. Telemedicine visit again on May 5, 2020. April 27, 2020 she did undergo liver biopsy. Pathology did reveal metastatic adenocarcinoma from a breast primary. Tumor was still positive for ER 50% AR 40%. HER-2 was equivocal by IHC though negative by FISH. May 6, 2020 had long discussion with her and her family. We talked about progressive nature of the disease. We talked about future plan including liver biopsy material being sent to [place] [id number] analysis. We talked about the need for us to change her treatment to a combination of Faslodex, Zoladex and add Verzenio. We also discussed possibly doing a PET scan. We do know she has disease involving the breast, bones and now liver. Our intention is that if indeed she has an isolated lesion in the liver we might consider doing local treatment to the liver besides this new systemic therapy. PET scan done on May 21, 8426 revealed metabolically active metastases in the left hepatic lobe no other metabolic activity was noted in the known skeletal metastases mammogram revealed stable appearance no new sites of malignancy were noted. Treatment was changed to Faslodex Zoladex and Verzenio in May 2020    Foundation 1 analysis from the liver lesion did reveal a tumor to be positive for PI K3 CA. Otherwise microsatellite status was stable and no other alterations were noted especially in BRCA1 and BRCA2 as well as HER-2      As PET scan had revealed an isolated lesion involving the liver she did undergo radio frequency ablation of the lesion on June 24, 2020, tolerated procedure fairly well though had some discomfort afterwards. CT scan of the abdomen done without contrast on June 25, 2020 did reveal sequela of treated metastatic lesion in the left lobe of the liver with central high attenuation area measuring 2.6 x 2.4 cm and the peripheral low-attenuation rim measuring around 4.8 x 4.1 cm related to the postprocedural edema. Again osseous metastatic disease was again noted without pathologic fracture. July 17, 2020 she was overall doing fairly decent had recovered from the discomfort related to the radiofrequency ablation. We talked about continuing on this present regimen does do plan to check her [address] levels to see if we can hold off giving her Zoladex. We of course will continue with Faslodex, Verzenio and Zometa. We of course will continue to monitor her progress through exams as well as CAT scans. [address] level done on July 30, 2020 was 9.4, premenopausal.    Visit here on September 15, 2020. Was still being treated with Faslodex Zoladex and Verzenio. Studies done on October 9, 2020 fairly normal CBC, chemistries reveal creatinine of 1.2 CA 27-29 levels were mildly elevated to 47. A CT scan of the abdomen done on October 15, 2020 revealed 3.5 x 3.1 cm ablation zone, which had decreased in size since immediate post ablation changes done on June 25, 2020. It is difficult to assess residual tremor dedicated multiphasic CT or MRI was recommended. Multifocal osseous sclerotic metastases were felt to be stable. October 27, 2020 overall she was feeling about the same there was some concern about slight increase in tumor marker CA 27-29. Steffanie Raphael Plan was to to multiphasic CT scan of the abdomen to assess the disease status. Also she was seriously considering having her unilateral oophorectomy I think that will be reasonable. 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 On December 17, 2020 she did undergo left oophorectomy. She also the same day underwent removal of Meckel's diverticulum. Tolerated the procedure well. Pathology from the ovary did not show any significant pathologic changes. CA 27-29 levels done on November 25, 2020 was 37.2 not significantly different from before. Visit here on January 5, 2020 she was still being treated with Faslodex and Verzenio. Of course Zoladex was stopped after her undergoing oophorectomy. She in general was doing fairly well working and had not noticed any significant bony discomfort. Denied any other was specific new systemic complaints and all in all both physically and emotionally was doing well    Past Medical History    Basically healthy.   Surgical History 1. Abdominal hysterectomy with unilateral oophorectomy for endometriosis in the year 2000. 2. Surgery on right elbow in 2013. Social History  Smoking Status Former smokerUsed to smoke, quit in 2004. Negative for significant ethanol intake. Occasional social usage. .  works at a manufacturing plant. Two sons ages 32 and 25. 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     Family History    0 FH  Current Therapy   Fulvestrant D1,15 (Initial) fb Fulvestrant D1 (Maintenance) + Goserelin Q28D Cycle Length: 28 Number Cycles: 36 Start: C1D1 on 10/27/2017 Assoc Dx: Breast cancer, female LOT: 1st Line Metastatic or Recurrent Stage: IV 10/27/2017 C1 D1  Fulvestrant IM, 500 mg   Fulvestrant IM, 500 mg   Goserelin Subcutaneous, 3.6 mg  Zoledronic acid (Zometa) Q6M Cycle Length: 84 Number Cycles: 6 Start: Morales Andrews on 10/27/2017 Assoc Dx: Breast cancer, female LOT: 1st Line Metastatic or Recurrent 11/10/2017 C1 D1  Zoledronic Acid IV (Zometa), 4 mg  Abemaciclib + Fulvestrant + Goserelin Q28D Cycle Length: 28 Number Cycles: 6 Start: Roddydeisy Littlejohngard on 05/06/2020 Assoc Dx: Breast cancer, female LOT: 2nd Line Metastatic Stage: IV 05/06/2020 C1 D15  Fulvestrant IM, 500 mg   Fulvestrant IM, 500 mg   Goserelin Subcutaneous, 3.6 mg   Abemaciclib Oral Dose Pack, 150 mg bid      Review of Systems   Per interval history; otherwise 10 point ROS is negative    Physical Exam   Very pleasant lady, no pallor or jaundice, no supraclavicular or axillary nodes, no breast masses. No abnormal cardiac, pulmonary, or abdominal findings. Negative for any pedal edema, skin changes, or neurologic findings.      Changes of course of her recent surgery                      Assessment & Plan Extremely pleasant lady patient of Dr. Angelica Peoples, overall blessed with good health. Saw him in September for vague discomfort involving the sternum which led to chest x-ray and x-rays of the sternum revealing questionable age-indeterminate fracture of the mid to superior sternum. CT scan revealed mottled appearance of sternum. Etiology could be related to earlier trauma and/or even metastatic disease. Bone scan showed increased activity in the sternum area with the same differential. Mammogram in October 2016 was unremarkable. October 10, 2017, I did spend time with her and her sister. We did talk about future course of action including possibly just repeating the studies in a couple of months. Would consider doing biopsy of the sternal area. She I think would feel comfortable if we could have better answer for her and thus for that reason I did advise her to be seen in interventional radiology for possible biopsy. October 13, 2017, CT guided biopsy from the sternum revealed changes consistent with metastatic carcinoma from breast biopsy. The tumor is strongly positive for ER and CT and negative for HER2. October 25, 2017, mammogram did reveal a lesion in the right breast of 1.8 cm in the upper outer quadrant. Biopsy for which did reveal invasive mammary carcinoma. October 27, 2017, I did discuss the findings with her and her extended family. We talked about basically the finding of carcinoma involving the right breast as well as bones. Biopsy from the sternum was positive. A bone scan also showed questionable changes involving the T12, though CT scan of the chest otherwise was negative for any pulmonary or hepatic metastases. Biopsy from the right breast October 27, 2017, also revealed invasive mammary carcinoma, strongly positive ER, CT, and HER-2. PET scan done on November 2, 2017, revealed hypermetabolic right breast as well as right axillary nodes and osseous metastases involving sternum, right scapula, and L4 vertebral body. She was considered a premenopausal lady with metastatic disease involving the bones. Treatment choices were discussed with her and on November 10, 2017, was started on treatment with Zoladex, Faslodex, and Zometa. Visit here on November 20, 2017, she was tolerating these treatments well following the Zometa. December 7, 2017, bony pains were better, but she was concerned about possibly a lump involving the 6 oclock position right breast. Mammogram and ultrasound did not reveal any mass in that area. She was still noted to have lesion in the upper outer quadrant which had been biopsied before. The PET scan done on May 14, 2018, had revealed improvement in disease involving the breast, axilla, as well as bones. Previous PET scan had shown changes involving the sternum, scapula, and L4 areas; those areas did show improvement on the PET scan. Visit here on February 26, 2019 overall I think she was doing fair but we were concerned about her starting to have more discomfort in her left elbow as well as the right hip area. She had not had a PET scan since May of last year and that too had revealed a partial response, we felt possibly repeating the PET scan would be helpful. In the meantime she was going to continue to receive the same treatments as before including Faslodex, Zoladex and Zometa  She was not able to undergo a PET scan but insurance. Did undergo a bone scan on April 4, 2019 revealing a stable bone scan without evidence of new osseous metastases. May , 2019 was evaluated for symptoms of chest pain including a cardiac work-up including echo and stress test afterwards and those were unremarkable. She did have a CTA of the chest done looking for pulmonary embolism on May 4, 2019 which was negative for pulmonary embolism no other acute pulmonary illness. She still of course was noted to have several sclerotic lesions within the spine which were felt to be stable as compared to the prior PET scan which was done in May 2018. [facility] September was 17, 2019 we talked about still being pre-or perimenopausal Darst will continue to stay on Zoladex along with Faslodex. Talked about disease overall having responded. Will possibly extend intervals between Zometa to may be once a year, may be due in July 2020. Did start her on Effexor for hot flashes  March 12, 2020 overall was doing fairly well Effexor was helpful with hot flashes. Denied significant new issues except for discomfort in the breast as well as rib cage area. Is due to have her bone scan mammogram and CT scan of the chest done to look for the discomfort in the chest area. Otherwise will continue on the same regimen. She will see me after the studies are completed. Telemedicine visit on April 23, 2020, as described above new issues being no obvious progression noted on a bone scan and blood work including tumor markers. CT scan of the chest as well as CT scan of the abdomen showing 2.7 cm lesion in the left hepatic lobe quite worrisome for possible metastatic disease. Clinically she was still doing well. I did encourage her to undergo a biopsy of the liver lesion.  And following the biopsy could decide about if we need to send material for foundation 1 study also Telemedicine visit again on May 5, 2020. April 27, 2020 she did undergo liver biopsy. Pathology did reveal metastatic adenocarcinoma from a breast primary. Tumor was still positive for ER 50% VT 40%. HER-2 was equivocal by IHC though negative by FISH. May 6, 2020 had long discussion with her and her family. We talked about progressive nature of the disease. We talked about future plan including liver biopsy material being sent to [place] [id number] analysis. We talked about the need for us to change her treatment to a combination of Faslodex, Zoladex and add Verzenio. We also discussed possibly doing a PET scan. We do know she has disease involving the breast, bones and now liver. Our intention is that if indeed she has an isolated lesion in the liver we might consider doing local treatment to the liver besides this new systemic therapy. June 1, 2020 I did spend time with her and her sister. We talked about PET scan revealing stable bony metastases and an isolated lesion involving the liver, biopsy-proven malignancy. Mammogram was unchanged from before. Couple weeks prior to this visit had started taking Verzenio along with Faslodex and Zoladex. We did talk about staying on the present systemic therapy and of course will continue to monitor her progress. We also discussed with her about possibly doing radiofrequency ablation treatment to the single lesion involving the liver. As described above on June 24, 2020 she did undergo radiofrequency ablation of the isolated lesion in the liver. July 17, 2020 she was overall doing fairly decent had recovered from the discomfort related to the radiofrequency ablation. We talked about continuing on this present regimen does do plan to check her [address] [place] [address] levels to see if we can hold off giving her Zoladex. We of course will continue with Faslodex, Verzenio and Zometa. We of course will continue to monitor her progress through exams as well as CAT scans. September 15, 2020 was still being treated with Faslodex Zoladex and Verzenio. FSH levels were in premenopausal levels. We also planning to give zometa from time to time. She in general was feeling fairly well major issue was discomfort primarily from Faslodex injections. We talked about checking her blood work with the next injection on October 9 including chemistries and CA-27-29. We also talked about checking her a CT scan of the abdomen to see the status of the lesion following the radiofrequency ablation. Otherwise as before we will continue to monitor her blood progress exams CAT scans and possibly PET scans. January 5, 2021 spend time with her on the few issues as described below. #1 regarding metastatic carcinoma of the breast, premenopausal, s/p  oophorectomy, currently being treated with Faslodex and Verzenio. She was has had locally treatment for isolated liver metastases done on June 24, 2020. CBC was drawn today counts were reasonable that she could continue on the same dosages of Verzenio. Advised her to stay on the present therapy and will recheck her blood tumor markers in February. I will be seeing her again after the blood test to decide if need to do any additional intervention. For the time being pleased and I think would feel comfortable keeping her on the same schedule. January 5, 2020 also did encourage her to take Covid vaccine.

## 2021-01-05 NOTE — PROGRESS NOTES
MA Rooming Questions  Patient: Ayan Friend  MRN: [id number]    Date: 1/5/2021        1. Do you have any new issues?   no         2. Do you need any refills on medications?    no    3. Have you had any imaging done since your last visit?   no    4. Have you been hospitalized or seen in the emergency room since your last visit here?   no    5. Did the patient have a depression screening completed today?  No    PHQ-9 Total Score: 0 (1/5/2021  8:45 AM)       PHQ-9 Given to (if applicable):               PHQ-9 Score (if applicable):                     [] Positive     []  Negative              Does question #9 need addressed (if applicable)                     [] Yes    []  No               Jennifer Childers MA

## 2021-01-27 RX ORDER — LAMOTRIGINE 25 MG/1
250 TABLET ORAL ONCE
Status: CANCELLED | OUTPATIENT
Start: 2021-02-05 | End: 2021-01-29

## 2021-01-27 RX ORDER — METHYLPREDNISOLONE SODIUM SUCCINATE 125 MG/2ML
125 INJECTION, POWDER, LYOPHILIZED, FOR SOLUTION INTRAMUSCULAR; INTRAVENOUS ONCE
Status: CANCELLED | OUTPATIENT
Start: 2021-02-05 | End: 2021-01-29

## 2021-01-27 RX ORDER — SODIUM CHLORIDE 9 MG/ML
INJECTION, SOLUTION INTRAVENOUS CONTINUOUS
Status: CANCELLED | OUTPATIENT
Start: 2021-02-05

## 2021-01-27 RX ORDER — EPINEPHRINE 1 MG/ML
0.3 INJECTION, SOLUTION, CONCENTRATE INTRAVENOUS PRN
Status: CANCELLED | OUTPATIENT
Start: 2021-02-05

## 2021-01-27 RX ORDER — DIPHENHYDRAMINE HYDROCHLORIDE 50 MG/ML
50 INJECTION INTRAMUSCULAR; INTRAVENOUS ONCE
Status: CANCELLED | OUTPATIENT
Start: 2021-02-05 | End: 2021-01-29

## 2021-02-02 ENCOUNTER — CLINICAL DOCUMENTATION (OUTPATIENT)
Dept: ONCOLOGY | Age: 51
End: 2021-02-02

## 2021-02-05 ENCOUNTER — HOSPITAL ENCOUNTER (OUTPATIENT)
Dept: INFUSION THERAPY | Age: 51
Setting detail: INFUSION SERIES
Discharge: HOME | End: 2021-02-05
Payer: COMMERCIAL

## 2021-02-05 VITALS
OXYGEN SATURATION: 95 % | RESPIRATION RATE: 16 BRPM | SYSTOLIC BLOOD PRESSURE: 120 MMHG | HEART RATE: 70 BPM | DIASTOLIC BLOOD PRESSURE: 81 MMHG | TEMPERATURE: 97.5 F

## 2021-02-05 DIAGNOSIS — C50.911 PRIMARY CANCER OF RIGHT BREAST WITH METASTASIS TO OTHER SITE (HCC): ICD-10-CM

## 2021-02-05 DIAGNOSIS — C50.411 MALIGNANT NEOPLASM OF UPPER-OUTER QUADRANT OF RIGHT BREAST IN FEMALE, ESTROGEN RECEPTOR POSITIVE (HCC): Primary | ICD-10-CM

## 2021-02-05 DIAGNOSIS — C50.911 PRIMARY CANCER OF RIGHT BREAST WITH METASTASIS TO OTHER SITE (HCC): Primary | ICD-10-CM

## 2021-02-05 DIAGNOSIS — C79.51 SECONDARY MALIGNANT NEOPLASM OF BONE (HCC): ICD-10-CM

## 2021-02-05 DIAGNOSIS — C78.7 SECONDARY MALIGNANT NEOPLASM OF LIVER AND INTRAHEPATIC BILE DUCT (HCC): ICD-10-CM

## 2021-02-05 DIAGNOSIS — Z17.0 MALIGNANT NEOPLASM OF UPPER-OUTER QUADRANT OF RIGHT BREAST IN FEMALE, ESTROGEN RECEPTOR POSITIVE (HCC): Primary | ICD-10-CM

## 2021-02-05 LAB
BASOPHILS ABSOLUTE: 0.1 K/CU MM
BASOPHILS RELATIVE PERCENT: 1.2 % (ref 0–1)
DIFFERENTIAL TYPE: ABNORMAL
EOSINOPHILS ABSOLUTE: 0 K/CU MM
EOSINOPHILS RELATIVE PERCENT: 0.8 % (ref 0–3)
HCT VFR BLD CALC: 37.4 % (ref 37–47)
HEMOGLOBIN: 13.1 GM/DL (ref 12.5–16)
IMMATURE NEUTROPHIL %: 0.2 % (ref 0–0.43)
LYMPHOCYTES ABSOLUTE: 1.9 K/CU MM
LYMPHOCYTES RELATIVE PERCENT: 39.4 % (ref 24–44)
MCH RBC QN AUTO: 33.5 PG (ref 27–31)
MCHC RBC AUTO-ENTMCNC: 35 % (ref 32–36)
MCV RBC AUTO: 95.7 FL (ref 78–100)
MONOCYTES ABSOLUTE: 0.4 K/CU MM
MONOCYTES RELATIVE PERCENT: 8.1 % (ref 0–4)
NUCLEATED RBC %: 0 %
PDW BLD-RTO: 13.2 % (ref 11.7–14.9)
PLATELET # BLD: 238 K/CU MM (ref 140–440)
PMV BLD AUTO: 8.8 FL (ref 7.5–11.1)
RBC # BLD: 3.91 M/CU MM (ref 4.2–5.4)
SEGMENTED NEUTROPHILS ABSOLUTE COUNT: 2.5 K/CU MM
SEGMENTED NEUTROPHILS RELATIVE PERCENT: 50.3 % (ref 36–66)
TOTAL IMMATURE NEUTOROPHIL: 0.01 K/CU MM
TOTAL NUCLEATED RBC: 0 K/CU MM
WBC # BLD: 4.9 K/CU MM (ref 4–10.5)

## 2021-02-05 PROCEDURE — 99211 OFF/OP EST MAY X REQ PHY/QHP: CPT

## 2021-02-05 PROCEDURE — 96402 CHEMO HORMON ANTINEOPL SQ/IM: CPT

## 2021-02-05 PROCEDURE — 85025 COMPLETE CBC W/AUTO DIFF WBC: CPT

## 2021-02-05 PROCEDURE — 86300 IMMUNOASSAY TUMOR CA 15-3: CPT

## 2021-02-05 RX ORDER — ABEMACICLIB 150 MG/1
1 TABLET ORAL 2 TIMES DAILY
Qty: 60 TABLET | Refills: 3 | Status: SHIPPED | OUTPATIENT
Start: 2021-02-05 | End: 2021-06-03 | Stop reason: SDUPTHER

## 2021-02-05 RX ORDER — LAMOTRIGINE 25 MG/1
250 TABLET ORAL ONCE
Status: COMPLETED | OUTPATIENT
Start: 2021-02-05 | End: 2021-02-05

## 2021-02-05 RX ORDER — EPINEPHRINE 1 MG/ML
0.3 INJECTION, SOLUTION, CONCENTRATE INTRAVENOUS PRN
Status: DISCONTINUED | OUTPATIENT
Start: 2021-02-05 | End: 2021-02-06 | Stop reason: HOSPADM

## 2021-02-05 RX ORDER — METHYLPREDNISOLONE SODIUM SUCCINATE 125 MG/2ML
125 INJECTION, POWDER, LYOPHILIZED, FOR SOLUTION INTRAMUSCULAR; INTRAVENOUS ONCE
Status: DISCONTINUED | OUTPATIENT
Start: 2021-02-05 | End: 2021-02-06 | Stop reason: HOSPADM

## 2021-02-05 RX ORDER — DIPHENHYDRAMINE HYDROCHLORIDE 50 MG/ML
50 INJECTION INTRAMUSCULAR; INTRAVENOUS ONCE
Status: DISCONTINUED | OUTPATIENT
Start: 2021-02-05 | End: 2021-02-06 | Stop reason: HOSPADM

## 2021-02-05 RX ORDER — SODIUM CHLORIDE 9 MG/ML
INJECTION, SOLUTION INTRAVENOUS CONTINUOUS
Status: DISCONTINUED | OUTPATIENT
Start: 2021-02-05 | End: 2021-02-06 | Stop reason: HOSPADM

## 2021-02-05 RX ADMIN — LAMOTRIGINE 250 MG: 25 TABLET ORAL at 08:13

## 2021-02-05 NOTE — PROGRESS NOTES
Tolerated injections well. Reviewed discharge instruction, voiced understanding. Copies of AVS offered but refused. Pt discharged home. Pt to exit via ambulation.  Next appt made for 3/5/2021    Orders Placed This Encounter   Medications    fulvestrant (FASLODEX) IM injection 250 mg    fulvestrant (FASLODEX) IM injection 250 mg    0.9 % sodium chloride infusion    diphenhydrAMINE (BENADRYL) injection 50 mg    hydrocortisone sodium succinate PF (SOLU-CORTEF) injection 100 mg    methylPREDNISolone sodium (SOLU-MEDROL) injection 125 mg    famotidine (PEPCID) injection 20 mg    EPINEPHrine PF 1 MG/ML injection 0.3 mg

## 2021-02-08 LAB — CA 27.29: 54.5 U/ML (ref 0–40)

## 2021-02-16 ENCOUNTER — OFFICE VISIT (OUTPATIENT)
Dept: ONCOLOGY | Age: 51
End: 2021-02-16
Payer: COMMERCIAL

## 2021-02-16 DIAGNOSIS — C50.411 MALIGNANT NEOPLASM OF UPPER-OUTER QUADRANT OF RIGHT FEMALE BREAST, UNSPECIFIED ESTROGEN RECEPTOR STATUS (HCC): ICD-10-CM

## 2021-02-16 DIAGNOSIS — C78.7 SECONDARY MALIGNANT NEOPLASM OF LIVER AND INTRAHEPATIC BILE DUCT (HCC): Primary | ICD-10-CM

## 2021-02-16 DIAGNOSIS — C79.51 SECONDARY MALIGNANT NEOPLASM OF BONE (HCC): ICD-10-CM

## 2021-02-16 PROCEDURE — 99213 OFFICE O/P EST LOW 20 MIN: CPT | Performed by: INTERNAL MEDICINE

## 2021-02-16 RX ORDER — VENLAFAXINE HYDROCHLORIDE 37.5 MG/1
37.5 CAPSULE, EXTENDED RELEASE ORAL DAILY
Qty: 30 CAPSULE | Refills: 2 | Status: SHIPPED | OUTPATIENT
Start: 2021-02-16 | End: 2021-08-03 | Stop reason: SDUPTHER

## 2021-02-16 NOTE — PROGRESS NOTES
Chief Complaint  Breast cancer, female ( Stage Date: Unknown, Stage IV (Right breast upper-outer quadrant, T1c, N0, M1)-Clinical  Date of Dx:10/2017 Extent of Disease: Evidence of metastatic disease; Disease State: Initial diagnosis; Metastatic Sites: Bone; Metastatic Sites: Liver; ER Status: Positive; [place] Status: Positive; HER-2/jus Value-FISH: Negative; Menopausal Status: Premenopausal; PIK3CA gene: Unknown; )  Problem List  Breast cancer, female ( Stage Date: Unknown, Stage IV (Right breast upper-outer quadrant, T1c, N0, M1)-Clinical  Date of Dx:10/2017 Extent of Disease: Evidence of metastatic disease; Disease State: Initial diagnosis; Metastatic Sites: Bone; Metastatic Sites: Liver; ER Status: Positive; [place] Status: Positive; HER-2/jus Value-FISH: Negative; Menopausal Status: Premenopausal; PIK3CA gene: Unknown; )   Abnormal radiological finding   Bony metastasis ( Date of Dx:10/2017 )   Chest pain   Metastasis to liver  HPI  Extremely pleasant lady patient of Dr. Gela Monteiro RN at [facility]. Evaluated in September 2017 for some discomfort involving the sternum that had been present for a few weeks. She could not remember any injury to that area except maybe a few months earlier she had fallen face-forward. She did not remember having significant discomfort to the chest area involving the fall. September 12, 2017, chest x-ray was reported to be fairly unremarkable. X-rays of the sternum revealed questionable age-indeterminate factor of the mid to superior sternum. Additional CT was recommended which was done on September 18, 2017, revealing mottled appearance of the sternum. Findings could represent related to earlier trauma or even osteomyelitis or metastatic disease. Nonspecific sclerotic changes in the T12 vertebral body were noted too.      Triple phase bone scan done on October 4, 2017, had revealed only focal uptake in the body of the sternum corresponding to the changes noted on earlier CAT scan. Similar differential was again suggested. Visit here on October 9, 2017, though, she stated that she in general was doing actually better and pain was not severe and she did not have to take any pain medications and had been continuing to work on a full time basis and also remained fairly active. CMP on October 09, 2017, was normal and so was LDL of 183. Sed rate was 10. Kappa Lambda light chain ratios were normal. Immunoelectrophoresis was negative. CBC had revealed fairly normal numbers. CT biopsy of the sternum done on October 17, 2017, did reveal changes of metastatic carcinoma, possibly suggestive of breast primary. Biopsy from the sternum done on October 13, 2017, revealed metastatic carcinoma quite likely from breast primary. The tumor was strongly positive for estrogen receptor 95 percent and progesterone receptor is 95 percent, and negative for HER2. Visit here on October 20, 2017, those findings were discussed with her. October 25, 2017, diagnostic digital bilateral breast mammogram revealed 1.8 cm irregular, spiculated hyperechoic mass at the 10 oclock position in the right breast correlating to the mammographic area. Findings were highly suspicious for neoplasm. Ultrasound guided biopsy on October 27, 2017, did reveal invasive mammary carcinoma. Ultrasound-guided biopsy October 27, 2017, from this mass revealed invasive mammary carcinoma. Tumor was again strongly positive 95 percent for ER, 95 percent MT, HER-2 was negative both by IHC and FISH.      PET scan done on November 2, 2017, revealed hypermetabolic right breast mass as well as prominent right axillary lymph nodes and scattered osseous metastatic lesions involving the sternum, right scapula, left L4 vertebral body, and bilateral iliac crest.     Visit here on October 27, 2017, and afterwards metastatic disease involving the bones were discussed with her and clinically she was still considered to be premenopausal as she was not having any symptoms suggestive of menopausal state. She was started on treatment with Zoladex, Faslodex, and Zometa starting on November 10, 2017. She had to receive these treatments in outpatient facility at the hospital primarily because of insurance reasons. Saw me on December 7, 2017, sooner than her scheduled appointment, was concerned about possibly feeling a lump involving the right breast at around 6 oclock position. Otherwise she had not noticed any new issues. Mammogram and ultrasound done on December 8, 2017, showed dense breast tissue where she was feeling a mass. There was no sonographic evidence for any mass in that area too. MRI was suggested if needed to. Otherwise she was again noted to have known malignancy in the upper outer quadrant of the breast.     PET scan on May 14, 2018, revealed partial treatment response with improvement in the right axillary nodes as well as skeletal metastases. Metabolic activity associated with lytic bone lesions had improved with increased sclerosis of the previously seen lesions. Mild residual activity was noted at the L4 vertebral body. There were no new persistent or metabolically active aggressive osseous lesions. Visit here on February 26, 2019 overall I think she was doing fair but we were concerned about her starting to have more discomfort in her left elbow as well as the right hip area. She had not had a PET scan since May of last year and that too had revealed a partial response, we felt possibly repeating the PET scan would be helpful. In the meantime she was going to continue to receive the same treatments as before including Faslodex, Zoladex and Zometa    She was not able to undergo a PET scan but insurance. Did undergo a bone scan on April 4, 2019 revealing a stable bone scan without evidence of new osseous metastases.     May fall, 2019 was evaluated for symptoms of chest pain including a cardiac work-up including echo and stress test afterwards and those were unremarkable. She did have a CTA of the chest done looking for pulmonary embolism on May 4, 2019 which was negative for pulmonary embolism no other acute pulmonary illness. She still of course was noted to have several sclerotic lesions within the spine which were felt to be stable as compared to the prior PET scan which was done in May 2018. [facility] levels done on July 16, 4284 was 64.0, follicular phase. CA-27-29 level same day was 26, stable. [address] [facility] September was 17, 2019 we talked about still being pre-or perimenopausal Darst will continue to stay on Zoladex along with Faslodex. Talked about disease overall having responded. Will possibly extend intervals between Zometa to may be once a year, may be due in July 2020. Did start her on Effexor for hot flashes    March 12, 2020 still being treated with same agents, still working on a long hours still in general feeling well only issue being occasional discomfort in the right rib cage as well as right breast area. Could not feel any distinct masses. Denied any history of injury. Otherwise negative for any new systemic complaints and overall both physically and emotionally was doing quite well    Telemedicine visit on April 23, 2020. We discussed results those were obtained on her previous visit on March 12, 2020. Chemistries CBC were fairly unremarkable CA-27-29 levels were still normal. She had undergone CT scan of the chest on March 24, 2020 was noted to have a stable bony lesions. But she was described to have a 2.8 cm lesion in liver which was worrisome for possibly metastatic disease. Following those studies she was advised to undergo PET scan and that was refused by insurance. She did undergo a bone scan on April 28, 2020 was noted to have stable appearance of skeletal metastatic disease no new metastatic lesions were noted.  She did undergo a CT scan of the abdomen and pelvis with contrast on the same day again was noted to have a 2.3 x 2.7 cm left hepatic lesion which was worrisome for metastatic disease and again of course skeletal metastases were noted. Telemedicine visit again on May 5, 2020. April 27, 2020 she did undergo liver biopsy. Pathology did reveal metastatic adenocarcinoma from a breast primary. Tumor was still positive for ER 50% NJ 40%. HER-2 was equivocal by IHC though negative by FISH. May 6, 2020 had long discussion with her and her family. We talked about progressive nature of the disease. We talked about future plan including liver biopsy material being sent to foundation 1 analysis. We talked about the need for us to change her treatment to a combination of Faslodex, Zoladex and add Verzenio. We also discussed possibly doing a PET scan. We do know she has disease involving the breast, bones and now liver. Our intention is that if indeed she has an isolated lesion in the liver we might consider doing local treatment to the liver besides this new systemic therapy. PET scan done on May 21, 9100 revealed metabolically active metastases in the left hepatic lobe no other metabolic activity was noted in the known skeletal metastases mammogram revealed stable appearance no new sites of malignancy were noted. Treatment was changed to Faslodex Zoladex and Verzenio in May 2020    Foundation 1 analysis from the liver lesion did reveal a tumor to be positive for PI K3 CA. Otherwise microsatellite status was stable and no other alterations were noted especially in BRCA1 and BRCA2 as well as HER-2      As PET scan had revealed an isolated lesion involving the liver she did undergo radio frequency ablation of the lesion on June 24, 2020, tolerated procedure fairly well though had some discomfort afterwards.  CT scan of the abdomen done without contrast on June 25, 2020 did reveal sequela of treated metastatic lesion in the left lobe of the liver with central high attenuation area measuring 2.6 x 2.4 cm and the peripheral low-attenuation rim measuring around 4.8 x 4.1 cm related to the postprocedural edema. Again osseous metastatic disease was again noted without pathologic fracture. July 17, 2020 she was overall doing fairly decent had recovered from the discomfort related to the radiofrequency ablation. We talked about continuing on this present regimen does do plan to check her [address] levels to see if we can hold off giving her Zoladex. We of course will continue with Faslodex, Verzenio and Zometa. We of course will continue to monitor her progress through exams as well as CAT scans. [address] level done on July 30, 2020 was 9.4, premenopausal.    Visit here on September 15, 2020. Was still being treated with Faslodex Zoladex and Verzenio. Studies done on October 9, 2020 fairly normal CBC, chemistries reveal creatinine of 1.2 CA 27-29 levels were mildly elevated to 47. A CT scan of the abdomen done on October 15, 2020 revealed 3.5 x 3.1 cm ablation zone, which had decreased in size since immediate post ablation changes done on June 25, 2020. It is difficult to assess residual tremor dedicated multiphasic CT or MRI was recommended. Multifocal osseous sclerotic metastases were felt to be stable. October 27, 2020 overall she was feeling about the same there was some concern about slight increase in tumor marker CA 27-29. Kandis Cifuentes Plan was to to multiphasic CT scan of the abdomen to assess the disease status. Also she was seriously considering having her unilateral oophorectomy I think that will be reasonable. 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 On December 17, 2020 she did undergo left oophorectomy. She also the same day underwent removal of Meckel's diverticulum. Tolerated the procedure well. Pathology from the ovary did not show any significant pathologic changes. CA 27-29 levels done on November 25, 2020 was 47.2 not significantly different from before. Visit here on January 5, 2020 she was still being treated with Faslodex and Verzenio. Of course Zoladex was stopped after her undergoing oophorectomy. CA 27-29 levels done on February 5, 2021 was slightly higher than before, 54.5. Overall visit by telephone on February 16, 2021 she was doing extremely well. Working denied any complaints. Still on same medications including Faslodex and Verzenio. Counts had been a reasonable. Past Medical History    Basically healthy. Surgical History    1. Abdominal hysterectomy with unilateral oophorectomy for endometriosis in the year 2000. 2. Surgery on right elbow in 2013. Social History  Smoking Status Former smokerUsed to smoke, quit in 2004. Negative for significant ethanol intake. Occasional social usage. .  works at a manufacturing plant. Two sons ages 32 and 25. 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     Family History    0 FH  Current Therapy   Fulvestrant D1,15 (Initial) fb Fulvestrant D1 (Maintenance) + Goserelin Q28D Cycle Length: 28 Number Cycles: 36 Start: C1D1 on 10/27/2017 Assoc Dx: Breast cancer, female LOT: 1st Line Metastatic or Recurrent Stage: IV 10/27/2017 C1 D1  Fulvestrant IM, 500 mg   Fulvestrant IM, 500 mg   Goserelin Subcutaneous, 3.6 mg  Zoledronic acid (Zometa) Q6M Cycle Length: 84 Number Cycles: 6 Start: Dedrick Gee on 10/27/2017 Assoc Dx: Breast cancer, female LOT: 1st Line Metastatic or Recurrent 11/10/2017 C1 D1  Zoledronic Acid IV (Zometa), 4 mg  Abemaciclib + Fulvestrant + Goserelin Q28D Cycle Length: 28 Number Cycles: 6 Start: Dedrick Gee on 05/06/2020 Assoc Dx:  Breast cancer, female LOT: 2nd Line Metastatic Stage: IV 05/06/2020 C1 D15 Fulvestrant IM, 500 mg   Fulvestrant IM, 500 mg   Goserelin Subcutaneous, 3.6 mg   Abemaciclib Oral Dose Pack, 150 mg bid      Review of Systems   Per interval history; otherwise 10 point ROS is negative    Physical Exam   Limited exam as primarily through telephone conversation                  Assessment & Plan  Extremely pleasant lady patient of Dr. Lukasz Gasca, overall blessed with good health. Saw him in September for vague discomfort involving the sternum which led to chest x-ray and x-rays of the sternum revealing questionable age-indeterminate fracture of the mid to superior sternum. CT scan revealed mottled appearance of sternum. Etiology could be related to earlier trauma and/or even metastatic disease. Bone scan showed increased activity in the sternum area with the same differential. Mammogram in October 2016 was unremarkable. October 10, 2017, I did spend time with her and her sister. We did talk about future course of action including possibly just repeating the studies in a couple of months. Would consider doing biopsy of the sternal area. She I think would feel comfortable if we could have better answer for her and thus for that reason I did advise her to be seen in interventional radiology for possible biopsy. October 13, 2017, CT guided biopsy from the sternum revealed changes consistent with metastatic carcinoma from breast biopsy. The tumor is strongly positive for ER and [place] and negative for HER2. October 25, 2017, mammogram did reveal a lesion in the right breast of 1.8 cm in the upper outer quadrant. Biopsy for which did reveal invasive mammary carcinoma. October 27, 2017, I did discuss the findings with her and her extended family. We talked about basically the finding of carcinoma involving the right breast as well as bones. Biopsy from the sternum was positive.  A bone scan also showed questionable changes involving the T12, though CT scan of the chest otherwise was negative for any pulmonary or hepatic metastases. Biopsy from the right breast October 27, 2017, also revealed invasive mammary carcinoma, strongly positive ER, TN, and HER-2. PET scan done on November 2, 2017, revealed hypermetabolic right breast as well as right axillary nodes and osseous metastases involving sternum, right scapula, and L4 vertebral body. She was considered a premenopausal lady with metastatic disease involving the bones. Treatment choices were discussed with her and on November 10, 2017, was started on treatment with Zoladex, Faslodex, and Zometa. Visit here on November 20, 2017, she was tolerating these treatments well following the Zometa. December 7, 2017, bony pains were better, but she was concerned about possibly a lump involving the 6 oclock position right breast. Mammogram and ultrasound did not reveal any mass in that area. She was still noted to have lesion in the upper outer quadrant which had been biopsied before. The PET scan done on May 14, 2018, had revealed improvement in disease involving the breast, axilla, as well as bones. Previous PET scan had shown changes involving the sternum, scapula, and L4 areas; those areas did show improvement on the PET scan. Visit here on February 26, 2019 overall I think she was doing fair but we were concerned about her starting to have more discomfort in her left elbow as well as the right hip area. She had not had a PET scan since May of last year and that too had revealed a partial response, we felt possibly repeating the PET scan would be helpful. In the meantime she was going to continue to receive the same treatments as before including Faslodex, Zoladex and Zometa  She was not able to undergo a PET scan but insurance. Did undergo a bone scan on April 4, 2019 revealing a stable bone scan without evidence of new osseous metastases.     May , 2019 was evaluated for symptoms of chest pain including a cardiac work-up including echo and stress test afterwards and those were unremarkable. She did have a CTA of the chest done looking for pulmonary embolism on May 4, 2019 which was negative for pulmonary embolism no other acute pulmonary illness. She still of course was noted to have several sclerotic lesions within the spine which were felt to be stable as compared to the prior PET scan which was done in May 2018. [facility] September was 17, 2019 we talked about still being pre-or perimenopausal Darst will continue to stay on Zoladex along with Faslodex. Talked about disease overall having responded. Will possibly extend intervals between Zometa to may be once a year, may be due in July 2020. Did start her on Effexor for hot flashes  March 12, 2020 overall was doing fairly well Effexor was helpful with hot flashes. Denied significant new issues except for discomfort in the breast as well as rib cage area. Is due to have her bone scan mammogram and CT scan of the chest done to look for the discomfort in the chest area. Otherwise will continue on the same regimen. She will see me after the studies are completed. Telemedicine visit on April 23, 2020, as described above new issues being no obvious progression noted on a bone scan and blood work including tumor markers. CT scan of the chest as well as CT scan of the abdomen showing 2.7 cm lesion in the left hepatic lobe quite worrisome for possible metastatic disease. Clinically she was still doing well. I did encourage her to undergo a biopsy of the liver lesion. And following the biopsy could decide about if we need to send material for foundation 1 study also    Telemedicine visit again on May 5, 2020. April 27, 2020 she did undergo liver biopsy. Pathology did reveal metastatic adenocarcinoma from a breast primary. Tumor was still positive for ER 50% CT 40%. HER-2 was equivocal by IHC though negative by FISH. May 6, 2020 had long discussion with her and her family.  We talked about progressive nature of the disease. We talked about future plan including liver biopsy material being sent to [place] [id number] analysis. We talked about the need for us to change her treatment to a combination of Faslodex, Zoladex and add Verzenio. We also discussed possibly doing a PET scan. We do know she has disease involving the breast, bones and now liver. Our intention is that if indeed she has an isolated lesion in the liver we might consider doing local treatment to the liver besides this new systemic therapy. June 1, 2020 I did spend time with her and her sister. We talked about PET scan revealing stable bony metastases and an isolated lesion involving the liver, biopsy-proven malignancy. Mammogram was unchanged from before. Couple weeks prior to this visit had started taking Verzenio along with Faslodex and Zoladex. We did talk about staying on the present systemic therapy and of course will continue to monitor her progress. We also discussed with her about possibly doing radiofrequency ablation treatment to the single lesion involving the liver. As described above on June 24, 2020 she did undergo radiofrequency ablation of the isolated lesion in the liver. July 17, 2020 she was overall doing fairly decent had recovered from the discomfort related to the radiofrequency ablation. We talked about continuing on this present regimen does do plan to check her [address] [place] [address] levels to see if we can hold off giving her Zoladex. We of course will continue with Faslodex, Verzenio and Zometa. We of course will continue to monitor her progress through exams as well as CAT scans. September 15, 2020 was still being treated with Faslodex Zoladex and Verzenio. FSH levels were in premenopausal levels. We also planning to give zometa from time to time. She in general was feeling fairly well major issue was discomfort primarily from Faslodex injections.   We talked about checking her blood work with the next injection on October 9 including chemistries and CA-27-29. We also talked about checking her a CT scan of the abdomen to see the status of the lesion following the radiofrequency ablation. Otherwise as before we will continue to monitor her blood progress exams CAT scans and possibly PET scans. January 5, 2021 spend time with her on the few issues as described below. #1 regarding metastatic carcinoma of the breast, premenopausal, s/p  oophorectomy, currently being treated with Faslodex and Verzenio. She was has had locally treatment for isolated liver metastases done on June 24, 2020. CBC was drawn today counts were reasonable that she could continue on the same dosages of Verzenio. Advised her to stay on the present therapy and will recheck her blood tumor markers in February. February 16, 2021 telephone conversation with her. Clinically she was still doing quite well still on same medications as described above including Faslodex and Verzenio that she was tolerating well. Concern being is further rise of CA 27-29 levels. We talked about rechecking the level again in 3 weeks along with possibly PET scan if we could and if we could confirm progression of the disease then we might consider changing her treatment [facility] along with Faslodex as the foundation 1 studies done before had revealed the presence of PIK 3 CA mutation. Other wise there was stable microsatellite status tumor mutational burden was not elevated.     I will be seeing her again in 3 weeks after above studies

## 2021-02-24 ENCOUNTER — TELEPHONE (OUTPATIENT)
Dept: ONCOLOGY | Age: 51
End: 2021-02-24

## 2021-02-24 NOTE — TELEPHONE ENCOUNTER
Patient called to follow up on PET Scan apt. [facility] scheduled for 3/1/2021 09:30 patient notified.

## 2021-03-01 ENCOUNTER — OFFICE VISIT (OUTPATIENT)
Dept: SURGERY | Age: 51
End: 2021-03-01
Payer: COMMERCIAL

## 2021-03-01 VITALS
WEIGHT: 213.7 LBS | SYSTOLIC BLOOD PRESSURE: 114 MMHG | BODY MASS INDEX: 33.54 KG/M2 | DIASTOLIC BLOOD PRESSURE: 68 MMHG | TEMPERATURE: 97.5 F | HEART RATE: 93 BPM | OXYGEN SATURATION: 98 % | HEIGHT: 67 IN

## 2021-03-01 DIAGNOSIS — K40.90 NON-RECURRENT UNILATERAL INGUINAL HERNIA WITHOUT OBSTRUCTION OR GANGRENE: Primary | ICD-10-CM

## 2021-03-01 PROCEDURE — 99213 OFFICE O/P EST LOW 20 MIN: CPT | Performed by: SURGERY

## 2021-03-01 ASSESSMENT — ENCOUNTER SYMPTOMS
ANAL BLEEDING: 0
ABDOMINAL PAIN: 1
COLOR CHANGE: 0
PHOTOPHOBIA: 0
CHOKING: 0
EYE ITCHING: 0
CONSTIPATION: 0
SORE THROAT: 0
RECTAL PAIN: 0
BACK PAIN: 0
APNEA: 0
BLOOD IN STOOL: 1
STRIDOR: 0
EYE REDNESS: 0

## 2021-03-01 NOTE — PROGRESS NOTES
Chief Complaint   Patient presents with    Post-Op Check     PO S/P Meckles Diverticulectomy, 20 Having intermittent, sporadic pain on left side like an appendicitis         SUBJECTIVE:  HPI:  Patientcomplains of abdominal pain. Pain is located in the RLQ with no radiation . The pain is described as dull and pressure-like. Onset was 2 months ago. Symptoms have been unchanged since. Aggravating factors: activity. Associated symptoms: none. The patient denies nausea and vomiting. I have reviewed the patient's(pertinent information to this visit) medical history, family history(scanned in  the Media tab under \"patient questioner\"), social history and reviewof systems with the patient today in the office. Past Surgical History:   Procedure Laterality Date     SECTION  [place]    EYE SURGERY  age 28    LASIK both eyes    HYSTERECTOMY  age 27    partial - has 1 ovary (L);  Due to endometriosis    LAPAROSCOPY N/A 2020    LAPAROSCOPY MECKLE'S DIVERTICULECTOMY performed by Brian Shen MD at [facility]      then ablation    OTHER SURGICAL HISTORY Right 2013    right ulnar nerve decompression    SALPINGO-OOPHORECTOMY Left 2020    EXPLORATORY LAPAROSCOPY, LEFT SALPINGO OOPHORECTOMY LAPAROSCOPIC performed by Brian Shen MD at [address] [facility]     Past Medical History:   Diagnosis Date    Asthma     last flare up winter 2016    Breast CA [facility])     right    Chest pain     \"had chest pain last time - saw Dr Kim Palacios- all ok\"    FHx: coronary artery disease     H/O Doppler lower venous ultrasound 2019    No DVT,  Significant reflux noted of the Right & Left Deep Venous System.  H/O echocardiogram 2019    EF 55-60%, Essentially normal echocardiogram. Dilated aortic root at 4.0 cm.     History of exercise stress test 2019    Treadmill, EKG portion is negative for ischemia by diagnostic criteria    History of migraine     last HA  2020   [facility] OTHER MEDICAL     \"since 2016- having some pain mid sternum- xray thought fx, then did CT and bone scan now having bx done\"    Hyperlipemia     Primary cancer of right breast with metastasis to other site [facility]) 3/24/2019    bone and liver     Family History   Problem Relation Age of Onset    Heart Attack Mother          from [address] @ age 61.  Coronary Art Dis Mother     Other Mother         Low blood pressure, endometriosis - hyster    High Cholesterol Mother     High Blood Pressure Father     Heart Attack Father     Coronary Art Dis Father     Arthritis Father         hip replacement    Arthritis Sister     Liver Disease Sister         medication & hx of alcohol abuse    Other Sister         endometriosis - hyster    Depression Brother     Other Sister         endometriosis - hyster    Other Sister         endometriosis - hyster   [employer] Sister     Other Sister         endometriosis - hyster    Other Sister         endometriosis - hyster, hypothyroidism    High Blood Pressure Sister      Social History     Socioeconomic History    Marital status:      Spouse name: Not on file    Number of children: Not on file    Years of education: Not on file    Highest education level: Not on file   Occupational History    Occupation: Nurse   Social Needs    Financial resource strain: Not hard at all   Sophia-Jade insecurity     Worry: Never true     Inability: Never true    Transportation needs     Medical: No     Non-medical: No   Tobacco Use    Smoking status: Former Smoker     Packs/day: 0.50     Years: 16.00     Pack years: 8.00     Types: Cigarettes     Quit date: 2004     Years since quittin.6    Smokeless tobacco: Never Used    Tobacco comment: Quit smoking 9 years ago. Substance and Sexual Activity    Alcohol use:  Yes     Alcohol/week: 2.0 standard drinks     Types: 2 Glasses of wine per week     Comment: Occasionally     CAFFEINE: 1 cup coffee daily/ average one per week    Drug use: No    Sexual activity: Not on file   Lifestyle    Physical activity     Days per week: 3 days     Minutes per session: Not on file    Stress: Only a little   Relationships    Social connections     Talks on phone: Not on file     Gets together: Not on file     Attends [employer] service: Not on file     Active member of club or organization: Not on file     Attends meetings of clubs or organizations: Not on file     Relationship status:     Intimate partner violence     Fear of current or ex partner: Not on file     Emotionally abused: Not on file     Physically abused: Not on file     Forced sexual activity: Not on file   Other Topics Concern    Not on file   Social History Narrative    Pt works full time as a nurse at the hospital. Ana Abernathy frequently during her 12 hour shift 7p-7a.          Current Outpatient Medications   Medication Sig Dispense Refill    venlafaxine (EFFEXOR XR) 37.5 MG extended release capsule Take 1 capsule by mouth daily 30 capsule 2    Abemaciclib (VERZENIO) 150 MG TABS Take 1 tablet by mouth 2 times daily 60 tablet 3    ibuprofen (ADVIL;MOTRIN) 800 MG tablet Take 1 tablet by mouth every 8 hours as needed for Pain 30 tablet 1    fulvestrant (FASLODEX) 250 MG/5ML SOLN IM injection Inject 10 mLs into the muscle every 30 days 10 mL 1    ondansetron (ZOFRAN) 4 MG tablet Take 1 tablet by mouth every 8 hours as needed for Nausea or Vomiting 30 tablet 0    albuterol sulfate  (90 Base) MCG/ACT inhaler Inhale 2 puffs into the lungs every 6 hours as needed for Wheezing 1 Inhaler 2    zoledronic acid (ZOMETA) 4 MG/100ML SOLN infusion Infuse 4 mg intravenously once Every year      topiramate ER (TROKENDI XR) 50 MG CP24 Take 50 mg by mouth as needed (headaches)       diphenhydrAMINE (BENADRYL) 25 MG capsule 1-2 capsules by mouth every 6 hours as needed for rash 30 capsule 0     No current facility-administered medications for this visit. Allergies   Allergen Reactions    Sulfa Antibiotics Other (See Comments)     Stop breathing.  Nickel Rash         Review of Systems:       Review of Systems   Constitutional: Negative for chills and fever. HENT: Negative for ear pain, mouth sores, sore throat and tinnitus. Eyes: Negative for photophobia, redness and itching. Respiratory: Negative for apnea, choking and stridor. Cardiovascular: Negative for chest pain and palpitations. Gastrointestinal: Positive for abdominal pain and blood in stool. Negative for anal bleeding, constipation and rectal pain. Endocrine: Negative for polydipsia. Genitourinary: Negative for enuresis, flank pain and hematuria. Musculoskeletal: Negative for back pain, joint swelling and myalgias. Skin: Negative for color change and pallor. Allergic/Immunologic: Negative for environmental allergies. Neurological: Negative for syncope and speech difficulty. Psychiatric/Behavioral: Negative for confusion and hallucinations. OBJECTIVE:  Physical Exam:    /68   Pulse 93   Temp 97.5 °F (36.4 °C)   Ht 5' 6.5\" (1.689 m)   Wt 213 lb 11.2 oz (96.9 kg)   SpO2 98%   BMI 33.98 kg/m²      Physical Exam  Constitutional:       Appearance: She is well-developed. HENT:      Head: Normocephalic. Eyes:      Pupils: Pupils are equal, round, and reactive to light. Neck:      Musculoskeletal: Normal range of motion and neck supple. Cardiovascular:      Rate and Rhythm: Normal rate. Pulmonary:      Effort: Pulmonary effort is normal.   Abdominal:      General: There is no distension. Palpations: Abdomen is soft. There is no mass. Tenderness: There is abdominal tenderness. There is no guarding or rebound. Hernia: A hernia (RIH small ) is present. Musculoskeletal: Normal range of motion. Skin:     General: Skin is warm.    Neurological:      Mental Status: She is alert and oriented to person, place, and time. ASSESSMENT:  1. Non-recurrent unilateral inguinal hernia without obstruction or gangrene        PLAN:  Treatment: Right sided inguinal pain not associated with activity. CT reviewed and shows small inguinal hernia. Pt wants to wait for surgery. I did recommend C-scope for screening     Patient counseled on risks, benefits, and alternatives of treatment plan at length today. Patient states an understanding and willingness to proceed with plan. No orders of the defined types were placed in this encounter. No orders of the defined types were placed in this encounter. Follow Up:  No follow-ups on file.       Jovanny Ralph MD

## 2021-03-04 RX ORDER — DIPHENHYDRAMINE HYDROCHLORIDE 50 MG/ML
50 INJECTION INTRAMUSCULAR; INTRAVENOUS ONCE
Status: CANCELLED | OUTPATIENT
Start: 2021-03-05 | End: 2021-03-05

## 2021-03-04 RX ORDER — EPINEPHRINE 1 MG/ML
0.3 INJECTION, SOLUTION, CONCENTRATE INTRAVENOUS PRN
Status: CANCELLED | OUTPATIENT
Start: 2021-03-05

## 2021-03-04 RX ORDER — METHYLPREDNISOLONE SODIUM SUCCINATE 125 MG/2ML
125 INJECTION, POWDER, LYOPHILIZED, FOR SOLUTION INTRAMUSCULAR; INTRAVENOUS ONCE
Status: CANCELLED | OUTPATIENT
Start: 2021-03-05 | End: 2021-03-05

## 2021-03-04 RX ORDER — LAMOTRIGINE 25 MG/1
250 TABLET ORAL ONCE
Status: CANCELLED | OUTPATIENT
Start: 2021-03-05 | End: 2021-03-05

## 2021-03-04 RX ORDER — SODIUM CHLORIDE 9 MG/ML
INJECTION, SOLUTION INTRAVENOUS CONTINUOUS
Status: CANCELLED | OUTPATIENT
Start: 2021-03-05

## 2021-03-05 ENCOUNTER — HOSPITAL ENCOUNTER (OUTPATIENT)
Dept: INFUSION THERAPY | Age: 51
Setting detail: INFUSION SERIES
Discharge: HOME | End: 2021-03-05
Payer: COMMERCIAL

## 2021-03-05 VITALS
OXYGEN SATURATION: 98 % | SYSTOLIC BLOOD PRESSURE: 131 MMHG | DIASTOLIC BLOOD PRESSURE: 85 MMHG | HEART RATE: 60 BPM | TEMPERATURE: 97.5 F | RESPIRATION RATE: 16 BRPM

## 2021-03-05 DIAGNOSIS — Z17.0 MALIGNANT NEOPLASM OF UPPER-OUTER QUADRANT OF RIGHT BREAST IN FEMALE, ESTROGEN RECEPTOR POSITIVE (HCC): Primary | ICD-10-CM

## 2021-03-05 DIAGNOSIS — C78.7 SECONDARY MALIGNANT NEOPLASM OF LIVER AND INTRAHEPATIC BILE DUCT (HCC): ICD-10-CM

## 2021-03-05 DIAGNOSIS — C50.411 MALIGNANT NEOPLASM OF UPPER-OUTER QUADRANT OF RIGHT BREAST IN FEMALE, ESTROGEN RECEPTOR POSITIVE (HCC): Primary | ICD-10-CM

## 2021-03-05 PROCEDURE — 6360000002 HC RX W HCPCS: Performed by: INTERNAL MEDICINE

## 2021-03-05 PROCEDURE — 99211 OFF/OP EST MAY X REQ PHY/QHP: CPT

## 2021-03-05 PROCEDURE — 96401 CHEMO ANTI-NEOPL SQ/IM: CPT

## 2021-03-05 RX ORDER — LAMOTRIGINE 25 MG/1
250 TABLET ORAL ONCE
Status: COMPLETED | OUTPATIENT
Start: 2021-03-05 | End: 2021-03-05

## 2021-03-05 RX ADMIN — FULVESTRANT 250 MG: 50 INJECTION INTRAMUSCULAR at 08:45

## 2021-03-05 NOTE — DISCHARGE SUMMARY
Tolerated Injections well. Reviewed discharge instructions, understanding verbalized. Copies of AVS given to take home. Patient discharged home. Down to exit per self.     Orders Placed This Encounter   Medications    fulvestrant (FASLODEX) IM injection 250 mg    fulvestrant (FASLODEX) IM injection 250 mg

## 2021-03-10 DIAGNOSIS — C50.411 MALIGNANT NEOPLASM OF UPPER-OUTER QUADRANT OF RIGHT FEMALE BREAST, UNSPECIFIED ESTROGEN RECEPTOR STATUS (HCC): ICD-10-CM

## 2021-03-10 DIAGNOSIS — C78.7 SECONDARY MALIGNANT NEOPLASM OF LIVER AND INTRAHEPATIC BILE DUCT (HCC): Primary | ICD-10-CM

## 2021-03-10 DIAGNOSIS — C79.51 SECONDARY MALIGNANT NEOPLASM OF BONE (HCC): ICD-10-CM

## 2021-03-10 NOTE — PROGRESS NOTES
I did speak with her by phone on March 10, 2021. We talked about PET scan not being approved. Concerned about persistent, elevation of tumor marker.   Talked about may be checking a CT scan of the chest along with abdomen and pelvis prior to her next visit

## 2021-03-12 ENCOUNTER — TELEPHONE (OUTPATIENT)
Dept: ONCOLOGY | Age: 51
End: 2021-03-12

## 2021-03-12 NOTE — TELEPHONE ENCOUNTER
Left message, Pt is going to [facility] on 3/24 10:00 arrival for a 10:30 appt-- NPO for 4 hours-- told to call back iwht any questions

## 2021-03-24 ENCOUNTER — HOSPITAL ENCOUNTER (OUTPATIENT)
Dept: CT IMAGING | Age: 51
Discharge: HOME | End: 2021-03-24
Payer: COMMERCIAL

## 2021-03-24 DIAGNOSIS — C79.51 SECONDARY MALIGNANT NEOPLASM OF BONE (HCC): ICD-10-CM

## 2021-03-24 DIAGNOSIS — C50.411 MALIGNANT NEOPLASM OF UPPER-OUTER QUADRANT OF RIGHT FEMALE BREAST, UNSPECIFIED ESTROGEN RECEPTOR STATUS (HCC): ICD-10-CM

## 2021-03-24 DIAGNOSIS — C78.7 SECONDARY MALIGNANT NEOPLASM OF LIVER AND INTRAHEPATIC BILE DUCT (HCC): ICD-10-CM

## 2021-03-24 PROCEDURE — 71260 CT THORAX DX C+: CPT

## 2021-03-24 PROCEDURE — 2580000003 HC RX 258: Performed by: INTERNAL MEDICINE

## 2021-03-24 PROCEDURE — 6360000004 HC RX CONTRAST MEDICATION: Performed by: INTERNAL MEDICINE

## 2021-03-24 PROCEDURE — 74177 CT ABD & PELVIS W/CONTRAST: CPT

## 2021-03-24 RX ORDER — SODIUM CHLORIDE 0.9 % (FLUSH) 0.9 %
10 SYRINGE (ML) INJECTION PRN
Status: DISCONTINUED | OUTPATIENT
Start: 2021-03-24 | End: 2021-03-25 | Stop reason: HOSPADM

## 2021-03-24 RX ADMIN — SODIUM CHLORIDE, PRESERVATIVE FREE 10 ML: 5 INJECTION INTRAVENOUS at 10:54

## 2021-03-24 RX ADMIN — IOHEXOL 50 ML: 240 INJECTION, SOLUTION INTRATHECAL; INTRAVASCULAR; INTRAVENOUS; ORAL at 10:15

## 2021-03-24 RX ADMIN — IOPAMIDOL 75 ML: 755 INJECTION, SOLUTION INTRAVENOUS at 10:56

## 2021-03-31 RX ORDER — DIPHENHYDRAMINE HYDROCHLORIDE 50 MG/ML
50 INJECTION INTRAMUSCULAR; INTRAVENOUS ONCE
Status: CANCELLED | OUTPATIENT
Start: 2021-04-02 | End: 2021-04-02

## 2021-03-31 RX ORDER — LAMOTRIGINE 25 MG/1
250 TABLET ORAL ONCE
Status: CANCELLED | OUTPATIENT
Start: 2021-04-02 | End: 2021-04-02

## 2021-03-31 RX ORDER — SODIUM CHLORIDE 9 MG/ML
INJECTION, SOLUTION INTRAVENOUS CONTINUOUS
Status: CANCELLED | OUTPATIENT
Start: 2021-04-02

## 2021-03-31 RX ORDER — EPINEPHRINE 1 MG/ML
0.3 INJECTION, SOLUTION, CONCENTRATE INTRAVENOUS PRN
Status: CANCELLED | OUTPATIENT
Start: 2021-04-02

## 2021-03-31 RX ORDER — METHYLPREDNISOLONE SODIUM SUCCINATE 125 MG/2ML
125 INJECTION, POWDER, LYOPHILIZED, FOR SOLUTION INTRAMUSCULAR; INTRAVENOUS ONCE
Status: CANCELLED | OUTPATIENT
Start: 2021-04-02 | End: 2021-04-02

## 2021-04-01 ENCOUNTER — OFFICE VISIT (OUTPATIENT)
Dept: ONCOLOGY | Age: 51
End: 2021-04-01
Payer: COMMERCIAL

## 2021-04-01 ENCOUNTER — HOSPITAL ENCOUNTER (OUTPATIENT)
Dept: INFUSION THERAPY | Age: 51
Discharge: HOME | End: 2021-04-01
Payer: COMMERCIAL

## 2021-04-01 VITALS
HEART RATE: 64 BPM | SYSTOLIC BLOOD PRESSURE: 110 MMHG | RESPIRATION RATE: 16 BRPM | BODY MASS INDEX: 34.52 KG/M2 | HEIGHT: 66 IN | WEIGHT: 214.8 LBS | DIASTOLIC BLOOD PRESSURE: 71 MMHG | TEMPERATURE: 97.2 F | OXYGEN SATURATION: 97 %

## 2021-04-01 DIAGNOSIS — C79.51 SECONDARY MALIGNANT NEOPLASM OF BONE (HCC): Primary | ICD-10-CM

## 2021-04-01 DIAGNOSIS — C50.911 PRIMARY CANCER OF RIGHT BREAST WITH METASTASIS TO OTHER SITE (HCC): ICD-10-CM

## 2021-04-01 DIAGNOSIS — C78.7 SECONDARY MALIGNANT NEOPLASM OF LIVER AND INTRAHEPATIC BILE DUCT (HCC): ICD-10-CM

## 2021-04-01 PROCEDURE — 99214 OFFICE O/P EST MOD 30 MIN: CPT | Performed by: INTERNAL MEDICINE

## 2021-04-01 PROCEDURE — 99211 OFF/OP EST MAY X REQ PHY/QHP: CPT

## 2021-04-01 ASSESSMENT — PATIENT HEALTH QUESTIONNAIRE - PHQ9
1. LITTLE INTEREST OR PLEASURE IN DOING THINGS: 0
SUM OF ALL RESPONSES TO PHQ9 QUESTIONS 1 & 2: 0

## 2021-04-01 NOTE — PROGRESS NOTES
MA Rooming Questions  Patient: Shen Skaggs  MRN: [id number]    Date: 4/1/2021        1. Do you have any new issues?   no         2. Do you need any refills on medications?    no    3. Have you had any imaging done since your last visit?   no    4. Have you been hospitalized or seen in the emergency room since your last visit here?   no    5. Did the patient have a depression screening completed today?  Yes    PHQ-9 Total Score: 0 (4/1/2021 10:18 AM)       PHQ-9 Given to (if applicable):               PHQ-9 Score (if applicable):                     [] Positive     []  Negative              Does question #9 need addressed (if applicable)                     [] Yes    []  No               Tae Soler MA

## 2021-04-01 NOTE — PROGRESS NOTES
Chief Complaint  Breast cancer, female ( Stage Date: Unknown, Stage IV (Right breast upper-outer quadrant, T1c, N0, M1)-Clinical  Date of Dx:10/2017 Extent of Disease: Evidence of metastatic disease; Disease State: Initial diagnosis; Metastatic Sites: Bone; Metastatic Sites: Liver; ER Status: Positive; ME Status: Positive; HER-2/jus Value-FISH: Negative; Menopausal Status: Premenopausal; PIK3CA gene: Unknown; )  Problem List  Breast cancer, female ( Stage Date: Unknown, Stage IV (Right breast upper-outer quadrant, T1c, N0, M1)-Clinical  Date of Dx:10/2017 Extent of Disease: Evidence of metastatic disease; Disease State: Initial diagnosis; Metastatic Sites: Bone; Metastatic Sites: Liver; ER Status: Positive; ME Status: Positive; HER-2/jus Value-FISH: Negative; Menopausal Status: Premenopausal; PIK3CA gene: Unknown; )   Abnormal radiological finding   Bony metastasis ( Date of Dx:10/2017 )   Chest pain   Metastasis to liver  HPI  Extremely pleasant lady patient of Dr. Ree Sy. RN at [facility]. Evaluated in September 2017 for some discomfort involving the sternum that had been present for a few weeks. She could not remember any injury to that area except maybe a few months earlier she had fallen face-forward. She did not remember having significant discomfort to the chest area involving the fall. September 12, 2017, chest x-ray was reported to be fairly unremarkable. X-rays of the sternum revealed questionable age-indeterminate factor of the mid to superior sternum. Additional CT was recommended which was done on September 18, 2017, revealing mottled appearance of the sternum. Findings could represent related to earlier trauma or even osteomyelitis or metastatic disease. Nonspecific sclerotic changes in the T12 vertebral body were noted too.      Triple phase bone scan done on October 4, 2017, had revealed only focal uptake in the body of the sternum corresponding to the changes noted on earlier CAT scan. Similar differential was again suggested. Visit here on October 9, 2017, though, she stated that she in general was doing actually better and pain was not severe and she did not have to take any pain medications and had been continuing to work on a full time basis and also remained fairly active. CMP on October 09, 2017, was normal and so was LDL of 183. Sed rate was 10. Kappa Lambda light chain ratios were normal. Immunoelectrophoresis was negative. CBC had revealed fairly normal numbers. CT biopsy of the sternum done on October 17, 2017, did reveal changes of metastatic carcinoma, possibly suggestive of breast primary. Biopsy from the sternum done on October 13, 2017, revealed metastatic carcinoma quite likely from breast primary. The tumor was strongly positive for estrogen receptor 95 percent and progesterone receptor is 95 percent, and negative for HER2. Visit here on October 20, 2017, those findings were discussed with her. October 25, 2017, diagnostic digital bilateral breast mammogram revealed 1.8 cm irregular, spiculated hyperechoic mass at the 10 oclock position in the right breast correlating to the mammographic area. Findings were highly suspicious for neoplasm. Ultrasound guided biopsy on October 27, 2017, did reveal invasive mammary carcinoma. Ultrasound-guided biopsy October 27, 2017, from this mass revealed invasive mammary carcinoma. Tumor was again strongly positive 95 percent for ER, 95 percent DE, HER-2 was negative both by IHC and FISH.      PET scan done on November 2, 2017, revealed hypermetabolic right breast mass as well as prominent right axillary lymph nodes and scattered osseous metastatic lesions involving the sternum, right scapula, left L4 vertebral body, and bilateral iliac crest.     Visit here on October 27, 2017, and afterwards metastatic disease involving the bones were discussed with her and clinically she was still considered work-up including echo and stress test afterwards and those were unremarkable. She did have a CTA of the chest done looking for pulmonary embolism on May 4, 2019 which was negative for pulmonary embolism no other acute pulmonary illness. She still of course was noted to have several sclerotic lesions within the spine which were felt to be stable as compared to the prior PET scan which was done in May 2018. [facility] levels done on July 16, 8369 was 39.7, follicular phase. CA-27-29 level same day was 26, stable. [address] [facility] [address] September was 17, 2019 we talked about still being pre-or perimenopausal Darst will continue to stay on Zoladex along with Faslodex. Talked about disease overall having responded. Will possibly extend intervals between Zometa to may be once a year, may be due in July 2020. Did start her on Effexor for hot flashes    March 12, 2020 still being treated with same agents, still working on a long hours still in general feeling well only issue being occasional discomfort in the right rib cage as well as right breast area. Could not feel any distinct masses. Denied any history of injury. Otherwise negative for any new systemic complaints and overall both physically and emotionally was doing quite well    Telemedicine visit on April 23, 2020. We discussed results those were obtained on her previous visit on March 12, 2020. Chemistries CBC were fairly unremarkable CA-27-29 levels were still normal. She had undergone CT scan of the chest on March 24, 2020 was noted to have a stable bony lesions. But she was described to have a 2.8 cm lesion in liver which was worrisome for possibly metastatic disease. Following those studies she was advised to undergo PET scan and that was refused by insurance. She did undergo a bone scan on April 28, 2020 was noted to have stable appearance of skeletal metastatic disease no new metastatic lesions were noted.  She did undergo a CT scan of the abdomen and pelvis with contrast on the same day again was noted to have a 2.3 x 2.7 cm left hepatic lesion which was worrisome for metastatic disease and again of course skeletal metastases were noted. Telemedicine visit again on May 5, 2020. April 27, 2020 she did undergo liver biopsy. Pathology did reveal metastatic adenocarcinoma from a breast primary. Tumor was still positive for ER 50% DC 40%. HER-2 was equivocal by IHC though negative by FISH. May 6, 2020 had long discussion with her and her family. We talked about progressive nature of the disease. We talked about future plan including liver biopsy material being sent to [facility] 1 analysis. We talked about the need for us to change her treatment to a combination of Faslodex, Zoladex and add Verzenio. We also discussed possibly doing a PET scan. We do know she has disease involving the breast, bones and now liver. Our intention is that if indeed she has an isolated lesion in the liver we might consider doing local treatment to the liver besides this new systemic therapy. PET scan done on May 21, 7422 revealed metabolically active metastases in the left hepatic lobe no other metabolic activity was noted in the known skeletal metastases mammogram revealed stable appearance no new sites of malignancy were noted. Treatment was changed to Faslodex Zoladex and Verzenio in May 2020    [facility] 1 analysis from the liver lesion did reveal a tumor to be positive for PI K3 CA. Otherwise microsatellite status was stable and no other alterations were noted especially in BRCA1 and BRCA2 as well as HER-2      As PET scan had revealed an isolated lesion involving the liver she did undergo radio frequency ablation of the lesion on June 24, 2020, tolerated procedure fairly well though had some discomfort afterwards.  CT scan of the abdomen done without contrast on June 25, 2020 did reveal sequela of treated metastatic lesion in the left lobe of the liver with central high attenuation area measuring 2.6 x 2.4 cm and the peripheral low-attenuation rim measuring around 4.8 x 4.1 cm related to the postprocedural edema. Again osseous metastatic disease was again noted without pathologic fracture. July 17, 2020 she was overall doing fairly decent had recovered from the discomfort related to the radiofrequency ablation. We talked about continuing on this present regimen does do plan to check her [address] levels to see if we can hold off giving her Zoladex. We of course will continue with Faslodex, Verzenio and Zometa. We of course will continue to monitor her progress through exams as well as CAT scans. [address] level done on July 30, 2020 was 9.4, premenopausal.    Visit here on September 15, 2020. Was still being treated with Faslodex Zoladex and Verzenio. Studies done on October 9, 2020 fairly normal CBC, chemistries reveal creatinine of 1.2 CA 27-29 levels were mildly elevated to 47. A CT scan of the abdomen done on October 15, 2020 revealed 3.5 x 3.1 cm ablation zone, which had decreased in size since immediate post ablation changes done on June 25, 2020. It is difficult to assess residual tremor dedicated multiphasic CT or MRI was recommended. Multifocal osseous sclerotic metastases were felt to be stable. October 27, 2020 overall she was feeling about the same there was some concern about slight increase in tumor marker CA 27-29. Lilly Melendez Plan was to to multiphasic CT scan of the abdomen to assess the disease status. Also she was seriously considering having her unilateral oophorectomy I think that will be reasonable. 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 On December 17, 2020 she did undergo left oophorectomy. She also the same day underwent removal of Meckel's diverticulum. Tolerated the procedure well. Pathology from the ovary did not show any significant pathologic changes. CA 27-29 levels done on November 25, 2020 was 47.2 not significantly different from before. Visit here on January 5, 2020 she was still being treated with Faslodex and Verzenio. Of course Zoladex was stopped after her undergoing oophorectomy. CA 27-29 levels done on February 5, 2021 was slightly higher than before, 54.5. With her continued elevation of CA 27-29, though minimally, wanted to do a PET scan, was not approved by [employer]. Underwent a CT scan of the chest abdomen and pelvis on March 24, 2021. There was no new metastatic disease noted in the chest abdomen or pelvis the treated left hepatic mass was stable in appearance. Also stable appearance of treated skeletal metastatic disease. Small bilateral pulmonary nodules too were a stable. Visit here on April 1, 2021 all in all she was feeling quite well still tolerating therapy with Faslodex and Verzenio extremely well. Counts have been fairly reasonable. Past Medical History    Basically healthy. Surgical History    1. Abdominal hysterectomy with unilateral oophorectomy for endometriosis in the year 2000. 2. Surgery on right elbow in 2013. Social History  Smoking Status Former smokerUsed to smoke, quit in 2004. Negative for significant ethanol intake. Occasional social usage. .  works at a [employer]. Two sons ages 32 and 25. 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     Family History    0 FH  Current Therapy   Fulvestrant D1,15 (Initial) fb Fulvestrant D1 (Maintenance) + Goserelin Q28D Cycle Length: 28 Number Cycles: 36 Start: C1D1 on 10/27/2017 Assoc Dx:  Breast cancer, female LOT: 1st Line Metastatic or Recurrent Stage: IV 10/27/2017 C1 D1  Fulvestrant IM, 500 mg   Fulvestrant IM, 500 mg   Goserelin Subcutaneous, positive for ER and IN and negative for HER2. October 25, 2017, mammogram did reveal a lesion in the right breast of 1.8 cm in the upper outer quadrant. Biopsy for which did reveal invasive mammary carcinoma. October 27, 2017, I did discuss the findings with her and her extended family. We talked about basically the finding of carcinoma involving the right breast as well as bones. Biopsy from the sternum was positive. A bone scan also showed questionable changes involving the T12, though CT scan of the chest otherwise was negative for any pulmonary or hepatic metastases. Biopsy from the right breast October 27, 2017, also revealed invasive mammary carcinoma, strongly positive ER, IN, and HER-2. PET scan done on November 2, 2017, revealed hypermetabolic right breast as well as right axillary nodes and osseous metastases involving sternum, right scapula, and L4 vertebral body. She was considered a premenopausal lady with metastatic disease involving the bones. Treatment choices were discussed with her and on November 10, 2017, was started on treatment with Zoladex, Faslodex, and Zometa. Visit here on November 20, 2017, she was tolerating these treatments well following the Zometa. December 7, 2017, bony pains were better, but she was concerned about possibly a lump involving the 6 oclock position right breast. Mammogram and ultrasound did not reveal any mass in that area. She was still noted to have lesion in the upper outer quadrant which had been biopsied before. The PET scan done on May 14, 2018, had revealed improvement in disease involving the breast, axilla, as well as bones. Previous PET scan had shown changes involving the sternum, scapula, and L4 areas; those areas did show improvement on the PET scan.       Visit here on February 26, 2019 overall I think she was doing fair but we were concerned about her starting to have more discomfort in her left elbow as well as the right hip area. She had not had a PET scan since May of last year and that too had revealed a partial response, we felt possibly repeating the PET scan would be helpful. In the meantime she was going to continue to receive the same treatments as before including Faslodex, Zoladex and Zometa  She was not able to undergo a PET scan but insurance. Did undergo a bone scan on April 4, 2019 revealing a stable bone scan without evidence of new osseous metastases. May , 2019 was evaluated for symptoms of chest pain including a cardiac work-up including echo and stress test afterwards and those were unremarkable. She did have a CTA of the chest done looking for pulmonary embolism on May 4, 2019 which was negative for pulmonary embolism no other acute pulmonary illness. She still of course was noted to have several sclerotic lesions within the spine which were felt to be stable as compared to the prior PET scan which was done in May 2018. [facility] September was 17, 2019 we talked about still being pre-or perimenopausal Darst will continue to stay on Zoladex along with Faslodex. Talked about disease overall having responded. Will possibly extend intervals between Zometa to may be once a year, may be due in July 2020. Did start her on Effexor for hot flashes  March 12, 2020 overall was doing fairly well Effexor was helpful with hot flashes. Denied significant new issues except for discomfort in the breast as well as rib cage area. Is due to have her bone scan mammogram and CT scan of the chest done to look for the discomfort in the chest area. Otherwise will continue on the same regimen. She will see me after the studies are completed. Telemedicine visit on April 23, 2020, as described above new issues being no obvious progression noted on a bone scan and blood work including tumor markers.  CT scan of the chest as well as CT scan of the abdomen showing 2.7 cm lesion in the left hepatic lobe quite worrisome for possible metastatic disease. Clinically she was still doing well. I did encourage her to undergo a biopsy of the liver lesion. And following the biopsy could decide about if we need to send material for [place] [id number] study also    Telemedicine visit again on May 5, 2020. April 27, 2020 she did undergo liver biopsy. Pathology did reveal metastatic adenocarcinoma from a breast primary. Tumor was still positive for ER 50% KS 40%. HER-2 was equivocal by IHC though negative by FISH. May 6, 2020 had long discussion with her and her family. We talked about progressive nature of the disease. We talked about future plan including liver biopsy material being sent to [place] [address] analysis. We talked about the need for us to change her treatment to a combination of Faslodex, Zoladex and add Verzenio. We also discussed possibly doing a PET scan. We do know she has disease involving the breast, bones and now liver. Our intention is that if indeed she has an isolated lesion in the liver we might consider doing local treatment to the liver besides this new systemic therapy. June 1, 2020 I did spend time with her and her sister. We talked about PET scan revealing stable bony metastases and an isolated lesion involving the liver, biopsy-proven malignancy. Mammogram was unchanged from before. Couple weeks prior to this visit had started taking Verzenio along with Faslodex and Zoladex. We did talk about staying on the present systemic therapy and of course will continue to monitor her progress. We also discussed with her about possibly doing radiofrequency ablation treatment to the single lesion involving the liver. As described above on June 24, 2020 she did undergo radiofrequency ablation of the isolated lesion in the liver. July 17, 2020 she was overall doing fairly decent had recovered from the discomfort related to the radiofrequency ablation.  We talked about continuing on this present regimen does do plan to check her [address] [place] [address] levels to see if we can hold off giving her Zoladex. We of course will continue with Faslodex, Verzenio and Zometa. We of course will continue to monitor her progress through exams as well as CAT scans. September 15, 2020 was still being treated with Faslodex Zoladex and Verzenio. FSH levels were in premenopausal levels. We also planning to give zometa from time to time. She in general was feeling fairly well major issue was discomfort primarily from Faslodex injections. We talked about checking her blood work with the next injection on October 9 including chemistries and CA-27-29. We also talked about checking her a CT scan of the abdomen to see the status of the lesion following the radiofrequency ablation. Otherwise as before we will continue to monitor her blood progress exams CAT scans and possibly PET scans. January 5, 2021 spend time with her on the few issues as described below. #1 regarding metastatic carcinoma of the breast, premenopausal, s/p  oophorectomy, currently being treated with Faslodex and Verzenio. She was has had locally treatment for isolated liver metastases done on June 24, 2020. CBC was drawn today counts were reasonable that she could continue on the same dosages of Verzenio. Advised her to stay on the present therapy and will recheck her blood tumor markers in February. February 16, 2021 telephone conversation with her. Clinically she was still doing quite well still on same medications as described above including Faslodex and Verzenio that she was tolerating well. Concern being is further rise of CA 27-29 levels. We talked about rechecking the level again in 3 weeks along with possibly PET scan if we could and if we could confirm progression of the disease then we might consider changing her treatment to [facility] along with Faslodex as the foundation 1 studies done before had revealed the presence of PIK 3 CA mutation.   Other wise there was stable microsatellite status tumor mutational burden was not elevated. She could not undergo a PET scan instead did a CT scan of the chest abdomen and pelvis on March 24, 2021 and those studies were negative for any obvious signs of progression of the disease. April 1, 2021 had long discussion with her. Clinically she was feeling well exam was unremarkable at least CAT scan studies are fairly unremarkable. Only concern being mild elevation of CA 27-29 levels. As all in all she was doing quite well we felt comfortable keeping her on treatment with Faslodex and Verzenio continue to monitor her CBC and chemistries possibly recheck her tumor markers in 8 weeks and still the plan remains the same if there is documented progression we might consider changing treatment to [address] She is going to be seen here in 8 weeks possibly by one of my partners.

## 2021-04-02 ENCOUNTER — HOSPITAL ENCOUNTER (OUTPATIENT)
Dept: INFUSION THERAPY | Age: 51
Setting detail: INFUSION SERIES
Discharge: HOME | End: 2021-04-02
Payer: COMMERCIAL

## 2021-04-02 VITALS
TEMPERATURE: 97.3 F | OXYGEN SATURATION: 98 % | RESPIRATION RATE: 16 BRPM | SYSTOLIC BLOOD PRESSURE: 110 MMHG | HEART RATE: 66 BPM | DIASTOLIC BLOOD PRESSURE: 77 MMHG

## 2021-04-02 DIAGNOSIS — C79.51 SECONDARY MALIGNANT NEOPLASM OF BONE (HCC): ICD-10-CM

## 2021-04-02 DIAGNOSIS — Z17.0 MALIGNANT NEOPLASM OF UPPER-OUTER QUADRANT OF RIGHT BREAST IN FEMALE, ESTROGEN RECEPTOR POSITIVE (HCC): Primary | ICD-10-CM

## 2021-04-02 DIAGNOSIS — C78.7 SECONDARY MALIGNANT NEOPLASM OF LIVER AND INTRAHEPATIC BILE DUCT (HCC): ICD-10-CM

## 2021-04-02 DIAGNOSIS — C50.411 MALIGNANT NEOPLASM OF UPPER-OUTER QUADRANT OF RIGHT BREAST IN FEMALE, ESTROGEN RECEPTOR POSITIVE (HCC): Primary | ICD-10-CM

## 2021-04-02 LAB
ALBUMIN SERPL-MCNC: 3.9 GM/DL (ref 3.4–5)
ALP BLD-CCNC: 47 IU/L (ref 40–129)
ALT SERPL-CCNC: 13 U/L (ref 10–40)
ANION GAP SERPL CALCULATED.3IONS-SCNC: 8 MMOL/L (ref 4–16)
AST SERPL-CCNC: 20 IU/L (ref 15–37)
BASOPHILS ABSOLUTE: 0.1 K/CU MM
BASOPHILS RELATIVE PERCENT: 2.3 % (ref 0–1)
BILIRUB SERPL-MCNC: 0.4 MG/DL (ref 0–1)
BUN BLDV-MCNC: 19 MG/DL (ref 6–23)
CALCIUM SERPL-MCNC: 9 MG/DL (ref 8.3–10.6)
CHLORIDE BLD-SCNC: 103 MMOL/L (ref 99–110)
CO2: 24 MMOL/L (ref 21–32)
CREAT SERPL-MCNC: 1.2 MG/DL (ref 0.6–1.1)
DIFFERENTIAL TYPE: ABNORMAL
EOSINOPHILS ABSOLUTE: 0 K/CU MM
EOSINOPHILS RELATIVE PERCENT: 1.2 % (ref 0–3)
GFR AFRICAN AMERICAN: 58 ML/MIN/1.73M2
GFR NON-AFRICAN AMERICAN: 48 ML/MIN/1.73M2
GLUCOSE BLD-MCNC: 96 MG/DL (ref 70–99)
HCT VFR BLD CALC: 38.3 % (ref 37–47)
HEMOGLOBIN: 13.3 GM/DL (ref 12.5–16)
IMMATURE NEUTROPHIL %: 0 % (ref 0–0.43)
LYMPHOCYTES ABSOLUTE: 1.9 K/CU MM
LYMPHOCYTES RELATIVE PERCENT: 56.3 % (ref 24–44)
MCH RBC QN AUTO: 33.8 PG (ref 27–31)
MCHC RBC AUTO-ENTMCNC: 34.7 % (ref 32–36)
MCV RBC AUTO: 97.2 FL (ref 78–100)
MONOCYTES ABSOLUTE: 0.3 K/CU MM
MONOCYTES RELATIVE PERCENT: 8.5 % (ref 0–4)
NUCLEATED RBC %: 0 %
PDW BLD-RTO: 12.5 % (ref 11.7–14.9)
PLATELET # BLD: 230 K/CU MM (ref 140–440)
PMV BLD AUTO: 9.2 FL (ref 7.5–11.1)
POTASSIUM SERPL-SCNC: 4.2 MMOL/L (ref 3.5–5.1)
RBC # BLD: 3.94 M/CU MM (ref 4.2–5.4)
SEGMENTED NEUTROPHILS ABSOLUTE COUNT: 1.1 K/CU MM
SEGMENTED NEUTROPHILS RELATIVE PERCENT: 31.7 % (ref 36–66)
SODIUM BLD-SCNC: 135 MMOL/L (ref 135–145)
TOTAL IMMATURE NEUTOROPHIL: 0 K/CU MM
TOTAL NUCLEATED RBC: 0 K/CU MM
TOTAL PROTEIN: 6.4 GM/DL (ref 6.4–8.2)
WBC # BLD: 3.4 K/CU MM (ref 4–10.5)

## 2021-04-02 PROCEDURE — 96402 CHEMO HORMON ANTINEOPL SQ/IM: CPT

## 2021-04-02 PROCEDURE — 80053 COMPREHEN METABOLIC PANEL: CPT

## 2021-04-02 PROCEDURE — 85025 COMPLETE CBC W/AUTO DIFF WBC: CPT

## 2021-04-02 PROCEDURE — 99211 OFF/OP EST MAY X REQ PHY/QHP: CPT

## 2021-04-02 PROCEDURE — 6360000002 HC RX W HCPCS: Performed by: INTERNAL MEDICINE

## 2021-04-02 PROCEDURE — 96372 THER/PROPH/DIAG INJ SC/IM: CPT

## 2021-04-02 RX ORDER — LAMOTRIGINE 25 MG/1
250 TABLET ORAL ONCE
Status: COMPLETED | OUTPATIENT
Start: 2021-04-02 | End: 2021-04-02

## 2021-04-02 RX ADMIN — FULVESTRANT 250 MG: 50 INJECTION INTRAMUSCULAR at 08:44

## 2021-04-02 RX ADMIN — FULVESTRANT 250 MG: 50 INJECTION INTRAMUSCULAR at 08:43

## 2021-04-02 ASSESSMENT — PAIN SCALES - GENERAL: PAINLEVEL_OUTOF10: 0

## 2021-04-02 NOTE — PROGRESS NOTES
Tolerated Faslodex injections well. Reviewed discharge instruction, voiced understanding. Copies of AVS given. Pt discharged home. Pt to exit via ambulation. Next appt confirmed with patient.     Orders Placed This Encounter   Medications    fulvestrant (FASLODEX) IM injection 250 mg    fulvestrant (FASLODEX) IM injection 250 mg

## 2021-04-29 RX ORDER — SODIUM CHLORIDE 9 MG/ML
INJECTION, SOLUTION INTRAVENOUS CONTINUOUS
Status: CANCELLED | OUTPATIENT
Start: 2021-04-30

## 2021-04-29 RX ORDER — EPINEPHRINE 1 MG/ML
0.3 INJECTION, SOLUTION, CONCENTRATE INTRAVENOUS PRN
Status: CANCELLED | OUTPATIENT
Start: 2021-04-30

## 2021-04-29 RX ORDER — DIPHENHYDRAMINE HYDROCHLORIDE 50 MG/ML
50 INJECTION INTRAMUSCULAR; INTRAVENOUS ONCE
Status: CANCELLED | OUTPATIENT
Start: 2021-04-30 | End: 2021-04-30

## 2021-04-29 RX ORDER — LAMOTRIGINE 25 MG/1
250 TABLET ORAL ONCE
Status: CANCELLED | OUTPATIENT
Start: 2021-04-30 | End: 2021-04-30

## 2021-04-29 RX ORDER — METHYLPREDNISOLONE SODIUM SUCCINATE 125 MG/2ML
125 INJECTION, POWDER, LYOPHILIZED, FOR SOLUTION INTRAMUSCULAR; INTRAVENOUS ONCE
Status: CANCELLED | OUTPATIENT
Start: 2021-04-30 | End: 2021-04-30

## 2021-04-30 ENCOUNTER — HOSPITAL ENCOUNTER (OUTPATIENT)
Dept: INFUSION THERAPY | Age: 51
Setting detail: INFUSION SERIES
Discharge: HOME | End: 2021-04-30
Payer: COMMERCIAL

## 2021-04-30 VITALS
TEMPERATURE: 97.3 F | HEART RATE: 65 BPM | OXYGEN SATURATION: 98 % | DIASTOLIC BLOOD PRESSURE: 91 MMHG | SYSTOLIC BLOOD PRESSURE: 112 MMHG | RESPIRATION RATE: 16 BRPM

## 2021-04-30 DIAGNOSIS — C78.7 SECONDARY MALIGNANT NEOPLASM OF LIVER AND INTRAHEPATIC BILE DUCT (HCC): ICD-10-CM

## 2021-04-30 DIAGNOSIS — C50.411 MALIGNANT NEOPLASM OF UPPER-OUTER QUADRANT OF RIGHT BREAST IN FEMALE, ESTROGEN RECEPTOR POSITIVE (HCC): Primary | ICD-10-CM

## 2021-04-30 DIAGNOSIS — Z17.0 MALIGNANT NEOPLASM OF UPPER-OUTER QUADRANT OF RIGHT BREAST IN FEMALE, ESTROGEN RECEPTOR POSITIVE (HCC): Primary | ICD-10-CM

## 2021-04-30 PROCEDURE — 99211 OFF/OP EST MAY X REQ PHY/QHP: CPT

## 2021-04-30 PROCEDURE — 96402 CHEMO HORMON ANTINEOPL SQ/IM: CPT

## 2021-04-30 RX ORDER — LAMOTRIGINE 25 MG/1
250 TABLET ORAL ONCE
Status: COMPLETED | OUTPATIENT
Start: 2021-04-30 | End: 2021-04-30

## 2021-04-30 RX ADMIN — LAMOTRIGINE 250 MG: 25 TABLET ORAL at 08:23

## 2021-04-30 RX ADMIN — LAMOTRIGINE 250 MG: 25 TABLET ORAL at 08:24

## 2021-04-30 ASSESSMENT — PAIN SCALES - GENERAL: PAINLEVEL_OUTOF10: 0

## 2021-04-30 NOTE — PROGRESS NOTES
Prior to discharge, the After Visit Summary Discharge Instructions were reviewed with the patient. She was offered a printed version of the AVS, but declined the offer. Recurrent appointment, pt tolerated infusion well. Pt discharged HOME. Pt to exit via ambulation.     Orders Placed This Encounter   Medications    fulvestrant (FASLODEX) IM injection 250 mg    fulvestrant (FASLODEX) IM injection 250 mg

## 2021-05-06 ENCOUNTER — CLINICAL DOCUMENTATION (OUTPATIENT)
Dept: ONCOLOGY | Age: 51
End: 2021-05-06

## 2021-05-06 NOTE — PROGRESS NOTES
Rec'd request for PA for [employer], called the number on request to initiate PA, turn around time is 24-72 hr

## 2021-05-26 RX ORDER — DIPHENHYDRAMINE HYDROCHLORIDE 50 MG/ML
50 INJECTION INTRAMUSCULAR; INTRAVENOUS ONCE
Status: CANCELLED | OUTPATIENT
Start: 2021-05-28 | End: 2021-05-28

## 2021-05-26 RX ORDER — EPINEPHRINE 1 MG/ML
0.3 INJECTION, SOLUTION, CONCENTRATE INTRAVENOUS PRN
Status: CANCELLED | OUTPATIENT
Start: 2021-05-28

## 2021-05-26 RX ORDER — SODIUM CHLORIDE 9 MG/ML
INJECTION, SOLUTION INTRAVENOUS CONTINUOUS
Status: CANCELLED | OUTPATIENT
Start: 2021-05-28

## 2021-05-26 RX ORDER — LAMOTRIGINE 25 MG/1
250 TABLET ORAL ONCE
Status: CANCELLED | OUTPATIENT
Start: 2021-05-28 | End: 2021-05-28

## 2021-05-26 RX ORDER — METHYLPREDNISOLONE SODIUM SUCCINATE 125 MG/2ML
125 INJECTION, POWDER, LYOPHILIZED, FOR SOLUTION INTRAMUSCULAR; INTRAVENOUS ONCE
Status: CANCELLED | OUTPATIENT
Start: 2021-05-28 | End: 2021-05-28

## 2021-05-28 ENCOUNTER — HOSPITAL ENCOUNTER (OUTPATIENT)
Dept: INFUSION THERAPY | Age: 51
Setting detail: INFUSION SERIES
Discharge: HOME | End: 2021-05-28
Payer: COMMERCIAL

## 2021-05-28 VITALS
DIASTOLIC BLOOD PRESSURE: 85 MMHG | RESPIRATION RATE: 16 BRPM | SYSTOLIC BLOOD PRESSURE: 125 MMHG | HEART RATE: 64 BPM | TEMPERATURE: 97.3 F | OXYGEN SATURATION: 98 %

## 2021-05-28 DIAGNOSIS — C78.7 SECONDARY MALIGNANT NEOPLASM OF LIVER AND INTRAHEPATIC BILE DUCT (HCC): ICD-10-CM

## 2021-05-28 DIAGNOSIS — Z17.0 MALIGNANT NEOPLASM OF UPPER-OUTER QUADRANT OF RIGHT BREAST IN FEMALE, ESTROGEN RECEPTOR POSITIVE (HCC): Primary | ICD-10-CM

## 2021-05-28 DIAGNOSIS — C50.411 MALIGNANT NEOPLASM OF UPPER-OUTER QUADRANT OF RIGHT BREAST IN FEMALE, ESTROGEN RECEPTOR POSITIVE (HCC): Primary | ICD-10-CM

## 2021-05-28 PROCEDURE — 96402 CHEMO HORMON ANTINEOPL SQ/IM: CPT

## 2021-05-28 PROCEDURE — 99211 OFF/OP EST MAY X REQ PHY/QHP: CPT

## 2021-05-28 RX ORDER — LAMOTRIGINE 25 MG/1
250 TABLET ORAL ONCE
Status: COMPLETED | OUTPATIENT
Start: 2021-05-28 | End: 2021-05-28

## 2021-05-28 RX ADMIN — LAMOTRIGINE 250 MG: 25 TABLET ORAL at 08:50

## 2021-05-28 ASSESSMENT — PAIN SCALES - GENERAL: PAINLEVEL_OUTOF10: 0

## 2021-06-03 ENCOUNTER — HOSPITAL ENCOUNTER (OUTPATIENT)
Dept: INFUSION THERAPY | Age: 51
Discharge: HOME | End: 2021-06-03
Payer: COMMERCIAL

## 2021-06-03 ENCOUNTER — OFFICE VISIT (OUTPATIENT)
Dept: ONCOLOGY | Age: 51
End: 2021-06-03
Payer: COMMERCIAL

## 2021-06-03 VITALS
WEIGHT: 217.3 LBS | OXYGEN SATURATION: 99 % | HEART RATE: 65 BPM | TEMPERATURE: 97.6 F | HEIGHT: 67 IN | RESPIRATION RATE: 16 BRPM | SYSTOLIC BLOOD PRESSURE: 115 MMHG | BODY MASS INDEX: 34.11 KG/M2 | DIASTOLIC BLOOD PRESSURE: 78 MMHG

## 2021-06-03 DIAGNOSIS — C50.911 PRIMARY CANCER OF RIGHT BREAST WITH METASTASIS TO OTHER SITE (HCC): ICD-10-CM

## 2021-06-03 DIAGNOSIS — C50.911 PRIMARY CANCER OF RIGHT BREAST WITH METASTASIS TO OTHER SITE (HCC): Primary | ICD-10-CM

## 2021-06-03 LAB
ALBUMIN SERPL-MCNC: 4.5 GM/DL (ref 3.4–5)
ALP BLD-CCNC: 55 IU/L (ref 40–128)
ALT SERPL-CCNC: 18 U/L (ref 10–40)
ANION GAP SERPL CALCULATED.3IONS-SCNC: 15 MMOL/L (ref 4–16)
AST SERPL-CCNC: 21 IU/L (ref 15–37)
BASOPHILS ABSOLUTE: 0.1 K/CU MM
BASOPHILS RELATIVE PERCENT: 1.5 % (ref 0–1)
BILIRUB SERPL-MCNC: 0.3 MG/DL (ref 0–1)
BUN BLDV-MCNC: 16 MG/DL (ref 6–23)
CALCIUM SERPL-MCNC: 9.8 MG/DL (ref 8.3–10.6)
CHLORIDE BLD-SCNC: 102 MMOL/L (ref 99–110)
CO2: 23 MMOL/L (ref 21–32)
CREAT SERPL-MCNC: 1.1 MG/DL (ref 0.6–1.1)
DIFFERENTIAL TYPE: ABNORMAL
EOSINOPHILS ABSOLUTE: 0.1 K/CU MM
EOSINOPHILS RELATIVE PERCENT: 1.5 % (ref 0–3)
GFR AFRICAN AMERICAN: >60 ML/MIN/1.73M2
GFR NON-AFRICAN AMERICAN: 53 ML/MIN/1.73M2
GLUCOSE BLD-MCNC: 83 MG/DL (ref 70–99)
HCT VFR BLD CALC: 37.2 % (ref 37–47)
HEMOGLOBIN: 13.3 GM/DL (ref 12.5–16)
LYMPHOCYTES ABSOLUTE: 1.9 K/CU MM
LYMPHOCYTES RELATIVE PERCENT: 48.5 % (ref 24–44)
MCH RBC QN AUTO: 34.5 PG (ref 27–31)
MCHC RBC AUTO-ENTMCNC: 35.8 % (ref 32–36)
MCV RBC AUTO: 96.6 FL (ref 78–100)
MONOCYTES ABSOLUTE: 0.3 K/CU MM
MONOCYTES RELATIVE PERCENT: 8.3 % (ref 0–4)
PDW BLD-RTO: 12.4 % (ref 11.7–14.9)
PLATELET # BLD: 200 K/CU MM (ref 140–440)
PMV BLD AUTO: 8.9 FL (ref 7.5–11.1)
POTASSIUM SERPL-SCNC: 4.4 MMOL/L (ref 3.5–5.1)
RBC # BLD: 3.85 M/CU MM (ref 4.2–5.4)
SEGMENTED NEUTROPHILS ABSOLUTE COUNT: 1.6 K/CU MM
SEGMENTED NEUTROPHILS RELATIVE PERCENT: 40.2 % (ref 36–66)
SODIUM BLD-SCNC: 140 MMOL/L (ref 135–145)
TOTAL PROTEIN: 6.9 GM/DL (ref 6.4–8.2)
WBC # BLD: 4 K/CU MM (ref 4–10.5)

## 2021-06-03 PROCEDURE — 86300 IMMUNOASSAY TUMOR CA 15-3: CPT

## 2021-06-03 PROCEDURE — 85025 COMPLETE CBC W/AUTO DIFF WBC: CPT

## 2021-06-03 PROCEDURE — 80053 COMPREHEN METABOLIC PANEL: CPT

## 2021-06-03 PROCEDURE — 99214 OFFICE O/P EST MOD 30 MIN: CPT | Performed by: INTERNAL MEDICINE

## 2021-06-03 PROCEDURE — 36415 COLL VENOUS BLD VENIPUNCTURE: CPT

## 2021-06-03 PROCEDURE — 99211 OFF/OP EST MAY X REQ PHY/QHP: CPT

## 2021-06-03 RX ORDER — ABEMACICLIB 150 MG/1
1 TABLET ORAL 2 TIMES DAILY
Qty: 60 TABLET | Refills: 3 | Status: SHIPPED | OUTPATIENT
Start: 2021-06-03 | End: 2021-10-27 | Stop reason: SDUPTHER

## 2021-06-03 RX ORDER — DIPHENHYDRAMINE HYDROCHLORIDE 50 MG/ML
50 INJECTION INTRAMUSCULAR; INTRAVENOUS ONCE
Status: CANCELLED | OUTPATIENT
Start: 2021-06-03 | End: 2021-06-03

## 2021-06-03 RX ORDER — EPINEPHRINE 1 MG/ML
0.3 INJECTION, SOLUTION, CONCENTRATE INTRAVENOUS PRN
Status: CANCELLED | OUTPATIENT
Start: 2021-06-03

## 2021-06-03 RX ORDER — METHYLPREDNISOLONE SODIUM SUCCINATE 125 MG/2ML
125 INJECTION, POWDER, LYOPHILIZED, FOR SOLUTION INTRAMUSCULAR; INTRAVENOUS ONCE
Status: CANCELLED | OUTPATIENT
Start: 2021-06-03 | End: 2021-06-03

## 2021-06-03 RX ORDER — SODIUM CHLORIDE 0.9 % (FLUSH) 0.9 %
5-40 SYRINGE (ML) INJECTION PRN
Status: CANCELLED | OUTPATIENT
Start: 2021-06-03

## 2021-06-03 RX ORDER — SODIUM CHLORIDE 9 MG/ML
25 INJECTION, SOLUTION INTRAVENOUS PRN
Status: CANCELLED | OUTPATIENT
Start: 2021-06-03

## 2021-06-03 RX ORDER — SODIUM CHLORIDE 9 MG/ML
20 INJECTION, SOLUTION INTRAVENOUS ONCE
Status: CANCELLED | OUTPATIENT
Start: 2021-06-03 | End: 2021-06-03

## 2021-06-03 RX ORDER — SODIUM CHLORIDE 9 MG/ML
INJECTION, SOLUTION INTRAVENOUS CONTINUOUS
Status: CANCELLED | OUTPATIENT
Start: 2021-06-03

## 2021-06-03 RX ORDER — HEPARIN SODIUM (PORCINE) LOCK FLUSH IV SOLN 100 UNIT/ML 100 UNIT/ML
500 SOLUTION INTRAVENOUS PRN
Status: CANCELLED | OUTPATIENT
Start: 2021-06-03

## 2021-06-03 ASSESSMENT — PATIENT HEALTH QUESTIONNAIRE - PHQ9
1. LITTLE INTEREST OR PLEASURE IN DOING THINGS: 0
SUM OF ALL RESPONSES TO PHQ QUESTIONS 1-9: 0
2. FEELING DOWN, DEPRESSED OR HOPELESS: 0
SUM OF ALL RESPONSES TO PHQ9 QUESTIONS 1 & 2: 0

## 2021-06-03 NOTE — PROGRESS NOTES
MA Rooming Questions  Patient: Manuela Mcelroy  MRN: [id number]    Date: 6/3/2021        1. Do you have any new issues?   no         2. Do you need any refills on medications?    no    3. Have you had any imaging done since your last visit?   no    4. Have you been hospitalized or seen in the emergency room since your last visit here?   no    5. Did the patient have a depression screening completed today?  Yes    PHQ-9 Total Score: 0 (6/3/2021 11:04 AM)       PHQ-9 Given to (if applicable):               PHQ-9 Score (if applicable):                     [] Positive     []  Negative              Does question #9 need addressed (if applicable)                     [] Yes    []  No               Familia Dee CMA

## 2021-06-03 NOTE — PROGRESS NOTES
Patient Name: Cherelle Pérez  Patient : 1970  Patient MRN: [id number]     Primary Oncologist: Hunter Brewer MD       Date of Service: 6/3/2021      Chief Complaint:   Chief Complaint   Patient presents with    Discuss Labs        Active Problem list  1. Primary cancer of right breast with metastasis to other site [facility])    Problem List  · Breast cancer, female ( Stage Date: Unknown, Stage IV (Right breast upper-outer quadrant, T1c, N0, M1)-Clinical  Date of Dx:10/2017 Extent of Disease: Evidence of metastatic disease; Disease State: Initial diagnosis; Metastatic Sites: Bone; Metastatic Sites: Liver; ER Status: Positive; DE Status: Positive; HER-2/jus Value-FISH: Negative; Menopausal Status: Premenopausal; PIK3CA gene: Positive)   · Abnormal radiological finding   · Bony metastasis ( Date of Dx:10/2017 )   · Chest pain   Metastasis to liver       HPI:        Extremely pleasant lady patient of Dr. Vonda Melissa RN at [facility]. Evaluated in 2017 for some discomfort involving the sternum that had been present for a few weeks. She could not remember any injury to that area except maybe a few months earlier she had fallen face-forward. She did not remember having significant discomfort to the chest area involving the fall. 2017, chest x-ray was reported to be fairly unremarkable. X-rays of the sternum revealed questionable age-indeterminate factor of the mid to superior sternum. Additional CT was recommended which was done on 2017, revealing mottled appearance of the sternum. Findings could represent related to earlier trauma or even osteomyelitis or metastatic disease. Nonspecific sclerotic changes in the T12 vertebral body were noted too. Triple phase bone scan done on 2017, had revealed only focal uptake in the body of the sternum corresponding to the changes noted on earlier CAT scan.  Similar differential was again suggested. Visit here on October 9, 2017, though, she stated that she in general was doing actually better and pain was not severe and she did not have to take any pain medications and had been continuing to work on a full time basis and also remained fairly active. CMP on October 09, 2017, was normal and so was LDL of 183. Sed rate was 10. Kappa Lambda light chain ratios were normal. Immunoelectrophoresis was negative. CBC had revealed fairly normal numbers. CT biopsy of the sternum done on October 17, 2017, did reveal changes of metastatic carcinoma, possibly suggestive of breast primary. Biopsy from the sternum done on October 13, 2017, revealed metastatic carcinoma quite likely from breast primary. The tumor was strongly positive for estrogen receptor 95 percent and progesterone receptor is 95 percent, and negative for HER2. Visit here on October 20, 2017, those findings were discussed with her. October 25, 2017, diagnostic digital bilateral breast mammogram revealed 1.8 cm irregular, spiculated hyperechoic mass at the 10 oclock position in the right breast correlating to the mammographic area. Findings were highly suspicious for neoplasm. Ultrasound guided biopsy on October 27, 2017, did reveal invasive mammary carcinoma. Ultrasound-guided biopsy October 27, 2017, from this mass revealed invasive mammary carcinoma. Tumor was again strongly positive 95 percent for ER, 95 percent IL, HER-2 was negative both by IHC and FISH.      PET scan done on November 2, 2017, revealed hypermetabolic right breast mass as well as prominent right axillary lymph nodes and scattered osseous metastatic lesions involving the sternum, right scapula, left L4 vertebral body, and bilateral iliac crest.     Visit here on October 27, 2017, and afterwards metastatic disease involving the bones were discussed with her and clinically she was still considered to be premenopausal as she was not having any symptoms suggestive of menopausal state. She was started on treatment with Zoladex, Faslodex, and Zometa starting on November 10, 2017. She had to receive these treatments in outpatient facility at the hospital primarily because of insurance reasons. Saw me on December 7, 2017, sooner than her scheduled appointment, was concerned about possibly feeling a lump involving the right breast at around 6 oclock position. Otherwise she had not noticed any new issues. Mammogram and ultrasound done on December 8, 2017, showed dense breast tissue where she was feeling a mass. There was no sonographic evidence for any mass in that area too. MRI was suggested if needed to. Otherwise she was again noted to have known malignancy in the upper outer quadrant of the breast.     PET scan on May 14, 2018, revealed partial treatment response with improvement in the right axillary nodes as well as skeletal metastases. Metabolic activity associated with lytic bone lesions had improved with increased sclerosis of the previously seen lesions. Mild residual activity was noted at the L4 vertebral body. There were no new persistent or metabolically active aggressive osseous lesions. Visit here on February 26, 2019 overall I think she was doing fair but we were concerned about her starting to have more discomfort in her left elbow as well as the right hip area. She had not had a PET scan since May of last year and that too had revealed a partial response, we felt possibly repeating the PET scan would be helpful. In the meantime she was going to continue to receive the same treatments as before including Faslodex, Zoladex and Zometa    She was not able to undergo a PET scan but insurance. Did undergo a bone scan on April 4, 2019 revealing a stable bone scan without evidence of new osseous metastases.     May fall, 2019 was evaluated for symptoms of chest pain including a cardiac work-up including echo and stress test afterwards and those were unremarkable. She did have a CTA of the chest done looking for pulmonary embolism on May 4, 2019 which was negative for pulmonary embolism no other acute pulmonary illness. She still of course was noted to have several sclerotic lesions within the spine which were felt to be stable as compared to the prior PET scan which was done in May 2018. [address] levels done on July 16, 5194 was 71.6, follicular phase. CA-27-29 level same day was 26, stable. [address] September was 17, 2019 we talked about still being pre-or perimenopausal Darst will continue to stay on Zoladex along with Faslodex. Talked about disease overall having responded. Will possibly extend intervals between Zometa to may be once a year, may be due in July 2020. Did start her on Effexor for hot flashes    March 12, 2020 still being treated with same agents, still working on a long hours still in general feeling well only issue being occasional discomfort in the right rib cage as well as right breast area. Could not feel any distinct masses. Denied any history of injury. Otherwise negative for any new systemic complaints and overall both physically and emotionally was doing quite well    Telemedicine visit on April 23, 2020. We discussed results those were obtained on her previous visit on March 12, 2020. Chemistries CBC were fairly unremarkable CA-27-29 levels were still normal. She had undergone CT scan of the chest on March 24, 2020 was noted to have a stable bony lesions. But she was described to have a 2.8 cm lesion in liver which was worrisome for possibly metastatic disease. Following those studies she was advised to undergo PET scan and that was refused by insurance. She did undergo a bone scan on April 28, 2020 was noted to have stable appearance of skeletal metastatic disease no new metastatic lesions were noted.  She did undergo a CT scan of the abdomen and pelvis with contrast on the same day again was noted to have a 2.3 x 2.7 cm left hepatic lesion which was worrisome for metastatic disease and again of course skeletal metastases were noted. Telemedicine visit again on May 5, 2020. April 27, 2020 she did undergo liver biopsy. Pathology did reveal metastatic adenocarcinoma from a breast primary. Tumor was still positive for ER 50% UT 40%. HER-2 was equivocal by IHC though negative by FISH. May 6, 2020 had long discussion with her and her family. We talked about progressive nature of the disease. We talked about future plan including liver biopsy material being sent to [facility] analysis. We talked about the need for us to change her treatment to a combination of Faslodex, Zoladex and add Verzenio. We also discussed possibly doing a PET scan. We do know she has disease involving the breast, bones and now liver. Our intention is that if indeed she has an isolated lesion in the liver we might consider doing local treatment to the liver besides this new systemic therapy. PET scan done on May 21, 8362 revealed metabolically active metastases in the left hepatic lobe no other metabolic activity was noted in the known skeletal metastases mammogram revealed stable appearance no new sites of malignancy were noted. Treatment was changed to Faslodex Zoladex and Verzenio in May 2020    [facility] analysis from the liver lesion did reveal a tumor to be positive for PI K3 CA. Otherwise microsatellite status was stable and no other alterations were noted especially in BRCA1 and BRCA2 as well as HER-2      As PET scan had revealed an isolated lesion involving the liver she did undergo radio frequency ablation of the lesion on June 24, 2020, tolerated procedure fairly well though had some discomfort afterwards.  CT scan of the abdomen done without contrast on June 25, 2020 did reveal sequela of treated metastatic lesion in the left lobe of the liver with central high attenuation area measuring 2.6 x 2.4 cm and the peripheral low-attenuation rim measuring around 4.8 x 4.1 cm related to the postprocedural edema. Again osseous metastatic disease was again noted without pathologic fracture. July 17, 2020 she was overall doing fairly decent had recovered from the discomfort related to the radiofrequency ablation. We talked about continuing on this present regimen does do plan to check her [address] [place] [address] levels to see if we can hold off giving her Zoladex. We of course will continue with Faslodex, Verzenio and Zometa. We of course will continue to monitor her progress through exams as well as CAT scans.     FSH level done on July 30, 2020 was 9.4, premenopausal.     Visit here on September 15, 2020. Was still being treated with Faslodex Zoladex and Verzenio.       Studies done on October 9, 2020 fairly normal CBC, chemistries reveal creatinine of 1.2 CA 27-29 levels were mildly elevated to 47. A CT scan of the abdomen done on October 15, 2020 revealed 3.5 x 3.1 cm ablation zone, which had decreased in size since immediate post ablation changes done on June 25, 2020. It is difficult to assess residual tremor dedicated multiphasic CT or MRI was recommended. Multifocal osseous sclerotic metastases were felt to be stable.     October 27, 2020 overall she was feeling about the same there was some concern about slight increase in tumor marker CA 27-29. Deidre Colder Plan was to to multiphasic CT scan of the abdomen to assess the disease status. Also she was seriously considering having her unilateral oophorectomy I think that will be reasonable. I     CT scan of the abdomen done on November 3, 2020 overall it was felt that there was stable present appearance of liver no suspicious contrast enhancement to indicate presence of residual tumor no new hepatic abnormality was noted again stable appearance of osteoblastic metastatic disease of the lower lumbar and thoracic spine.        On December 17, 2020 she did undergo left oophorectomy.   She also the same day underwent removal of Meckel's diverticulum. Tolerated the procedure well. Pathology from the ovary did not show any significant pathologic changes.     CA 27-29 levels done on November 25, 2020 was 47.2 not significantly different from before.     Visit here on January 5, 2020 she was still being treated with Faslodex and Verzenio. Of course Zoladex was stopped after her undergoing oophorectomy.          CA 27-29 levels done on February 5, 2021 was slightly higher than before, 54.5.     With her continued elevation of CA 27-29, though minimally, wanted to do a PET scan, was not approved by [employer]. Underwent a CT scan of the chest abdomen and pelvis on March 24, 2021. There was no new metastatic disease noted in the chest abdomen or pelvis the treated left hepatic mass was stable in appearance. Also stable appearance of treated skeletal metastatic disease. Small bilateral pulmonary nodules too were a stable.     Visit here on April 1, 2021 all in all she was feeling quite well still tolerating therapy with Faslodex and Verzenio extremely well. Counts have been fairly reasonable.     Sent here on 6/3/2021 she reported numbness and scar tissue formation in the gluteal area secondary to the Faslodex injection. Reported that she continues to have hot flashes. Reported chronic headaches secondary to migraine. Also reported blurred vision which is not extensive and has Lasix 20 years ago, has an follow-up appointment with ophthalmology. No chest pain or shortness of breath. No new breast masses. Reported that her legs hurt when she sits for long period Of time and try to get up. Past Medical History  None other than breast cancer    Surgical History    1. Abdominal hysterectomy with unilateral oophorectomy for endometriosis in the year 2000. 2. Surgery on right elbow in 2013. Social History  Smoking Status Former smokerUsed to smoke, quit in 2004. Negative for significant ethanol intake. Occasional social usage. .  works at a manufacturing plant. Two sons ages 32 and 25. 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     Family History  [place]    Review of Systems   Per interval history; otherwise 10 point ROS is negative              Vital Signs:  /78 (Site: Right Upper Arm, Position: Sitting, Cuff Size: Large Adult)   Pulse 65   Temp 97.6 °F (36.4 °C) (Infrared)   Resp 16   Ht 5' 7\" (1.702 m)   Wt 217 lb 4.8 oz (98.6 kg)   SpO2 99%   BMI 34.03 kg/m²     Physical Exam:  Was negative for any new palpable nodes and/or masses involving nodes in the right breast.  No hepatosplenomegaly no abnormal cardiac or pulmonary findings no new skin or neurologic issues or findings.       Labs:    Hematology:  Lab Results   Component Value Date    WBC 3.4 (L) 04/02/2021    RBC 3.94 (L) 04/02/2021    HGB 13.3 04/02/2021    HCT 38.3 04/02/2021    MCV 97.2 04/02/2021    MCH 33.8 (H) 04/02/2021    MCHC 34.7 04/02/2021    RDW 12.5 04/02/2021     04/02/2021    MPV 9.2 04/02/2021    SEGSPCT 31.7 (L) 04/02/2021    EOSRELPCT 1.2 04/02/2021    BASOPCT 2.3 (H) 04/02/2021    LYMPHOPCT 56.3 (H) 04/02/2021    MONOPCT 8.5 (H) 04/02/2021    SEGSABS 1.1 04/02/2021    EOSABS 0.0 04/02/2021    BASOSABS 0.1 04/02/2021    LYMPHSABS 1.9 04/02/2021    MONOSABS 0.3 04/02/2021    DIFFTYPE AUTOMATED DIFFERENTIAL 04/02/2021     Lab Results   Component Value Date    ESR 10 10/09/2017       Chemistry:  Lab Results   Component Value Date     04/02/2021    K 4.2 04/02/2021     04/02/2021    CO2 24 04/02/2021    BUN 19 04/02/2021    CREATININE 1.2 (H) 04/02/2021    GLUCOSE 96 04/02/2021    CALCIUM 9.0 04/02/2021    PROT 6.4 04/02/2021    LABALBU 3.9 04/02/2021    BILITOT 0.4 04/02/2021    ALKPHOS 47 04/02/2021    AST 20 04/02/2021    ALT 13 04/02/2021    LABGLOM 48 (L) 04/02/2021    GFRAA 58 (L) 04/02/2021     Lab Results   Component Value Date     10/09/2017     No components found for: LD  Lab Results   Component Value Date    TSHHS 2.100 08/27/2014    T4FREE 1.22 08/08/2013    FT3 3.3 08/08/2013       Immunology:  Lab Results   Component Value Date    PROT 6.4 04/02/2021     Lab Results   Component Value Date    KAPPAUVOL 1.47 10/09/2017    LAMBDAUVOL 1.16 10/09/2017    KLFLCR 1.27 10/09/2017     No results found for: B2M    Coagulation Panel:  Lab Results   Component Value Date    PROTIME 10.5 (L) 06/24/2020    INR 0.87 06/24/2020    APTT 28.0 06/24/2020       Anemia Panel:  Lab Results   Component Value Date    WFNZCMDM85 129 08/08/2013       Tumor Markers:  Lab Results   Component Value Date    LABCA2 54.5 (H) 02/05/2021         Imaging: Reviewed     Pathology:Reviewed     Observations:  Performance Status: ECOG   Depression Status: PHQ-9 Total Score: 0 (6/3/2021 11:04 AM)          Assessment & Plan:  Extremely pleasant lady patient of Dr. Dorota Fisher, overall blessed with good health. Saw him in September for vague discomfort involving the sternum which led to chest x-ray and x-rays of the sternum revealing questionable age-indeterminate fracture of the mid to superior sternum. CT scan revealed mottled appearance of sternum. Etiology could be related to earlier trauma and/or even metastatic disease. Bone scan showed increased activity in the sternum area with the same differential. Mammogram in October 2016 was unremarkable. October 10, 2017, I did spend time with her and her sister. We did talk about future course of action including possibly just repeating the studies in a couple of months. Would consider doing biopsy of the sternal area. She I think would feel comfortable if we could have better answer for her and thus for that reason I did advise her to be seen in interventional radiology for possible biopsy. October 13, 2017, CT guided biopsy from the sternum revealed changes consistent with metastatic carcinoma from breast biopsy. The tumor is strongly positive for ER and TX and negative for HER2.      October 25, 2017, mammogram did reveal a lesion in the right breast of 1.8 cm in the upper outer quadrant. Biopsy for which did reveal invasive mammary carcinoma. October 27, 2017, I did discuss the findings with her and her extended family. We talked about basically the finding of carcinoma involving the right breast as well as bones. Biopsy from the sternum was positive. A bone scan also showed questionable changes involving the T12, though CT scan of the chest otherwise was negative for any pulmonary or hepatic metastases. Biopsy from the right breast October 27, 2017, also revealed invasive mammary carcinoma, strongly positive ER, WA, and HER-2. PET scan done on November 2, 2017, revealed hypermetabolic right breast as well as right axillary nodes and osseous metastases involving sternum, right scapula, and L4 vertebral body. She was considered a premenopausal lady with metastatic disease involving the bones. Treatment choices were discussed with her and on November 10, 2017, was started on treatment with Zoladex, Faslodex, and Zometa. Visit here on November 20, 2017, she was tolerating these treatments well following the Zometa. December 7, 2017, bony pains were better, but she was concerned about possibly a lump involving the 6 oclock position right breast. Mammogram and ultrasound did not reveal any mass in that area. She was still noted to have lesion in the upper outer quadrant which had been biopsied before. The PET scan done on May 14, 2018, had revealed improvement in disease involving the breast, axilla, as well as bones. Previous PET scan had shown changes involving the sternum, scapula, and L4 areas; those areas did show improvement on the PET scan. Visit here on February 26, 2019 overall I think she was doing fair but we were concerned about her starting to have more discomfort in her left elbow as well as the right hip area.  She had not had a PET scan since May of last year and her to undergo a biopsy of the liver lesion. And following the biopsy could decide about if we need to send material for [facility] 1 study also    Telemedicine visit again on May 5, 2020. April 27, 2020 she did undergo liver biopsy. Pathology did reveal metastatic adenocarcinoma from a breast primary. Tumor was still positive for ER 50% TN 40%. HER-2 was equivocal by IHC though negative by FISH. May 6, 2020 had long discussion with her and her family. We talked about progressive nature of the disease. We talked about future plan including liver biopsy material being sent to [facility] 1 analysis. We talked about the need for us to change her treatment to a combination of Faslodex, Zoladex and add Verzenio. We also discussed possibly doing a PET scan. We do know she has disease involving the breast, bones and now liver. Our intention is that if indeed she has an isolated lesion in the liver we might consider doing local treatment to the liver besides this new systemic therapy. June 1, 2020 I did spend time with her and her sister. We talked about PET scan revealing stable bony metastases and an isolated lesion involving the liver, biopsy-proven malignancy. Mammogram was unchanged from before. Couple weeks prior to this visit had started taking Verzenio along with Faslodex and Zoladex. We did talk about staying on the present systemic therapy and of course will continue to monitor her progress. We also discussed with her about possibly doing radiofrequency ablation treatment to the single lesion involving the liver. As described above on June 24, 2020 she did undergo radiofrequency ablation of the isolated lesion in the liver. July 17, 2020 she was overall doing fairly decent had recovered from the discomfort related to the radiofrequency ablation. We talked about continuing on this present regimen does do plan to check her [address] [place] [address] levels to see if we can hold off giving her Zoladex.  We of course will continue with Faslodex, Verzenio and Zometa. We of course will continue to monitor her progress through exams as well as CAT scans.     September 15, 2020 was still being treated with Faslodex Zoladex and Verzenio. FSH levels were in premenopausal levels. We also planning to give zometa from time to time. She in general was feeling fairly well major issue was discomfort primarily from Faslodex injections. We talked about checking her blood work with the next injection on October 9 including chemistries and CA-27-29. We also talked about checking her a CT scan of the abdomen to see the status of the lesion following the radiofrequency ablation. Otherwise as before we will continue to monitor her blood progress exams CAT scans and possibly PET scans.     January 5, 2021 spend time with her on the few issues as described below. #1 regarding metastatic carcinoma of the breast, premenopausal, s/p  oophorectomy, currently being treated with Faslodex and Verzenio. She was has had locally treatment for isolated liver metastases done on June 24, 2020. CBC was drawn today counts were reasonable that she could continue on the same dosages of Verzenio. Advised her to stay on the present therapy and will recheck her blood tumor markers in February.     February 16, 2021 telephone conversation with her. Clinically she was still doing quite well still on same medications as described above including Faslodex and Verzenio that she was tolerating well. Concern being is further rise of CA 27-29 levels. We talked about rechecking the level again in 3 weeks along with possibly PET scan if we could and if we could confirm progression of the disease then we might consider changing her treatment to LYNSEY JIMENEZ JR[facility] along with Faslodex as the foundation 1 studies done before had revealed the presence of PIK 3 CA mutation.   Other wise there was stable microsatellite status tumor mutational burden was not elevated.     She could not undergo a PET scan instead did a CT scan of the chest abdomen and pelvis on March 24, 2021 and those studies were negative for any obvious signs of progression of the disease.     April 1, 2021 had long discussion with her. Clinically she was feeling well exam was unremarkable at least CAT scan studies are fairly unremarkable. Only concern being mild elevation of CA 27-29 levels. As all in all she was doing quite well we felt comfortable keeping her on treatment with Faslodex and Verzenio continue to monitor her CBC and chemistries possibly recheck her tumor markers in 8 weeks and still the plan remains the same if there is documented progression we might consider changing treatment to [facility].     She is going to be seen here in 8 weeks possibly by one of my partners. Zandra 3, 2021, clinically she seems to be stable. Currently on Verzenio and Faslodex. We will repeat CA 27-29 and if it remains stable then will continue the same treatment. If CA 27-29 markedly elevated then prior test, will consider switching to [facility]. Recommend DEXA scan and mammogram.  Also continue bisphosphonates. At some point she would like to transition to letrozole from Faslodex secondary to the side effects. Is on venlafaxine for hot flashes, deferred any other intervention at this point. Return to clinic in 3 months or earlier if new symptoms. She has received her Covid vaccine.     [address]     Electronically signed by Rich Francis MD on 6/3/2021 at 11:29 AM

## 2021-06-06 LAB — CA 27.29: 53.8 U/ML (ref 0–40)

## 2021-06-08 ENCOUNTER — CLINICAL DOCUMENTATION (OUTPATIENT)
Dept: ONCOLOGY | Age: 51
End: 2021-06-08

## 2021-06-14 ENCOUNTER — CLINICAL DOCUMENTATION (OUTPATIENT)
Dept: ONCOLOGY | Age: 51
End: 2021-06-14

## 2021-06-14 NOTE — PROGRESS NOTES
[facility] paperwork completed, signed by DR Ivory Coreas and faxed to [phone] per patient's request, confirm rec'd

## 2021-06-22 ENCOUNTER — TELEPHONE (OUTPATIENT)
Dept: ONCOLOGY | Age: 51
End: 2021-06-22

## 2021-06-22 DIAGNOSIS — Z78.0 MENOPAUSE: Primary | ICD-10-CM

## 2021-06-22 DIAGNOSIS — C50.411 MALIGNANT NEOPLASM OF UPPER-OUTER QUADRANT OF RIGHT FEMALE BREAST, UNSPECIFIED ESTROGEN RECEPTOR STATUS (HCC): ICD-10-CM

## 2021-06-23 DIAGNOSIS — Z12.31 SCREENING MAMMOGRAM FOR HIGH-RISK PATIENT: Primary | ICD-10-CM

## 2021-06-23 RX ORDER — LAMOTRIGINE 25 MG/1
250 TABLET ORAL ONCE
Status: CANCELLED | OUTPATIENT
Start: 2021-06-25 | End: 2021-06-25

## 2021-06-23 RX ORDER — SODIUM CHLORIDE 9 MG/ML
INJECTION, SOLUTION INTRAVENOUS CONTINUOUS
Status: CANCELLED | OUTPATIENT
Start: 2021-06-25

## 2021-06-23 RX ORDER — DIPHENHYDRAMINE HYDROCHLORIDE 50 MG/ML
50 INJECTION INTRAMUSCULAR; INTRAVENOUS ONCE
Status: CANCELLED | OUTPATIENT
Start: 2021-06-25 | End: 2021-06-25

## 2021-06-23 RX ORDER — EPINEPHRINE 1 MG/ML
0.3 INJECTION, SOLUTION, CONCENTRATE INTRAVENOUS PRN
Status: CANCELLED | OUTPATIENT
Start: 2021-06-25

## 2021-06-23 RX ORDER — METHYLPREDNISOLONE SODIUM SUCCINATE 125 MG/2ML
125 INJECTION, POWDER, LYOPHILIZED, FOR SOLUTION INTRAMUSCULAR; INTRAVENOUS ONCE
Status: CANCELLED | OUTPATIENT
Start: 2021-06-25 | End: 2021-06-25

## 2021-06-25 ENCOUNTER — HOSPITAL ENCOUNTER (OUTPATIENT)
Dept: INFUSION THERAPY | Age: 51
Setting detail: INFUSION SERIES
Discharge: HOME | End: 2021-06-25
Payer: COMMERCIAL

## 2021-06-25 ENCOUNTER — TELEPHONE (OUTPATIENT)
Dept: ONCOLOGY | Age: 51
End: 2021-06-25

## 2021-06-25 VITALS
OXYGEN SATURATION: 97 % | TEMPERATURE: 97.3 F | SYSTOLIC BLOOD PRESSURE: 110 MMHG | RESPIRATION RATE: 14 BRPM | HEART RATE: 65 BPM | DIASTOLIC BLOOD PRESSURE: 83 MMHG

## 2021-06-25 DIAGNOSIS — C78.7 SECONDARY MALIGNANT NEOPLASM OF LIVER AND INTRAHEPATIC BILE DUCT (HCC): ICD-10-CM

## 2021-06-25 DIAGNOSIS — C50.411 MALIGNANT NEOPLASM OF UPPER-OUTER QUADRANT OF RIGHT FEMALE BREAST, UNSPECIFIED ESTROGEN RECEPTOR STATUS (HCC): ICD-10-CM

## 2021-06-25 DIAGNOSIS — C50.411 MALIGNANT NEOPLASM OF UPPER-OUTER QUADRANT OF RIGHT BREAST IN FEMALE, ESTROGEN RECEPTOR POSITIVE (HCC): Primary | ICD-10-CM

## 2021-06-25 DIAGNOSIS — Z17.0 MALIGNANT NEOPLASM OF UPPER-OUTER QUADRANT OF RIGHT BREAST IN FEMALE, ESTROGEN RECEPTOR POSITIVE (HCC): Primary | ICD-10-CM

## 2021-06-25 DIAGNOSIS — C79.51 SECONDARY MALIGNANT NEOPLASM OF BONE (HCC): ICD-10-CM

## 2021-06-25 PROCEDURE — 2580000003 HC RX 258: Performed by: INTERNAL MEDICINE

## 2021-06-25 PROCEDURE — 6360000002 HC RX W HCPCS: Performed by: INTERNAL MEDICINE

## 2021-06-25 PROCEDURE — 99211 OFF/OP EST MAY X REQ PHY/QHP: CPT

## 2021-06-25 PROCEDURE — 96402 CHEMO HORMON ANTINEOPL SQ/IM: CPT

## 2021-06-25 PROCEDURE — 96365 THER/PROPH/DIAG IV INF INIT: CPT

## 2021-06-25 RX ORDER — LAMOTRIGINE 25 MG/1
250 TABLET ORAL ONCE
Status: COMPLETED | OUTPATIENT
Start: 2021-06-25 | End: 2021-06-25

## 2021-06-25 RX ORDER — SODIUM CHLORIDE 0.9 % (FLUSH) 0.9 %
10 SYRINGE (ML) INJECTION PRN
Status: DISCONTINUED | OUTPATIENT
Start: 2021-06-25 | End: 2021-06-26 | Stop reason: HOSPADM

## 2021-06-25 RX ADMIN — SODIUM CHLORIDE, PRESERVATIVE FREE 10 ML: 5 INJECTION INTRAVENOUS at 08:45

## 2021-06-25 RX ADMIN — LAMOTRIGINE 250 MG: 25 TABLET ORAL at 09:23

## 2021-06-25 RX ADMIN — SODIUM CHLORIDE, PRESERVATIVE FREE 10 ML: 5 INJECTION INTRAVENOUS at 09:22

## 2021-06-25 RX ADMIN — ZOLEDRONIC ACID 3 MG: 4 INJECTION, SOLUTION, CONCENTRATE INTRAVENOUS at 08:53

## 2021-06-25 RX ADMIN — LAMOTRIGINE 250 MG: 25 TABLET ORAL at 09:26

## 2021-06-25 ASSESSMENT — PAIN SCALES - GENERAL: PAINLEVEL_OUTOF10: 0

## 2021-06-25 NOTE — TELEPHONE ENCOUNTER
Pt is going to the [facility] on 7/1 2:50 arrival for a 3:10 appt-- pt is aware of appt and stated understanding

## 2021-06-26 ENCOUNTER — HOSPITAL ENCOUNTER (EMERGENCY)
Age: 51
Discharge: HOME | End: 2021-06-26
Attending: EMERGENCY MEDICINE
Payer: COMMERCIAL

## 2021-06-26 VITALS
SYSTOLIC BLOOD PRESSURE: 123 MMHG | BODY MASS INDEX: 33.74 KG/M2 | WEIGHT: 215 LBS | OXYGEN SATURATION: 99 % | HEIGHT: 67 IN | DIASTOLIC BLOOD PRESSURE: 75 MMHG | TEMPERATURE: 97.1 F | RESPIRATION RATE: 17 BRPM | HEART RATE: 96 BPM

## 2021-06-26 DIAGNOSIS — M79.662 PAIN OF LEFT LOWER LEG: Primary | ICD-10-CM

## 2021-06-26 DIAGNOSIS — R79.89 ELEVATED D-DIMER: ICD-10-CM

## 2021-06-26 LAB
ALBUMIN SERPL-MCNC: 4.3 GM/DL (ref 3.4–5)
ALP BLD-CCNC: 60 IU/L (ref 40–129)
ALT SERPL-CCNC: 18 U/L (ref 10–40)
ANION GAP SERPL CALCULATED.3IONS-SCNC: 13 MMOL/L (ref 4–16)
APTT: 25.1 SECONDS (ref 25.1–37.1)
AST SERPL-CCNC: 26 IU/L (ref 15–37)
BASOPHILS ABSOLUTE: 0.1 K/CU MM
BASOPHILS RELATIVE PERCENT: 0.9 % (ref 0–1)
BILIRUB SERPL-MCNC: 0.3 MG/DL (ref 0–1)
BILIRUBIN DIRECT: 0.2 MG/DL (ref 0–0.3)
BILIRUBIN, INDIRECT: 0.1 MG/DL (ref 0–0.7)
BUN BLDV-MCNC: 15 MG/DL (ref 6–23)
CALCIUM SERPL-MCNC: 9.3 MG/DL (ref 8.3–10.6)
CHLORIDE BLD-SCNC: 102 MMOL/L (ref 99–110)
CO2: 21 MMOL/L (ref 21–32)
CREAT SERPL-MCNC: 1.2 MG/DL (ref 0.6–1.1)
D DIMER: 0.58 UG/ML (FEU)
DIFFERENTIAL TYPE: ABNORMAL
EOSINOPHILS ABSOLUTE: 0 K/CU MM
EOSINOPHILS RELATIVE PERCENT: 0.6 % (ref 0–3)
GFR AFRICAN AMERICAN: 58 ML/MIN/1.73M2
GFR NON-AFRICAN AMERICAN: 48 ML/MIN/1.73M2
GLUCOSE BLD-MCNC: 121 MG/DL (ref 70–99)
HCT VFR BLD CALC: 36.8 % (ref 37–47)
HEMOGLOBIN: 13 GM/DL (ref 12.5–16)
IMMATURE NEUTROPHIL %: 0.2 % (ref 0–0.43)
INR BLD: 0.9 INDEX
LYMPHOCYTES ABSOLUTE: 1.7 K/CU MM
LYMPHOCYTES RELATIVE PERCENT: 26.4 % (ref 24–44)
MCH RBC QN AUTO: 34.7 PG (ref 27–31)
MCHC RBC AUTO-ENTMCNC: 35.3 % (ref 32–36)
MCV RBC AUTO: 98.1 FL (ref 78–100)
MONOCYTES ABSOLUTE: 0.5 K/CU MM
MONOCYTES RELATIVE PERCENT: 7.1 % (ref 0–4)
PDW BLD-RTO: 13.2 % (ref 11.7–14.9)
PLATELET # BLD: 206 K/CU MM (ref 140–440)
PMV BLD AUTO: 9.1 FL (ref 7.5–11.1)
POTASSIUM SERPL-SCNC: 3.8 MMOL/L (ref 3.5–5.1)
PROTHROMBIN TIME: 12 SECONDS (ref 11.7–14.5)
RBC # BLD: 3.75 M/CU MM (ref 4.2–5.4)
SEGMENTED NEUTROPHILS ABSOLUTE COUNT: 4.3 K/CU MM
SEGMENTED NEUTROPHILS RELATIVE PERCENT: 64.8 % (ref 36–66)
SODIUM BLD-SCNC: 136 MMOL/L (ref 135–145)
TOTAL IMMATURE NEUTOROPHIL: 0.01 K/CU MM
TOTAL PROTEIN: 7.1 GM/DL (ref 6.4–8.2)
WBC # BLD: 6.6 K/CU MM (ref 4–10.5)

## 2021-06-26 PROCEDURE — 6360000002 HC RX W HCPCS: Performed by: EMERGENCY MEDICINE

## 2021-06-26 PROCEDURE — 85025 COMPLETE CBC W/AUTO DIFF WBC: CPT

## 2021-06-26 PROCEDURE — 80053 COMPREHEN METABOLIC PANEL: CPT

## 2021-06-26 PROCEDURE — 85730 THROMBOPLASTIN TIME PARTIAL: CPT

## 2021-06-26 PROCEDURE — 85610 PROTHROMBIN TIME: CPT

## 2021-06-26 PROCEDURE — 85379 FIBRIN DEGRADATION QUANT: CPT

## 2021-06-26 PROCEDURE — 96372 THER/PROPH/DIAG INJ SC/IM: CPT

## 2021-06-26 PROCEDURE — 82248 BILIRUBIN DIRECT: CPT

## 2021-06-26 PROCEDURE — 6370000000 HC RX 637 (ALT 250 FOR IP): Performed by: EMERGENCY MEDICINE

## 2021-06-26 PROCEDURE — 99283 EMERGENCY DEPT VISIT LOW MDM: CPT

## 2021-06-26 RX ORDER — IBUPROFEN 400 MG/1
400 TABLET ORAL ONCE
Status: COMPLETED | OUTPATIENT
Start: 2021-06-26 | End: 2021-06-26

## 2021-06-26 RX ORDER — METHOCARBAMOL 500 MG/1
1000 TABLET, FILM COATED ORAL ONCE
Status: COMPLETED | OUTPATIENT
Start: 2021-06-26 | End: 2021-06-26

## 2021-06-26 RX ADMIN — IBUPROFEN 400 MG: 400 TABLET ORAL at 21:43

## 2021-06-26 RX ADMIN — ENOXAPARIN SODIUM 100 MG: 100 INJECTION SUBCUTANEOUS at 22:44

## 2021-06-26 RX ADMIN — METHOCARBAMOL 1000 MG: 500 TABLET ORAL at 21:43

## 2021-06-26 ASSESSMENT — PAIN DESCRIPTION - LOCATION: LOCATION: LEG

## 2021-06-26 ASSESSMENT — PAIN SCALES - GENERAL
PAINLEVEL_OUTOF10: 10
PAINLEVEL_OUTOF10: 10

## 2021-06-26 ASSESSMENT — PAIN DESCRIPTION - PAIN TYPE: TYPE: ACUTE PAIN

## 2021-06-27 ENCOUNTER — HOSPITAL ENCOUNTER (OUTPATIENT)
Dept: ULTRASOUND IMAGING | Age: 51
Discharge: HOME | End: 2021-06-27
Payer: COMMERCIAL

## 2021-06-27 ENCOUNTER — HOSPITAL ENCOUNTER (OUTPATIENT)
Age: 51
Discharge: HOME | End: 2021-06-27
Payer: COMMERCIAL

## 2021-06-27 DIAGNOSIS — R79.89 ELEVATED D-DIMER: ICD-10-CM

## 2021-06-27 DIAGNOSIS — M79.662 PAIN OF LEFT LOWER LEG: ICD-10-CM

## 2021-06-27 PROCEDURE — 93971 EXTREMITY STUDY: CPT

## 2021-06-27 NOTE — ED PROVIDER NOTES
CHIEF COMPLAINT    Chief Complaint   Patient presents with    Leg Pain     left/ statrted 1000 am     HPI  Micky Galvin is a 48 y.o. female with history of metastatic HER-2 negative breast cancer who presents to the ED with complaints of pain to left lower leg. Patient was at work today when she started to have sudden pain in the left calf which began to radiate to the left popliteal fossa and left thigh. Pain described as sharp and stabbing pain rated as moderate to severe in severity and exacerbated with walking and certain movements as well as palpation. Nothing makes this better. No strenuous activity to bring this about. Has never experienced pain like this before. Is currently being treated for active breast cancer. Denies chest pain, shortness of breath, nausea, vomiting, numbness, tingling. REVIEW OF SYSTEMS  Constitutional: No fever, chills or recent illness. Eye: No visual changes  HENT: No earache or sore throat. Resp: No SOB or productive cough. Cardio: No chest pain or palpitations. GI: No abdominal pain, nausea, vomiting, constipation or diarrhea. No melena. : No dysuria, urgency or frequency. Endocrine: No heat intolerance, no cold intolerance, no polydipsia   Lymphatics: No adenopathy  Musculoskeletal: Complains of pain to left lower leg and left thigh  Neuro: No headaches. Psych: No homicidal or suicidal thoughts  Skin: No rash, No itching. ?  ? PAST MEDICAL HISTORY  Past Medical History:   Diagnosis Date    Asthma     last flare up winter 2016    Breast CA [facility])     right    Chest pain     \"had chest pain last time 2015- saw Dr Jin Castellanos- all ok\"    FHx: coronary artery disease     H/O Doppler lower venous ultrasound 06/04/2019    No DVT,  Significant reflux noted of the Right & Left Deep Venous System.  H/O echocardiogram 06/04/2019    EF 55-60%, Essentially normal echocardiogram. Dilated aortic root at 4.0 cm.     History of exercise stress test 2019    Treadmill, EKG portion is negative for ischemia by diagnostic criteria    History of migraine     last HA  2020   [facility] OTHER MEDICAL     \"since 2016- having some pain mid sternum- xray thought fx, then did CT and bone scan now having bx done\"    Hyperlipemia     Primary cancer of right breast with metastasis to other site [facility]) 3/24/2019    bone and liver     FAMILY HISTORY  Family History   Problem Relation Age of Onset    Heart Attack Mother          from MI @ age 61.  Coronary Art Dis Mother     Other Mother         Low blood pressure, endometriosis - hyster    High Cholesterol Mother     High Blood Pressure Father     Heart Attack Father     Coronary Art Dis Father     Arthritis Father         hip replacement    Arthritis Sister     Liver Disease Sister         medication & hx of alcohol abuse    Other Sister         endometriosis - hyster    Depression Brother     Other Sister         endometriosis - hyster    Other Sister         endometriosis - hyster   [facility] Migraines Sister     Other Sister         endometriosis - hyster    Other Sister         endometriosis - hyster, hypothyroidism    High Blood Pressure Sister      SOCIAL HISTORY  Social History     Socioeconomic History    Marital status:      Spouse name: None    Number of children: None    Years of education: None    Highest education level: None   Occupational History    Occupation: Nurse   Tobacco Use    Smoking status: Former Smoker     Packs/day: 0.50     Years: 16.00     Pack years: 8.00     Types: Cigarettes     Quit date: 2004     Years since quittin.8    Smokeless tobacco: Never Used    Tobacco comment: Quit smoking 9 years ago. Vaping Use    Vaping Use: Never used   Substance and Sexual Activity    Alcohol use:  Yes     Alcohol/week: 2.0 standard drinks     Types: 2 Glasses of wine per week     Comment: Occasionally     CAFFEINE: 1 cup coffee daily/ average one per week  Drug use: No    Sexual activity: None   Other Topics Concern    None   Social History Narrative    Pt works full time as a nurse at the hospital. [employer] [facility] [employer] frequently during her 12 hour shift 7p-7a. Social Determinants of Health     Financial Resource Strain: Low Risk     Difficulty of Paying Living Expenses: Not hard at all   Food Insecurity: No Food Insecurity    Worried About Running Out of Food in the Last Year: Never true    Nj of Food in the Last Year: Never true   Transportation Needs: No Transportation Needs    Lack of Transportation (Medical): No    Lack of Transportation (Non-Medical): No   Physical Activity: Unknown    Days of Exercise per Week: 3 days    Minutes of Exercise per Session: Not on file   Stress: No Stress Concern Present    Feeling of Stress :  Only a little   Social Connections: Unknown    Frequency of Communication with Friends and Family: Not on file    Frequency of Social Gatherings with Friends and Family: Not on file    Attends [employer] Services: Not on file    Active Member of Clubs or Organizations: Not on file    Attends Club or Organization Meetings: Not on file    Marital Status:    Intimate Partner Violence:     Fear of Current or Ex-Partner:     Emotionally Abused:     Physically Abused:     Sexually Abused:        SURGICAL HISTORY  Past Surgical History:   Procedure Laterality Date    [address]  age 28    LASIK both eyes    HYSTERECTOMY  age 27    partial - has 1 ovary (L);  Due to endometriosis    LAPAROSCOPY N/A 12/17/2020    LAPAROSCOPY MECKLE'S DIVERTICULECTOMY performed by Aura Gamino MD at [facility]  2020    then ablation    OTHER SURGICAL HISTORY Right 08 05 2013    right ulnar nerve decompression    SALPINGO-OOPHORECTOMY Left 12/17/2020    EXPLORATORY LAPAROSCOPY, LEFT SALPINGO OOPHORECTOMY LAPAROSCOPIC performed by Aura Gamino MD at [facility] [address] MEDICATIONS  Discharge Medication List as of 6/26/2021 10:38 PM      CONTINUE these medications which have NOT CHANGED    Details   Abemaciclib (VERZENIO) 150 MG TABS Take 1 tablet by mouth 2 times daily, Disp-60 tablet, R-3Normal      venlafaxine (EFFEXOR XR) 37.5 MG extended release capsule Take 1 capsule by mouth daily, Disp-30 capsule, R-2Normal      ibuprofen (ADVIL;MOTRIN) 800 MG tablet Take 1 tablet by mouth every 8 hours as needed for Pain, Disp-30 tablet, R-1Print      fulvestrant (FASLODEX) 250 MG/5ML SOLN IM injection Inject 10 mLs into the muscle every 30 days, Disp-10 mL,R-1Normal      ondansetron (ZOFRAN) 4 MG tablet Take 1 tablet by mouth every 8 hours as needed for Nausea or Vomiting, Disp-30 tablet,R-0Normal      albuterol sulfate  (90 Base) MCG/ACT inhaler Inhale 2 puffs into the lungs every 6 hours as needed for Wheezing, Disp-1 Inhaler,R-2Normal      zoledronic acid (ZOMETA) 4 MG/100ML SOLN infusion Infuse 4 mg intravenously once Every yearHistorical Med      diphenhydrAMINE (BENADRYL) 25 MG capsule 1-2 capsules by mouth every 6 hours as needed for rash, Disp-30 capsule, R-0Print           ALLERGIES  Allergies   Allergen Reactions    Sulfa Antibiotics Other (See Comments)     Stop breathing.  Nickel Rash       Nursing notes reviewed by myself for past medical history, family history, social history, surgical history, current medications, and allergies. PHYSICAL EXAM  VITAL SIGNS: Triage VS:    ED Triage Vitals   Enc Vitals Group      BP       Pulse       Resp       Temp       Temp src       SpO2       Weight       Height       Head Circumference       Peak Flow       Pain Score       Pain Loc       Pain Edu? Excl. in [address]?       Constitutional: Well developed, Well nourished, nontoxic appearing  HENT: Normocephalic, Atraumatic, Bilateral external ears normal, Oropharynx moist, No oral exudates, Nose normal.   Eyes: PERRL, EOMI, Conjunctiva normal, No discharge. No scleral icterus. Neck: Normal range of motion, No tenderness, Supple. Lymphatic: No lymphadenopathy noted. Cardiovascular: Normal heart rate, Normal rhythm, No murmurs, gallops or rubs. Thorax & Lungs: Normal breath sounds, No respiratory distress, No wheezing. Abdomen: Soft, No tenderness, No masses, No pulsatile masses, No distention, Normal bowel sounds  Skin: Warm, Dry, Pink, No mottling, No erythema, No rash. Back: No tenderness, No CVA tenderness. Extremities:  No cyanosis, Normal perfusion, No clubbing. Musculoskeletal: Tenderness to palpation to posterior aspect of left lower leg and left popliteal fossa. Compartments of left lower extremity are soft and compressible. Left lower extremity is neurovascular intact with 2+ dorsalis pedis and posterior tibial pulses  Neurologic: Alert & oriented x 3, Normal motor function, Normal sensory function, CN II-XII grossly intact as tested, No focal deficits noted. Psychiatric: Affect normal, Judgment normal, Mood normal.     RADIOLOGY  Labs Reviewed   CBC WITH AUTO DIFFERENTIAL - Abnormal; Notable for the following components:       Result Value    RBC 3.75 (*)     Hematocrit 36.8 (*)     MCH 34.7 (*)     Monocytes % 7.1 (*)     All other components within normal limits   BASIC METABOLIC PANEL - Abnormal; Notable for the following components:    CREATININE 1.2 (*)     Glucose 121 (*)     GFR Non- 48 (*)     GFR  58 (*)     All other components within normal limits   D-DIMER, RAPID - Abnormal; Notable for the following components:    D-Dimer, Quant 0.58 (*)     All other components within normal limits   HEPATIC FUNCTION PANEL   APTT   PROTIME-INR     I personally reviewed the images.  The radiologist's interpretation reveals:  Last Imaging results   VL DUP LOWER EXTREMITY VENOUS LEFT    (Results Pending)       MEDS GIVEN IN ED:  Medications   ibuprofen (ADVIL;MOTRIN) tablet 400 mg (400 mg Oral Given 6/26/21 2143)   methocarbamol (ROBAXIN) tablet 1,000 mg (1,000 mg Oral Given 6/26/21 2143)   enoxaparin (LOVENOX) injection 100 mg (100 mg Subcutaneous Given 6/26/21 2244)     COURSE & MEDICAL DECISION MAKING  55-year-old female presents the emergency department with complaints of atraumatic pain to left lower leg and left popliteal fossa that started today. Is currently being treated for metastatic breast cancer. Initial vital signs reassuring. On exam she has reproducible tenderness to palpation of the posterior aspect of the left lower leg and left thigh. Left lower extremity is neurovascularly intact with 2+ dorsalis pedis and posterior tibial pulses. She has normal motor and sensation to left lower extremity. At this time we will obtain CBC, BMP, coags, hepatic panel, D-dimer testing. Unfortunately we do not have ultrasound capability 24/7 at this facility for rule out of DVT. The patient's D-dimer is elevated. Given her history of active cancer with sudden onset of atraumatic pain we will treat empirically with Lovenox for possible DVT. She was instructed to report to the emergency department tomorrow for ultrasound which has been placed in the orders for her. Also instructed to follow-up with her oncologist, Dr. Corrie Hurley. Discharged home with strict return precautions. Appropriate PPE utilized as indicated for entire patient encounter? Time of Disposition: See timeline  ? Discharge Medication List as of 6/26/2021 10:38 PM        FINAL IMPRESSION  1. Pain of left lower leg    2. Elevated d-dimer        Electronically signed by:  [address], DO, 6/26/2021         [address], DO  06/26/21 [id number]

## 2021-07-08 ENCOUNTER — HOSPITAL ENCOUNTER (OUTPATIENT)
Dept: WOMENS IMAGING | Age: 51
Discharge: HOME | End: 2021-07-08
Payer: COMMERCIAL

## 2021-07-08 DIAGNOSIS — Z78.0 MENOPAUSE: ICD-10-CM

## 2021-07-08 DIAGNOSIS — Z12.31 VISIT FOR SCREENING MAMMOGRAM: ICD-10-CM

## 2021-07-08 PROCEDURE — 77063 BREAST TOMOSYNTHESIS BI: CPT

## 2021-07-08 PROCEDURE — 77080 DXA BONE DENSITY AXIAL: CPT

## 2021-07-23 ENCOUNTER — HOSPITAL ENCOUNTER (OUTPATIENT)
Dept: INFUSION THERAPY | Age: 51
Setting detail: INFUSION SERIES
Discharge: HOME | End: 2021-07-23
Payer: COMMERCIAL

## 2021-07-23 VITALS
HEART RATE: 70 BPM | RESPIRATION RATE: 16 BRPM | TEMPERATURE: 97.3 F | OXYGEN SATURATION: 98 % | DIASTOLIC BLOOD PRESSURE: 80 MMHG | SYSTOLIC BLOOD PRESSURE: 108 MMHG

## 2021-07-23 DIAGNOSIS — Z17.0 MALIGNANT NEOPLASM OF UPPER-OUTER QUADRANT OF RIGHT BREAST IN FEMALE, ESTROGEN RECEPTOR POSITIVE (HCC): Primary | ICD-10-CM

## 2021-07-23 DIAGNOSIS — C50.411 MALIGNANT NEOPLASM OF UPPER-OUTER QUADRANT OF RIGHT BREAST IN FEMALE, ESTROGEN RECEPTOR POSITIVE (HCC): Primary | ICD-10-CM

## 2021-07-23 DIAGNOSIS — C78.7 SECONDARY MALIGNANT NEOPLASM OF LIVER AND INTRAHEPATIC BILE DUCT (HCC): ICD-10-CM

## 2021-07-23 PROCEDURE — 99211 OFF/OP EST MAY X REQ PHY/QHP: CPT

## 2021-07-23 PROCEDURE — 96402 CHEMO HORMON ANTINEOPL SQ/IM: CPT

## 2021-07-23 RX ORDER — DIPHENHYDRAMINE HYDROCHLORIDE 50 MG/ML
50 INJECTION INTRAMUSCULAR; INTRAVENOUS ONCE
Status: CANCELLED | OUTPATIENT
Start: 2021-07-23 | End: 2021-07-23

## 2021-07-23 RX ORDER — LAMOTRIGINE 25 MG/1
250 TABLET ORAL ONCE
Status: COMPLETED | OUTPATIENT
Start: 2021-07-23 | End: 2021-07-23

## 2021-07-23 RX ORDER — LAMOTRIGINE 25 MG/1
250 TABLET ORAL ONCE
Status: CANCELLED | OUTPATIENT
Start: 2021-07-23

## 2021-07-23 RX ORDER — METHYLPREDNISOLONE SODIUM SUCCINATE 125 MG/2ML
125 INJECTION, POWDER, LYOPHILIZED, FOR SOLUTION INTRAMUSCULAR; INTRAVENOUS ONCE
Status: CANCELLED | OUTPATIENT
Start: 2021-07-23 | End: 2021-07-23

## 2021-07-23 RX ORDER — EPINEPHRINE 1 MG/ML
0.3 INJECTION, SOLUTION, CONCENTRATE INTRAVENOUS PRN
Status: CANCELLED | OUTPATIENT
Start: 2021-07-23

## 2021-07-23 RX ORDER — SODIUM CHLORIDE 9 MG/ML
INJECTION, SOLUTION INTRAVENOUS CONTINUOUS
Status: CANCELLED | OUTPATIENT
Start: 2021-07-23

## 2021-07-23 RX ADMIN — LAMOTRIGINE 250 MG: 25 TABLET ORAL at 09:25

## 2021-07-23 RX ADMIN — LAMOTRIGINE 250 MG: 25 TABLET ORAL at 09:26

## 2021-07-23 ASSESSMENT — PAIN SCALES - GENERAL: PAINLEVEL_OUTOF10: 0

## 2021-07-23 NOTE — PROGRESS NOTES
Tolerated Fulvestrant injections well. Reviewed discharge instruction, voiced understanding. Copies of AVS given. Pt discharged home. Pt to exit via ambulation.     Orders Placed This Encounter   Medications    fulvestrant (FASLODEX) IM injection 250 mg    fulvestrant (FASLODEX) IM injection 250 mg

## 2021-07-28 ENCOUNTER — CLINICAL DOCUMENTATION (OUTPATIENT)
Dept: CASE MANAGEMENT | Age: 51
End: 2021-07-28

## 2021-08-03 ENCOUNTER — OFFICE VISIT (OUTPATIENT)
Dept: ONCOLOGY | Age: 51
End: 2021-08-03
Payer: COMMERCIAL

## 2021-08-03 ENCOUNTER — HOSPITAL ENCOUNTER (OUTPATIENT)
Dept: INFUSION THERAPY | Age: 51
Discharge: HOME | End: 2021-08-03
Payer: COMMERCIAL

## 2021-08-03 VITALS
OXYGEN SATURATION: 98 % | SYSTOLIC BLOOD PRESSURE: 109 MMHG | HEIGHT: 67 IN | HEART RATE: 75 BPM | DIASTOLIC BLOOD PRESSURE: 79 MMHG | WEIGHT: 215.4 LBS | BODY MASS INDEX: 33.81 KG/M2 | TEMPERATURE: 98.6 F

## 2021-08-03 DIAGNOSIS — C78.7 SECONDARY MALIGNANT NEOPLASM OF LIVER AND INTRAHEPATIC BILE DUCT (HCC): ICD-10-CM

## 2021-08-03 DIAGNOSIS — C50.411 MALIGNANT NEOPLASM OF UPPER-OUTER QUADRANT OF RIGHT FEMALE BREAST, UNSPECIFIED ESTROGEN RECEPTOR STATUS (HCC): Primary | ICD-10-CM

## 2021-08-03 DIAGNOSIS — C50.911 PRIMARY CANCER OF RIGHT BREAST WITH METASTASIS TO OTHER SITE (HCC): Primary | ICD-10-CM

## 2021-08-03 PROCEDURE — 99214 OFFICE O/P EST MOD 30 MIN: CPT | Performed by: INTERNAL MEDICINE

## 2021-08-03 PROCEDURE — 99211 OFF/OP EST MAY X REQ PHY/QHP: CPT

## 2021-08-03 RX ORDER — ONDANSETRON 4 MG/1
4 TABLET, FILM COATED ORAL EVERY 8 HOURS PRN
Qty: 30 TABLET | Refills: 0 | Status: SHIPPED | OUTPATIENT
Start: 2021-08-03

## 2021-08-03 RX ORDER — VENLAFAXINE HYDROCHLORIDE 37.5 MG/1
37.5 CAPSULE, EXTENDED RELEASE ORAL DAILY
Qty: 30 CAPSULE | Refills: 2 | Status: SHIPPED | OUTPATIENT
Start: 2021-08-03 | End: 2022-04-27 | Stop reason: SDUPTHER

## 2021-08-03 RX ORDER — LETROZOLE 2.5 MG/1
2.5 TABLET, FILM COATED ORAL DAILY
Qty: 90 TABLET | Refills: 1 | Status: SHIPPED | OUTPATIENT
Start: 2021-08-03 | End: 2021-12-09 | Stop reason: SDUPTHER

## 2021-08-03 NOTE — PROGRESS NOTES
Patient Name: Ary Verma  Patient : 1970  Patient MRN: [id number]     Primary Oncologist: Edgar Mo MD       Date of Service: 8/3/2021      Chief Complaint:   Chief Complaint   Patient presents with    Follow-up        Active Problem list  1. Primary cancer of right breast with metastasis to other site [facility])    2. Metastasis to liver    Problem List  · Breast cancer, female ( Stage Date: Unknown, Stage IV (Right breast upper-outer quadrant, T1c, N0, M1)-Clinical  Date of Dx:10/2017 Extent of Disease: Evidence of metastatic disease; Disease State: Initial diagnosis; Metastatic Sites: Bone; Metastatic Sites: Liver; ER Status: Positive; IA Status: Positive; HER-2/jus Value-FISH: Negative; Menopausal Status: Premenopausal; PIK3CA gene: Positive)   · Abnormal radiological finding   · Bony metastasis ( Date of Dx:10/2017 )   · Chest pain   Metastasis to liver       HPI:        Extremely pleasant lady patient of Dr. Blanca Jasso. [facility] at [facility]. Evaluated in 2017 for some discomfort involving the sternum that had been present for a few weeks. She could not remember any injury to that area except maybe a few months earlier she had fallen face-forward. She did not remember having significant discomfort to the chest area involving the fall. 2017, chest x-ray was reported to be fairly unremarkable. X-rays of the sternum revealed questionable age-indeterminate factor of the mid to superior sternum. Additional CT was recommended which was done on 2017, revealing mottled appearance of the sternum. Findings could represent related to earlier trauma or even osteomyelitis or metastatic disease. Nonspecific sclerotic changes in the T12 vertebral body were noted too. Triple phase bone scan done on 2017, had revealed only focal uptake in the body of the sternum corresponding to the changes noted on earlier CAT scan.  Similar differential was again suggested. Visit here on October 9, 2017, though, she stated that she in general was doing actually better and pain was not severe and she did not have to take any pain medications and had been continuing to work on a full time basis and also remained fairly active. CMP on October 09, 2017, was normal and so was LDL of 183. Sed rate was 10. Kappa Lambda light chain ratios were normal. Immunoelectrophoresis was negative. CBC had revealed fairly normal numbers. CT biopsy of the sternum done on October 17, 2017, did reveal changes of metastatic carcinoma, possibly suggestive of breast primary. Biopsy from the sternum done on October 13, 2017, revealed metastatic carcinoma quite likely from breast primary. The tumor was strongly positive for estrogen receptor 95 percent and progesterone receptor is 95 percent, and negative for HER2. Visit here on October 20, 2017, those findings were discussed with her. October 25, 2017, diagnostic digital bilateral breast mammogram revealed 1.8 cm irregular, spiculated hyperechoic mass at the 10 oclock position in the right breast correlating to the mammographic area. Findings were highly suspicious for neoplasm. Ultrasound guided biopsy on October 27, 2017, did reveal invasive mammary carcinoma. Ultrasound-guided biopsy October 27, 2017, from this mass revealed invasive mammary carcinoma. Tumor was again strongly positive 95 percent for ER, 95 percent AL, HER-2 was negative both by IHC and FISH.      PET scan done on November 2, 2017, revealed hypermetabolic right breast mass as well as prominent right axillary lymph nodes and scattered osseous metastatic lesions involving the sternum, right scapula, left L4 vertebral body, and bilateral iliac crest.     Visit here on October 27, 2017, and afterwards metastatic disease involving the bones were discussed with her and clinically she was still considered to be premenopausal as she was not having any symptoms suggestive of menopausal state. She was started on treatment with Zoladex, Faslodex, and Zometa starting on November 10, 2017. She had to receive these treatments in outpatient facility at the hospital primarily because of insurance reasons. Saw me on December 7, 2017, sooner than her scheduled appointment, was concerned about possibly feeling a lump involving the right breast at around 6 oclock position. Otherwise she had not noticed any new issues. Mammogram and ultrasound done on December 8, 2017, showed dense breast tissue where she was feeling a mass. There was no sonographic evidence for any mass in that area too. MRI was suggested if needed to. Otherwise she was again noted to have known malignancy in the upper outer quadrant of the breast.     PET scan on May 14, 2018, revealed partial treatment response with improvement in the right axillary nodes as well as skeletal metastases. Metabolic activity associated with lytic bone lesions had improved with increased sclerosis of the previously seen lesions. Mild residual activity was noted at the L4 vertebral body. There were no new persistent or metabolically active aggressive osseous lesions. Visit here on February 26, 2019 overall I think she was doing fair but we were concerned about her starting to have more discomfort in her left elbow as well as the right hip area. She had not had a PET scan since May of last year and that too had revealed a partial response, we felt possibly repeating the PET scan would be helpful. In the meantime she was going to continue to receive the same treatments as before including Faslodex, Zoladex and Zometa    She was not able to undergo a PET scan but insurance. Did undergo a bone scan on April 4, 2019 revealing a stable bone scan without evidence of new osseous metastases.     May fall, 2019 was evaluated for symptoms of chest pain including a cardiac work-up including echo and stress test afterwards and those were unremarkable. She did have a CTA of the chest done looking for pulmonary embolism on May 4, 2019 which was negative for pulmonary embolism no other acute pulmonary illness. She still of course was noted to have several sclerotic lesions within the spine which were felt to be stable as compared to the prior PET scan which was done in May 2018. [facility] levels done on July 16, 0146 was 27.8, follicular phase. CA-27-29 level same day was 26, stable. [address] September was 17, 2019 we talked about still being pre-or perimenopausal Darst will continue to stay on Zoladex along with Faslodex. Talked about disease overall having responded. Will possibly extend intervals between Zometa to may be once a year, may be due in July 2020. Did start her on Effexor for hot flashes    March 12, 2020 still being treated with same agents, still working on a long hours still in general feeling well only issue being occasional discomfort in the right rib cage as well as right breast area. Could not feel any distinct masses. Denied any history of injury. Otherwise negative for any new systemic complaints and overall both physically and emotionally was doing quite well    Telemedicine visit on April 23, 2020. We discussed results those were obtained on her previous visit on March 12, 2020. Chemistries CBC were fairly unremarkable CA-27-29 levels were still normal. She had undergone CT scan of the chest on March 24, 2020 was noted to have a stable bony lesions. But she was described to have a 2.8 cm lesion in liver which was worrisome for possibly metastatic disease. Following those studies she was advised to undergo PET scan and that was refused by insurance. She did undergo a bone scan on April 28, 2020 was noted to have stable appearance of skeletal metastatic disease no new metastatic lesions were noted.  She did undergo a CT scan of the abdomen and pelvis with contrast on the same day again was noted to have a 2.3 x 2.7 cm left hepatic lesion which was worrisome for metastatic disease and again of course skeletal metastases were noted. Telemedicine visit again on May 5, 2020. April 27, 2020 she did undergo liver biopsy. Pathology did reveal metastatic adenocarcinoma from a breast primary. Tumor was still positive for ER 50% ID 40%. HER-2 was equivocal by IHC though negative by FISH. May 6, 2020 had long discussion with her and her family. We talked about progressive nature of the disease. We talked about future plan including liver biopsy material being sent to foundation 1 analysis. We talked about the need for us to change her treatment to a combination of Faslodex, Zoladex and add Verzenio. We also discussed possibly doing a PET scan. We do know she has disease involving the breast, bones and now liver. Our intention is that if indeed she has an isolated lesion in the liver we might consider doing local treatment to the liver besides this new systemic therapy. PET scan done on May 21, 3325 revealed metabolically active metastases in the left hepatic lobe no other metabolic activity was noted in the known skeletal metastases mammogram revealed stable appearance no new sites of malignancy were noted. Treatment was changed to Faslodex Zoladex and Verzenio in May 2020    Foundation 1 analysis from the liver lesion did reveal a tumor to be positive for PI K3 CA. Otherwise microsatellite status was stable and no other alterations were noted especially in BRCA1 and BRCA2 as well as HER-2      As PET scan had revealed an isolated lesion involving the liver she did undergo radio frequency ablation of the lesion on June 24, 2020, tolerated procedure fairly well though had some discomfort afterwards.  CT scan of the abdomen done without contrast on June 25, 2020 did reveal sequela of treated metastatic lesion in the left lobe of the liver with central high attenuation area measuring 2.6 x 2.4 cm and the peripheral low-attenuation rim measuring around 4.8 x 4.1 cm related to the postprocedural edema. Again osseous metastatic disease was again noted without pathologic fracture. July 17, 2020 she was overall doing fairly decent had recovered from the discomfort related to the radiofrequency ablation. We talked about continuing on this present regimen does do plan to check her [address] [place] [address] levels to see if we can hold off giving her Zoladex. We of course will continue with Faslodex, Verzenio and Zometa. We of course will continue to monitor her progress through exams as well as CAT scans.     FSH level done on July 30, 2020 was 9.4, premenopausal.     Visit here on September 15, 2020. Was still being treated with Faslodex Zoladex and Verzenio.       Studies done on October 9, 2020 fairly normal CBC, chemistries reveal creatinine of 1.2 CA 27-29 levels were mildly elevated to 47. A CT scan of the abdomen done on October 15, 2020 revealed 3.5 x 3.1 cm ablation zone, which had decreased in size since immediate post ablation changes done on June 25, 2020. It is difficult to assess residual tremor dedicated multiphasic CT or MRI was recommended. Multifocal osseous sclerotic metastases were felt to be stable.     October 27, 2020 overall she was feeling about the same there was some concern about slight increase in tumor marker CA 27-29. Graylon Vinicius Plan was to to multiphasic CT scan of the abdomen to assess the disease status. Also she was seriously considering having her unilateral oophorectomy I think that will be reasonable. I     CT scan of the abdomen done on November 3, 2020 overall it was felt that there was stable present appearance of liver no suspicious contrast enhancement to indicate presence of residual tumor no new hepatic abnormality was noted again stable appearance of osteoblastic metastatic disease of the lower lumbar and thoracic spine.        On December 17, 2020 she did undergo left oophorectomy.   She also the same day underwent removal of Meckel's diverticulum. Tolerated the procedure well. Pathology from the ovary did not show any significant pathologic changes.     CA 27-29 levels done on November 25, 2020 was 47.2 not significantly different from before.     Visit here on January 5, 2020 she was still being treated with Faslodex and Verzenio. Of course Zoladex was stopped after her undergoing oophorectomy.          CA 27-29 levels done on February 5, 2021 was slightly higher than before, 54.5.     With her continued elevation of CA 27-29, though minimally, wanted to do a PET scan, was not approved by [employer]. Underwent a CT scan of the chest abdomen and pelvis on March 24, 2021. There was no new metastatic disease noted in the chest abdomen or pelvis the treated left hepatic mass was stable in appearance. Also stable appearance of treated skeletal metastatic disease. Small bilateral pulmonary nodules too were a stable.     Visit here on April 1, 2021 all in all she was feeling quite well still tolerating therapy with Faslodex and Verzenio extremely well. Counts have been fairly reasonable.     Seen  here on 6/3/2021 she reported numbness and scar tissue formation in the gluteal area secondary to the Faslodex injection. Reported that she continues to have hot flashes. Reported chronic headaches secondary to migraine. Also reported blurred vision which is not extensive and has LasiK 20 years ago, has an follow-up appointment with ophthalmology. No chest pain or shortness of breath. No new breast masses. Reported that her legs hurt when she sits for long period Of time and tries  to get up.    6/26/21: Venous u/l LE; no DVT. She had swelling in the LLE after the injection here. Received one dose of lovenox and also muscle relaxant and sx resolved. 7/8/21 mammogram no evidence of malignancy. Dexa scan normal       Past Medical History  None other than breast cancer    Surgical History    1. Abdominal hysterectomy with unilateral oophorectomy for endometriosis in the year 2000. 2. Surgery on right elbow in 2013. Social History  Smoking Status Former smokerUsed to smoke, quit in 2004. Negative for significant ethanol intake. Occasional social usage. .  works at a manufacturing plant. Two sons ages 32 and 25. 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     Family History  NC    Interval history:8/3/21: He arrived alone to the clinic today. Reported the hot flashes are the same, is on Effexor 37.5 mg.  Reported that she does not want to continue with the Faslodex injections as they causing a lot of fibrosis and pain. Reported rash on the right lower extremity on the calf area skin of dry without any itching. She receives Zometa once a year. No new bone pains. Denied any chest pain, increase shortness of breath, palpitations or dizziness. Denied any abdominal pain, nausea, vomiting, diarrhea, constipation. No  symptoms. No other rash. Review of Systems   Per interval history; otherwise 10 point ROS is negative              Vital Signs:  /79 (Site: Right Upper Arm, Position: Sitting, Cuff Size: Medium Adult)   Pulse 75   Temp 98.6 °F (37 °C) (Infrared)   Ht 5' 7\" (1.702 m)   Wt 215 lb 6.4 oz (97.7 kg)   SpO2 98%   BMI 33.74 kg/m²     Physical Exam:  Was negative for any new palpable nodes and/or masses involving nodes in the right breast.  No hepatosplenomegaly no abnormal cardiac or pulmonary findings no new skin or neurologic issues or findings.       Labs:    Hematology:  Lab Results   Component Value Date    WBC 6.6 06/26/2021    RBC 3.75 (L) 06/26/2021    HGB 13.0 06/26/2021    HCT 36.8 (L) 06/26/2021    MCV 98.1 06/26/2021    MCH 34.7 (H) 06/26/2021    MCHC 35.3 06/26/2021    RDW 13.2 06/26/2021     06/26/2021    MPV 9.1 06/26/2021    SEGSPCT 64.8 06/26/2021    EOSRELPCT 0.6 06/26/2021    BASOPCT 0.9 06/26/2021    LYMPHOPCT 26.4 06/26/2021 MONOPCT 7.1 (H) 06/26/2021    SEGSABS 4.3 06/26/2021    EOSABS 0.0 06/26/2021    BASOSABS 0.1 06/26/2021    LYMPHSABS 1.7 06/26/2021    MONOSABS 0.5 06/26/2021    DIFFTYPE AUTOMATED DIFFERENTIAL 06/26/2021     Lab Results   Component Value Date    ESR 10 10/09/2017       Chemistry:  Lab Results   Component Value Date     06/26/2021    K 3.8 06/26/2021     06/26/2021    CO2 21 06/26/2021    BUN 15 06/26/2021    CREATININE 1.2 (H) 06/26/2021    GLUCOSE 121 (H) 06/26/2021    CALCIUM 9.3 06/26/2021    PROT 7.1 06/26/2021    LABALBU 4.3 06/26/2021    BILITOT 0.3 06/26/2021    ALKPHOS 60 06/26/2021    AST 26 06/26/2021    ALT 18 06/26/2021    LABGLOM 48 (L) 06/26/2021    GFRAA 58 (L) 06/26/2021     Lab Results   Component Value Date     10/09/2017     No components found for: LD  Lab Results   Component Value Date    TSHHS 2.100 08/27/2014    T4FREE 1.22 08/08/2013    FT3 3.3 08/08/2013       Immunology:  Lab Results   Component Value Date    PROT 7.1 06/26/2021     Lab Results   Component Value Date    KAPPAUVOL 1.47 10/09/2017    LAMBDAUVOL 1.16 10/09/2017    KLFLCR 1.27 10/09/2017     No results found for: B2M    Coagulation Panel:  Lab Results   Component Value Date    PROTIME 12.0 06/26/2021    INR 0.90 06/26/2021    APTT 25.1 06/26/2021    DDIMER 0.58 (H) 06/26/2021       Anemia Panel:  Lab Results   Component Value Date    CEBGANQS13 721 08/08/2013       Tumor Markers:  Lab Results   Component Value Date    LABCA2 53.8 (H) 06/03/2021         Imaging: Reviewed     Pathology:Reviewed     Observations:  Performance Status: ECOG   Depression Status: No data recorded          Assessment & Plan:  Extremely pleasant lady patient of Dr. Jyoti Clemente, overall blessed with good health. Saw him in September for vague discomfort involving the sternum which led to chest x-ray and x-rays of the sternum revealing questionable age-indeterminate fracture of the mid to superior sternum.  CT scan revealed mottled appearance of sternum. Etiology could be related to earlier trauma and/or even metastatic disease. Bone scan showed increased activity in the sternum area with the same differential. Mammogram in October 2016 was unremarkable. October 10, 2017, I did spend time with her and her sister. We did talk about future course of action including possibly just repeating the studies in a couple of months. Would consider doing biopsy of the sternal area. She I think would feel comfortable if we could have better answer for her and thus for that reason I did advise her to be seen in interventional radiology for possible biopsy. October 13, 2017, CT guided biopsy from the sternum revealed changes consistent with metastatic carcinoma from breast biopsy. The tumor is strongly positive for ER and KS and negative for HER2. October 25, 2017, mammogram did reveal a lesion in the right breast of 1.8 cm in the upper outer quadrant. Biopsy for which did reveal invasive mammary carcinoma. October 27, 2017, I did discuss the findings with her and her extended family. We talked about basically the finding of carcinoma involving the right breast as well as bones. Biopsy from the sternum was positive. A bone scan also showed questionable changes involving the T12, though CT scan of the chest otherwise was negative for any pulmonary or hepatic metastases. Biopsy from the right breast October 27, 2017, also revealed invasive mammary carcinoma, strongly positive ER, KS, and HER-2. PET scan done on November 2, 2017, revealed hypermetabolic right breast as well as right axillary nodes and osseous metastases involving sternum, right scapula, and L4 vertebral body. She was considered a premenopausal lady with metastatic disease involving the bones. Treatment choices were discussed with her and on November 10, 2017, was started on treatment with Zoladex, Faslodex, and Zometa.      Visit here on November 20, 2017, she was tolerating these treatments well following the Zometa. December 7, 2017, bony pains were better, but she was concerned about possibly a lump involving the 6 oclock position right breast. Mammogram and ultrasound did not reveal any mass in that area. She was still noted to have lesion in the upper outer quadrant which had been biopsied before. The PET scan done on May 14, 2018, had revealed improvement in disease involving the breast, axilla, as well as bones. Previous PET scan had shown changes involving the sternum, scapula, and L4 areas; those areas did show improvement on the PET scan. Visit here on February 26, 2019 overall I think she was doing fair but we were concerned about her starting to have more discomfort in her left elbow as well as the right hip area. She had not had a PET scan since May of last year and that too had revealed a partial response, we felt possibly repeating the PET scan would be helpful. In the meantime she was going to continue to receive the same treatments as before including Faslodex, Zoladex and Zometa  She was not able to undergo a PET scan but insurance. Did undergo a bone scan on April 4, 2019 revealing a stable bone scan without evidence of new osseous metastases. May , 2019 was evaluated for symptoms of chest pain including a cardiac work-up including echo and stress test afterwards and those were unremarkable. She did have a CTA of the chest done looking for pulmonary embolism on May 4, 2019 which was negative for pulmonary embolism no other acute pulmonary illness. She still of course was noted to have several sclerotic lesions within the spine which were felt to be stable as compared to the prior PET scan which was done in May 2018. [facility] September was 17, 2019 we talked about still being pre-or perimenopausal Darst will continue to stay on Zoladex along with Faslodex. Talked about disease overall having responded.  Will possibly extend intervals between Zometa to may be once a year, may be due in July 2020. Did start her on Effexor for hot flashes  March 12, 2020 overall was doing fairly well Effexor was helpful with hot flashes. Denied significant new issues except for discomfort in the breast as well as rib cage area. Is due to have her bone scan mammogram and CT scan of the chest done to look for the discomfort in the chest area. Otherwise will continue on the same regimen. She will see me after the studies are completed. Telemedicine visit on April 23, 2020, as described above new issues being no obvious progression noted on a bone scan and blood work including tumor markers. CT scan of the chest as well as CT scan of the abdomen showing 2.7 cm lesion in the left hepatic lobe quite worrisome for possible metastatic disease. Clinically she was still doing well. I did encourage her to undergo a biopsy of the liver lesion. And following the biopsy could decide about if we need to send material for foundation 1 study also    Telemedicine visit again on May 5, 2020. April 27, 2020 she did undergo liver biopsy. Pathology did reveal metastatic adenocarcinoma from a breast primary. Tumor was still positive for ER 50% W[place] 40%. HER-2 was equivocal by IHC though negative by FISH. May 6, 2020 had long discussion with her and her family. We talked about progressive nature of the disease. We talked about future plan including liver biopsy material being sent to foundation 1 analysis. We talked about the need for us to change her treatment to a combination of Faslodex, Zoladex and add Verzenio. We also discussed possibly doing a PET scan. We do know she has disease involving the breast, bones and now liver. Our intention is that if indeed she has an isolated lesion in the liver we might consider doing local treatment to the liver besides this new systemic therapy. June 1, 2020 I did spend time with her and her sister.  We talked about PET scan revealing stable bony metastases and an isolated lesion involving the liver, biopsy-proven malignancy. Mammogram was unchanged from before. Couple weeks prior to this visit had started taking Verzenio along with Faslodex and Zoladex. We did talk about staying on the present systemic therapy and of course will continue to monitor her progress. We also discussed with her about possibly doing radiofrequency ablation treatment to the single lesion involving the liver. As described above on June 24, 2020 she did undergo radiofrequency ablation of the isolated lesion in the liver. July 17, 2020 she was overall doing fairly decent had recovered from the discomfort related to the radiofrequency ablation. We talked about continuing on this present regimen does do plan to check her [address] [place] [address] levels to see if we can hold off giving her Zoladex. We of course will continue with Faslodex, Verzenio and Zometa. We of course will continue to monitor her progress through exams as well as CAT scans.     September 15, 2020 was still being treated with Faslodex Zoladex and Verzenio. FSH levels were in premenopausal levels. We also planning to give zometa from time to time. She in general was feeling fairly well major issue was discomfort primarily from Faslodex injections. We talked about checking her blood work with the next injection on October 9 including chemistries and CA-27-29. We also talked about checking her a CT scan of the abdomen to see the status of the lesion following the radiofrequency ablation. Otherwise as before we will continue to monitor her blood progress exams CAT scans and possibly PET scans.     January 5, 2021 spend time with her on the few issues as described below. #1 regarding metastatic carcinoma of the breast, premenopausal, s/p  oophorectomy, currently being treated with Faslodex and Verzenio. She was has had locally treatment for isolated liver metastases done on June 24, 2020.   CBC was drawn today counts were reasonable that she could continue bisphosphonates. At some point she would like to transition to letrozole from Faslodex secondary to the side effects. Is on venlafaxine for hot flashes, deferred any other intervention at this point. Seen here on August third 2021 with no new symptoms except for the swelling at the injection site associated with pain. Discussed that CA 27-29 is relatively stable and mammogram with no new findings, would like to continue the current regimen but she wants to switch to oral AI. Discussed the adverse effects of AI  Return to clinic in 3 months or earlier if new symptoms. Note DEXA scan is normal.  Continue Zometa yearly. Recommend increase Effexor to 75 mg for vasogenic symptoms. Repeat CA 27-29 in 6 to 8 weeks and also will monitor for any adverse effects. Return to clinic in October 2021 or earlier if new symptoms  She has received her Covid vaccine.     [address]     Electronically signed by Christina Rodríguez MD on 8/3/2021 at 11:53 AM

## 2021-08-03 NOTE — PROGRESS NOTES
Per Dr. Emily Barba - patient switching from Faslodex to Letrozole. Letrozole 2.5 mg one tab daily #90 with one refill e-scripted to [facility] home delivery. Called patient to make aware - voiced understanding.

## 2021-08-16 ENCOUNTER — OFFICE VISIT (OUTPATIENT)
Dept: GASTROENTEROLOGY | Age: 51
End: 2021-08-16

## 2021-08-16 VITALS
SYSTOLIC BLOOD PRESSURE: 120 MMHG | TEMPERATURE: 97.2 F | DIASTOLIC BLOOD PRESSURE: 76 MMHG | WEIGHT: 215 LBS | HEART RATE: 67 BPM | BODY MASS INDEX: 33.74 KG/M2 | HEIGHT: 67 IN | OXYGEN SATURATION: 98 %

## 2021-08-16 DIAGNOSIS — Z12.11 COLON CANCER SCREENING: Primary | ICD-10-CM

## 2021-08-16 PROCEDURE — 99999 PR OFFICE/OUTPT VISIT,PROCEDURE ONLY: CPT | Performed by: SPECIALIST

## 2021-08-16 NOTE — PROGRESS NOTES
Gastroenterology Note  Gladis Benites. Mervat VILLAVICENCIO      Reason for Consult:  Routine colon cancer screening  Primary Care Physician:  Mark Gonzales DO    CHIEF COMPLAINT:  None- screening only    History Obtained From:  patient    HISTORY OF PRESENT ILLNESS:              The patient is a 48 y.o. y.o. female with a past medical history listed below who presents for routine colon cancer screening. The patient does have lower abd cramps relieved by BM. Last year she noted rectal bleeding for a few days but none since. She has never had a colonoscopy. She does have breast cancer with bone and liver mets. In December had ovarectomy with resection of an incidental Meckels'    Past Medical History:        Diagnosis Date    Asthma     last flare up winter 2016    Breast CA [facility])     right    Breast cancer ([facility]     right    Chest pain     \"had chest pain last time - saw Dr Tessie Bosch- all ok\"    FHx: coronary artery disease     H/O Doppler lower venous ultrasound 2019    No DVT,  Significant reflux noted of the Right & Left Deep Venous System.  H/O echocardiogram 2019    EF 55-60%, Essentially normal echocardiogram. Dilated aortic root at 4.0 cm.     History of exercise stress test 2019    Treadmill, EKG portion is negative for ischemia by diagnostic criteria    History of migraine     last HA  2020    Hx antineoplastic chemo 2017    still taking    HX OTHER MEDICAL     \"since 2016- having some pain mid sternum- xray thought fx, then did CT and bone scan now having bx done\"    Hyperlipemia     Primary cancer of right breast with metastasis to other site ([facility] [facility] 3/24/2019    bone and liver       Past Surgical History:        Procedure Laterality Date     SECTION      EYE SURGERY  age 28    LASIK both eyes    HYSTERECTOMY  age 27    partial - has 1 ovary (L);  Due to endometriosis    LAPAROSCOPY N/A 2020    LAPAROSCOPY MECKLE'S DIVERTICULECTOMY performed by Kelvin Copeland Gisell Maya MD at [facility]      then ablation    OTHER SURGICAL HISTORY Right 2013    right ulnar nerve decompression    SALPINGO-OOPHORECTOMY Left 2020    EXPLORATORY LAPAROSCOPY, LEFT SALPINGO OOPHORECTOMY LAPAROSCOPIC performed by Eamon Mahoney MD at [facility]       Medications Prior to Admission:    Prior to Admission medications    Medication Sig Start Date End Date Taking? Authorizing Provider   venlafaxine (EFFEXOR XR) 37.5 MG extended release capsule Take 1 capsule by mouth daily 8/3/21  Yes Miriam Crisostomo MD   ondansetron (ZOFRAN) 4 MG tablet Take 1 tablet by mouth every 8 hours as needed for Nausea or Vomiting 8/3/21  Yes Miriam Crisostomo MD   letrozole (FEMARA) 2.5 MG tablet Take 1 tablet by mouth daily 8/3/21  Yes Miriam Crisostomo MD   Abemaciclib (VERZENIO) 150 MG TABS Take 1 tablet by mouth 2 times daily 6/3/21  Yes Miriam Crisostomo MD   ibuprofen (ADVIL;MOTRIN) 800 MG tablet Take 1 tablet by mouth every 8 hours as needed for Pain 20  Yes Eamon Mahoney MD   albuterol sulfate  (90 Base) MCG/ACT inhaler Inhale 2 puffs into the lungs every 6 hours as needed for Wheezing 20  Yes Nancy Barber,    zoledronic acid (ZOMETA) 4 MG/100ML SOLN infusion Infuse 4 mg intravenously once Every year   Yes Historical Provider, MD   diphenhydrAMINE (BENADRYL) 25 MG capsule 1-2 capsules by mouth every 6 hours as needed for rash 18  Yes SARAH Land       Allergies: Allergies   Allergen Reactions    Sulfa Antibiotics Other (See Comments)     Stop breathing.  Nickel Rash   . Family History:   Family History   Problem Relation Age of Onset    Heart Attack Mother          from MI @ age 61.     Coronary Art Dis Mother     Other Mother         Low blood pressure, endometriosis - hyster    High Cholesterol Mother     High Blood Pressure Father     Heart Attack Father     Coronary Art Dis Father     Arthritis Father         hip replacement    Arthritis Sister     Liver Disease Sister         medication & hx of alcohol abuse    Other Sister         endometriosis - hyster    Depression Brother     Other Sister         endometriosis - hyster    Other Sister         endometriosis - hyster    Migraines Sister     Other Sister         endometriosis - hyster    Other Sister         endometriosis - hyster, hypothyroidism    High Blood Pressure Sister     Breast Cancer Paternal Cousin        ROS: non-contributory except as noted    PHYSICAL EXAM:    Vitals:  /76 (Site: Left Upper Arm, Position: Sitting, Cuff Size: Large Adult)   Pulse 67   Temp 97.2 °F (36.2 °C) (Infrared)   Ht 5' 7\" (1.702 m)   Wt 215 lb (97.5 kg)   SpO2 98%   BMI 33.67 kg/m²   CONSTITUTIONAL: alert, cooperative, no apparent distress,   EYES:  pupils equal, round and reactive to light and sclera clear  ENT:  normocepalic, without obvious abnormality  NECK:  supple, symmetrical, trachea midline  HEMATOLOGIC/LYMPHATICS:  no cervical lymphadenopathy and no supraclavicular lymphadenopathy  LUNGS:  clear to auscultation  CARDIOVASCULAR:  regular rate and rhythm and no murmur noted  ABDOMEN:  normal bowel sounds, soft, non-distended, non-tender and no masses palpated, no hepatosplenomegally  NEUROLOGIC: no focal deficit detected  SKIN:  no lesions  EXTREMITIES: no clubbing, cyanosis, or edema      IMPRESSION:  1) routine colon cancer screening  2) metastatic breast cancer  3) ASA CLASS 2  4) AIRWAY CLASS 1    RECOMMENDATIONS:  1) colonoscopy        Lloyd Crowell M.D.

## 2021-08-23 ENCOUNTER — TELEPHONE (OUTPATIENT)
Dept: GASTROENTEROLOGY | Age: 51
End: 2021-08-23

## 2021-08-23 NOTE — TELEPHONE ENCOUNTER
Per my call to Alana at [facility]; No Deisi Seals is required for C-scope.  [facility][phone]

## 2021-08-24 DIAGNOSIS — Z01.818 PRE-OP TESTING: Primary | ICD-10-CM

## 2021-08-27 ENCOUNTER — HOSPITAL ENCOUNTER (OUTPATIENT)
Age: 51
Setting detail: SPECIMEN
Discharge: HOME | End: 2021-08-27
Payer: COMMERCIAL

## 2021-08-27 ENCOUNTER — NURSE ONLY (OUTPATIENT)
Dept: SURGERY | Age: 51
End: 2021-08-27

## 2021-08-27 DIAGNOSIS — Z01.818 PRE-OP TESTING: Primary | ICD-10-CM

## 2021-08-27 PROCEDURE — U0005 INFEC AGEN DETEC AMPLI PROBE: HCPCS

## 2021-08-27 PROCEDURE — U0003 INFECTIOUS AGENT DETECTION BY NUCLEIC ACID (DNA OR RNA); SEVERE ACUTE RESPIRATORY SYNDROME CORONAVIRUS 2 (SARS-COV-2) (CORONAVIRUS DISEASE [COVID-19]), AMPLIFIED PROBE TECHNIQUE, MAKING USE OF HIGH THROUGHPUT TECHNOLOGIES AS DESCRIBED BY CMS-2020-01-R: HCPCS

## 2021-08-28 LAB
SARS-COV-2: NOT DETECTED
SOURCE: NORMAL

## 2021-08-30 NOTE — PROGRESS NOTES
[facility]               1. Do not eat or drink anything after midnight - unless instructed by your doctor prior to surgery. This includes no water, chewing gum or mints. 2. Follow your directions as prescribed by the doctor for your procedure and medications. 3. Check with your Doctor regarding stopping Plavix, Coumadin, Lovenox,Effient,Pradaxa,Xarelto, Fragmin or other blood thinners and follow their instructions. 4. Do not smoke, and do not drink any alcoholic beverages 24 hours prior to surgery. This includes NA Beer. 5. You may brush your teeth and gargle the morning of surgery. DO NOT SWALLOW WATER   6. You MUST make arrangements for a responsible adult to take you home after your surgery and be able to check on you every couple hours for the day. You will not be allowed to leave alone or drive yourself home. It is strongly suggested someone stay with you the first 24                 hrs. Your surgery will be cancelled if you do not have a ride home. 7. Please wear simple, loose fitting clothing to the hospital.  Jazmine Louie not bring valuables (money, credit cards, checkbooks, etc.) Do not wear any                   makeup (including no eye makeup) or nail polish on your fingers or toes. 8. DO NOT wear any jewelry or piercings on day of surgery. All body piercing jewelry must be removed. 9. If you have dentures, they will be removed before going to the OR; we will provide you a container. If you wear contact lenses or glasses, they will be removed; please bring a case for them. 10. If you  have a Living Will and Durable Power of  for Healthcare, please bring in a copy. 11. Please bring picture ID,  insurance card, paperwork from the doctors office  (H & P, Consent, & card for implantable devices). 12. Take a shower the night before or morning of your procedure, do not apply any lotion, oil or powder.            13. Wear a mask covering your nose & mouth when entering the hospital. Have your covid-19 test performed within 2-7 days of your surgery. Quarantine yourself after the test until after your surgery.  COVID VACCINATED, COVID NEGATIVE 8/27/21

## 2021-08-31 ENCOUNTER — ANESTHESIA EVENT (OUTPATIENT)
Dept: OPERATING ROOM | Age: 51
End: 2021-08-31
Payer: COMMERCIAL

## 2021-09-01 NOTE — ANESTHESIA PRE PROCEDURE
Department of Anesthesiology  Preprocedure Note       Name:  Jerardo Reason   Age:  48 y.o.  :  1970                                          MRN:  [id number]         Date:  2021      Surgeon: Lupe Duarte):  Robbie Ambriz MD    Procedure: Procedure(s):  COLORECTAL CANCER SCREENING, NOT HIGH RISK    Medications prior to admission:   Prior to Admission medications    Medication Sig Start Date End Date Taking? Authorizing Provider   venlafaxine (EFFEXOR XR) 37.5 MG extended release capsule Take 1 capsule by mouth daily 8/3/21   Andrew Wooten MD   ondansetron (ZOFRAN) 4 MG tablet Take 1 tablet by mouth every 8 hours as needed for Nausea or Vomiting 8/3/21   Andrew Wooten MD   letrozole [facility]) 2.5 MG tablet Take 1 tablet by mouth daily 8/3/21   Andrew Wooten MD   Abemaciclib (VERZENIO) 150 MG TABS Take 1 tablet by mouth 2 times daily 6/3/21   Andrew Wooten MD   ibuprofen (ADVIL;MOTRIN) 800 MG tablet Take 1 tablet by mouth every 8 hours as needed for Pain 20   Kaila Aguilar MD   albuterol sulfate  (90 Base) MCG/ACT inhaler Inhale 2 puffs into the lungs every 6 hours as needed for Wheezing 20   Abraham January,    zoledronic acid (ZOMETA) 4 MG/100ML SOLN infusion Infuse 4 mg intravenously once Every year    Historical Provider, MD   diphenhydrAMINE (BENADRYL) 25 MG capsule 1-2 capsules by mouth every 6 hours as needed for rash 18   SARAH Serrano       Current medications:    No current facility-administered medications for this encounter.      Current Outpatient Medications   Medication Sig Dispense Refill    venlafaxine (EFFEXOR XR) 37.5 MG extended release capsule Take 1 capsule by mouth daily 30 capsule 2    ondansetron (ZOFRAN) 4 MG tablet Take 1 tablet by mouth every 8 hours as needed for Nausea or Vomiting 30 tablet 0    letrozole (FEMARA) 2.5 MG tablet Take 1 tablet by mouth daily 90 tablet 1    Abemaciclib (VERZENIO) 150 MG TABS Take 1 tablet by mouth 2 times daily 60 tablet 3    ibuprofen (ADVIL;MOTRIN) 800 MG tablet Take 1 tablet by mouth every 8 hours as needed for Pain 30 tablet 1    albuterol sulfate  (90 Base) MCG/ACT inhaler Inhale 2 puffs into the lungs every 6 hours as needed for Wheezing 1 Inhaler 2    zoledronic acid (ZOMETA) 4 MG/100ML SOLN infusion Infuse 4 mg intravenously once Every year      diphenhydrAMINE (BENADRYL) 25 MG capsule 1-2 capsules by mouth every 6 hours as needed for rash 30 capsule 0       Allergies: Allergies   Allergen Reactions    Sulfa Antibiotics Other (See Comments)     Stop breathing.  Nickel Rash       Problem List:    Patient Active Problem List   Diagnosis Code    Cubital tunnel syndrome on right G56.21    FHx: coronary artery disease Z82.49    Chest pain R07.9    Primary cancer of right breast with metastasis to other site ([facility] [address] C50.911    Dyslipidemia E78.5    Asymptomatic varicose veins I83.90    Dilated aortic root ([facility] [facility] I77.810    Liver disease K76.9    Liver lesion K76.9    Breast cancer, female C50.411    Abnormal radiological finding R93.7    Bone metastasis C79.51    Metastasis to liver C78.7       Past Medical History:        Diagnosis Date    Asthma     last flare up winter 2016    Breast CA [facility])     right    Breast cancer ([facility] [address] 2017    right    Chest pain     \"had chest pain last time 2015- saw Dr Sary Lieberman- all ok\"    FHx: coronary artery disease     H/O Doppler lower venous ultrasound 06/04/2019    No DVT,  Significant reflux noted of the Right & Left Deep Venous System.  H/O echocardiogram 06/04/2019    EF 55-60%, Essentially normal echocardiogram. Dilated aortic root at 4.0 cm.     History of exercise stress test 06/04/2019    Treadmill, EKG portion is negative for ischemia by diagnostic criteria    History of migraine     last HA  June 2020    Hx antineoplastic chemo 2017    still taking    HX OTHER MEDICAL     \"since March 2016- having some pain mid sternum- xray thought fx, then did CT and bone scan now having bx done\"    Hyperlipemia     Primary cancer of right breast with metastasis to other site [facility]) 3/24/2019    bone and liver       Past Surgical History:        Procedure Laterality Date     SECTION  12    EYE SURGERY  age 28    LASIK both eyes    HYSTERECTOMY  age 27    partial - has 1 ovary (L);  Due to endometriosis    LAPAROSCOPY N/A 2020    LAPAROSCOPY MECKLE'S DIVERTICULECTOMY performed by Severino Fitzgerald MD at [facility]    then ablation    OTHER SURGICAL HISTORY Right 2013    right ulnar nerve decompression    SALPINGO-OOPHORECTOMY Left 2020    EXPLORATORY LAPAROSCOPY, LEFT SALPINGO OOPHORECTOMY LAPAROSCOPIC performed by Severino Fitzgerald MD at [address] [facility]       Social History:    Social History     Tobacco Use    Smoking status: Former Smoker     Packs/day: 0.50     Years: 16.00     Pack years: 8.00     Types: Cigarettes     Quit date: 2004     Years since quittin.0    Smokeless tobacco: Never Used    Tobacco comment: Quit smoking 9 years ago. Substance Use Topics    Alcohol use: Yes     Alcohol/week: 2.0 standard drinks     Types: 2 Glasses of wine per week     Comment: Occasionally     CAFFEINE: 1 cup coffee daily/ average one per week                                Counseling given: Not Answered  Comment: Quit smoking 9 years ago. Vital Signs (Current):   Vitals:    21 1423   Weight: 215 lb (97.5 kg)   Height: 5' 7\" (1.702 m)                                              BP Readings from Last 3 Encounters:   21 120/76   21 109/79   21 108/80       NPO Status:                                                24 hrs. Solids                     6 hrs.  clears                                 BMI:   Wt Readings from Last 3 Encounters:   21 215 lb (97.5 kg)   21 215 lb 6.4 oz (97.7 kg)   21 215 lb (97.5 kg)     Body mass index is 33.67 kg/m². CBC:   Lab Results   Component Value Date    WBC 6.6 2021    RBC 3.75 2021    HGB 13.0 2021    HCT 36.8 2021    MCV 98.1 2021    RDW 13.2 2021     2021       CMP:   Lab Results   Component Value Date     2021    K 3.8 2021     2021    CO2 21 2021    BUN 15 2021    CREATININE 1.2 2021    GFRAA 58 2021    LABGLOM 48 2021    GLUCOSE 121 2021    PROT 7.1 2021    CALCIUM 9.3 2021    BILITOT 0.3 2021    ALKPHOS 60 2021    AST 26 2021    ALT 18 2021       POC Tests: No results for input(s): POCGLU, POCNA, POCK, POCCL, POCBUN, POCHEMO, POCHCT in the last 72 hours.     Coags:   Lab Results   Component Value Date    PROTIME 12.0 2021    INR 0.90 2021    APTT 25.1 2021       HCG (If Applicable): No results found for: PREGTESTUR, PREGSERUM, HCG, HCGQUANT     ABGs: No results found for: PHART, PO2ART, LSN0FQK, SPD1JLH, BEART, M2TJYZGD     Type & Screen (If Applicable):  No results found for: LABABO, LABRH    Drug/Infectious Status (If Applicable):  No results found for: HIV, HEPCAB    COVID-19 Screening (If Applicable):   Lab Results   Component Value Date    COVID19 NOT DETECTED 2021           Anesthesia Evaluation  Patient summary reviewed no history of anesthetic complications:   Airway: Mallampati: I  TM distance: >3 FB   Neck ROM: full  Mouth opening: > = 3 FB Dental:          Pulmonary:normal exam    (+) asthma:                           ROS comment: Former Smoker - 8 pack years  Quit Smokin04       Cardiovascular:  Exercise tolerance: good (>4 METS),            Beta Blocker:  Not on Beta Blocker      ROS comment: Stress test 2019:  Summary   Normal stress test   Peak Bp was 142/74 , Peak Hr was 172   completed 10.9 METS and 9.3 mins of exercise   Appropriate hemodynamic response to exercise. No significant ST T wave   changes with exercise. EKG portion is negative for ischemia by diagnostic   criteria. Echo 6/2019:  Summary   Normal left ventricle structure and systolic function with an ejection   fraction of 55-60%. No significant valvular disease noted. Normal pulmonary artery pressure. No evidence of pericardial effusion. Dilated aortic root at 4.0 cm. Aortic arch not visualized. Neuro/Psych:   (+) headaches: migraine headaches,             GI/Hepatic/Renal:   (+) liver disease:, bowel prep, morbid obesity         ROS comment: Liver lesion. Endo/Other:    (+) malignancy/cancer. ROS comment: Breast CA ([facility] [address] right  stable liver, bone and pulmonary mets Abdominal:   (+) obese,           Vascular: negative vascular ROS. Other Findings:           Anesthesia Plan      MAC     ASA 3       Induction: intravenous. Anesthetic plan and risks discussed with patient. Ora Fabry, APRN - CRNA   8/31/2021      Pre Anesthesia Evaluation complete. Anesthesia plan, risks, benefits, alternatives, and personnel discussed with patient and/or legal guardian. Patient and/or legal guardian verbalized an understanding and agreed to proceed. Anesthesia plan discussed with care team members and agreed upon.   Ora Fabry, APRN - CRNA  9/2/2021

## 2021-09-01 NOTE — H&P
Original H &P in soft chart. I have examined the patient immediately before the procedure and there is no change in the previous history and physical exam, which has been reviewed. There is no history of sleep apnea, snoring, or stridor. There has been no  previous adverse experience with sedation/anesthesia. There is no increased risk for aspiration of gastric contents. The patient has been instructed that all resuscitative measures (during the operative and immediate perioperative period) will be instituted in the unlikely event that they will be needed. The patient has no pertinent past surgical or family history other than listed in the original H&P. The patient was counseled about the risks of carmelita Covid-19 during their perioperative period and any recovery window from their procedure. The patient was made aware that carmelita Covid-19  may worsen their prognosis for recovering from their procedure  and lend to a higher morbidity and/or mortality risk. All material risks, benefits, and reasonable alternatives including postponing the procedure were discussed. The patient does wish to proceed with the procedure at this time.     ASA Class: 3  AIRWAY Class: 1

## 2021-09-02 ENCOUNTER — HOSPITAL ENCOUNTER (OUTPATIENT)
Age: 51
Setting detail: OUTPATIENT SURGERY
Discharge: HOME | End: 2021-09-02
Attending: SPECIALIST | Admitting: SPECIALIST
Payer: COMMERCIAL

## 2021-09-02 ENCOUNTER — ANESTHESIA (OUTPATIENT)
Dept: OPERATING ROOM | Age: 51
End: 2021-09-02
Payer: COMMERCIAL

## 2021-09-02 VITALS — OXYGEN SATURATION: 100 % | SYSTOLIC BLOOD PRESSURE: 87 MMHG | DIASTOLIC BLOOD PRESSURE: 51 MMHG

## 2021-09-02 VITALS
HEIGHT: 67 IN | WEIGHT: 208 LBS | OXYGEN SATURATION: 100 % | HEART RATE: 71 BPM | BODY MASS INDEX: 32.65 KG/M2 | DIASTOLIC BLOOD PRESSURE: 80 MMHG | SYSTOLIC BLOOD PRESSURE: 105 MMHG | RESPIRATION RATE: 16 BRPM | TEMPERATURE: 97.3 F

## 2021-09-02 PROCEDURE — 3700000001 HC ADD 15 MINUTES (ANESTHESIA): Performed by: SPECIALIST

## 2021-09-02 PROCEDURE — 3609027000 HC COLONOSCOPY: Performed by: SPECIALIST

## 2021-09-02 PROCEDURE — 2580000003 HC RX 258: Performed by: SPECIALIST

## 2021-09-02 PROCEDURE — 6360000002 HC RX W HCPCS: Performed by: NURSE ANESTHETIST, CERTIFIED REGISTERED

## 2021-09-02 PROCEDURE — 2709999900 HC NON-CHARGEABLE SUPPLY: Performed by: SPECIALIST

## 2021-09-02 PROCEDURE — 3700000000 HC ANESTHESIA ATTENDED CARE: Performed by: SPECIALIST

## 2021-09-02 PROCEDURE — 7100000010 HC PHASE II RECOVERY - FIRST 15 MIN: Performed by: SPECIALIST

## 2021-09-02 PROCEDURE — 45378 DIAGNOSTIC COLONOSCOPY: CPT | Performed by: SPECIALIST

## 2021-09-02 PROCEDURE — 7100000011 HC PHASE II RECOVERY - ADDTL 15 MIN: Performed by: SPECIALIST

## 2021-09-02 RX ORDER — SODIUM CHLORIDE, SODIUM LACTATE, POTASSIUM CHLORIDE, CALCIUM CHLORIDE 600; 310; 30; 20 MG/100ML; MG/100ML; MG/100ML; MG/100ML
INJECTION, SOLUTION INTRAVENOUS CONTINUOUS
Status: DISCONTINUED | OUTPATIENT
Start: 2021-09-02 | End: 2021-09-02 | Stop reason: HOSPADM

## 2021-09-02 RX ORDER — LIDOCAINE HYDROCHLORIDE 20 MG/ML
INJECTION, SOLUTION INTRAVENOUS PRN
Status: DISCONTINUED | OUTPATIENT
Start: 2021-09-02 | End: 2021-09-02 | Stop reason: SDUPTHER

## 2021-09-02 RX ORDER — PROPOFOL 10 MG/ML
INJECTION, EMULSION INTRAVENOUS PRN
Status: DISCONTINUED | OUTPATIENT
Start: 2021-09-02 | End: 2021-09-02 | Stop reason: SDUPTHER

## 2021-09-02 RX ADMIN — PROPOFOL 230 MG: 10 INJECTION, EMULSION INTRAVENOUS at 13:51

## 2021-09-02 RX ADMIN — SODIUM CHLORIDE, POTASSIUM CHLORIDE, SODIUM LACTATE AND CALCIUM CHLORIDE: 600; 310; 30; 20 INJECTION, SOLUTION INTRAVENOUS at 11:59

## 2021-09-02 RX ADMIN — LIDOCAINE HYDROCHLORIDE 100 MG: 20 INJECTION, SOLUTION INTRAVENOUS at 13:51

## 2021-09-02 ASSESSMENT — PAIN SCALES - GENERAL: PAINLEVEL_OUTOF10: 0

## 2021-09-02 ASSESSMENT — PAIN - FUNCTIONAL ASSESSMENT: PAIN_FUNCTIONAL_ASSESSMENT: 0-10

## 2021-09-02 NOTE — FLOWSHEET NOTE
Returned to room H. Report received from [facility]. Alert and oriented. Respirations even and unlabored. COlor pink. Abdomen soft and nontender. Beverage provided. Call light in reach.

## 2021-09-02 NOTE — BRIEF OP NOTE
BRIEF COLONOSCOPY REPORT:   Photos and full colonoscopy report available by going to \"chart review\" then \"procedures\" then  \"colonoscopy\" then \"View Report\"      IMPRESSION :   1) small internal hemorrhoids  2) otherwise normal colon    PLAN : Followup colonoscopy in 10 years

## 2021-09-02 NOTE — PROGRESS NOTES
RECEIVED REPORT FROM SARAI KESSLER PRIOR TO TRANSFER TO ENDO UNIT.  PT IS ALERT AND ORIENTED, DRANK ALL OF PREP AND HAS BEEN NPO APPROPRIATE AMOUNT OF TIME

## 2021-09-02 NOTE — ANESTHESIA POSTPROCEDURE EVALUATION
Department of Anesthesiology  Postprocedure Note    Patient: Ferny Santiago  MRN: [id number]  YOB: 1970  Date of evaluation: 9/2/2021  Time:  2:12 PM     Procedure Summary     Date: 09/02/21 Room / Location: [address]    Anesthesia Start: 0125 Anesthesia Stop: 2775    Procedure: COLORECTAL CANCER SCREENING, NOT HIGH RISK (N/A ) Diagnosis: (SCREENING)    Surgeons: Jose Richard MD Responsible Provider: KIMBERLY Tom CRNA    Anesthesia Type: MAC ASA Status: 3          Anesthesia Type: MAC    David Phase I:  10    David Phase II:  10    Last vitals: Reviewed and per EMR flowsheets.        Anesthesia Post Evaluation    Patient location during evaluation: bedside  Patient participation: complete - patient participated  Level of consciousness: awake and alert  Pain score: 0  Airway patency: patent  Nausea & Vomiting: no nausea and no vomiting  Complications: no  Cardiovascular status: hemodynamically stable  Respiratory status: acceptable, room air, spontaneous ventilation and nonlabored ventilation  Hydration status: euvolemic

## 2021-09-14 LAB
CHOLESTEROL: 250 MG/DL
GLUCOSE BLD-MCNC: 87 MG/DL (ref 70–99)
HDLC SERPL-MCNC: 68 MG/DL
LDL CHOLESTEROL CALCULATED: 141 MG/DL
PATIENT FASTING?: ABNORMAL
TRIGL SERPL-MCNC: 204 MG/DL

## 2021-09-27 ENCOUNTER — HOSPITAL ENCOUNTER (OUTPATIENT)
Dept: INFUSION THERAPY | Age: 51
Discharge: HOME | End: 2021-09-27
Payer: COMMERCIAL

## 2021-09-27 DIAGNOSIS — C78.7 SECONDARY MALIGNANT NEOPLASM OF LIVER AND INTRAHEPATIC BILE DUCT (HCC): ICD-10-CM

## 2021-09-27 DIAGNOSIS — C50.911 PRIMARY CANCER OF RIGHT BREAST WITH METASTASIS TO OTHER SITE (HCC): ICD-10-CM

## 2021-09-27 LAB
ALBUMIN SERPL-MCNC: 4.3 GM/DL (ref 3.4–5)
ALP BLD-CCNC: 52 IU/L (ref 40–128)
ALT SERPL-CCNC: 18 U/L (ref 10–40)
ANION GAP SERPL CALCULATED.3IONS-SCNC: 11 MMOL/L (ref 4–16)
AST SERPL-CCNC: 24 IU/L (ref 15–37)
BASOPHILS ABSOLUTE: 0.1 K/CU MM
BASOPHILS RELATIVE PERCENT: 1.5 % (ref 0–1)
BILIRUB SERPL-MCNC: 0.4 MG/DL (ref 0–1)
BUN BLDV-MCNC: 17 MG/DL (ref 6–23)
CALCIUM SERPL-MCNC: 9.8 MG/DL (ref 8.3–10.6)
CHLORIDE BLD-SCNC: 105 MMOL/L (ref 99–110)
CO2: 24 MMOL/L (ref 21–32)
CREAT SERPL-MCNC: 1.1 MG/DL (ref 0.6–1.1)
DIFFERENTIAL TYPE: ABNORMAL
EOSINOPHILS ABSOLUTE: 0 K/CU MM
EOSINOPHILS RELATIVE PERCENT: 1.2 % (ref 0–3)
GFR AFRICAN AMERICAN: >60 ML/MIN/1.73M2
GFR NON-AFRICAN AMERICAN: 53 ML/MIN/1.73M2
GLUCOSE BLD-MCNC: 97 MG/DL (ref 70–99)
HCT VFR BLD CALC: 36.2 % (ref 37–47)
HEMOGLOBIN: 13.1 GM/DL (ref 12.5–16)
LYMPHOCYTES ABSOLUTE: 2 K/CU MM
LYMPHOCYTES RELATIVE PERCENT: 58.5 % (ref 24–44)
MCH RBC QN AUTO: 34.5 PG (ref 27–31)
MCHC RBC AUTO-ENTMCNC: 36.2 % (ref 32–36)
MCV RBC AUTO: 95.3 FL (ref 78–100)
MONOCYTES ABSOLUTE: 0.2 K/CU MM
MONOCYTES RELATIVE PERCENT: 7 % (ref 0–4)
PDW BLD-RTO: 11.6 % (ref 11.7–14.9)
PLATELET # BLD: 196 K/CU MM (ref 140–440)
PMV BLD AUTO: 9 FL (ref 7.5–11.1)
POTASSIUM SERPL-SCNC: 4.3 MMOL/L (ref 3.5–5.1)
RBC # BLD: 3.8 M/CU MM (ref 4.2–5.4)
SEGMENTED NEUTROPHILS ABSOLUTE COUNT: 1.1 K/CU MM
SEGMENTED NEUTROPHILS RELATIVE PERCENT: 31.8 % (ref 36–66)
SODIUM BLD-SCNC: 140 MMOL/L (ref 135–145)
TOTAL PROTEIN: 7 GM/DL (ref 6.4–8.2)
WBC # BLD: 3.4 K/CU MM (ref 4–10.5)

## 2021-09-27 PROCEDURE — 80053 COMPREHEN METABOLIC PANEL: CPT

## 2021-09-27 PROCEDURE — 86300 IMMUNOASSAY TUMOR CA 15-3: CPT

## 2021-09-27 PROCEDURE — 85025 COMPLETE CBC W/AUTO DIFF WBC: CPT

## 2021-09-27 PROCEDURE — 36415 COLL VENOUS BLD VENIPUNCTURE: CPT

## 2021-09-29 LAB — CA 27.29: 70.9 U/ML

## 2021-10-04 ENCOUNTER — HOSPITAL ENCOUNTER (OUTPATIENT)
Dept: INFUSION THERAPY | Age: 51
Discharge: HOME | End: 2021-10-04
Payer: COMMERCIAL

## 2021-10-04 ENCOUNTER — OFFICE VISIT (OUTPATIENT)
Dept: ONCOLOGY | Age: 51
End: 2021-10-04
Payer: COMMERCIAL

## 2021-10-04 VITALS
WEIGHT: 214 LBS | HEART RATE: 66 BPM | SYSTOLIC BLOOD PRESSURE: 122 MMHG | TEMPERATURE: 96.2 F | DIASTOLIC BLOOD PRESSURE: 75 MMHG | BODY MASS INDEX: 33.59 KG/M2 | OXYGEN SATURATION: 96 % | HEIGHT: 67 IN

## 2021-10-04 DIAGNOSIS — C78.7 SECONDARY MALIGNANT NEOPLASM OF LIVER AND INTRAHEPATIC BILE DUCT (HCC): ICD-10-CM

## 2021-10-04 DIAGNOSIS — C79.51 SECONDARY MALIGNANT NEOPLASM OF BONE (HCC): Primary | ICD-10-CM

## 2021-10-04 DIAGNOSIS — C50.911 PRIMARY CANCER OF RIGHT BREAST WITH METASTASIS TO OTHER SITE (HCC): ICD-10-CM

## 2021-10-04 DIAGNOSIS — R42 DIZZINESS: ICD-10-CM

## 2021-10-04 DIAGNOSIS — D70.9 NEUTROPENIA, UNSPECIFIED TYPE (HCC): ICD-10-CM

## 2021-10-04 DIAGNOSIS — G44.009 CLUSTER HEADACHE, NOT INTRACTABLE, UNSPECIFIED CHRONICITY PATTERN: ICD-10-CM

## 2021-10-04 PROCEDURE — 99214 OFFICE O/P EST MOD 30 MIN: CPT | Performed by: INTERNAL MEDICINE

## 2021-10-04 PROCEDURE — 99211 OFF/OP EST MAY X REQ PHY/QHP: CPT

## 2021-10-04 ASSESSMENT — PATIENT HEALTH QUESTIONNAIRE - PHQ9
1. LITTLE INTEREST OR PLEASURE IN DOING THINGS: 0
SUM OF ALL RESPONSES TO PHQ9 QUESTIONS 1 & 2: 0
2. FEELING DOWN, DEPRESSED OR HOPELESS: 0
SUM OF ALL RESPONSES TO PHQ QUESTIONS 1-9: 0

## 2021-10-04 NOTE — PROGRESS NOTES
MA Rooming Questions  Patient: Bonnie Christensen  MRN: [id number]    Date: 10/4/2021        1. Do you have any new issues? yes - patient states she has been having more headaches behind right eye and more intense. Dizzy and light headed spells          2. Do you need any refills on medications?    no    3. Have you had any imaging done since your last visit? yes - here    4. Have you been hospitalized or seen in the emergency room since your last visit here?   no    5. Did the patient have a depression screening completed today?  Yes    PHQ-9 Total Score: 0 (10/4/2021  9:58 AM)       PHQ-9 Given to (if applicable):               PHQ-9 Score (if applicable):                     [] Positive     []  Negative              Does question #9 need addressed (if applicable)                     [] Yes    []  No               Sandra Arredondo CMA

## 2021-10-04 NOTE — PROGRESS NOTES
Patient Name: Mary Moralez  Patient : 1970  Patient MRN: [id number]     Primary Oncologist: Edwin Mercado MD       Date of Service: 10/4/2021      Chief Complaint:   Chief Complaint   Patient presents with    Follow-up    Results        Active Problem list  No diagnosis found. Problem List  · Breast cancer, female ( Stage Date: Unknown, Stage IV (Right breast upper-outer quadrant, T1c, N0, M1)-Clinical  Date of Dx:10/2017 Extent of Disease: Evidence of metastatic disease; Disease State: Initial diagnosis; Metastatic Sites: Bone; Metastatic Sites: Liver; ER Status: Positive; ND Status: Positive; HER-2/jus Value-FISH: Negative; Menopausal Status: Premenopausal; PIK3CA gene: Positive)   · Abnormal radiological finding   · Bony metastasis ( Date of Dx:10/2017 )   · Chest pain   Metastasis to liver       HPI:        Extremely pleasant lady patient of Dr. Oscar Villa. RN at [facility]. Evaluated in 2017 for some discomfort involving the sternum that had been present for a few weeks. She could not remember any injury to that area except maybe a few months earlier she had fallen face-forward. She did not remember having significant discomfort to the chest area involving the fall. 2017, chest x-ray was reported to be fairly unremarkable. X-rays of the sternum revealed questionable age-indeterminate factor of the mid to superior sternum. Additional CT was recommended which was done on 2017, revealing mottled appearance of the sternum. Findings could represent related to earlier trauma or even osteomyelitis or metastatic disease. Nonspecific sclerotic changes in the T12 vertebral body were noted too. Triple phase bone scan done on 2017, had revealed only focal uptake in the body of the sternum corresponding to the changes noted on earlier CAT scan. Similar differential was again suggested.      Visit here on  2017, though, she stated that she in general was doing actually better and pain was not severe and she did not have to take any pain medications and had been continuing to work on a full time basis and also remained fairly active. CMP on October 09, 2017, was normal and so was LDL of 183. Sed rate was 10. Kappa Lambda light chain ratios were normal. Immunoelectrophoresis was negative. CBC had revealed fairly normal numbers. CT biopsy of the sternum done on October 17, 2017, did reveal changes of metastatic carcinoma, possibly suggestive of breast primary. Biopsy from the sternum done on October 13, 2017, revealed metastatic carcinoma quite likely from breast primary. The tumor was strongly positive for estrogen receptor 95 percent and progesterone receptor is 95 percent, and negative for HER2. Visit here on October 20, 2017, those findings were discussed with her. October 25, 2017, diagnostic digital bilateral breast mammogram revealed 1.8 cm irregular, spiculated hyperechoic mass at the 10 oclock position in the right breast correlating to the mammographic area. Findings were highly suspicious for neoplasm. Ultrasound guided biopsy on October 27, 2017, did reveal invasive mammary carcinoma. Ultrasound-guided biopsy October 27, 2017, from this mass revealed invasive mammary carcinoma. Tumor was again strongly positive 95 percent for ER, 95 percent [place], HER-2 was negative both by IHC and FISH. PET scan done on November 2, 2017, revealed hypermetabolic right breast mass as well as prominent right axillary lymph nodes and scattered osseous metastatic lesions involving the sternum, right scapula, left L4 vertebral body, and bilateral iliac crest.     Visit here on October 27, 2017, and afterwards metastatic disease involving the bones were discussed with her and clinically she was still considered to be premenopausal as she was not having any symptoms suggestive of menopausal state.      She was started on treatment with Zoladex, Faslodex, and Zometa starting on November 10, 2017. She had to receive these treatments in outpatient facility at the hospital primarily because of insurance reasons. Saw me on December 7, 2017, sooner than her scheduled appointment, was concerned about possibly feeling a lump involving the right breast at around 6 oclock position. Otherwise she had not noticed any new issues. Mammogram and ultrasound done on December 8, 2017, showed dense breast tissue where she was feeling a mass. There was no sonographic evidence for any mass in that area too. MRI was suggested if needed to. Otherwise she was again noted to have known malignancy in the upper outer quadrant of the breast.     PET scan on May 14, 2018, revealed partial treatment response with improvement in the right axillary nodes as well as skeletal metastases. Metabolic activity associated with lytic bone lesions had improved with increased sclerosis of the previously seen lesions. Mild residual activity was noted at the L4 vertebral body. There were no new persistent or metabolically active aggressive osseous lesions. Visit here on February 26, 2019 overall I think she was doing fair but we were concerned about her starting to have more discomfort in her left elbow as well as the right hip area. She had not had a PET scan since May of last year and that too had revealed a partial response, we felt possibly repeating the PET scan would be helpful. In the meantime she was going to continue to receive the same treatments as before including Faslodex, Zoladex and Zometa    She was not able to undergo a PET scan but insurance. Did undergo a bone scan on April 4, 2019 revealing a stable bone scan without evidence of new osseous metastases. May fall, 2019 was evaluated for symptoms of chest pain including a cardiac work-up including echo and stress test afterwards and those were unremarkable.  She did have a CTA of the chest done looking for pulmonary embolism on May 4, 2019 which was negative for pulmonary embolism no other acute pulmonary illness. She still of course was noted to have several sclerotic lesions within the spine which were felt to be stable as compared to the prior PET scan which was done in May 2018. [address] levels done on July 16, 5647 was 49.9, follicular phase. CA-27-29 level same day was 26, stable. [address] September was 17, 2019 we talked about still being pre-or perimenopausal Darst will continue to stay on Zoladex along with Faslodex. Talked about disease overall having responded. Will possibly extend intervals between Zometa to may be once a year, may be due in July 2020. Did start her on Effexor for hot flashes    March 12, 2020 still being treated with same agents, still working on a long hours still in general feeling well only issue being occasional discomfort in the right rib cage as well as right breast area. Could not feel any distinct masses. Denied any history of injury. Otherwise negative for any new systemic complaints and overall both physically and emotionally was doing quite well    Telemedicine visit on April 23, 2020. We discussed results those were obtained on her previous visit on March 12, 2020. Chemistries CBC were fairly unremarkable CA-27-29 levels were still normal. She had undergone CT scan of the chest on March 24, 2020 was noted to have a stable bony lesions. But she was described to have a 2.8 cm lesion in liver which was worrisome for possibly metastatic disease. Following those studies she was advised to undergo PET scan and that was refused by insurance. She did undergo a bone scan on April 28, 2020 was noted to have stable appearance of skeletal metastatic disease no new metastatic lesions were noted.  She did undergo a CT scan of the abdomen and pelvis with contrast on the same day again was noted to have a 2.3 x 2.7 cm left hepatic lesion which was worrisome for metastatic disease and again of course skeletal metastases were noted. Telemedicine visit again on May 5, 2020. April 27, 2020 she did undergo liver biopsy. Pathology did reveal metastatic adenocarcinoma from a breast primary. Tumor was still positive for ER 50% AR 40%. HER-2 was equivocal by IHC though negative by FISH. May 6, 2020 had long discussion with her and her family. We talked about progressive nature of the disease. We talked about future plan including liver biopsy material being sent to foundation 1 analysis. We talked about the need for us to change her treatment to a combination of Faslodex, Zoladex and add Verzenio. We also discussed possibly doing a PET scan. We do know she has disease involving the breast, bones and now liver. Our intention is that if indeed she has an isolated lesion in the liver we might consider doing local treatment to the liver besides this new systemic therapy. PET scan done on May 21, 2086 revealed metabolically active metastases in the left hepatic lobe no other metabolic activity was noted in the known skeletal metastases mammogram revealed stable appearance no new sites of malignancy were noted. Treatment was changed to Faslodex Zoladex and Verzenio in May 2020    Foundation 1 analysis from the liver lesion did reveal a tumor to be positive for PI K3 CA. Otherwise microsatellite status was stable and no other alterations were noted especially in BRCA1 and BRCA2 as well as HER-2      As PET scan had revealed an isolated lesion involving the liver she did undergo radio frequency ablation of the lesion on June 24, 2020, tolerated procedure fairly well though had some discomfort afterwards.  CT scan of the abdomen done without contrast on June 25, 2020 did reveal sequela of treated metastatic lesion in the left lobe of the liver with central high attenuation area measuring 2.6 x 2.4 cm and the peripheral low-attenuation rim measuring around 4.8 x 4.1 cm related to the postprocedural edema. Again osseous metastatic disease was again noted without pathologic fracture. July 17, 2020 she was overall doing fairly decent had recovered from the discomfort related to the radiofrequency ablation. We talked about continuing on this present regimen does do plan to check her [address] [place] [address] Street levels to see if we can hold off giving her Zoladex. We of course will continue with Faslodex, Verzenio and Zometa. We of course will continue to monitor her progress through exams as well as CAT scans.     FSH level done on July 30, 2020 was 9.4, premenopausal.     Visit here on September 15, 2020. Was still being treated with Faslodex Zoladex and Verzenio.       Studies done on October 9, 2020 fairly normal CBC, chemistries reveal creatinine of 1.2 CA 27-29 levels were mildly elevated to 47. A CT scan of the abdomen done on October 15, 2020 revealed 3.5 x 3.1 cm ablation zone, which had decreased in size since immediate post ablation changes done on June 25, 2020. It is difficult to assess residual tremor dedicated multiphasic CT or MRI was recommended. Multifocal osseous sclerotic metastases were felt to be stable.     October 27, 2020 overall she was feeling about the same there was some concern about slight increase in tumor marker CA 27-29. [place] Plan was to to multiphasic CT scan of the abdomen to assess the disease status. Also she was seriously considering having her unilateral oophorectomy I think that will be reasonable. I     CT scan of the abdomen done on November 3, 2020 overall it was felt that there was stable present appearance of liver no suspicious contrast enhancement to indicate presence of residual tumor no new hepatic abnormality was noted again stable appearance of osteoblastic metastatic disease of the lower lumbar and thoracic spine.        On December 17, 2020 she did undergo left oophorectomy. She also the same day underwent removal of Meckel's diverticulum.   Tolerated the procedure well.  Pathology from the ovary did not show any significant pathologic changes.     CA 27-29 levels done on November 25, 2020 was 47.2 not significantly different from before.     Visit here on January 5, 2020 she was still being treated with Faslodex and Verzenio. Of course Zoladex was stopped after her undergoing oophorectomy.          CA 27-29 levels done on February 5, 2021 was slightly higher than before, 54.5.     With her continued elevation of CA 27-29, though minimally, wanted to do a PET scan, was not approved by [employer]. Underwent a CT scan of the chest abdomen and pelvis on March 24, 2021. There was no new metastatic disease noted in the chest abdomen or pelvis the treated left hepatic mass was stable in appearance. Also stable appearance of treated skeletal metastatic disease. Small bilateral pulmonary nodules too were a stable.     Visit here on April 1, 2021 all in all she was feeling quite well still tolerating therapy with Faslodex and Verzenio extremely well. Counts have been fairly reasonable.     Seen  here on 6/3/2021 she reported numbness and scar tissue formation in the gluteal area secondary to the Faslodex injection. Reported that she continues to have hot flashes. Reported chronic headaches secondary to migraine. Also reported blurred vision which is not extensive and has LasiK 20 years ago, has an follow-up appointment with ophthalmology. No chest pain or shortness of breath. No new breast masses. Reported that her legs hurt when she sits for long period Of time and tries  to get up.    6/26/21: Venous u/l LE; no DVT. She had swelling in the LLE after the injection here. Received one dose of lovenox and also muscle relaxant and sx resolved. 7/8/21 mammogram no evidence of malignancy. Dexa scan normal     9/2021 Ca 27-29   Past Medical History  None other than breast cancer    Surgical History    1.  Abdominal hysterectomy with unilateral oophorectomy for endometriosis in the year 2000. 2. Surgery on right elbow in 2013. Social History  Smoking Status Former smokerUsed to smoke, quit in 2004. Negative for significant ethanol intake. Occasional social usage. .  works at a manufacturing plant. Two sons ages 32 and 25. 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     Family History  NC    Interval history:10/4/21: She arrived with her sister to the clinic today. Reported that for the last few months she has been having right frontal pain which radiates to the occipital area. Wishes to stay in the dark room. No focal weakness. Has been dropping things. Has history of migraine headaches long time ago. Feeling dizzy and lightheaded. Reported tenderness in the right breast area. No mass or any axial lymphadenopathy. Denied any chest pain, increase shortness of breath. Denied any palpitations. Denied any dysphagia. Reported history of chronic intermittent mid abdominal pain. Had colonoscopy in 2021 which is normal.  Was told that she has internal hemorrhoids. Reported intermittent nausea and vomiting. Also diarrhea. Elbow symptoms since she started were seen you. There were chronic stable cough, has history of asthma. No  symptoms. Continues to be on letrozole and Verzenio. Review of Systems   Per interval history; otherwise 10 point ROS is negative              Vital Signs:  /75 (Site: Left Upper Arm, Position: Sitting, Cuff Size: Large Adult)   Pulse 66   Temp 96.2 °F (35.7 °C) (Temporal)   Ht 5' 7\" (1.702 m)   Wt 214 lb (97.1 kg)   SpO2 96%   BMI 33.52 kg/m²     Physical Exam:  Was negative for any new palpable nodes and/or masses involving nodes in the right breast.  No hepatosplenomegaly no abnormal cardiac or pulmonary findings no new skin or neurologic issues or findings.       Labs:    Hematology:  Lab Results   Component Value Date    WBC 3.4 (L) 09/27/2021    RBC 3.80 (L) 09/27/2021    HGB 13.1 09/27/2021 HCT 36.2 (L) 09/27/2021    MCV 95.3 09/27/2021    MCH 34.5 (H) 09/27/2021    MCHC 36.2 (H) 09/27/2021    RDW 11.6 (L) 09/27/2021     09/27/2021    MPV 9.0 09/27/2021    SEGSPCT 31.8 (L) 09/27/2021    EOSRELPCT 1.2 09/27/2021    BASOPCT 1.5 (H) 09/27/2021    LYMPHOPCT 58.5 (H) 09/27/2021    MONOPCT 7.0 (H) 09/27/2021    SEGSABS 1.1 09/27/2021    EOSABS 0.0 09/27/2021    BASOSABS 0.1 09/27/2021    LYMPHSABS 2.0 09/27/2021    MONOSABS 0.2 09/27/2021    DIFFTYPE AUTOMATED DIFFERENTIAL 09/27/2021     Lab Results   Component Value Date    ESR 10 10/09/2017       Chemistry:  Lab Results   Component Value Date     09/27/2021    K 4.3 09/27/2021     09/27/2021    CO2 24 09/27/2021    BUN 17 09/27/2021    CREATININE 1.1 09/27/2021    GLUCOSE 97 09/27/2021    CALCIUM 9.8 09/27/2021    PROT 7.0 09/27/2021    LABALBU 4.3 09/27/2021    BILITOT 0.4 09/27/2021    ALKPHOS 52 09/27/2021    AST 24 09/27/2021    ALT 18 09/27/2021    LABGLOM 53 (L) 09/27/2021    GFRAA >60 09/27/2021     Lab Results   Component Value Date     10/09/2017     No components found for: LD  Lab Results   Component Value Date    TSHHS 2.100 08/27/2014    T4FREE 1.22 08/08/2013    FT3 3.3 08/08/2013       Immunology:  Lab Results   Component Value Date    PROT 7.0 09/27/2021     Lab Results   Component Value Date    KAPPAUVOL 1.47 10/09/2017    LAMBDAUVOL 1.16 10/09/2017    KLFLCR 1.27 10/09/2017     No results found for: B2M    Coagulation Panel:  Lab Results   Component Value Date    PROTIME 12.0 06/26/2021    INR 0.90 06/26/2021    APTT 25.1 06/26/2021    DDIMER 0.58 (H) 06/26/2021       Anemia Panel:  Lab Results   Component Value Date    STNAQDIO28 721 08/08/2013       Tumor Markers:  Lab Results   Component Value Date    LABCA2 70.9 (H) 09/27/2021         Imaging: Reviewed     Pathology:Reviewed     Observations:  Performance Status: ECOG   Depression Status: PHQ-9 Total Score: 0 (10/4/2021  9:58 AM)          Assessment & Plan:  Extremely pleasant lady patient of Dr. Ceci Andrade, overall blessed with good health. Saw him in September for vague discomfort involving the sternum which led to chest x-ray and x-rays of the sternum revealing questionable age-indeterminate fracture of the mid to superior sternum. CT scan revealed mottled appearance of sternum. Etiology could be related to earlier trauma and/or even metastatic disease. Bone scan showed increased activity in the sternum area with the same differential. Mammogram in October 2016 was unremarkable. October 10, 2017, I did spend time with her and her sister. We did talk about future course of action including possibly just repeating the studies in a couple of months. Would consider doing biopsy of the sternal area. She I think would feel comfortable if we could have better answer for her and thus for that reason I did advise her to be seen in interventional radiology for possible biopsy. October 13, 2017, CT guided biopsy from the sternum revealed changes consistent with metastatic carcinoma from breast biopsy. The tumor is strongly positive for ER and DC and negative for HER2. October 25, 2017, mammogram did reveal a lesion in the right breast of 1.8 cm in the upper outer quadrant. Biopsy for which did reveal invasive mammary carcinoma. October 27, 2017, I did discuss the findings with her and her extended family. We talked about basically the finding of carcinoma involving the right breast as well as bones. Biopsy from the sternum was positive. A bone scan also showed questionable changes involving the T12, though CT scan of the chest otherwise was negative for any pulmonary or hepatic metastases. Biopsy from the right breast October 27, 2017, also revealed invasive mammary carcinoma, strongly positive ER, DC, and HER-2.      PET scan done on November 2, 2017, revealed hypermetabolic right breast as well as right axillary nodes and osseous metastases involving were felt to be stable as compared to the prior PET scan which was done in May 2018. [facility] September was 17, 2019 we talked about still being pre-or perimenopausal Darst will continue to stay on Zoladex along with Faslodex. Talked about disease overall having responded. Will possibly extend intervals between Zometa to may be once a year, may be due in July 2020. Did start her on Effexor for hot flashes  March 12, 2020 overall was doing fairly well Effexor was helpful with hot flashes. Denied significant new issues except for discomfort in the breast as well as rib cage area. Is due to have her bone scan mammogram and CT scan of the chest done to look for the discomfort in the chest area. Otherwise will continue on the same regimen. She will see me after the studies are completed. Telemedicine visit on April 23, 2020, as described above new issues being no obvious progression noted on a bone scan and blood work including tumor markers. CT scan of the chest as well as CT scan of the abdomen showing 2.7 cm lesion in the left hepatic lobe quite worrisome for possible metastatic disease. Clinically she was still doing well. I did encourage her to undergo a biopsy of the liver lesion. And following the biopsy could decide about if we need to send material for foundation 1 study also    Telemedicine visit again on May 5, 2020. April 27, 2020 she did undergo liver biopsy. Pathology did reveal metastatic adenocarcinoma from a breast primary. Tumor was still positive for ER 50% FL 40%. HER-2 was equivocal by IHC though negative by FISH. May 6, 2020 had long discussion with her and her family. We talked about progressive nature of the disease. We talked about future plan including liver biopsy material being sent to foundation 1 analysis. We talked about the need for us to change her treatment to a combination of Faslodex, Zoladex and add Verzenio. We also discussed possibly doing a PET scan.  We do know she has disease involving the breast, bones and now liver. Our intention is that if indeed she has an isolated lesion in the liver we might consider doing local treatment to the liver besides this new systemic therapy. June 1, 2020 I did spend time with her and her sister. We talked about PET scan revealing stable bony metastases and an isolated lesion involving the liver, biopsy-proven malignancy. Mammogram was unchanged from before. Couple weeks prior to this visit had started taking Verzenio along with Faslodex and Zoladex. We did talk about staying on the present systemic therapy and of course will continue to monitor her progress. We also discussed with her about possibly doing radiofrequency ablation treatment to the single lesion involving the liver. As described above on June 24, 2020 she did undergo radiofrequency ablation of the isolated lesion in the liver. July 17, 2020 she was overall doing fairly decent had recovered from the discomfort related to the radiofrequency ablation. We talked about continuing on this present regimen does do plan to check her [facility] levels to see if we can hold off giving her Zoladex. We of course will continue with Faslodex, Verzenio and Zometa. We of course will continue to monitor her progress through exams as well as CAT scans.     September 15, 2020 was still being treated with Faslodex Zoladex and Verzenio. FSH levels were in premenopausal levels. We also planning to give zometa from time to time. She in general was feeling fairly well major issue was discomfort primarily from Faslodex injections. We talked about checking her blood work with the next injection on October 9 including chemistries and CA-27-29. We also talked about checking her a CT scan of the abdomen to see the status of the lesion following the radiofrequency ablation.   Otherwise as before we will continue to monitor her blood progress exams CAT scans and possibly PET scans.     January 5, 2021 spend time with her on the few issues as described below. #1 regarding metastatic carcinoma of the breast, premenopausal, s/p  oophorectomy, currently being treated with Faslodex and Verzenio. She was has had locally treatment for isolated liver metastases done on June 24, 2020. CBC was drawn today counts were reasonable that she could continue on the same dosages of Verzenio. Advised her to stay on the present therapy and will recheck her blood tumor markers in February.     February 16, 2021 telephone conversation with her. Clinically she was still doing quite well still on same medications as described above including Faslodex and Verzenio that she was tolerating well. Concern being is further rise of CA 27-29 levels. We talked about rechecking the level again in 3 weeks along with possibly PET scan if we could and if we could confirm progression of the disease then we might consider changing her treatment to [facility] [place] [place] 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 Other wise there was stable microsatellite status tumor mutational burden was not elevated.     She could not undergo a PET scan instead did a CT scan of the chest abdomen and pelvis on March 24, 2021 and those studies were negative for any obvious signs of progression of the disease.     April 1, 2021 had long discussion with her. Clinically she was feeling well exam was unremarkable at least CAT scan studies are fairly unremarkable. Only concern being mild elevation of CA 27-29 levels. As all in all she was doing quite well we felt comfortable keeping her on treatment with Faslodex and Verzenio continue to monitor her CBC and chemistries possibly recheck her tumor markers in 8 weeks and still the plan remains the same if there is documented progression we might consider changing treatment to [facility] [place] [place]     She is going to be seen here in 8 weeks possibly by one of my partners.     Zandra 3, 2021, clinically she seems to be stable(stable liver, bone and pulmonary mets). Currently on Verzenio and Faslodex. We will repeat CA 27-29 and if it remains stable then will continue the same treatment. If CA 27-29 markedly elevated then prior test, will [facility] Recommend DEXA scan and mammogram.  Also continue bisphosphonates. At some point she would like to transition to letrozole from Faslodex secondary to the side effects. Is on venlafaxine for hot flashes, deferred any other intervention at this point. Seen here on August third 2021 with no new symptoms except for the swelling at the injection site associated with pain. Discussed that CA 27-29 is relatively stable and mammogram with no new findings, would like to continue the current regimen but she wants to switch to oral AI. Discussed the adverse effects of AI  Return to clinic in 3 months or earlier if new symptoms. Note DEXA scan is normal.  Continue Zometa yearly. Recommend increase Effexor to 75 mg for vasogenic symptoms. Repeat CA 27-29 in 6 to 8 weeks and also will monitor for any adverse effects. Visit here on 10/4/2020 when she reported headache, and has been dropping things. Also reported tenderness in the right breast area. Clinical exam with no palpable masses. But note CA 27-29 elevated to 70.9 in September 2021 compared to 53.8 in June 2021. Is currently on letrozole(which has been changed from Faslodex in August 2021) and Verzenio. Note PIK3A positive. Recommend further evaluation with CT scan of the chest abdomen pelvis and also MRI of the brain. Neutropenia most probably secondary to Verzenio. Avoid sick contacts. Recommend not to work in the [employer]. Dose adjustments as needed. Further recommendations pending above. Note mammogram and DEXA scan in July 2021 were normal    Return to clinic after imaging studies or earlier if new symptoms  She has received her Covid vaccine.     [address] Electronically signed by Keron Juares MD on 10/4/2021 at 10:04 AM

## 2021-10-08 ENCOUNTER — TELEPHONE (OUTPATIENT)
Dept: ONCOLOGY | Age: 51
End: 2021-10-08

## 2021-10-08 NOTE — TELEPHONE ENCOUNTER
Called patient regarding her MRI and CT scans on 10.25.2021 at [address] [place] [address] over prep and she stated understanding.

## 2021-10-25 ENCOUNTER — HOSPITAL ENCOUNTER (OUTPATIENT)
Dept: MRI IMAGING | Age: 51
Discharge: HOME | End: 2021-10-25
Payer: COMMERCIAL

## 2021-10-25 ENCOUNTER — HOSPITAL ENCOUNTER (OUTPATIENT)
Dept: CT IMAGING | Age: 51
Discharge: HOME | End: 2021-10-25
Payer: COMMERCIAL

## 2021-10-25 DIAGNOSIS — C78.7 SECONDARY MALIGNANT NEOPLASM OF LIVER AND INTRAHEPATIC BILE DUCT (HCC): ICD-10-CM

## 2021-10-25 DIAGNOSIS — G44.009 CLUSTER HEADACHE, NOT INTRACTABLE, UNSPECIFIED CHRONICITY PATTERN: ICD-10-CM

## 2021-10-25 DIAGNOSIS — C50.911 PRIMARY CANCER OF RIGHT BREAST WITH METASTASIS TO OTHER SITE (HCC): ICD-10-CM

## 2021-10-25 DIAGNOSIS — R42 DIZZINESS: ICD-10-CM

## 2021-10-25 DIAGNOSIS — C79.51 SECONDARY MALIGNANT NEOPLASM OF BONE (HCC): ICD-10-CM

## 2021-10-25 DIAGNOSIS — D70.9 NEUTROPENIA, UNSPECIFIED TYPE (HCC): ICD-10-CM

## 2021-10-25 PROCEDURE — 70553 MRI BRAIN STEM W/O & W/DYE: CPT

## 2021-10-25 PROCEDURE — 6360000004 HC RX CONTRAST MEDICATION: Performed by: INTERNAL MEDICINE

## 2021-10-25 PROCEDURE — 74177 CT ABD & PELVIS W/CONTRAST: CPT

## 2021-10-25 PROCEDURE — 2580000003 HC RX 258: Performed by: INTERNAL MEDICINE

## 2021-10-25 PROCEDURE — A9577 INJ MULTIHANCE: HCPCS | Performed by: INTERNAL MEDICINE

## 2021-10-25 PROCEDURE — 71260 CT THORAX DX C+: CPT

## 2021-10-25 RX ORDER — SODIUM CHLORIDE 0.9 % (FLUSH) 0.9 %
5-40 SYRINGE (ML) INJECTION PRN
Status: DISCONTINUED | OUTPATIENT
Start: 2021-10-25 | End: 2021-10-26 | Stop reason: HOSPADM

## 2021-10-25 RX ADMIN — IOPAMIDOL 75 ML: 755 INJECTION, SOLUTION INTRAVENOUS at 11:25

## 2021-10-25 RX ADMIN — GADOBENATE DIMEGLUMINE 10 ML: 529 INJECTION, SOLUTION INTRAVENOUS at 11:56

## 2021-10-25 RX ADMIN — IOHEXOL 50 ML: 240 INJECTION, SOLUTION INTRATHECAL; INTRAVASCULAR; INTRAVENOUS; ORAL at 10:20

## 2021-10-25 RX ADMIN — SODIUM CHLORIDE, PRESERVATIVE FREE 10 ML: 5 INJECTION INTRAVENOUS at 11:25

## 2021-10-27 ENCOUNTER — OFFICE VISIT (OUTPATIENT)
Dept: ONCOLOGY | Age: 51
End: 2021-10-27
Payer: COMMERCIAL

## 2021-10-27 ENCOUNTER — HOSPITAL ENCOUNTER (OUTPATIENT)
Dept: INFUSION THERAPY | Age: 51
Discharge: HOME | End: 2021-10-27
Payer: COMMERCIAL

## 2021-10-27 VITALS
RESPIRATION RATE: 16 BRPM | WEIGHT: 215 LBS | TEMPERATURE: 97.5 F | DIASTOLIC BLOOD PRESSURE: 79 MMHG | OXYGEN SATURATION: 99 % | BODY MASS INDEX: 33.74 KG/M2 | SYSTOLIC BLOOD PRESSURE: 130 MMHG | HEART RATE: 98 BPM | HEIGHT: 67 IN

## 2021-10-27 DIAGNOSIS — C50.911 PRIMARY CANCER OF RIGHT BREAST WITH METASTASIS TO OTHER SITE (HCC): ICD-10-CM

## 2021-10-27 PROCEDURE — 99214 OFFICE O/P EST MOD 30 MIN: CPT | Performed by: INTERNAL MEDICINE

## 2021-10-27 PROCEDURE — 99211 OFF/OP EST MAY X REQ PHY/QHP: CPT

## 2021-10-27 RX ORDER — ABEMACICLIB 150 MG/1
1 TABLET ORAL 2 TIMES DAILY
Qty: 60 TABLET | Refills: 3 | Status: SHIPPED | OUTPATIENT
Start: 2021-10-27 | End: 2022-02-17

## 2021-10-27 NOTE — PROGRESS NOTES
Patient Name: Claude Crafts  Patient : 1970  Patient MRN: [id number]     Primary Oncologist: Beryle Scale, MD       Date of Service: 10/27/2021      Chief Complaint:   Chief Complaint   Patient presents with    Follow-up        Active Problem list  1. Primary cancer of right breast with metastasis to other site [facility])    Problem List  · Breast cancer, female ( Stage Date: Unknown, Stage IV (Right breast upper-outer quadrant, T1c, N0, M1)-Clinical  Date of Dx:10/2017 Extent of Disease: Evidence of metastatic disease; Disease State: Initial diagnosis; Metastatic Sites: Bone; Metastatic Sites: Liver; ER Status: Positive; AL Status: Positive; HER-2/jus Value-FISH: Negative; Menopausal Status: Premenopausal; PIK3CA gene: Positive)   · Abnormal radiological finding   · Bony metastasis ( Date of Dx:10/2017 )   · Chest pain   Metastasis to liver       HPI:        Extremely pleasant lady patient of Dr. Ruth Reyes. RN at [facility]. Evaluated in 2017 for some discomfort involving the sternum that had been present for a few weeks. She could not remember any injury to that area except maybe a few months earlier she had fallen face-forward. She did not remember having significant discomfort to the chest area involving the fall. 2017, chest x-ray was reported to be fairly unremarkable. X-rays of the sternum revealed questionable age-indeterminate factor of the mid to superior sternum. Additional CT was recommended which was done on 2017, revealing mottled appearance of the sternum. Findings could represent related to earlier trauma or even osteomyelitis or metastatic disease. Nonspecific sclerotic changes in the T12 vertebral body were noted too. Triple phase bone scan done on 2017, had revealed only focal uptake in the body of the sternum corresponding to the changes noted on earlier CAT scan.  Similar differential was again suggestive of menopausal state. She was started on treatment with Zoladex, Faslodex, and Zometa starting on November 10, 2017. She had to receive these treatments in outpatient facility at the hospital primarily because of insurance reasons. Saw me on December 7, 2017, sooner than her scheduled appointment, was concerned about possibly feeling a lump involving the right breast at around 6 oclock position. Otherwise she had not noticed any new issues. Mammogram and ultrasound done on December 8, 2017, showed dense breast tissue where she was feeling a mass. There was no sonographic evidence for any mass in that area too. MRI was suggested if needed to. Otherwise she was again noted to have known malignancy in the upper outer quadrant of the breast.     PET scan on May 14, 2018, revealed partial treatment response with improvement in the right axillary nodes as well as skeletal metastases. Metabolic activity associated with lytic bone lesions had improved with increased sclerosis of the previously seen lesions. Mild residual activity was noted at the L4 vertebral body. There were no new persistent or metabolically active aggressive osseous lesions. Visit here on February 26, 2019 overall I think she was doing fair but we were concerned about her starting to have more discomfort in her left elbow as well as the right hip area. She had not had a PET scan since May of last year and that too had revealed a partial response, we felt possibly repeating the PET scan would be helpful. In the meantime she was going to continue to receive the same treatments as before including Faslodex, Zoladex and Zometa    She was not able to undergo a PET scan but insurance. Did undergo a bone scan on April 4, 2019 revealing a stable bone scan without evidence of new osseous metastases.     May fall, 2019 was evaluated for symptoms of chest pain including a cardiac work-up including echo and stress test afterwards and those were unremarkable. She did have a CTA of the chest done looking for pulmonary embolism on May 4, 2019 which was negative for pulmonary embolism no other acute pulmonary illness. She still of course was noted to have several sclerotic lesions within the spine which were felt to be stable as compared to the prior PET scan which was done in May 2018. [facility] levels done on July 16, 1509 was 86.6, follicular phase. CA-27-29 level same day was 26, stable. [address] [facility] [address] September was 17, 2019 we talked about still being pre-or perimenopausal Darst will continue to stay on Zoladex along with Faslodex. Talked about disease overall having responded. Will possibly extend intervals between Zometa to may be once a year, may be due in July 2020. Did start her on Effexor for hot flashes    March 12, 2020 still being treated with same agents, still working on a long hours still in general feeling well only issue being occasional discomfort in the right rib cage as well as right breast area. Could not feel any distinct masses. Denied any history of injury. Otherwise negative for any new systemic complaints and overall both physically and emotionally was doing quite well    Telemedicine visit on April 23, 2020. We discussed results those were obtained on her previous visit on March 12, 2020. Chemistries CBC were fairly unremarkable CA-27-29 levels were still normal. She had undergone CT scan of the chest on March 24, 2020 was noted to have a stable bony lesions. But she was described to have a 2.8 cm lesion in liver which was worrisome for possibly metastatic disease. Following those studies she was advised to undergo PET scan and that was refused by insurance. She did undergo a bone scan on April 28, 2020 was noted to have stable appearance of skeletal metastatic disease no new metastatic lesions were noted.  She did undergo a CT scan of the abdomen and pelvis with contrast on the same day again was noted to have a 2.3 x 2.7 cm left hepatic lesion which was worrisome for metastatic disease and again of course skeletal metastases were noted. Telemedicine visit again on May 5, 2020. April 27, 2020 she did undergo liver biopsy. Pathology did reveal metastatic adenocarcinoma from a breast primary. Tumor was still positive for ER 50% MA 40%. HER-2 was equivocal by IHC though negative by FISH. May 6, 2020 had long discussion with her and her family. We talked about progressive nature of the disease. We talked about future plan including liver biopsy material being sent to foundation 1 analysis. We talked about the need for us to change her treatment to a combination of Faslodex, Zoladex and add Verzenio. We also discussed possibly doing a PET scan. We do know she has disease involving the breast, bones and now liver. Our intention is that if indeed she has an isolated lesion in the liver we might consider doing local treatment to the liver besides this new systemic therapy. PET scan done on May 21, 6389 revealed metabolically active metastases in the left hepatic lobe no other metabolic activity was noted in the known skeletal metastases mammogram revealed stable appearance no new sites of malignancy were noted. Treatment was changed to Faslodex Zoladex and Verzenio in May 2020    Foundation 1 analysis from the liver lesion did reveal a tumor to be positive for PI K3 CA. Otherwise microsatellite status was stable and no other alterations were noted especially in BRCA1 and BRCA2 as well as HER-2      As PET scan had revealed an isolated lesion involving the liver she did undergo radio frequency ablation of the lesion on June 24, 2020, tolerated procedure fairly well though had some discomfort afterwards.  CT scan of the abdomen done without contrast on June 25, 2020 did reveal sequela of treated metastatic lesion in the left lobe of the liver with central high attenuation area measuring 2.6 x 2.4 cm and the peripheral low-attenuation rim measuring diverticulum. Tolerated the procedure well. Pathology from the ovary did not show any significant pathologic changes.     CA 27-29 levels done on November 25, 2020 was 47.2 not significantly different from before.     Visit here on January 5, 2020 she was still being treated with Faslodex and Verzenio. Of course Zoladex was stopped after her undergoing oophorectomy.          CA 27-29 levels done on February 5, 2021 was slightly higher than before, 54.5.     With her continued elevation of CA 27-29, though minimally, wanted to do a PET scan, was not approved by [employer]. Underwent a CT scan of the chest abdomen and pelvis on March 24, 2021. There was no new metastatic disease noted in the chest abdomen or pelvis the treated left hepatic mass was stable in appearance. Also stable appearance of treated skeletal metastatic disease. Small bilateral pulmonary nodules too were a stable.     Visit here on April 1, 2021 all in all she was feeling quite well still tolerating therapy with Faslodex and Verzenio extremely well. Counts have been fairly reasonable.     Seen  here on 6/3/2021 she reported numbness and scar tissue formation in the gluteal area secondary to the Faslodex injection. Reported that she continues to have hot flashes. Reported chronic headaches secondary to migraine. Also reported blurred vision which is not extensive and has LasiK 20 years ago, has an follow-up appointment with ophthalmology. No chest pain or shortness of breath. No new breast masses. Reported that her legs hurt when she sits for long period Of time and tries  to get up.    6/26/21: Venous u/l LE; no DVT. She had swelling in the LLE after the injection here. Received one dose of lovenox and also muscle relaxant and sx resolved. 7/8/21 mammogram no evidence of malignancy.  Dexa scan normal     9/2021 Ca 27-29 was 70    10/25/21: Ct chest, abdomen and pelvis:  Chest: Stable chest CT with treated osseous metastatic disease, with several   sclerotic bone lesions. Ina Brower also a few tiny pulmonary nodules 4 mm and   less.  No new findings of metastatic disease.       12 mm masslike focus of the right breast which appears slightly increased in   conspicuity from March 2021.  This may relate to differences in technique but   diagnostic mammogram is recommended along with correlation with the   mammography results from July 2021       Abdomen/pelvis: Stable CT of the abdomen and pelvis.  Unchanged low-density 3   cm lesion of the liver and unchanged treated osseous metastatic disease with   several sclerotic foci.  No new findings. Past Medical History  None other than breast cancer    Surgical History    1. Abdominal hysterectomy with unilateral oophorectomy for endometriosis in the year 2000. 2. Surgery on right elbow in 2013. Social History  Smoking Status Former smokerUsed to smoke, quit in 2004. Negative for significant ethanol intake. Occasional social usage. .  works at a [employer] plant. Two sons ages 32 and 25. 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     Family History  [place]    Interval history:10/27/21: She arrived with her sister to the clinic today. Other than diarrhea and fatigue she did not complain of any new symptoms since last visit. Review of Systems   Per interval history; otherwise 10 point ROS is negative              Vital Signs:  /79 (Site: Right Upper Arm, Position: Sitting, Cuff Size: Large Adult)   Pulse 98   Temp 97.5 °F (36.4 °C) (Infrared)   Resp 16   Ht 5' 7\" (1.702 m)   Wt 215 lb (97.5 kg)   SpO2 99%   BMI 33.67 kg/m²     Physical Exam:  Was negative for any new palpable nodes and/or masses involving nodes in the right breast.  No hepatosplenomegaly no abnormal cardiac or pulmonary findings no new skin or neurologic issues or findings.       Labs:    Hematology:  Lab Results   Component Value Date    WBC 3.4 (L) 09/27/2021    RBC 3.80 (L) 09/27/2021    HGB 13.1 09/27/2021    HCT 36.2 (L) 09/27/2021    MCV 95.3 09/27/2021    MCH 34.5 (H) 09/27/2021    MCHC 36.2 (H) 09/27/2021    RDW 11.6 (L) 09/27/2021     09/27/2021    MPV 9.0 09/27/2021    SEGSPCT 31.8 (L) 09/27/2021    EOSRELPCT 1.2 09/27/2021    BASOPCT 1.5 (H) 09/27/2021    LYMPHOPCT 58.5 (H) 09/27/2021    MONOPCT 7.0 (H) 09/27/2021    SEGSABS 1.1 09/27/2021    EOSABS 0.0 09/27/2021    BASOSABS 0.1 09/27/2021    LYMPHSABS 2.0 09/27/2021    MONOSABS 0.2 09/27/2021    DIFFTYPE AUTOMATED DIFFERENTIAL 09/27/2021     Lab Results   Component Value Date    ESR 10 10/09/2017       Chemistry:  Lab Results   Component Value Date     09/27/2021    K 4.3 09/27/2021     09/27/2021    CO2 24 09/27/2021    BUN 17 09/27/2021    CREATININE 1.1 09/27/2021    GLUCOSE 97 09/27/2021    CALCIUM 9.8 09/27/2021    PROT 7.0 09/27/2021    LABALBU 4.3 09/27/2021    BILITOT 0.4 09/27/2021    ALKPHOS 52 09/27/2021    AST 24 09/27/2021    ALT 18 09/27/2021    LABGLOM 53 (L) 09/27/2021    GFRAA >60 09/27/2021     Lab Results   Component Value Date     10/09/2017     No components found for: LD  Lab Results   Component Value Date    TSHHS 2.100 08/27/2014    T4FREE 1.22 08/08/2013    FT3 3.3 08/08/2013       Immunology:  Lab Results   Component Value Date    PROT 7.0 09/27/2021     Lab Results   Component Value Date    KAPPAUVOL 1.47 10/09/2017    LAMBDAUVOL 1.16 10/09/2017    KLFLCR 1.27 10/09/2017     No results found for: B2M    Coagulation Panel:  Lab Results   Component Value Date    PROTIME 12.0 06/26/2021    INR 0.90 06/26/2021    APTT 25.1 06/26/2021    DDIMER 0.58 (H) 06/26/2021       Anemia Panel:  Lab Results   Component Value Date    COJWHIUB54 538 08/08/2013       Tumor Markers:  Lab Results   Component Value Date    LABCA2 70.9 (H) 09/27/2021         Imaging: Reviewed     Pathology:Reviewed     Observations:  Performance Status: ECOG   Depression Status: No data recorded Assessment & Plan:  Extremely pleasant lady patient of Dr. Diana Levy, overall blessed with good health. Saw him in September for vague discomfort involving the sternum which led to chest x-ray and x-rays of the sternum revealing questionable age-indeterminate fracture of the mid to superior sternum. CT scan revealed mottled appearance of sternum. Etiology could be related to earlier trauma and/or even metastatic disease. Bone scan showed increased activity in the sternum area with the same differential. Mammogram in October 2016 was unremarkable. October 10, 2017, I did spend time with her and her sister. We did talk about future course of action including possibly just repeating the studies in a couple of months. Would consider doing biopsy of the sternal area. She I think would feel comfortable if we could have better answer for her and thus for that reason I did advise her to be seen in interventional radiology for possible biopsy. October 13, 2017, CT guided biopsy from the sternum revealed changes consistent with metastatic carcinoma from breast biopsy. The tumor is strongly positive for ER and AK and negative for HER2. October 25, 2017, mammogram did reveal a lesion in the right breast of 1.8 cm in the upper outer quadrant. Biopsy for which did reveal invasive mammary carcinoma. October 27, 2017, I did discuss the findings with her and her extended family. We talked about basically the finding of carcinoma involving the right breast as well as bones. Biopsy from the sternum was positive. A bone scan also showed questionable changes involving the T12, though CT scan of the chest otherwise was negative for any pulmonary or hepatic metastases. Biopsy from the right breast October 27, 2017, also revealed invasive mammary carcinoma, strongly positive ER, AK, and HER-2.      PET scan done on November 2, 2017, revealed hypermetabolic right breast as well as right axillary nodes and osseous metastases involving sternum, right scapula, and L4 vertebral body. She was considered a premenopausal lady with metastatic disease involving the bones. Treatment choices were discussed with her and on November 10, 2017, was started on treatment with Zoladex, Faslodex, and Zometa. Visit here on November 20, 2017, she was tolerating these treatments well following the Zometa. December 7, 2017, bony pains were better, but she was concerned about possibly a lump involving the 6 oclock position right breast. Mammogram and ultrasound did not reveal any mass in that area. She was still noted to have lesion in the upper outer quadrant which had been biopsied before. The PET scan done on May 14, 2018, had revealed improvement in disease involving the breast, axilla, as well as bones. Previous PET scan had shown changes involving the sternum, scapula, and L4 areas; those areas did show improvement on the PET scan. Visit here on February 26, 2019 overall I think she was doing fair but we were concerned about her starting to have more discomfort in her left elbow as well as the right hip area. She had not had a PET scan since May of last year and that too had revealed a partial response, we felt possibly repeating the PET scan would be helpful. In the meantime she was going to continue to receive the same treatments as before including Faslodex, Zoladex and Zometa  She was not able to undergo a PET scan but insurance. Did undergo a bone scan on April 4, 2019 revealing a stable bone scan without evidence of new osseous metastases. May , 2019 was evaluated for symptoms of chest pain including a cardiac work-up including echo and stress test afterwards and those were unremarkable. She did have a CTA of the chest done looking for pulmonary embolism on May 4, 2019 which was negative for pulmonary embolism no other acute pulmonary illness.  She still of course was noted to have several sclerotic lesions within the spine which were felt to be stable as compared to the prior PET scan which was done in May 2018. [facility] September was 17, 2019 we talked about still being pre-or perimenopausal Darst will continue to stay on Zoladex along with Faslodex. Talked about disease overall having responded. Will possibly extend intervals between Zometa to may be once a year, may be due in July 2020. Did start her on Effexor for hot flashes  March 12, 2020 overall was doing fairly well Effexor was helpful with hot flashes. Denied significant new issues except for discomfort in the breast as well as rib cage area. Is due to have her bone scan mammogram and CT scan of the chest done to look for the discomfort in the chest area. Otherwise will continue on the same regimen. She will see me after the studies are completed. Telemedicine visit on April 23, 2020, as described above new issues being no obvious progression noted on a bone scan and blood work including tumor markers. CT scan of the chest as well as CT scan of the abdomen showing 2.7 cm lesion in the left hepatic lobe quite worrisome for possible metastatic disease. Clinically she was still doing well. I did encourage her to undergo a biopsy of the liver lesion. And following the biopsy could decide about if we need to send material for foundation 1 study also    Telemedicine visit again on May 5, 2020. April 27, 2020 she did undergo liver biopsy. Pathology did reveal metastatic adenocarcinoma from a breast primary. Tumor was still positive for ER 50% SD 40%. HER-2 was equivocal by IHC though negative by FISH. May 6, 2020 had long discussion with her and her family. We talked about progressive nature of the disease. We talked about future plan including liver biopsy material being sent to foundation 1 analysis. We talked about the need for us to change her treatment to a combination of Faslodex, Zoladex and add Verzenio. We also discussed possibly doing a PET scan.  We do know she has disease involving the breast, bones and now liver. Our intention is that if indeed she has an isolated lesion in the liver we might consider doing local treatment to the liver besides this new systemic therapy. June 1, 2020 I did spend time with her and her sister. We talked about PET scan revealing stable bony metastases and an isolated lesion involving the liver, biopsy-proven malignancy. Mammogram was unchanged from before. Couple weeks prior to this visit had started taking Verzenio along with Faslodex and Zoladex. We did talk about staying on the present systemic therapy and of course will continue to monitor her progress. We also discussed with her about possibly doing radiofrequency ablation treatment to the single lesion involving the liver. As described above on June 24, 2020 she did undergo radiofrequency ablation of the isolated lesion in the liver. July 17, 2020 she was overall doing fairly decent had recovered from the discomfort related to the radiofrequency ablation. We talked about continuing on this present regimen does do plan to check her [address] [place] [address] levels to see if we can hold off giving her Zoladex. We of course will continue with Faslodex, Verzenio and Zometa. We of course will continue to monitor her progress through exams as well as CAT scans.     September 15, 2020 was still being treated with Faslodex Zoladex and Verzenio. FSH levels were in premenopausal levels. We also planning to give zometa from time to time. She in general was feeling fairly well major issue was discomfort primarily from Faslodex injections. We talked about checking her blood work with the next injection on October 9 including chemistries and CA-27-29. We also talked about checking her a CT scan of the abdomen to see the status of the lesion following the radiofrequency ablation.   Otherwise as before we will continue to monitor her blood progress exams CAT scans and possibly PET scans.     January 5, 2021 spend time with her on the few issues as described below. #1 regarding metastatic carcinoma of the breast, premenopausal, s/p  oophorectomy, currently being treated with Faslodex and Verzenio. She was has had locally treatment for isolated liver metastases done on June 24, 2020. CBC was drawn today counts were reasonable that she could continue on the same dosages of Verzenio. Advised her to stay on the present therapy and will recheck her blood tumor markers in February.     February 16, 2021 telephone conversation with her. Clinically she was still doing quite well still on same medications as described above including Faslodex and Verzenio that she was tolerating well. Concern being is further rise of CA 27-29 levels. We talked about rechecking the level again in 3 weeks along with possibly PET scan if we could and if we could confirm progression of the disease then we might consider changing her treatment to [facility] along with Faslodex as the foundation 1 studies done before had revealed the presence of PIK 3 CA mutation. Other wise there was stable microsatellite status tumor mutational burden was not elevated.     She could not undergo a PET scan instead did a CT scan of the chest abdomen and pelvis on March 24, 2021 and those studies were negative for any obvious signs of progression of the disease.     April 1, 2021 had long discussion with her. Clinically she was feeling well exam was unremarkable at least CAT scan studies are fairly unremarkable. Only concern being mild elevation of CA 27-29 levels. As all in all she was doing quite well we felt comfortable keeping her on treatment with Faslodex and Verzenio continue to monitor her CBC and chemistries possibly recheck her tumor markers in 8 weeks and still the plan remains the same if there is documented progression we might consider changing treatment to [facility].     She is going to be seen here in 8 weeks possibly by one of my partners.     June 3, 2021, clinically she seems to be stable(stable liver, bone and pulmonary mets). Currently on Verzenio and Faslodex. We will repeat CA 27-29 and if it remains stable then will continue the same treatment. If CA 27-29 markedly elevated then prior test, [facility]. Recommend DEXA scan and mammogram.  Also continue bisphosphonates. At some point she would like to transition to letrozole from Faslodex secondary to the side effects. Is on venlafaxine for hot flashes, deferred any other intervention at this point. Seen here on August third 2021 with no new symptoms except for the swelling at the injection site associated with pain. Discussed that CA 27-29 is relatively stable and mammogram with no new findings, would like to continue the current regimen but she wants to switch to oral AI. Discussed the adverse effects of AI  Return to clinic in 3 months or earlier if new symptoms. Note DEXA scan is normal.  Continue Zometa yearly. Recommend increase Effexor to 75 mg for vasogenic symptoms. Repeat CA 27-29 in 6 to 8 weeks and also will monitor for any adverse effects. Visit here on 10/4/2021 when she reported headache, and has been dropping things. Also reported tenderness in the right breast area. Clinical exam with no palpable masses. But note CA 27-29 elevated to 70.9 in September 2021 compared to 53.8 in June 2021. Is currently on letrozole(which has been changed from Faslodex in August 2021) and Verzenio. Note PIK3A positive. Recommend further evaluation with CT scan of the chest abdomen pelvis and also MRI of the brain. Neutropenia most probably secondary to Verzenio. Avoid sick contacts. Recommend not to work in the [employer]. Dose adjustments as needed. Further recommendations pending above. Note mammogram and DEXA scan in July 2021 were normal  She has received her Covid vaccine.     Visit here on 10/27/2021 we discussed about the results of the MRI of the brain which did not reveal any metastatic disease. Also discussed the results of the CT scan of the chest abdomen pelvis with stable osseous, pulmonary, liver lesions with slight increase in the right breast lesion. Note CA 27-29 was 70.9 in September 2021. Will discuss with radiation oncology for possible SBRT of the breast mass. Will repeat CA 27-29 in a month in November and if rising then will consider changing to [employer] SYS inhibitor. Other medical care. To clinic December 2021 or earlier if new symptoms.     [address]

## 2021-10-27 NOTE — PROGRESS NOTES
MA Rooming Questions  Patient: Bonnie Christensen  MRN: [id number]    Date: 10/27/2021        1. Do you have any new issues?   no         2. Do you need any refills on medications? yes - Verzenio    3. Have you had any imaging done since your last visit?   no    4. Have you been hospitalized or seen in the emergency room since your last visit here?   no    5. Did the patient have a depression screening completed today?  No    No data recorded     PHQ-9 Given to (if applicable):               PHQ-9 Score (if applicable):                     [] Positive     []  Negative              Does question #9 need addressed (if applicable)                     [] Yes    []  No               Sancho Johnson MA

## 2021-11-01 ENCOUNTER — OFFICE VISIT (OUTPATIENT)
Dept: INTERNAL MEDICINE CLINIC | Age: 51
End: 2021-11-01
Payer: COMMERCIAL

## 2021-11-01 VITALS
HEART RATE: 70 BPM | SYSTOLIC BLOOD PRESSURE: 100 MMHG | TEMPERATURE: 96.8 F | BODY MASS INDEX: 35.32 KG/M2 | WEIGHT: 212 LBS | OXYGEN SATURATION: 99 % | HEIGHT: 65 IN | DIASTOLIC BLOOD PRESSURE: 78 MMHG

## 2021-11-01 DIAGNOSIS — J45.20 MILD INTERMITTENT ASTHMA WITHOUT COMPLICATION: ICD-10-CM

## 2021-11-01 DIAGNOSIS — C50.911 PRIMARY CANCER OF RIGHT BREAST WITH METASTASIS TO OTHER SITE (HCC): ICD-10-CM

## 2021-11-01 DIAGNOSIS — M79.10 MUSCLE ACHE: ICD-10-CM

## 2021-11-01 DIAGNOSIS — Z00.00 ANNUAL PHYSICAL EXAM: Primary | ICD-10-CM

## 2021-11-01 DIAGNOSIS — E78.2 MIXED HYPERLIPIDEMIA: ICD-10-CM

## 2021-11-01 DIAGNOSIS — L30.9 ECZEMA, UNSPECIFIED TYPE: ICD-10-CM

## 2021-11-01 PROCEDURE — 99396 PREV VISIT EST AGE 40-64: CPT | Performed by: FAMILY MEDICINE

## 2021-11-01 RX ORDER — CYCLOBENZAPRINE HCL 10 MG
10 TABLET ORAL NIGHTLY PRN
Qty: 30 TABLET | Refills: 0 | Status: SHIPPED | OUTPATIENT
Start: 2021-11-01 | End: 2021-11-11

## 2021-11-01 RX ORDER — ALBUTEROL SULFATE 90 UG/1
2 AEROSOL, METERED RESPIRATORY (INHALATION) EVERY 6 HOURS PRN
Qty: 1 EACH | Refills: 3 | Status: SHIPPED | OUTPATIENT
Start: 2021-11-01

## 2021-11-01 SDOH — ECONOMIC STABILITY: FOOD INSECURITY: WITHIN THE PAST 12 MONTHS, YOU WORRIED THAT YOUR FOOD WOULD RUN OUT BEFORE YOU GOT MONEY TO BUY MORE.: NEVER TRUE

## 2021-11-01 SDOH — ECONOMIC STABILITY: FOOD INSECURITY: WITHIN THE PAST 12 MONTHS, THE FOOD YOU BOUGHT JUST DIDN'T LAST AND YOU DIDN'T HAVE MONEY TO GET MORE.: NEVER TRUE

## 2021-11-01 ASSESSMENT — SOCIAL DETERMINANTS OF HEALTH (SDOH): HOW HARD IS IT FOR YOU TO PAY FOR THE VERY BASICS LIKE FOOD, HOUSING, MEDICAL CARE, AND HEATING?: NOT HARD AT ALL

## 2021-11-01 ASSESSMENT — ENCOUNTER SYMPTOMS
SHORTNESS OF BREATH: 0
NAUSEA: 0
COUGH: 0
BLOOD IN STOOL: 0
EYES NEGATIVE: 1
ABDOMINAL PAIN: 0
DIARRHEA: 0
BACK PAIN: 0
CONSTIPATION: 0

## 2021-11-01 NOTE — PROGRESS NOTES
Well Adult Note  Name: Ronnie Mckinley Date: 2021   MRN: [id number] Sex: Female   Age: 46 y.o. Ethnicity: Non- / Non    : 1970 Race: White (non-)      Pavithra Samaniego is here for well adult exam.  History:  Patient Active Problem List    Diagnosis Date Noted    Neutropenia ([facility] [address] 10/04/2021    Breast cancer, female 2020    Abnormal radiological finding 2020    Bone metastasis 2020    Metastasis to liver 2020    Liver disease 2020    Liver lesion 2020    Dilated aortic root ([facility] [address] 2019    Dyslipidemia 2019    Asymptomatic varicose veins 2019    Primary cancer of right breast with metastasis to other site [facility]) 2019    FHx: coronary artery disease     Chest pain     Cubital tunnel syndrome on right 2013     Recent C-scope- Internal hemorrhoids, otherwise neg  R breast cancer with metastasis. Follows with oncology  Pt can have headaches. Pt had recent MRI of the brain- normal. 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 The areas are dry. Intermittent    Review of Systems   Constitutional: Negative for chills, diaphoresis and fever. HENT: Negative. Eyes: Negative. Respiratory: Negative for cough and shortness of breath. Cardiovascular: Negative for chest pain and palpitations. Gastrointestinal: Negative for abdominal pain, blood in stool, constipation, diarrhea and nausea. Genitourinary: Negative for difficulty urinating and dysuria. Musculoskeletal: Negative for back pain. Neurological: Positive for headaches (comes and goes). Negative for dizziness and light-headedness. Psychiatric/Behavioral: Negative for dysphoric mood. Allergies   Allergen Reactions    Sulfa Antibiotics Other (See Comments)     Stop breathing.     Nickel Rash         Prior to Visit Medications    Medication Sig Taking? Authorizing Provider   albuterol sulfate  (90 Base) MCG/ACT inhaler Inhale 2 puffs into the lungs every 6 hours as needed for Wheezing Yes Wanda Mcneal DO   cyclobenzaprine (FLEXERIL) 10 MG tablet Take 1 tablet by mouth nightly as needed for Muscle spasms Yes Wanda Mcneal DO   triamcinolone (KENALOG) 0.1 % ointment Apply topically 2 times daily to affected area on leg as directed Yes Wanda Mcneal DO   Abemaciclib (VERZENIO) 150 MG TABS Take 1 tablet by mouth 2 times daily Yes Nicolas Hernandez MD   venlafaxine (EFFEXOR XR) 37.5 MG extended release capsule Take 1 capsule by mouth daily Yes Nicolas Hernandez MD   ondansetron (ZOFRAN) 4 MG tablet Take 1 tablet by mouth every 8 hours as needed for Nausea or Vomiting Yes Nicolas Hernandez MD   letrozole (FEMARA) 2.5 MG tablet Take 1 tablet by mouth daily Yes Nicolas Hernandez MD   ibuprofen (ADVIL;MOTRIN) 800 MG tablet Take 1 tablet by mouth every 8 hours as needed for Pain Yes Harlan Aguila MD   zoledronic acid (ZOMETA) 4 MG/100ML SOLN infusion Infuse 4 mg intravenously once Every year Yes Historical Provider, MD   diphenhydrAMINE (BENADRYL) 25 MG capsule 1-2 capsules by mouth every 6 hours as needed for rash Yes SARAH Valadez         Past Medical History:   Diagnosis Date    Asthma     last flare up winter 2016    Breast CA [facility])     right    Breast cancer ([facility] [address] 2017    right    Chest pain     \"had chest pain last time 2015- saw Dr Lu Bui- all ok\"    FHx: coronary artery disease     H/O Doppler lower venous ultrasound 06/04/2019    No DVT,  Significant reflux noted of the Right & Left Deep Venous System.  H/O echocardiogram 06/04/2019    EF 55-60%, Essentially normal echocardiogram. Dilated aortic root at 4.0 cm.     History of exercise stress test 06/04/2019    Treadmill, EKG portion is negative for ischemia by diagnostic criteria    History of migraine     last HA  June 2020    Hx antineoplastic chemo 2017    still taking    HX OTHER MEDICAL     \"since 2016- having some pain mid sternum- xray thought fx, then did CT and bone scan now having bx done\"    Hyperlipemia     Primary cancer of right breast with metastasis to other site [facility]) 3/24/2019    bone and liver       Past Surgical History:   Procedure Laterality Date     SECTION      COLONOSCOPY N/A 2021    COLORECTAL CANCER SCREENING, NOT HIGH RISK performed by Pieter Duran MD at [address] [place] [address] Rd  age 28    LASIK both eyes    HYSTERECTOMY  age 27    BSO. Due to endometriosis    LAPAROSCOPY N/A 2020    LAPAROSCOPY MECKLE'S DIVERTICULECTOMY performed by Katelynn Roach MD at [facility]      then ablation    OTHER SURGICAL HISTORY Right 2013    right ulnar nerve decompression    SALPINGO-OOPHORECTOMY Left 2020    EXPLORATORY LAPAROSCOPY, LEFT SALPINGO OOPHORECTOMY LAPAROSCOPIC performed by Katelynn Roach MD at [facility]         Family History   Problem Relation Age of Onset    Heart Attack Mother          from [address] @ age 61.     Coronary Art Dis Mother     Other Mother         Low blood pressure, endometriosis - hyster    High Cholesterol Mother     High Blood Pressure Father     Heart Attack Father     Coronary Art Dis Father     Arthritis Father         hip replacement    Arthritis Sister     Liver Disease Sister         medication & hx of alcohol abuse    Other Sister         endometriosis - hyster    Depression Brother     Other Sister         endometriosis - hyster    Other Sister         endometriosis - hyster   [place] Migraines Sister     Other Sister         endometriosis - hyster    Other Sister         endometriosis - hyster, hypothyroidism    High Blood Pressure Sister     Breast Cancer Paternal Cousin        Social History     Tobacco Use    Smoking status: Former Smoker     Packs/day: 0.50     Years: 16.00     Pack years: 8.00     Types: Cigarettes     Quit date: 2004     Years since quittin.2    Smokeless tobacco: Never Used    Tobacco comment: Quit smoking 9 years ago. Vaping Use    Vaping Use: Never used   Substance Use Topics    Alcohol use: Yes     Alcohol/week: 2.0 standard drinks     Types: 2 Glasses of wine per week     Comment: Occasionally     CAFFEINE: 1 cup coffee daily/ average one per week    Drug use: No       Objective   /78   Pulse 70   Temp 96.8 °F (36 °C)   Ht 5' 5\" (1.651 m)   Wt 212 lb (96.2 kg)   SpO2 99%   Breastfeeding No   BMI 35.28 kg/m²   Wt Readings from Last 3 Encounters:   21 212 lb (96.2 kg)   10/27/21 215 lb (97.5 kg)   10/04/21 214 lb (97.1 kg)       Physical Exam  Vitals reviewed. Constitutional:       General: She is not in acute distress. HENT:      Right Ear: Tympanic membrane normal.      Left Ear: Tympanic membrane normal.   Eyes:      Extraocular Movements: Extraocular movements intact. Conjunctiva/sclera: Conjunctivae normal.      Pupils: Pupils are equal, round, and reactive to light. Cardiovascular:      Rate and Rhythm: Normal rate and regular rhythm. Pulmonary:      Effort: Pulmonary effort is normal. No respiratory distress. Breath sounds: Normal breath sounds. Abdominal:      General: Bowel sounds are normal.      Palpations: Abdomen is soft. Tenderness: There is no abdominal tenderness. Musculoskeletal:      Cervical back: Neck supple. Right lower leg: No edema. Left lower leg: No edema. Skin:     Findings: No rash. Neurological:      Mental Status: She is alert and oriented to person, place, and time. Cranial Nerves: No cranial nerve deficit. Psychiatric:         Mood and Affect: Mood normal.         Assessment   Plan   1. Annual physical exam  2. Mixed hyperlipidemia  3. Mild intermittent asthma without complication  -     albuterol sulfate  (90 Base) MCG/ACT inhaler;  Inhale 2 puffs into the lungs every 6 hours as needed for Wheezing, Disp-1 each, R-3Normal  4. Muscle ache  -     cyclobenzaprine (FLEXERIL) 10 MG tablet; Take 1 tablet by mouth nightly as needed for Muscle spasms, Disp-30 tablet, R-0Normal  5. Eczema, unspecified type  -     triamcinolone (KENALOG) 0.1 % ointment; Apply topically 2 times daily to affected area on leg as directed, Disp-30 g, R-0, Normal   6. Primary cancer of right breast with metastasis to other site [facility])    Flu vaccine UTD  Keep f/u with specialists  Personalized Preventive Plan   Current Health Maintenance Status  Immunization History   Administered Date(s) Administered    COVID-19, [address], Primary or Immunocompromised, PF, 100mcg/0.5mL 01/07/2021, 02/04/2021    Influenza Virus Vaccine 10/10/2018, 10/04/2019, 10/13/2020    Tdap (Boostrix, Adacel) 04/19/2010, 09/11/2018        Health Maintenance   Topic Date Due    Pneumococcal 0-64 years Vaccine (1 of 4 - PCV13) Never done    Shingles Vaccine (1 of 2) Never done    COVID-19 Vaccine (3 - Moderna risk 3-dose series) 03/04/2021    Flu vaccine (1) 09/01/2021    DTaP/Tdap/Td vaccine (3 - Td or Tdap) 09/11/2028    Hepatitis A vaccine  Aged Out    Hepatitis B vaccine  Aged Out    Hib vaccine  Aged Out    Meningococcal (ACWY) vaccine  Aged Out     Recommendations for [employer] Due: see orders and patient instructions/AVS.  .

## 2021-11-01 NOTE — PATIENT INSTRUCTIONS
Patient Education        High Cholesterol: Care Instructions  Overview     Cholesterol is a type of fat in your blood. It is needed for many body functions, such as making new cells. Cholesterol is made by your body. It also comes from food you eat. High cholesterol means that you have too much of the fat in your blood. This raises your risk of a heart attack and stroke. LDL and HDL are part of your total cholesterol. LDL is the \"bad\" cholesterol. High LDL can raise your risk for coronary artery disease, heart attack, and stroke. HDL is the \"good\" cholesterol. It helps clear bad cholesterol from the body. High HDL is linked with a lower risk of coronary artery disease, heart attack, and stroke. Your cholesterol levels help your doctor find out your risk for having a heart attack or stroke. You and your doctor can talk about whether you need to lower your risk and what treatment is best for you. Treatment options include a heart-healthy lifestyle and medicine. Both options can help lower your cholesterol and your risk. The way you choose to lower your risk will depend on how high your risk is for heart attack and stroke. It will also depend on how you feel about taking medicines. Follow-up care is a key part of your treatment and safety. Be sure to make and go to all appointments, and call your doctor if you are having problems. It's also a good idea to know your test results and keep a list of the medicines you take. How can you care for yourself at home? · Eat heart-healthy foods. ? Eat fruits, vegetables, whole grains, beans, and other high-fiber foods. ? Eat lean proteins, such as seafood, lean meats, beans, nuts, and soy products. ? Eat healthy fats, such as canola and olive oil. ? Choose foods that are low in saturated fat. ? Limit sodium and alcohol. ? Limit drinks and foods with added sugar. · Be physically active. Try to do moderate activity at least 2½ hours a week.  Or try to do vigorous activity at least 1¼ hours a week. You may want to walk or try other activities, such as running, swimming, cycling, or playing tennis or team sports. · Stay at a healthy weight or lose weight by making the changes in eating and physical activity listed above. Losing just a small amount of weight, even 5 to 10 pounds, can help reduce your risk for having a heart attack or stroke. · Do not smoke. · Manage other health problems. These include diabetes and high blood pressure. If you think you may have a problem with alcohol or drug use, talk to your doctor. · If you take medicine, take it exactly as prescribed. Call your doctor if you think you are having a problem with your medicine. · Check with your doctor or pharmacist before you use any other medicines, including over-the-counter medicines. Make sure your doctor knows all of the medicines, vitamins, herbal products, and supplements you take. Taking some medicines together can cause problems. When should you call for help? Watch closely for changes in your health, and be sure to contact your doctor if:    · You need help making lifestyle changes.     · You have questions about your medicine. Where can you learn more? Go to [email or web address]://[email or web address]. org and sign in to your [employer] account. Enter H420 in the [facility] box to learn more about \"High Cholesterol: Care Instructions. \"     If you do not have an account, please click on the \"Sign Up Now\" link. Current as of: April 29, 2021               Content Version: 13.0  © 2006-2021 Healthwise, [facility]. Care instructions adapted under license by [facility] ([facility]). If you have questions about a medical condition or this instruction, always ask your healthcare professional. [place] [address] any warranty or liability for your use of this information.

## 2021-11-02 ENCOUNTER — TELEPHONE (OUTPATIENT)
Dept: ONCOLOGY | Age: 51
End: 2021-11-02

## 2021-11-02 NOTE — TELEPHONE ENCOUNTER
Patient would like to know what the out come was from tumor board last night.   Please call cell phone [phone]

## 2021-11-26 ENCOUNTER — E-VISIT (OUTPATIENT)
Dept: INTERNAL MEDICINE CLINIC | Age: 51
End: 2021-11-26
Payer: COMMERCIAL

## 2021-11-26 DIAGNOSIS — J01.90 ACUTE NON-RECURRENT SINUSITIS, UNSPECIFIED LOCATION: ICD-10-CM

## 2021-11-26 DIAGNOSIS — J20.9 ACUTE BRONCHITIS, UNSPECIFIED ORGANISM: Primary | ICD-10-CM

## 2021-11-26 PROCEDURE — 99421 OL DIG E/M SVC 5-10 MIN: CPT | Performed by: FAMILY MEDICINE

## 2021-11-26 RX ORDER — AZITHROMYCIN 250 MG/1
250 TABLET, FILM COATED ORAL SEE ADMIN INSTRUCTIONS
Qty: 6 TABLET | Refills: 0 | Status: SHIPPED | OUTPATIENT
Start: 2021-11-26 | End: 2021-12-01

## 2021-11-26 ASSESSMENT — LIFESTYLE VARIABLES
PACKS_PER_DAY: .5
SMOKING_STATUS: NO, I'M A FORMER SMOKER
SMOKING_YEARS: 10

## 2021-11-26 NOTE — PROGRESS NOTES
CC: Sinus Cough  HPI: This 47 yo F with sinus pressure, drainage and productive cough that has persisted. Cough with yellowish phlegm. Pt with breast cancer . No fever,Cp or Sob  O: Unable  A: Acute Sinusitis, Acute Bronchitis  P: Spoke to pt. Zpak sent in to . Pt has used before. .ADR's explained. Continue Mucinex. Persist RTO or call  5-10 minutes were spent on the digital evaluation and management of this patient.

## 2021-12-02 ENCOUNTER — HOSPITAL ENCOUNTER (OUTPATIENT)
Dept: INFUSION THERAPY | Age: 51
Discharge: HOME | End: 2021-12-02
Payer: COMMERCIAL

## 2021-12-02 DIAGNOSIS — C50.911 PRIMARY CANCER OF RIGHT BREAST WITH METASTASIS TO OTHER SITE (HCC): ICD-10-CM

## 2021-12-02 LAB
ALBUMIN SERPL-MCNC: 4.5 GM/DL (ref 3.4–5)
ALP BLD-CCNC: 52 IU/L (ref 40–129)
ALT SERPL-CCNC: 16 U/L (ref 10–40)
ANION GAP SERPL CALCULATED.3IONS-SCNC: 8 MMOL/L (ref 4–16)
AST SERPL-CCNC: 17 IU/L (ref 15–37)
BASOPHILS ABSOLUTE: 0 K/CU MM
BASOPHILS RELATIVE PERCENT: 1.1 % (ref 0–1)
BILIRUB SERPL-MCNC: 0.4 MG/DL (ref 0–1)
BUN BLDV-MCNC: 15 MG/DL (ref 6–23)
CALCIUM SERPL-MCNC: 9.4 MG/DL (ref 8.3–10.6)
CHLORIDE BLD-SCNC: 106 MMOL/L (ref 99–110)
CO2: 26 MMOL/L (ref 21–32)
CREAT SERPL-MCNC: 1 MG/DL (ref 0.6–1.1)
DIFFERENTIAL TYPE: ABNORMAL
EOSINOPHILS ABSOLUTE: 0 K/CU MM
EOSINOPHILS RELATIVE PERCENT: 1.1 % (ref 0–3)
GFR AFRICAN AMERICAN: >60 ML/MIN/1.73M2
GFR NON-AFRICAN AMERICAN: 58 ML/MIN/1.73M2
GLUCOSE BLD-MCNC: 94 MG/DL (ref 70–99)
HCT VFR BLD CALC: 36.8 % (ref 37–47)
HEMOGLOBIN: 13.3 GM/DL (ref 12.5–16)
LYMPHOCYTES ABSOLUTE: 2.3 K/CU MM
LYMPHOCYTES RELATIVE PERCENT: 64.4 % (ref 24–44)
MCH RBC QN AUTO: 34.1 PG (ref 27–31)
MCHC RBC AUTO-ENTMCNC: 36.1 % (ref 32–36)
MCV RBC AUTO: 94.4 FL (ref 78–100)
MONOCYTES ABSOLUTE: 0.2 K/CU MM
MONOCYTES RELATIVE PERCENT: 6.8 % (ref 0–4)
PDW BLD-RTO: 12.1 % (ref 11.7–14.9)
PLATELET # BLD: 186 K/CU MM (ref 140–440)
PMV BLD AUTO: 8.6 FL (ref 7.5–11.1)
POTASSIUM SERPL-SCNC: 4.1 MMOL/L (ref 3.5–5.1)
RBC # BLD: 3.9 M/CU MM (ref 4.2–5.4)
SEGMENTED NEUTROPHILS ABSOLUTE COUNT: 0.9 K/CU MM
SEGMENTED NEUTROPHILS RELATIVE PERCENT: 26.6 % (ref 36–66)
SODIUM BLD-SCNC: 140 MMOL/L (ref 135–145)
TOTAL PROTEIN: 6.9 GM/DL (ref 6.4–8.2)
WBC # BLD: 3.5 K/CU MM (ref 4–10.5)

## 2021-12-02 PROCEDURE — 85025 COMPLETE CBC W/AUTO DIFF WBC: CPT

## 2021-12-02 PROCEDURE — 36415 COLL VENOUS BLD VENIPUNCTURE: CPT

## 2021-12-02 PROCEDURE — 80053 COMPREHEN METABOLIC PANEL: CPT

## 2021-12-02 PROCEDURE — 86300 IMMUNOASSAY TUMOR CA 15-3: CPT

## 2021-12-05 LAB — CA 27.29: 77.6 U/ML

## 2021-12-09 ENCOUNTER — HOSPITAL ENCOUNTER (OUTPATIENT)
Dept: INFUSION THERAPY | Age: 51
Discharge: HOME | End: 2021-12-09
Payer: COMMERCIAL

## 2021-12-09 ENCOUNTER — OFFICE VISIT (OUTPATIENT)
Dept: ONCOLOGY | Age: 51
End: 2021-12-09
Payer: COMMERCIAL

## 2021-12-09 VITALS
OXYGEN SATURATION: 98 % | SYSTOLIC BLOOD PRESSURE: 124 MMHG | DIASTOLIC BLOOD PRESSURE: 89 MMHG | HEART RATE: 82 BPM | BODY MASS INDEX: 35.49 KG/M2 | WEIGHT: 213 LBS | HEIGHT: 65 IN | RESPIRATION RATE: 16 BRPM | TEMPERATURE: 96.9 F

## 2021-12-09 DIAGNOSIS — C78.7 SECONDARY MALIGNANT NEOPLASM OF LIVER AND INTRAHEPATIC BILE DUCT (HCC): ICD-10-CM

## 2021-12-09 DIAGNOSIS — C79.51 SECONDARY MALIGNANT NEOPLASM OF BONE (HCC): ICD-10-CM

## 2021-12-09 DIAGNOSIS — C50.411 MALIGNANT NEOPLASM OF UPPER-OUTER QUADRANT OF RIGHT FEMALE BREAST, UNSPECIFIED ESTROGEN RECEPTOR STATUS (HCC): ICD-10-CM

## 2021-12-09 DIAGNOSIS — C50.911 PRIMARY CANCER OF RIGHT BREAST WITH METASTASIS TO OTHER SITE (HCC): Primary | ICD-10-CM

## 2021-12-09 PROCEDURE — 99211 OFF/OP EST MAY X REQ PHY/QHP: CPT

## 2021-12-09 PROCEDURE — 99214 OFFICE O/P EST MOD 30 MIN: CPT | Performed by: INTERNAL MEDICINE

## 2021-12-09 RX ORDER — LETROZOLE 2.5 MG/1
2.5 TABLET, FILM COATED ORAL DAILY
Qty: 90 TABLET | Refills: 1 | Status: SHIPPED | OUTPATIENT
Start: 2021-12-09 | End: 2022-02-01

## 2021-12-09 NOTE — PROGRESS NOTES
MA Rooming Questions  Patient: Saira Devries  MRN: [id number]    Date: 12/9/2021        1. Do you have any new issues?   no         2. Do you need any refills on medications? yes - Femara    3. Have you had any imaging done since your last visit?   no    4. Have you been hospitalized or seen in the emergency room since your last visit here?   no    5. Did the patient have a depression screening completed today?  No    No data recorded     PHQ-9 Given to (if applicable):               PHQ-9 Score (if applicable):                     [] Positive     []  Negative              Does question #9 need addressed (if applicable)                     [] Yes    []  No               Tyler Murillo MA

## 2021-12-09 NOTE — PROGRESS NOTES
Patient Name: Cheko Mckinley  Patient : 1970  Patient MRN: [id number]     Primary Oncologist: Sarika Marquez MD       Date of Service: 2021      Chief Complaint:   Chief Complaint   Patient presents with    Follow-up        Active Problem list  1. Primary cancer of right breast with metastasis to other site [facility])    2. Malignant neoplasm of upper-outer quadrant of right female breast, unspecified estrogen receptor status ([facility] [address]    Problem List  · Breast cancer, female ( Stage Date: Unknown, Stage IV (Right breast upper-outer quadrant, T1c, N0, M1)-Clinical  Date of Dx:10/2017 Extent of Disease: Evidence of metastatic disease; Disease State: Initial diagnosis; Metastatic Sites: Bone; Metastatic Sites: Liver; ER Status: Positive; MD Status: Positive; HER-2/jus Value-FISH: Negative; Menopausal Status: Premenopausal; PIK3CA gene: Positive)   · Abnormal radiological finding   · Bony metastasis ( Date of Dx:10/2017 )   · Chest pain   Metastasis to liver       HPI:        Extremely pleasant lady patient of Dr. Luisa Uribe RN at [facility]. Evaluated in 2017 for some discomfort involving the sternum that had been present for a few weeks. She could not remember any injury to that area except maybe a few months earlier she had fallen face-forward. She did not remember having significant discomfort to the chest area involving the fall. 2017, chest x-ray was reported to be fairly unremarkable. X-rays of the sternum revealed questionable age-indeterminate factor of the mid to superior sternum. Additional CT was recommended which was done on 2017, revealing mottled appearance of the sternum. Findings could represent related to earlier trauma or even osteomyelitis or metastatic disease. Nonspecific sclerotic changes in the T12 vertebral body were noted too.      Triple phase bone scan done on 2017, had revealed only focal uptake in the body of the sternum corresponding to the changes noted on earlier CAT scan. Similar differential was again suggested. Visit here on October 9, 2017, though, she stated that she in general was doing actually better and pain was not severe and she did not have to take any pain medications and had been continuing to work on a full time basis and also remained fairly active. CMP on October 09, 2017, was normal and so was LDL of 183. Sed rate was 10. Kappa Lambda light chain ratios were normal. Immunoelectrophoresis was negative. CBC had revealed fairly normal numbers. CT biopsy of the sternum done on October 17, 2017, did reveal changes of metastatic carcinoma, possibly suggestive of breast primary. Biopsy from the sternum done on October 13, 2017, revealed metastatic carcinoma quite likely from breast primary. The tumor was strongly positive for estrogen receptor 95 percent and progesterone receptor is 95 percent, and negative for HER2. Visit here on October 20, 2017, those findings were discussed with her. October 25, 2017, diagnostic digital bilateral breast mammogram revealed 1.8 cm irregular, spiculated hyperechoic mass at the 10 oclock position in the right breast correlating to the mammographic area. Findings were highly suspicious for neoplasm. Ultrasound guided biopsy on October 27, 2017, did reveal invasive mammary carcinoma. Ultrasound-guided biopsy October 27, 2017, from this mass revealed invasive mammary carcinoma. Tumor was again strongly positive 95 percent for ER, 95 percent RI, HER-2 was negative both by IHC and FISH.      PET scan done on November 2, 2017, revealed hypermetabolic right breast mass as well as prominent right axillary lymph nodes and scattered osseous metastatic lesions involving the sternum, right scapula, left L4 vertebral body, and bilateral iliac crest.     Visit here on October 27, 2017, and afterwards metastatic disease involving the bones were discussed with her and clinically she was still considered to be premenopausal as she was not having any symptoms suggestive of menopausal state. She was started on treatment with Zoladex, Faslodex, and Zometa starting on November 10, 2017. She had to receive these treatments in outpatient facility at the hospital primarily because of insurance reasons. Saw me on December 7, 2017, sooner than her scheduled appointment, was concerned about possibly feeling a lump involving the right breast at around 6 oclock position. Otherwise she had not noticed any new issues. Mammogram and ultrasound done on December 8, 2017, showed dense breast tissue where she was feeling a mass. There was no sonographic evidence for any mass in that area too. MRI was suggested if needed to. Otherwise she was again noted to have known malignancy in the upper outer quadrant of the breast.     PET scan on May 14, 2018, revealed partial treatment response with improvement in the right axillary nodes as well as skeletal metastases. Metabolic activity associated with lytic bone lesions had improved with increased sclerosis of the previously seen lesions. Mild residual activity was noted at the L4 vertebral body. There were no new persistent or metabolically active aggressive osseous lesions. Visit here on February 26, 2019 overall I think she was doing fair but we were concerned about her starting to have more discomfort in her left elbow as well as the right hip area. She had not had a PET scan since May of last year and that too had revealed a partial response, we felt possibly repeating the PET scan would be helpful. In the meantime she was going to continue to receive the same treatments as before including Faslodex, Zoladex and Zometa    She was not able to undergo a PET scan but insurance. Did undergo a bone scan on April 4, 2019 revealing a stable bone scan without evidence of new osseous metastases.     May fall, 2019 was evaluated for symptoms of chest pain including a cardiac work-up including echo and stress test afterwards and those were unremarkable. She did have a CTA of the chest done looking for pulmonary embolism on May 4, 2019 which was negative for pulmonary embolism no other acute pulmonary illness. She still of course was noted to have several sclerotic lesions within the spine which were felt to be stable as compared to the prior PET scan which was done in May 2018. [facility] levels done on July 16, 5263 was 14.1, follicular phase. CA-27-29 level same day was 26, stable. [address] September was 17, 2019 we talked about still being pre-or perimenopausal Darst will continue to stay on Zoladex along with Faslodex. Talked about disease overall having responded. Will possibly extend intervals between Zometa to may be once a year, may be due in July 2020. Did start her on Effexor for hot flashes    March 12, 2020 still being treated with same agents, still working on a long hours still in general feeling well only issue being occasional discomfort in the right rib cage as well as right breast area. Could not feel any distinct masses. Denied any history of injury. Otherwise negative for any new systemic complaints and overall both physically and emotionally was doing quite well    Telemedicine visit on April 23, 2020. We discussed results those were obtained on her previous visit on March 12, 2020. Chemistries CBC were fairly unremarkable CA-27-29 levels were still normal. She had undergone CT scan of the chest on March 24, 2020 was noted to have a stable bony lesions. But she was described to have a 2.8 cm lesion in liver which was worrisome for possibly metastatic disease. Following those studies she was advised to undergo PET scan and that was refused by insurance. She did undergo a bone scan on April 28, 2020 was noted to have stable appearance of skeletal metastatic disease no new metastatic lesions were noted.  She did undergo a CT scan of the abdomen and pelvis with contrast on the same day again was noted to have a 2.3 x 2.7 cm left hepatic lesion which was worrisome for metastatic disease and again of course skeletal metastases were noted. Telemedicine visit again on May 5, 2020. April 27, 2020 she did undergo liver biopsy. Pathology did reveal metastatic adenocarcinoma from a breast primary. Tumor was still positive for ER 50% WA 40%. HER-2 was equivocal by IHC though negative by FISH. May 6, 2020 had long discussion with her and her family. We talked about progressive nature of the disease. We talked about future plan including liver biopsy material being sent to foundation 1 analysis. We talked about the need for us to change her treatment to a combination of Faslodex, Zoladex and add Verzenio. We also discussed possibly doing a PET scan. We do know she has disease involving the breast, bones and now liver. Our intention is that if indeed she has an isolated lesion in the liver we might consider doing local treatment to the liver besides this new systemic therapy. PET scan done on May 21, 9079 revealed metabolically active metastases in the left hepatic lobe no other metabolic activity was noted in the known skeletal metastases mammogram revealed stable appearance no new sites of malignancy were noted. Treatment was changed to Faslodex Zoladex and Verzenio in May 2020    Foundation 1 analysis from the liver lesion did reveal a tumor to be positive for PI K3 CA. Otherwise microsatellite status was stable and no other alterations were noted especially in BRCA1 and BRCA2 as well as HER-2      As PET scan had revealed an isolated lesion involving the liver she did undergo radio frequency ablation of the lesion on June 24, 2020, tolerated procedure fairly well though had some discomfort afterwards.  CT scan of the abdomen done without contrast on June 25, 2020 did reveal sequela of treated metastatic lesion in the left lobe of the liver with central high attenuation area measuring 2.6 x 2.4 cm and the peripheral low-attenuation rim measuring around 4.8 x 4.1 cm related to the postprocedural edema. Again osseous metastatic disease was again noted without pathologic fracture. July 17, 2020 she was overall doing fairly decent had recovered from the discomfort related to the radiofrequency ablation. We talked about continuing on this present regimen does do plan to check her [address] [place] [address] levels to see if we can hold off giving her Zoladex. We of course will continue with Faslodex, Verzenio and Zometa. We of course will continue to monitor her progress through exams as well as CAT scans.     FSH level done on July 30, 2020 was 9.4, premenopausal.     Visit here on September 15, 2020. Was still being treated with Faslodex Zoladex and Verzenio.       Studies done on October 9, 2020 fairly normal CBC, chemistries reveal creatinine of 1.2 CA 27-29 levels were mildly elevated to 47. A CT scan of the abdomen done on October 15, 2020 revealed 3.5 x 3.1 cm ablation zone, which had decreased in size since immediate post ablation changes done on June 25, 2020. It is difficult to assess residual tremor dedicated multiphasic CT or MRI was recommended. Multifocal osseous sclerotic metastases were felt to be stable.     October 27, 2020 overall she was feeling about the same there was some concern about slight increase in tumor marker CA 27-29. Anshulne Major Plan was to to multiphasic CT scan of the abdomen to assess the disease status. Also she was seriously considering having her unilateral oophorectomy I think that will be reasonable.   I     CT scan of the abdomen done on November 3, 2020 overall it was felt that there was stable present appearance of liver no suspicious contrast enhancement to indicate presence of residual tumor no new hepatic abnormality was noted again stable appearance of osteoblastic metastatic disease of the lower lumbar and thoracic spine.        On December 17, 2020 she did undergo left oophorectomy. She also the same day underwent removal of Meckel's diverticulum. Tolerated the procedure well. Pathology from the ovary did not show any significant pathologic changes.     CA 27-29 levels done on November 25, 2020 was 47.2 not significantly different from before.     Visit here on January 5, 2020 she was still being treated with Faslodex and Verzenio. Of course Zoladex was stopped after her undergoing oophorectomy.          CA 27-29 levels done on February 5, 2021 was slightly higher than before, 54.5.     With her continued elevation of CA 27-29, though minimally, wanted to do a PET scan, was not approved by [employer]. Underwent a CT scan of the chest abdomen and pelvis on March 24, 2021. There was no new metastatic disease noted in the chest abdomen or pelvis the treated left hepatic mass was stable in appearance. Also stable appearance of treated skeletal metastatic disease. Small bilateral pulmonary nodules too were a stable.     Visit here on April 1, 2021 all in all she was feeling quite well still tolerating therapy with Faslodex and Verzenio extremely well. Counts have been fairly reasonable.     Seen  here on 6/3/2021 she reported numbness and scar tissue formation in the gluteal area secondary to the Faslodex injection. Reported that she continues to have hot flashes. Reported chronic headaches secondary to migraine. Also reported blurred vision which is not extensive and has LasiK 20 years ago, has an follow-up appointment with ophthalmology. No chest pain or shortness of breath. No new breast masses. Reported that her legs hurt when she sits for long period Of time and tries  to get up.    6/26/21: Venous u/l LE; no DVT. She had swelling in the LLE after the injection here. Received one dose of lovenox and also muscle relaxant and sx resolved. 7/8/21 mammogram no evidence of malignancy.  Dexa scan normal 9/2021 Ca 27-29 was 70    10/25/21: Ct chest, abdomen and pelvis:  Chest: Stable chest CT with treated osseous metastatic disease, with several   sclerotic bone lesions.  There also a few tiny pulmonary nodules 4 mm and   less.  No new findings of metastatic disease.       12 mm masslike focus of the right breast which appears slightly increased in   conspicuity from March 2021.  This may relate to differences in technique but   diagnostic mammogram is recommended along with correlation with the   mammography results from July 2021       Abdomen/pelvis: Stable CT of the abdomen and pelvis.  Unchanged low-density 3   cm lesion of the liver and unchanged treated osseous metastatic disease with   several sclerotic foci.  No new findings. 12/2/21: CA 27-29 was 77.6    Past Medical History  None other than breast cancer    Surgical History    1. Abdominal hysterectomy with unilateral oophorectomy for endometriosis in the year 2000. 2. Surgery on right elbow in 2013. Social History  Smoking Status Former smokerUsed to smoke, quit in 2004. Negative for significant ethanol intake. Occasional social usage. .  works at a manufacturing plant. Two sons ages 32 and 25. 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     Family History  NC    Interval history:12/9/21: Alone to the clinic today. Reported that she continues to have bad tenderness at the right breast area without any masses. Denied any active lymphadenopathy. Denied any nipple discharge. Denied any unintentional weight loss. Denied any chest pain. Denied any abdominal pain.       Review of Systems   Per interval history; otherwise 10 point ROS is negative              Vital Signs:  /89 (Site: Right Upper Arm, Position: Sitting, Cuff Size: Large Adult)   Pulse 82   Temp 96.9 °F (36.1 °C) (Infrared)   Resp 16   Ht 5' 5\" (1.651 m)   Wt 213 lb (96.6 kg)   SpO2 98%   BMI 35.45 kg/m²     Physical Exam:  Was negative for any new palpable nodes and/or masses involving nodes in the right breast.  No hepatosplenomegaly no abnormal cardiac or pulmonary findings no new skin or neurologic issues or findings.       Labs:    Hematology:  Lab Results   Component Value Date    WBC 3.5 (L) 12/02/2021    RBC 3.90 (L) 12/02/2021    HGB 13.3 12/02/2021    HCT 36.8 (L) 12/02/2021    MCV 94.4 12/02/2021    MCH 34.1 (H) 12/02/2021    MCHC 36.1 (H) 12/02/2021    RDW 12.1 12/02/2021     12/02/2021    MPV 8.6 12/02/2021    SEGSPCT 26.6 (L) 12/02/2021    EOSRELPCT 1.1 12/02/2021    BASOPCT 1.1 (H) 12/02/2021    LYMPHOPCT 64.4 (H) 12/02/2021    MONOPCT 6.8 (H) 12/02/2021    SEGSABS 0.9 12/02/2021    EOSABS 0.0 12/02/2021    BASOSABS 0.0 12/02/2021    LYMPHSABS 2.3 12/02/2021    MONOSABS 0.2 12/02/2021    DIFFTYPE AUTOMATED DIFFERENTIAL 12/02/2021     Lab Results   Component Value Date    ESR 10 10/09/2017       Chemistry:  Lab Results   Component Value Date     12/02/2021    K 4.1 12/02/2021     12/02/2021    CO2 26 12/02/2021    BUN 15 12/02/2021    CREATININE 1.0 12/02/2021    GLUCOSE 94 12/02/2021    CALCIUM 9.4 12/02/2021    PROT 6.9 12/02/2021    LABALBU 4.5 12/02/2021    BILITOT 0.4 12/02/2021    ALKPHOS 52 12/02/2021    AST 17 12/02/2021    ALT 16 12/02/2021    LABGLOM 58 (L) 12/02/2021    GFRAA >60 12/02/2021     Lab Results   Component Value Date     10/09/2017     No components found for: LD  Lab Results   Component Value Date    TSHHS 2.100 08/27/2014    T4FREE 1.22 08/08/2013    FT3 3.3 08/08/2013       Immunology:  Lab Results   Component Value Date    PROT 6.9 12/02/2021     Lab Results   Component Value Date    KAPPAUVOL 1.47 10/09/2017    LAMBDAUVOL 1.16 10/09/2017    KLFLCR 1.27 10/09/2017     No results found for: B2M    Coagulation Panel:  Lab Results   Component Value Date    PROTIME 12.0 06/26/2021    INR 0.90 06/26/2021    APTT 25.1 06/26/2021    DDIMER 0.58 (H) 06/26/2021       Anemia Panel:  Lab Results Component Value Date    KXNOMPTQ14 308 08/08/2013       Tumor Markers:  Lab Results   Component Value Date    LABCA2 77.6 (H) 12/02/2021         Imaging: Reviewed     Pathology:Reviewed     Observations:  Performance Status: ECOG   Depression Status: No data recorded          Assessment & Plan:  Extremely pleasant lady patient of Dr. Sheldon Durant, overall blessed with good health. Saw him in September for vague discomfort involving the sternum which led to chest x-ray and x-rays of the sternum revealing questionable age-indeterminate fracture of the mid to superior sternum. CT scan revealed mottled appearance of sternum. Etiology could be related to earlier trauma and/or even metastatic disease. Bone scan showed increased activity in the sternum area with the same differential. Mammogram in October 2016 was unremarkable. October 10, 2017, I did spend time with her and her sister. We did talk about future course of action including possibly just repeating the studies in a couple of months. Would consider doing biopsy of the sternal area. She I think would feel comfortable if we could have better answer for her and thus for that reason I did advise her to be seen in interventional radiology for possible biopsy. October 13, 2017, CT guided biopsy from the sternum revealed changes consistent with metastatic carcinoma from breast biopsy. The tumor is strongly positive for ER and TX and negative for HER2. October 25, 2017, mammogram did reveal a lesion in the right breast of 1.8 cm in the upper outer quadrant. Biopsy for which did reveal invasive mammary carcinoma. October 27, 2017, I did discuss the findings with her and her extended family. We talked about basically the finding of carcinoma involving the right breast as well as bones. Biopsy from the sternum was positive.  A bone scan also showed questionable changes involving the T12, though CT scan of the chest otherwise was negative for any pulmonary or hepatic metastases. Biopsy from the right breast October 27, 2017, also revealed invasive mammary carcinoma, strongly positive ER, NC, and HER-2. PET scan done on November 2, 2017, revealed hypermetabolic right breast as well as right axillary nodes and osseous metastases involving sternum, right scapula, and L4 vertebral body. She was considered a premenopausal lady with metastatic disease involving the bones. Treatment choices were discussed with her and on November 10, 2017, was started on treatment with Zoladex, Faslodex, and Zometa. Visit here on November 20, 2017, she was tolerating these treatments well following the Zometa. December 7, 2017, bony pains were better, but she was concerned about possibly a lump involving the 6 oclock position right breast. Mammogram and ultrasound did not reveal any mass in that area. She was still noted to have lesion in the upper outer quadrant which had been biopsied before. The PET scan done on May 14, 2018, had revealed improvement in disease involving the breast, axilla, as well as bones. Previous PET scan had shown changes involving the sternum, scapula, and L4 areas; those areas did show improvement on the PET scan. Visit here on February 26, 2019 overall I think she was doing fair but we were concerned about her starting to have more discomfort in her left elbow as well as the right hip area. She had not had a PET scan since May of last year and that too had revealed a partial response, we felt possibly repeating the PET scan would be helpful. In the meantime she was going to continue to receive the same treatments as before including Faslodex, Zoladex and Zometa  She was not able to undergo a PET scan but insurance. Did undergo a bone scan on April 4, 2019 revealing a stable bone scan without evidence of new osseous metastases.     May , 2019 was evaluated for symptoms of chest pain including a cardiac work-up including echo and stress test afterwards and those were unremarkable. She did have a CTA of the chest done looking for pulmonary embolism on May 4, 2019 which was negative for pulmonary embolism no other acute pulmonary illness. She still of course was noted to have several sclerotic lesions within the spine which were felt to be stable as compared to the prior PET scan which was done in May 2018. [facility] September was 17, 2019 we talked about still being pre-or perimenopausal Darst will continue to stay on Zoladex along with Faslodex. Talked about disease overall having responded. Will possibly extend intervals between Zometa to may be once a year, may be due in July 2020. Did start her on Effexor for hot flashes  March 12, 2020 overall was doing fairly well Effexor was helpful with hot flashes. Denied significant new issues except for discomfort in the breast as well as rib cage area. Is due to have her bone scan mammogram and CT scan of the chest done to look for the discomfort in the chest area. Otherwise will continue on the same regimen. She will see me after the studies are completed. Telemedicine visit on April 23, 2020, as described above new issues being no obvious progression noted on a bone scan and blood work including tumor markers. CT scan of the chest as well as CT scan of the abdomen showing 2.7 cm lesion in the left hepatic lobe quite worrisome for possible metastatic disease. Clinically she was still doing well. I did encourage her to undergo a biopsy of the liver lesion. And following the biopsy could decide about if we need to send material for foundation 1 study also    Telemedicine visit again on May 5, 2020. April 27, 2020 she did undergo liver biopsy. Pathology did reveal metastatic adenocarcinoma from a breast primary. Tumor was still positive for ER 50% DE 40%. HER-2 was equivocal by IHC though negative by FISH. May 6, 2020 had long discussion with her and her family.  We talked about progressive nature of the disease. We talked about future plan including liver biopsy material being sent to [facility] 1 analysis. We talked about the need for us to change her treatment to a combination of Faslodex, Zoladex and add Verzenio. We also discussed possibly doing a PET scan. We do know she has disease involving the breast, bones and now liver. Our intention is that if indeed she has an isolated lesion in the liver we might consider doing local treatment to the liver besides this new systemic therapy. June 1, 2020 I did spend time with her and her sister. We talked about PET scan revealing stable bony metastases and an isolated lesion involving the liver, biopsy-proven malignancy. Mammogram was unchanged from before. Couple weeks prior to this visit had started taking Verzenio along with Faslodex and Zoladex. We did talk about staying on the present systemic therapy and of course will continue to monitor her progress. We also discussed with her about possibly doing radiofrequency ablation treatment to the single lesion involving the liver. As described above on June 24, 2020 she did undergo radiofrequency ablation of the isolated lesion in the liver. July 17, 2020 she was overall doing fairly decent had recovered from the discomfort related to the radiofrequency ablation. We talked about continuing on this present regimen does do plan to check her [address] [place] [address] levels to see if we can hold off giving her Zoladex. We of course will continue with Faslodex, Verzenio and Zometa. We of course will continue to monitor her progress through exams as well as CAT scans.     September 15, 2020 was still being treated with Faslodex Zoladex and Verzenio. FSH levels were in premenopausal levels. We also planning to give zometa from time to time. She in general was feeling fairly well major issue was discomfort primarily from Faslodex injections.   We talked about checking her blood work with the next injection on October 9 including chemistries and CA-27-29. We also talked about checking her a CT scan of the abdomen to see the status of the lesion following the radiofrequency ablation. Otherwise as before we will continue to monitor her blood progress exams CAT scans and possibly PET scans.     January 5, 2021 spend time with her on the few issues as described below. #1 regarding metastatic carcinoma of the breast, premenopausal, s/p  oophorectomy, currently being treated with Faslodex and Verzenio. She was has had locally treatment for isolated liver metastases done on June 24, 2020. CBC was drawn today counts were reasonable that she could continue on the same dosages of Verzenio. Advised her to stay on the present therapy and will recheck her blood tumor markers in February.     February 16, 2021 telephone conversation with her. Clinically she was still doing quite well still on same medications as described above including Faslodex and Verzenio that she was tolerating well. Concern being is further rise of CA 27-29 levels. We talked about rechecking the level again in 3 weeks along with possibly PET scan if we could and if we could confirm progression of the disease then we might consider changing [facility] with Faslode[facility] as the foundation 1 studies done before had revealed the presence of PIK 3 CA mutation. Other wise there was stable microsatellite status tumor mutational burden was not elevated.     She could not undergo a PET scan instead did a CT scan of the chest abdomen and pelvis on March 24, 2021 and those studies were negative for any obvious signs of progression of the disease.     April 1, 2021 had long discussion with her. Clinically she was feeling well exam was unremarkable at least CAT scan studies are fairly unremarkable. Only concern being mild elevation of CA 27-29 levels.   As all in all she was doing quite well we felt comfortable keeping her on treatment with Faslodex and Verzenio continue to monitor her CBC and chemistries possibly recheck her tumor markers in 8 weeks and still the plan remains the same if there is documented progression we might [facility]     She is going to be seen here in 8 weeks possibly by one of my partners. Zandra 3, 2021, clinically she seems to be stable(stable liver, bone and pulmonary mets). Currently on Verzenio and Faslodex. We will repeat CA 27-29 and if it remains stable then will continue the same treatment. If CA 27-29 markedly elevated then prior test, will [facility]. Recommend DEXA scan and mammogram.  Also continue bisphosphonates. At some point she would like to transition to letrozole from Faslodex secondary to the side effects. Is on venlafaxine for hot flashes, deferred any other intervention at this point. Seen here on August third 2021 with no new symptoms except for the swelling at the injection site associated with pain. Discussed that CA 27-29 is relatively stable and mammogram with no new findings, would like to continue the current regimen but she wants to switch to oral AI. Discussed the adverse effects of AI  Return to clinic in 3 months or earlier if new symptoms. Note DEXA scan is normal.  Continue Zometa yearly. Recommend increase Effexor to 75 mg for vasogenic symptoms. Repeat CA 27-29 in 6 to 8 weeks and also will monitor for any adverse effects. Visit here on 10/4/2021 when she reported headache, and has been dropping things. Also reported tenderness in the right breast area. Clinical exam with no palpable masses. But note CA 27-29 elevated to 70.9 in September 2021 compared to 53.8 in June 2021. Is currently on letrozole(which has been changed from Faslodex in August 2021) and Verzenio. Note PIK3A positive. Recommend further evaluation with CT scan of the chest abdomen pelvis and also MRI of the brain. Neutropenia most probably secondary to Verzenio. Avoid sick contacts. Recommend not to work in the [employer]. Dose adjustments as needed. Further recommendations pending above. Note mammogram and DEXA scan in July 2021 were normal  She has received her Covid vaccine. Visit here on 10/27/2021 we discussed about the results of the MRI of the brain which did not reveal any metastatic disease. Also discussed the results of the CT scan of the chest abdomen pelvis with stable osseous, pulmonary, liver lesions with slight increase in the right breast lesion. Note CA 27-29 was 70.9 in September 2021. Will discuss with radiation oncology for possible SBRT of the breast mass. Will repeat CA 27-29 in a month in November and if rising then will consider changing to [address] [place] [address] if progressive disease. Other medical care. To clinic December 2021 or earlier if new symptoms. Dictated today on 12 9 2021 I discussed the results of the CA 27-29 that it continues to rise slowly. Clinically is the same. Very reluctant to go back on the Faslodex. Reported that I would recommend repeat CA 27-29 in a month and if rising then will recommend PET scan for further evaluation and treatment planning. The options would be at that time to go back on Faslodex or start her on PIK3A inhibitor if evidence of progressive disease. Discussed with the radiation oncology regarding SBRT to the breast but deferred at this point. Leukopenia with ANC of about thousand most probably secondary to abemaciclib. Continue to monitor closely and dose adjustments as needed.  Return to clinic in January 2022    [address]

## 2022-01-31 DIAGNOSIS — C50.411 MALIGNANT NEOPLASM OF UPPER-OUTER QUADRANT OF RIGHT FEMALE BREAST, UNSPECIFIED ESTROGEN RECEPTOR STATUS (HCC): ICD-10-CM

## 2022-02-01 RX ORDER — LETROZOLE 2.5 MG/1
TABLET, FILM COATED ORAL
Qty: 90 TABLET | Refills: 1 | Status: SHIPPED | OUTPATIENT
Start: 2022-02-01 | End: 2022-04-27 | Stop reason: SDUPTHER

## 2022-02-03 ENCOUNTER — HOSPITAL ENCOUNTER (OUTPATIENT)
Age: 52
Setting detail: SPECIMEN
Discharge: HOME | End: 2022-02-03

## 2022-02-03 LAB
ALBUMIN SERPL-MCNC: 4.6 GM/DL (ref 3.4–5)
ALP BLD-CCNC: 58 IU/L (ref 40–128)
ALT SERPL-CCNC: 25 U/L (ref 10–40)
ANION GAP SERPL CALCULATED.3IONS-SCNC: 12 MMOL/L (ref 4–16)
AST SERPL-CCNC: 25 IU/L (ref 15–37)
BASOPHILS ABSOLUTE: 0.1 K/CU MM
BASOPHILS RELATIVE PERCENT: 2 % (ref 0–1)
BILIRUB SERPL-MCNC: 0.3 MG/DL (ref 0–1)
BUN BLDV-MCNC: 18 MG/DL (ref 6–23)
CALCIUM SERPL-MCNC: 9.4 MG/DL (ref 8.3–10.6)
CHLORIDE BLD-SCNC: 103 MMOL/L (ref 99–110)
CO2: 24 MMOL/L (ref 21–32)
CREAT SERPL-MCNC: 1.1 MG/DL (ref 0.6–1.1)
DIFFERENTIAL TYPE: ABNORMAL
EOSINOPHILS ABSOLUTE: 0.1 K/CU MM
EOSINOPHILS RELATIVE PERCENT: 1.3 % (ref 0–3)
GFR AFRICAN AMERICAN: >60 ML/MIN/1.73M2
GFR NON-AFRICAN AMERICAN: 52 ML/MIN/1.73M2
GLUCOSE BLD-MCNC: 79 MG/DL (ref 70–99)
HCT VFR BLD CALC: 37.7 % (ref 37–47)
HEMOGLOBIN: 12.8 GM/DL (ref 12.5–16)
IMMATURE NEUTROPHIL %: 0 % (ref 0–0.43)
LYMPHOCYTES ABSOLUTE: 2.4 K/CU MM
LYMPHOCYTES RELATIVE PERCENT: 52.9 % (ref 24–44)
MCH RBC QN AUTO: 34.9 PG (ref 27–31)
MCHC RBC AUTO-ENTMCNC: 34 % (ref 32–36)
MCV RBC AUTO: 102.7 FL (ref 78–100)
MONOCYTES ABSOLUTE: 0.4 K/CU MM
MONOCYTES RELATIVE PERCENT: 9.6 % (ref 0–4)
PDW BLD-RTO: 12.7 % (ref 11.7–14.9)
PLATELET # BLD: 187 K/CU MM (ref 140–440)
PLT MORPHOLOGY: ADEQUATE
PMV BLD AUTO: 9.5 FL (ref 7.5–11.1)
POTASSIUM SERPL-SCNC: 4.1 MMOL/L (ref 3.5–5.1)
RBC # BLD: 3.67 M/CU MM (ref 4.2–5.4)
SEGMENTED NEUTROPHILS ABSOLUTE COUNT: 1.5 K/CU MM
SEGMENTED NEUTROPHILS RELATIVE PERCENT: 34.2 % (ref 36–66)
SODIUM BLD-SCNC: 139 MMOL/L (ref 135–145)
TOTAL IMMATURE NEUTOROPHIL: 0 K/CU MM
TOTAL PROTEIN: 7.2 GM/DL (ref 6.4–8.2)
WBC # BLD: 4.5 K/CU MM (ref 4–10.5)

## 2022-02-03 PROCEDURE — 86300 IMMUNOASSAY TUMOR CA 15-3: CPT

## 2022-02-03 PROCEDURE — 80053 COMPREHEN METABOLIC PANEL: CPT

## 2022-02-03 PROCEDURE — 85025 COMPLETE CBC W/AUTO DIFF WBC: CPT

## 2022-02-06 LAB — CA 27.29: 63.4 U/ML

## 2022-02-10 ENCOUNTER — HOSPITAL ENCOUNTER (OUTPATIENT)
Dept: INFUSION THERAPY | Age: 52
Discharge: HOME | End: 2022-02-10

## 2022-02-10 ENCOUNTER — OFFICE VISIT (OUTPATIENT)
Dept: ONCOLOGY | Age: 52
End: 2022-02-10
Payer: COMMERCIAL

## 2022-02-10 VITALS
BODY MASS INDEX: 35.82 KG/M2 | TEMPERATURE: 99 F | DIASTOLIC BLOOD PRESSURE: 85 MMHG | SYSTOLIC BLOOD PRESSURE: 145 MMHG | WEIGHT: 215 LBS | RESPIRATION RATE: 16 BRPM | OXYGEN SATURATION: 97 % | HEIGHT: 65 IN | HEART RATE: 63 BPM

## 2022-02-10 DIAGNOSIS — C50.411 MALIGNANT NEOPLASM OF UPPER-OUTER QUADRANT OF RIGHT FEMALE BREAST, UNSPECIFIED ESTROGEN RECEPTOR STATUS (HCC): Primary | ICD-10-CM

## 2022-02-10 DIAGNOSIS — C79.51 SECONDARY MALIGNANT NEOPLASM OF BONE (HCC): ICD-10-CM

## 2022-02-10 DIAGNOSIS — C78.7 SECONDARY MALIGNANT NEOPLASM OF LIVER AND INTRAHEPATIC BILE DUCT (HCC): ICD-10-CM

## 2022-02-10 PROCEDURE — 99214 OFFICE O/P EST MOD 30 MIN: CPT | Performed by: INTERNAL MEDICINE

## 2022-02-10 ASSESSMENT — PATIENT HEALTH QUESTIONNAIRE - PHQ9
1. LITTLE INTEREST OR PLEASURE IN DOING THINGS: 0
SUM OF ALL RESPONSES TO PHQ QUESTIONS 1-9: 0
2. FEELING DOWN, DEPRESSED OR HOPELESS: 0
SUM OF ALL RESPONSES TO PHQ9 QUESTIONS 1 & 2: 0
SUM OF ALL RESPONSES TO PHQ QUESTIONS 1-9: 0

## 2022-02-10 NOTE — PROGRESS NOTES
Patient Name: Kee Day  Patient : 1970  Patient MRN: [id number]     Primary Oncologist: Mena Wen MD       Date of Service: 2/10/2022      Chief Complaint:   Chief Complaint   Patient presents with    Discuss Labs        Active Problem list  1. Malignant neoplasm of upper-outer quadrant of right female breast, unspecified estrogen receptor status ([facility] [address]    2. Bone metastasis    3. Metastasis to liver    Problem List  · Breast cancer, female ( Stage Date: Unknown, Stage IV (Right breast upper-outer quadrant, T1c, N0, M1)-Clinical  Date of Dx:10/2017 Extent of Disease: Evidence of metastatic disease; Disease State: Initial diagnosis; Metastatic Sites: Bone; Metastatic Sites: Liver; ER Status: Positive; CT Status: Positive; HER-2/jus Value-FISH: Negative; Menopausal Status: Premenopausal; PIK3CA gene: Positive)   · Abnormal radiological finding   · Bony metastasis ( Date of Dx:10/2017 )   · Chest pain   Metastasis to liver       HPI:        Extremely pleasant lady patient of Dr. Jethro Onofre RN at [facility]. Evaluated in 2017 for some discomfort involving the sternum that had been present for a few weeks. She could not remember any injury to that area except maybe a few months earlier she had fallen face-forward. She did not remember having significant discomfort to the chest area involving the fall. 2017, chest x-ray was reported to be fairly unremarkable. X-rays of the sternum revealed questionable age-indeterminate factor of the mid to superior sternum. Additional CT was recommended which was done on 2017, revealing mottled appearance of the sternum. Findings could represent related to earlier trauma or even osteomyelitis or metastatic disease. Nonspecific sclerotic changes in the T12 vertebral body were noted too.      Triple phase bone scan done on 2017, had revealed only focal uptake in the body of the sternum corresponding to the changes noted on earlier CAT scan. Similar differential was again suggested. Visit here on October 9, 2017, though, she stated that she in general was doing actually better and pain was not severe and she did not have to take any pain medications and had been continuing to work on a full time basis and also remained fairly active. CMP on October 09, 2017, was normal and so was LDL of 183. Sed rate was 10. Kappa Lambda light chain ratios were normal. Immunoelectrophoresis was negative. CBC had revealed fairly normal numbers. CT biopsy of the sternum done on October 17, 2017, did reveal changes of metastatic carcinoma, possibly suggestive of breast primary. Biopsy from the sternum done on October 13, 2017, revealed metastatic carcinoma quite likely from breast primary. The tumor was strongly positive for estrogen receptor 95 percent and progesterone receptor is 95 percent, and negative for HER2. Visit here on October 20, 2017, those findings were discussed with her. October 25, 2017, diagnostic digital bilateral breast mammogram revealed 1.8 cm irregular, spiculated hyperechoic mass at the 10 oclock position in the right breast correlating to the mammographic area. Findings were highly suspicious for neoplasm. Ultrasound guided biopsy on October 27, 2017, did reveal invasive mammary carcinoma. Ultrasound-guided biopsy October 27, 2017, from this mass revealed invasive mammary carcinoma. Tumor was again strongly positive 95 percent for ER, 95 percent TN, HER-2 was negative both by IHC and FISH.      PET scan done on November 2, 2017, revealed hypermetabolic right breast mass as well as prominent right axillary lymph nodes and scattered osseous metastatic lesions involving the sternum, right scapula, left L4 vertebral body, and bilateral iliac crest.     Visit here on October 27, 2017, and afterwards metastatic disease involving the bones were discussed with her and clinically she was still considered to be premenopausal as she was not having any symptoms suggestive of menopausal state. She was started on treatment with Zoladex, Faslodex, and Zometa starting on November 10, 2017. She had to receive these treatments in outpatient facility at the hospital primarily because of insurance reasons. Saw me on December 7, 2017, sooner than her scheduled appointment, was concerned about possibly feeling a lump involving the right breast at around 6 oclock position. Otherwise she had not noticed any new issues. Mammogram and ultrasound done on December 8, 2017, showed dense breast tissue where she was feeling a mass. There was no sonographic evidence for any mass in that area too. MRI was suggested if needed to. Otherwise she was again noted to have known malignancy in the upper outer quadrant of the breast.     PET scan on May 14, 2018, revealed partial treatment response with improvement in the right axillary nodes as well as skeletal metastases. Metabolic activity associated with lytic bone lesions had improved with increased sclerosis of the previously seen lesions. Mild residual activity was noted at the L4 vertebral body. There were no new persistent or metabolically active aggressive osseous lesions. Visit here on February 26, 2019 overall I think she was doing fair but we were concerned about her starting to have more discomfort in her left elbow as well as the right hip area. She had not had a PET scan since May of last year and that too had revealed a partial response, we felt possibly repeating the PET scan would be helpful. In the meantime she was going to continue to receive the same treatments as before including Faslodex, Zoladex and Zometa    She was not able to undergo a PET scan but insurance. Did undergo a bone scan on April 4, 2019 revealing a stable bone scan without evidence of new osseous metastases.     May fall, 2019 was evaluated for symptoms of chest pain including a cardiac work-up including echo and stress test afterwards and those were unremarkable. She did have a CTA of the chest done looking for pulmonary embolism on May 4, 2019 which was negative for pulmonary embolism no other acute pulmonary illness. She still of course was noted to have several sclerotic lesions within the spine which were felt to be stable as compared to the prior PET scan which was done in May 2018. [facility] levels done on July 16, 8679 was 75.3, follicular phase. CA-27-29 level same day was 26, stable. [address] [facility] [address] September was 17, 2019 we talked about still being pre-or perimenopausal Darst will continue to stay on Zoladex along with Faslodex. Talked about disease overall having responded. Will possibly extend intervals between Zometa to may be once a year, may be due in July 2020. Did start her on Effexor for hot flashes    March 12, 2020 still being treated with same agents, still working on a long hours still in general feeling well only issue being occasional discomfort in the right rib cage as well as right breast area. Could not feel any distinct masses. Denied any history of injury. Otherwise negative for any new systemic complaints and overall both physically and emotionally was doing quite well    Telemedicine visit on April 23, 2020. We discussed results those were obtained on her previous visit on March 12, 2020. Chemistries CBC were fairly unremarkable CA-27-29 levels were still normal. She had undergone CT scan of the chest on March 24, 2020 was noted to have a stable bony lesions. But she was described to have a 2.8 cm lesion in liver which was worrisome for possibly metastatic disease. Following those studies she was advised to undergo PET scan and that was refused by insurance. She did undergo a bone scan on April 28, 2020 was noted to have stable appearance of skeletal metastatic disease no new metastatic lesions were noted.  She did undergo a CT scan of the abdomen and pelvis with contrast on the same day again was noted to have a 2.3 x 2.7 cm left hepatic lesion which was worrisome for metastatic disease and again of course skeletal metastases were noted. Telemedicine visit again on May 5, 2020. April 27, 2020 she did undergo liver biopsy. Pathology did reveal metastatic adenocarcinoma from a breast primary. Tumor was still positive for ER 50% TX 40%. HER-2 was equivocal by IHC though negative by FISH. May 6, 2020 had long discussion with her and her family. We talked about progressive nature of the disease. We talked about future plan including liver biopsy material being sent to [facility] 1 analysis. We talked about the need for us to change her treatment to a combination of Faslodex, Zoladex and add Verzenio. We also discussed possibly doing a PET scan. We do know she has disease involving the breast, bones and now liver. Our intention is that if indeed she has an isolated lesion in the liver we might consider doing local treatment to the liver besides this new systemic therapy. PET scan done on May 21, 1777 revealed metabolically active metastases in the left hepatic lobe no other metabolic activity was noted in the known skeletal metastases mammogram revealed stable appearance no new sites of malignancy were noted. Treatment was changed to Faslodex Zoladex and Verzenio in May 2020    Foundation 1 analysis from the liver lesion did reveal a tumor to be positive for PI K3 CA. Otherwise microsatellite status was stable and no other alterations were noted especially in BRCA1 and BRCA2 as well as HER-2      As PET scan had revealed an isolated lesion involving the liver she did undergo radio frequency ablation of the lesion on June 24, 2020, tolerated procedure fairly well though had some discomfort afterwards.  CT scan of the abdomen done without contrast on June 25, 2020 did reveal sequela of treated metastatic lesion in the left lobe of the liver with central high attenuation area measuring 2.6 x 2.4 cm and the peripheral low-attenuation rim measuring around 4.8 x 4.1 cm related to the postprocedural edema. Again osseous metastatic disease was again noted without pathologic fracture. July 17, 2020 she was overall doing fairly decent had recovered from the discomfort related to the radiofrequency ablation. We talked about continuing on this present regimen does do plan to check her [address] [place] [address] levels to see if we can hold off giving her Zoladex. We of course will continue with Faslodex, Verzenio and Zometa. We of course will continue to monitor her progress through exams as well as CAT scans.     FSH level done on July 30, 2020 was 9.4, premenopausal.     Visit here on September 15, 2020. Was still being treated with Faslodex Zoladex and Verzenio.       Studies done on October 9, 2020 fairly normal CBC, chemistries reveal creatinine of 1.2 CA 27-29 levels were mildly elevated to 47. A CT scan of the abdomen done on October 15, 2020 revealed 3.5 x 3.1 cm ablation zone, which had decreased in size since immediate post ablation changes done on June 25, 2020. It is difficult to assess residual tremor dedicated multiphasic CT or MRI was recommended. Multifocal osseous sclerotic metastases were felt to be stable.     October 27, 2020 overall she was feeling about the same there was some concern about slight increase in tumor marker CA 27-29. Lisa Venegas Plan was to to multiphasic CT scan of the abdomen to assess the disease status. Also she was seriously considering having her unilateral oophorectomy I think that will be reasonable.   I     CT scan of the abdomen done on November 3, 2020 overall it was felt that there was stable present appearance of liver no suspicious contrast enhancement to indicate presence of residual tumor no new hepatic abnormality was noted again stable appearance of osteoblastic metastatic disease of the lower lumbar and thoracic spine.        On December 17, 2020 she did undergo left oophorectomy. She also the same day underwent removal of Meckel's diverticulum. Tolerated the procedure well. Pathology from the ovary did not show any significant pathologic changes.     CA 27-29 levels done on November 25, 2020 was 47.2 not significantly different from before.     Visit here on January 5, 2020 she was still being treated with Faslodex and Verzenio. Of course Zoladex was stopped after her undergoing oophorectomy.          CA 27-29 levels done on February 5, 2021 was slightly higher than before, 54.5.     With her continued elevation of CA 27-29, though minimally, wanted to do a PET scan, was not approved by [facility]. Underwent a CT scan of the chest abdomen and pelvis on March 24, 2021. There was no new metastatic disease noted in the chest abdomen or pelvis the treated left hepatic mass was stable in appearance. Also stable appearance of treated skeletal metastatic disease. Small bilateral pulmonary nodules too were a stable.     Visit here on April 1, 2021 all in all she was feeling quite well still tolerating therapy with Faslodex and Verzenio extremely well. Counts have been fairly reasonable.     Seen  here on 6/3/2021 she reported numbness and scar tissue formation in the gluteal area secondary to the Faslodex injection. Reported that she continues to have hot flashes. Reported chronic headaches secondary to migraine. Also reported blurred vision which is not extensive and has LasiK 20 years ago, has an follow-up appointment with ophthalmology. No chest pain or shortness of breath. No new breast masses. Reported that her legs hurt when she sits for long period Of time and tries  to get up.    6/26/21: Venous u/l LE; no DVT. She had swelling in the LLE after the injection here. Received one dose of lovenox and also muscle relaxant and sx resolved. 7/8/21 mammogram no evidence of malignancy.  Dexa scan normal     9/2021 Ca 27-29 was 70    10/25/21: Ct chest, abdomen and pelvis:  Chest: Stable chest CT with treated osseous metastatic disease, with several   sclerotic bone lesions.  There also a few tiny pulmonary nodules 4 mm and   less.  No new findings of metastatic disease.       12 mm masslike focus of the right breast which appears slightly increased in   conspicuity from March 2021.  This may relate to differences in technique but   diagnostic mammogram is recommended along with correlation with the   mammography results from July 2021       Abdomen/pelvis: Stable CT of the abdomen and pelvis.  Unchanged low-density 3   cm lesion of the liver and unchanged treated osseous metastatic disease with   several sclerotic foci.  No new findings. 12/2/21: CA 27-29 was 77.6    2/3/22: CA 27-29 63    Past Medical History  None other than breast cancer    Surgical History    1. Abdominal hysterectomy with unilateral oophorectomy for endometriosis in the year 2000. 2. Surgery on right elbow in 2013. Social History  Smoking Status Former smokerUsed to smoke, quit in 2004. Negative for significant ethanol intake. Occasional social usage. .  works at a manufacturing plant. Two sons ages 32 and 25. 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     Family History  [place]    Interval history:2/10/22: Alone to the clinic today. She was sick for 3 days and flulike symptoms and was tested positive as per the home care. She quarantined and later felt better. Reported that she continues to have pain in the feet, hip and knees. Otherwise denied any new breast masses. Denied any axial lymphadenopathy. Denied any chest pain, increase shortness of breath, palpitations or dizziness. Denied any abdominal pain, nausea, vomiting, diarrhea, constipation.       Review of Systems   Per interval history; otherwise 10 point ROS is negative              Vital Signs:  BP (!) 145/85 (Site: Right Upper Arm, Position: Sitting, Cuff Size: Medium Adult) Pulse 63   Temp 99 °F (37.2 °C) (Infrared)   Resp 16   Ht 5' 5\" (1.651 m)   Wt 215 lb (97.5 kg)   SpO2 97%   BMI 35.78 kg/m²     Physical Exam:  Was negative for any new palpable nodes and/or masses involving nodes in the right breast.  No hepatosplenomegaly no abnormal cardiac or pulmonary findings no new skin or neurologic issues or findings.       Labs:    Hematology:  Lab Results   Component Value Date    WBC 4.5 02/03/2022    RBC 3.67 (L) 02/03/2022    HGB 12.8 02/03/2022    HCT 37.7 02/03/2022    .7 (H) 02/03/2022    MCH 34.9 (H) 02/03/2022    MCHC 34.0 02/03/2022    RDW 12.7 02/03/2022     02/03/2022    MPV 9.5 02/03/2022    SEGSPCT 34.2 (L) 02/03/2022    EOSRELPCT 1.3 02/03/2022    BASOPCT 2.0 (H) 02/03/2022    LYMPHOPCT 52.9 (H) 02/03/2022    MONOPCT 9.6 (H) 02/03/2022    SEGSABS 1.5 02/03/2022    EOSABS 0.1 02/03/2022    BASOSABS 0.1 02/03/2022    LYMPHSABS 2.4 02/03/2022    MONOSABS 0.4 02/03/2022    DIFFTYPE  02/03/2022     AUTOMATED DIFF RESULTS CONFIRMED BY SMEAR REVIEW  AUTOMATED DIFFERENTIAL      PLTM ADEQUATE 02/03/2022     Lab Results   Component Value Date    ESR 10 10/09/2017       Chemistry:  Lab Results   Component Value Date     02/03/2022    K 4.1 02/03/2022     02/03/2022    CO2 24 02/03/2022    BUN 18 02/03/2022    CREATININE 1.1 02/03/2022    GLUCOSE 79 02/03/2022    CALCIUM 9.4 02/03/2022    PROT 7.2 02/03/2022    LABALBU 4.6 02/03/2022    BILITOT 0.3 02/03/2022    ALKPHOS 58 02/03/2022    AST 25 02/03/2022    ALT 25 02/03/2022    LABGLOM 52 (L) 02/03/2022    GFRAA >60 02/03/2022     Lab Results   Component Value Date     10/09/2017     No components found for: LD  Lab Results   Component Value Date    TSHHS 2.100 08/27/2014    T4FREE 1.22 08/08/2013    FT3 3.3 08/08/2013       Immunology:  Lab Results   Component Value Date    PROT 7.2 02/03/2022     Lab Results   Component Value Date    KAPPAUVOL 1.47 10/09/2017    LAMBDAUVOL 1.16 10/09/2017 KLFLCR 1.27 10/09/2017     No results found for: B2M    Coagulation Panel:  Lab Results   Component Value Date    PROTIME 12.0 06/26/2021    INR 0.90 06/26/2021    APTT 25.1 06/26/2021    DDIMER 0.58 (H) 06/26/2021       Anemia Panel:  Lab Results   Component Value Date    MYRCKRZJ08 692 08/08/2013       Tumor Markers:  Lab Results   Component Value Date    LABCA2 63.4 (H) 02/03/2022         Imaging: Reviewed     Pathology:Reviewed     Observations:  Performance Status: ECOG   Depression Status: PHQ-9 Total Score: 0 (2/10/2022 11:45 AM)          Assessment & Plan:  Extremely pleasant lady patient of Dr. Champ Snell, overall blessed with good health. Saw him in September for vague discomfort involving the sternum which led to chest x-ray and x-rays of the sternum revealing questionable age-indeterminate fracture of the mid to superior sternum. CT scan revealed mottled appearance of sternum. Etiology could be related to earlier trauma and/or even metastatic disease. Bone scan showed increased activity in the sternum area with the same differential. Mammogram in October 2016 was unremarkable. October 10, 2017, I did spend time with her and her sister. We did talk about future course of action including possibly just repeating the studies in a couple of months. Would consider doing biopsy of the sternal area. She I think would feel comfortable if we could have better answer for her and thus for that reason I did advise her to be seen in interventional radiology for possible biopsy. October 13, 2017, CT guided biopsy from the sternum revealed changes consistent with metastatic carcinoma from breast biopsy. The tumor is strongly positive for ER and WA and negative for HER2. October 25, 2017, mammogram did reveal a lesion in the right breast of 1.8 cm in the upper outer quadrant. Biopsy for which did reveal invasive mammary carcinoma. October 27, 2017, I did discuss the findings with her and her extended family.  We talked about basically the finding of carcinoma involving the right breast as well as bones. Biopsy from the sternum was positive. A bone scan also showed questionable changes involving the T12, though CT scan of the chest otherwise was negative for any pulmonary or hepatic metastases. Biopsy from the right breast October 27, 2017, also revealed invasive mammary carcinoma, strongly positive ER, FL, and HER-2. PET scan done on November 2, 2017, revealed hypermetabolic right breast as well as right axillary nodes and osseous metastases involving sternum, right scapula, and L4 vertebral body. She was considered a premenopausal lady with metastatic disease involving the bones. Treatment choices were discussed with her and on November 10, 2017, was started on treatment with Zoladex, Faslodex, and Zometa. Visit here on November 20, 2017, she was tolerating these treatments well following the Zometa. December 7, 2017, bony pains were better, but she was concerned about possibly a lump involving the 6 oclock position right breast. Mammogram and ultrasound did not reveal any mass in that area. She was still noted to have lesion in the upper outer quadrant which had been biopsied before. The PET scan done on May 14, 2018, had revealed improvement in disease involving the breast, axilla, as well as bones. Previous PET scan had shown changes involving the sternum, scapula, and L4 areas; those areas did show improvement on the PET scan. Visit here on February 26, 2019 overall I think she was doing fair but we were concerned about her starting to have more discomfort in her left elbow as well as the right hip area. She had not had a PET scan since May of last year and that too had revealed a partial response, we felt possibly repeating the PET scan would be helpful.  In the meantime she was going to continue to receive the same treatments as before including Faslodex, Zoladex and Zometa  She was not able to undergo a PET scan but insurance. Did undergo a bone scan on April 4, 2019 revealing a stable bone scan without evidence of new osseous metastases. May , 2019 was evaluated for symptoms of chest pain including a cardiac work-up including echo and stress test afterwards and those were unremarkable. She did have a CTA of the chest done looking for pulmonary embolism on May 4, 2019 which was negative for pulmonary embolism no other acute pulmonary illness. She still of course was noted to have several sclerotic lesions within the spine which were felt to be stable as compared to the prior PET scan which was done in May 2018. [facility] September was 17, 2019 we talked about still being pre-or perimenopausal Darst will continue to stay on Zoladex along with Faslodex. Talked about disease overall having responded. Will possibly extend intervals between Zometa to may be once a year, may be due in July 2020. Did start her on Effexor for hot flashes  March 12, 2020 overall was doing fairly well Effexor was helpful with hot flashes. Denied significant new issues except for discomfort in the breast as well as rib cage area. Is due to have her bone scan mammogram and CT scan of the chest done to look for the discomfort in the chest area. Otherwise will continue on the same regimen. She will see me after the studies are completed. Telemedicine visit on April 23, 2020, as described above new issues being no obvious progression noted on a bone scan and blood work including tumor markers. CT scan of the chest as well as CT scan of the abdomen showing 2.7 cm lesion in the left hepatic lobe quite worrisome for possible metastatic disease. Clinically she was still doing well. I did encourage her to undergo a biopsy of the liver lesion. And following the biopsy could decide about if we need to send material for foundation 1 study also    Telemedicine visit again on May 5, 2020. April 27, 2020 she did undergo liver biopsy. Pathology did reveal metastatic adenocarcinoma from a breast primary. Tumor was still positive for ER 50% GA 40%. HER-2 was equivocal by IHC though negative by FISH. May 6, 2020 had long discussion with her and her family. We talked about progressive nature of the disease. We talked about future plan including liver biopsy material being sent to [place] [id number] analysis. We talked about the need for us to change her treatment to a combination of Faslodex, Zoladex and add Verzenio. We also discussed possibly doing a PET scan. We do know she has disease involving the breast, bones and now liver. Our intention is that if indeed she has an isolated lesion in the liver we might consider doing local treatment to the liver besides this new systemic therapy. June 1, 2020 I did spend time with her and her sister. We talked about PET scan revealing stable bony metastases and an isolated lesion involving the liver, biopsy-proven malignancy. Mammogram was unchanged from before. Couple weeks prior to this visit had started taking Verzenio along with Faslodex and Zoladex. We did talk about staying on the present systemic therapy and of course will continue to monitor her progress. We also discussed with her about possibly doing radiofrequency ablation treatment to the single lesion involving the liver. As described above on June 24, 2020 she did undergo radiofrequency ablation of the isolated lesion in the liver. July 17, 2020 she was overall doing fairly decent had recovered from the discomfort related to the radiofrequency ablation. We talked about continuing on this present regimen does do plan to check her [address] levels to see if we can hold off giving her Zoladex. We of course will continue with Faslodex, Verzenio and Zometa. We of course will continue to monitor her progress through exams as well as CAT scans.     September 15, 2020 was still being treated with Faslodex Zoladex and Verzenio.   [address] levels were in premenopausal levels. We also planning to give zometa from time to time. She in general was feeling fairly well major issue was discomfort primarily from Faslodex injections. We talked about checking her blood work with the next injection on October 9 including chemistries and CA-27-29. We also talked about checking her a CT scan of the abdomen to see the status of the lesion following the radiofrequency ablation. Otherwise as before we will continue to monitor her blood progress exams CAT scans and possibly PET scans.     January 5, 2021 spend time with her on the few issues as described below. #1 regarding metastatic carcinoma of the breast, premenopausal, s/p  oophorectomy, currently being treated with Faslodex and Verzenio. She was has had locally treatment for isolated liver metastases done on June 24, 2020. CBC was drawn today counts were reasonable that she could continue on the same dosages of Verzenio. Advised her to stay on the present therapy and will recheck her blood tumor markers in February.     February 16, 2021 telephone conversation with her. Clinically she was still doing quite well still on same medications as described above including Faslodex and Verzenio that she was tolerating well. Concern being is further rise of CA 27-29 levels. We talked about rechecking the level again in 3 weeks along with possibly PET scan if we could and if we could confirm progression of the disease then we might consider changing [facility] with Faslodex as the foundation 1 studies done before had revealed the presence of PIK 3 CA mutation. Other wise there was stable microsatellite status tumor mutational burden was not elevated.     She could not undergo a PET scan instead did a CT scan of the chest abdomen and pelvis on March 24, 2021 and those studies were negative for any obvious signs of progression of the disease.     April 1, 2021 had long discussion with her.   Clinically she was feeling well exam was unremarkable at least CAT scan studies are fairly unremarkable. Only concern being mild elevation of CA 27-29 levels. As all in all she was doing quite well we felt comfortable keeping her on treatment with Faslodex and Verzenio continue to monitor her CBC and chemistries possibly recheck her tumor markers in 8 weeks and still the plan remains the same if there is documented progression we might consider changing treatment to [facility].     She is going to be seen here in 8 weeks possibly by one of my partners. Zandra 3, 2021, clinically she seems to be stable(stable liver, bone and pulmonary mets). Currently on Verzenio and Faslodex. We will repeat CA 27-29 and if it remains stable then will continue the same treatment. If CA 27-29 markedly elevated then prior test, will consider switching to [facility]. Recommend DEXA scan and mammogram.  Also continue bisphosphonates. At some point she would like to transition to letrozole from Faslodex secondary to the side effects. Is on venlafaxine for hot flashes, deferred any other intervention at this point. Seen here on August third 2021 with no new symptoms except for the swelling at the injection site associated with pain. Discussed that CA 27-29 is relatively stable and mammogram with no new findings, would like to continue the current regimen but she wants to switch to oral AI. Discussed the adverse effects of AI  Return to clinic in 3 months or earlier if new symptoms. Note DEXA scan is normal.  Continue Zometa yearly. Recommend increase Effexor to 75 mg for vasogenic symptoms. Repeat CA 27-29 in 6 to 8 weeks and also will monitor for any adverse effects. Visit here on 10/4/2021 when she reported headache, and has been dropping things. Also reported tenderness in the right breast area. Clinical exam with no palpable masses. But note CA 27-29 elevated to 70.9 in September 2021 compared to 53.8 in June 2021.   Is currently on letrozole(which has been changed from Faslodex in August 2021) and Verzenio. Note PIK3A positive. Recommend further evaluation with CT scan of the chest abdomen pelvis and also MRI of the brain. Neutropenia most probably secondary to Verzenio. Avoid sick contacts. Recommend not to work in the [facility] unit. Dose adjustments as needed. Further recommendations pending above. Note mammogram and DEXA scan in July 2021 were normal  She has received her Covid vaccine. Visit here on 10/27/2021 we discussed about the results of the MRI of the brain which did not reveal any metastatic disease. Also discussed the results of the CT scan of the chest abdomen pelvis with stable osseous, pulmonary, liver lesions with slight increase in the right breast lesion. Note CA 27-29 was 70.9 in September 2021. Will discuss with radiation oncology for possible SBRT of the breast mass. Will repeat CA 27-29 in a month in November and if rising then will consider changing to [address] [place] [address] Avenue if progressive disease. Other medical care. To clinic December 2021 or earlier if new symptoms. Dictated today on 12 9 2021 I discussed the results of the CA 27-29 that it continues to rise slowly. Clinically is the same. Very reluctant to go back on the Faslodex. Reported that I would recommend repeat CA 27-29 in a month and if rising then will recommend PET scan for further evaluation and treatment planning. The options would be at that time to go back on Faslodex or start her on PIK3A inhibitor if evidence of progressive disease. Discussed with the radiation oncology regarding SBRT to the breast but they deferred at this point. Leukopenia with ANC of about thousand most probably secondary to abemaciclib. Continue to monitor closely and dose adjustments as needed. Return to clinic in January 2022    Visit here on 2/9/2022, clinically stable Seems to be tolerating fairly well. Note CA 27-29 in February 2022 mildly decreased.   Continue current regimen. Monitor for adverse effects. Plan to repeat imaging and tumor marker in April 2022.   [address]

## 2022-02-10 NOTE — PROGRESS NOTES
MA Rooming Questions  Patient: Alexsander Cunningham  MRN: [id number]    Date: 2/10/2022        1. Do you have any new issues?   no         2. Do you need any refills on medications?    no    3. Have you had any imaging done since your last visit?   no    4. Have you been hospitalized or seen in the emergency room since your last visit here?   no    5. Did the patient have a depression screening completed today?  Yes    PHQ-9 Total Score: 0 (2/10/2022 11:45 AM)       PHQ-9 Given to (if applicable):               PHQ-9 Score (if applicable):                     [] Positive     []  Negative              Does question #9 need addressed (if applicable)                     [] Yes    []  No               Chato Saab CMA

## 2022-02-17 DIAGNOSIS — C50.911 PRIMARY CANCER OF RIGHT BREAST WITH METASTASIS TO OTHER SITE (HCC): ICD-10-CM

## 2022-02-17 RX ORDER — ABEMACICLIB 150 MG/1
TABLET ORAL
Qty: 60 TABLET | Refills: 3 | Status: ACTIVE | OUTPATIENT
Start: 2022-02-17 | End: 2022-06-23

## 2022-02-17 NOTE — PROGRESS NOTES
Patient's prescription benefits are being verified for coverage. Status update to follow as soon as possible. Please call us with any questions at [phone] opt.  6.

## 2022-02-17 NOTE — PROGRESS NOTES
Medication has been routed to [facility]. Prior authorization effective through 5/12/22. Status update to follow as soon as possible. Please call us with any questions at [phone] opt.  6.

## 2022-02-21 NOTE — PROGRESS NOTES
Medication is scheduled to ship on 2/21/2022. Please call us with any questions at [phone] opt.  6.

## 2022-03-09 ENCOUNTER — TELEPHONE (OUTPATIENT)
Dept: ONCOLOGY | Age: 52
End: 2022-03-09

## 2022-04-11 ENCOUNTER — TELEPHONE (OUTPATIENT)
Dept: INFUSION THERAPY | Age: 52
End: 2022-04-11

## 2022-04-11 NOTE — TELEPHONE ENCOUNTER
SURYAM for pt asking for a return call concerning her zometa appts; pt has previously been getting these txs at the out pt infusion unit & this MA just calling to verify if she needs scheduled at our office.

## 2022-04-18 LAB
A/G RATIO: 1.9 (ref 1.1–2.2)
ALBUMIN SERPL-MCNC: 4.8 G/DL (ref 3.4–5)
ALP BLD-CCNC: 67 U/L (ref 40–129)
ALT SERPL-CCNC: 26 U/L (ref 10–40)
ANION GAP SERPL CALCULATED.3IONS-SCNC: 15 MMOL/L (ref 3–16)
AST SERPL-CCNC: 23 U/L (ref 15–37)
BILIRUB SERPL-MCNC: 0.3 MG/DL (ref 0–1)
BUN BLDV-MCNC: 18 MG/DL (ref 7–20)
CALCIUM SERPL-MCNC: 9.4 MG/DL (ref 8.3–10.6)
CHLORIDE BLD-SCNC: 105 MMOL/L (ref 99–110)
CO2: 20 MMOL/L (ref 21–32)
CREAT SERPL-MCNC: 1.1 MG/DL (ref 0.6–1.1)
GFR AFRICAN AMERICAN: >60
GFR NON-AFRICAN AMERICAN: 52
GLUCOSE BLD-MCNC: 89 MG/DL (ref 70–99)
POTASSIUM SERPL-SCNC: 4 MMOL/L (ref 3.5–5.1)
SODIUM BLD-SCNC: 140 MMOL/L (ref 136–145)
TOTAL PROTEIN: 7.3 G/DL (ref 6.4–8.2)

## 2022-04-19 ENCOUNTER — HOSPITAL ENCOUNTER (OUTPATIENT)
Dept: CT IMAGING | Age: 52
Discharge: HOME | End: 2022-04-19
Payer: COMMERCIAL

## 2022-04-19 DIAGNOSIS — C78.7 SECONDARY MALIGNANT NEOPLASM OF LIVER AND INTRAHEPATIC BILE DUCT (HCC): ICD-10-CM

## 2022-04-19 DIAGNOSIS — C79.51 SECONDARY MALIGNANT NEOPLASM OF BONE (HCC): ICD-10-CM

## 2022-04-19 DIAGNOSIS — C50.411 MALIGNANT NEOPLASM OF UPPER-OUTER QUADRANT OF RIGHT FEMALE BREAST, UNSPECIFIED ESTROGEN RECEPTOR STATUS (HCC): ICD-10-CM

## 2022-04-19 LAB
BASOPHILS ABSOLUTE: 0 K/UL (ref 0–0.2)
BASOPHILS RELATIVE PERCENT: 1.1 %
EOSINOPHILS ABSOLUTE: 0 K/UL (ref 0–0.6)
EOSINOPHILS RELATIVE PERCENT: 0.8 %
HCT VFR BLD CALC: 37.1 % (ref 36–48)
HEMOGLOBIN: 12.8 G/DL (ref 12–16)
LYMPHOCYTES ABSOLUTE: 0.5 K/UL (ref 1–5.1)
LYMPHOCYTES RELATIVE PERCENT: 19 %
MCH RBC QN AUTO: 35.5 PG (ref 26–34)
MCHC RBC AUTO-ENTMCNC: 34.6 G/DL (ref 31–36)
MCV RBC AUTO: 102.8 FL (ref 80–100)
MONOCYTES ABSOLUTE: 0.1 K/UL (ref 0–1.3)
MONOCYTES RELATIVE PERCENT: 5.2 %
NEUTROPHILS ABSOLUTE: 1.8 K/UL (ref 1.7–7.7)
NEUTROPHILS RELATIVE PERCENT: 73.9 %
PDW BLD-RTO: 13.2 % (ref 12.4–15.4)
PLATELET # BLD: 181 K/UL (ref 135–450)
PMV BLD AUTO: 7.6 FL (ref 5–10.5)
RBC # BLD: 3.61 M/UL (ref 4–5.2)
WBC # BLD: 2.5 K/UL (ref 4–11)

## 2022-04-19 PROCEDURE — 6360000004 HC RX CONTRAST MEDICATION: Performed by: INTERNAL MEDICINE

## 2022-04-19 PROCEDURE — 2580000003 HC RX 258: Performed by: INTERNAL MEDICINE

## 2022-04-19 PROCEDURE — 74177 CT ABD & PELVIS W/CONTRAST: CPT

## 2022-04-19 PROCEDURE — 71260 CT THORAX DX C+: CPT

## 2022-04-19 RX ORDER — SODIUM CHLORIDE 0.9 % (FLUSH) 0.9 %
10 SYRINGE (ML) INJECTION PRN
Status: DISCONTINUED | OUTPATIENT
Start: 2022-04-19 | End: 2022-04-20 | Stop reason: HOSPADM

## 2022-04-19 RX ADMIN — IOHEXOL 50 ML: 240 INJECTION, SOLUTION INTRATHECAL; INTRAVASCULAR; INTRAVENOUS; ORAL at 09:20

## 2022-04-19 RX ADMIN — IOPAMIDOL 75 ML: 755 INJECTION, SOLUTION INTRAVENOUS at 10:47

## 2022-04-19 RX ADMIN — SODIUM CHLORIDE, PRESERVATIVE FREE 10 ML: 5 INJECTION INTRAVENOUS at 10:45

## 2022-04-21 ENCOUNTER — TELEPHONE (OUTPATIENT)
Dept: INFUSION THERAPY | Age: 52
End: 2022-04-21

## 2022-04-22 LAB — CA 27-29: 50 U/ML (ref 0–38)

## 2022-04-22 NOTE — ANESTHESIA POSTPROCEDURE EVALUATION
Department of Anesthesiology  Postprocedure Note    Patient: Rosa M Knox  MRN: [id number]  YOB: 1970  Date of evaluation: 6/24/2020  Time:  2:59 PM     Procedure Summary     Date:  06/24/20 Room / Location:  [facility] Special Procedures    Anesthesia Start:  7839 Anesthesia Stop:  6088    Procedure:  CT GUIDED NEEDLE PLACEMENT Diagnosis:       Liver lesion      Liver lesion      Liver disease      Liver lesion      (liver ablation)    Scheduled Providers:  Aurea Cisneros Radiologist Responsible Provider:  KIMBERLY Loredo CRNA    Anesthesia Type:  general, TIVA ASA Status:  2          Anesthesia Type: general, TIVA    David Phase I: David Score: 9    David Phase II:      Last vitals: Reviewed and per EMR flowsheets.        Anesthesia Post Evaluation    Patient location during evaluation: PACU  Patient participation: complete - patient participated  Level of consciousness: awake  Airway patency: patent  Nausea & Vomiting: no vomiting and no nausea  Complications: no  Cardiovascular status: blood pressure returned to baseline and hemodynamically stable  Respiratory status: acceptable, nonlabored ventilation, nasal cannula and spontaneous ventilation  Hydration status: stable None

## 2022-04-27 ENCOUNTER — HOSPITAL ENCOUNTER (OUTPATIENT)
Dept: INFUSION THERAPY | Age: 52
Discharge: HOME | End: 2022-04-27

## 2022-04-27 ENCOUNTER — OFFICE VISIT (OUTPATIENT)
Dept: ONCOLOGY | Age: 52
End: 2022-04-27
Payer: COMMERCIAL

## 2022-04-27 VITALS
TEMPERATURE: 97.9 F | WEIGHT: 219.4 LBS | HEART RATE: 68 BPM | HEIGHT: 65 IN | DIASTOLIC BLOOD PRESSURE: 74 MMHG | BODY MASS INDEX: 36.55 KG/M2 | RESPIRATION RATE: 16 BRPM | SYSTOLIC BLOOD PRESSURE: 120 MMHG | OXYGEN SATURATION: 95 %

## 2022-04-27 DIAGNOSIS — C78.7 SECONDARY MALIGNANT NEOPLASM OF LIVER AND INTRAHEPATIC BILE DUCT (HCC): ICD-10-CM

## 2022-04-27 DIAGNOSIS — C79.51 SECONDARY MALIGNANT NEOPLASM OF BONE (HCC): ICD-10-CM

## 2022-04-27 DIAGNOSIS — D70.9 NEUTROPENIA, UNSPECIFIED TYPE (HCC): ICD-10-CM

## 2022-04-27 DIAGNOSIS — C50.411 MALIGNANT NEOPLASM OF UPPER-OUTER QUADRANT OF RIGHT FEMALE BREAST, UNSPECIFIED ESTROGEN RECEPTOR STATUS (HCC): Primary | ICD-10-CM

## 2022-04-27 PROCEDURE — 99214 OFFICE O/P EST MOD 30 MIN: CPT | Performed by: INTERNAL MEDICINE

## 2022-04-27 RX ORDER — LETROZOLE 2.5 MG/1
2.5 TABLET, FILM COATED ORAL DAILY
Qty: 90 TABLET | Refills: 5 | Status: SHIPPED | OUTPATIENT
Start: 2022-04-27 | End: 2022-08-31

## 2022-04-27 RX ORDER — VENLAFAXINE HYDROCHLORIDE 37.5 MG/1
37.5 CAPSULE, EXTENDED RELEASE ORAL DAILY
Qty: 30 CAPSULE | Refills: 5 | Status: SHIPPED | OUTPATIENT
Start: 2022-04-27

## 2022-04-27 NOTE — PROGRESS NOTES
Patient Name: [facility] Room  Patient : 1970  Patient MRN: [id number]     Primary Oncologist: Reji Mendes MD       Date of Service: 2022      Chief Complaint:   Chief Complaint   Patient presents with    Discuss Labs        Active Problem list  1. 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 Bone metastasis    3. Metastasis to liver    4. Neutropenia, unspecified type ([facility] [address]    Problem List  · Breast cancer, female ( Stage Date: Unknown, Stage IV (Right breast upper-outer quadrant, T1c, N0, M1)-Clinical  Date of Dx:10/2017 Extent of Disease: Evidence of metastatic disease; Disease State: Initial diagnosis; Metastatic Sites: Bone; Metastatic Sites: Liver; ER Status: Positive; ND Status: Positive; HER-2/jus Value-FISH: Negative; Menopausal Status: Premenopausal; PIK3CA gene: Positive)   · Abnormal radiological finding   · Bony metastasis ( Date of Dx:10/2017 )   · Chest pain   Metastasis to liver       HPI:        Extremely pleasant lady patient of Dr. Chris Guzman RN at [facility]. Evaluated in 2017 for some discomfort involving the sternum that had been present for a few weeks. She could not remember any injury to that area except maybe a few months earlier she had fallen face-forward. She did not remember having significant discomfort to the chest area involving the fall. 2017, chest x-ray was reported to be fairly unremarkable. X-rays of the sternum revealed questionable age-indeterminate factor of the mid to superior sternum. Additional CT was recommended which was done on 2017, revealing mottled appearance of the sternum. Findings could represent related to earlier trauma or even osteomyelitis or metastatic disease. Nonspecific sclerotic changes in the T12 vertebral body were noted too.      Triple phase bone scan done on 2017, had revealed only focal uptake in the body of the sternum corresponding to the changes noted on earlier CAT scan. Similar differential was again suggested. Visit here on October 9, 2017, though, she stated that she in general was doing actually better and pain was not severe and she did not have to take any pain medications and had been continuing to work on a full time basis and also remained fairly active. CMP on October 09, 2017, was normal and so was LDL of 183. Sed rate was 10. Kappa Lambda light chain ratios were normal. Immunoelectrophoresis was negative. CBC had revealed fairly normal numbers. CT biopsy of the sternum done on October 17, 2017, did reveal changes of metastatic carcinoma, possibly suggestive of breast primary. Biopsy from the sternum done on October 13, 2017, revealed metastatic carcinoma quite likely from breast primary. The tumor was strongly positive for estrogen receptor 95 percent and progesterone receptor is 95 percent, and negative for HER2. Visit here on October 20, 2017, those findings were discussed with her. October 25, 2017, diagnostic digital bilateral breast mammogram revealed 1.8 cm irregular, spiculated hyperechoic mass at the 10 oclock position in the right breast correlating to the mammographic area. Findings were highly suspicious for neoplasm. Ultrasound guided biopsy on October 27, 2017, did reveal invasive mammary carcinoma. Ultrasound-guided biopsy October 27, 2017, from this mass revealed invasive mammary carcinoma. Tumor was again strongly positive 95 percent for ER, 95 percent [place], HER-2 was negative both by IHC and FISH.      PET scan done on November 2, 2017, revealed hypermetabolic right breast mass as well as prominent right axillary lymph nodes and scattered osseous metastatic lesions involving the sternum, right scapula, left L4 vertebral body, and bilateral iliac crest.     Visit here on October 27, 2017, and afterwards metastatic disease involving the bones were discussed with her and clinically she was still considered to be premenopausal as she was not having any symptoms suggestive of menopausal state. She was started on treatment with Zoladex, Faslodex, and Zometa starting on November 10, 2017. She had to receive these treatments in outpatient facility at the hospital primarily because of insurance reasons. Saw me on December 7, 2017, sooner than her scheduled appointment, was concerned about possibly feeling a lump involving the right breast at around 6 oclock position. Otherwise she had not noticed any new issues. Mammogram and ultrasound done on December 8, 2017, showed dense breast tissue where she was feeling a mass. There was no sonographic evidence for any mass in that area too. MRI was suggested if needed to. Otherwise she was again noted to have known malignancy in the upper outer quadrant of the breast.     PET scan on May 14, 2018, revealed partial treatment response with improvement in the right axillary nodes as well as skeletal metastases. Metabolic activity associated with lytic bone lesions had improved with increased sclerosis of the previously seen lesions. Mild residual activity was noted at the L4 vertebral body. There were no new persistent or metabolically active aggressive osseous lesions. Visit here on February 26, 2019 overall I think she was doing fair but we were concerned about her starting to have more discomfort in her left elbow as well as the right hip area. She had not had a PET scan since May of last year and that too had revealed a partial response, we felt possibly repeating the PET scan would be helpful. In the meantime she was going to continue to receive the same treatments as before including Faslodex, Zoladex and Zometa    She was not able to undergo a PET scan but insurance.  Did undergo a bone scan on April 4, 2019 revealing a stable bone scan without evidence of new osseous metastases. May fall, 2019 was evaluated for symptoms of chest pain including a cardiac work-up including echo and stress test afterwards and those were unremarkable. She did have a CTA of the chest done looking for pulmonary embolism on May 4, 2019 which was negative for pulmonary embolism no other acute pulmonary illness. She still of course was noted to have several sclerotic lesions within the spine which were felt to be stable as compared to the prior PET scan which was done in May 2018. [facility] levels done on July 16, 6303 was 84.9, follicular phase. CA-27-29 level same day was 26, stable. [facility] September was 17, 2019 we talked about still being pre-or perimenopausal Darst will continue to stay on Zoladex along with Faslodex. Talked about disease overall having responded. Will possibly extend intervals between Zometa to may be once a year, may be due in July 2020. Did start her on Effexor for hot flashes    March 12, 2020 still being treated with same agents, still working on a long hours still in general feeling well only issue being occasional discomfort in the right rib cage as well as right breast area. Could not feel any distinct masses. Denied any history of injury. Otherwise negative for any new systemic complaints and overall both physically and emotionally was doing quite well    Telemedicine visit on April 23, 2020. We discussed results those were obtained on her previous visit on March 12, 2020. Chemistries CBC were fairly unremarkable CA-27-29 levels were still normal. She had undergone CT scan of the chest on March 24, 2020 was noted to have a stable bony lesions. But she was described to have a 2.8 cm lesion in liver which was worrisome for possibly metastatic disease. Following those studies she was advised to undergo PET scan and that was refused by insurance.  She did undergo a bone scan on April 28, 2020 was noted to have stable appearance of skeletal metastatic disease no new metastatic lesions were noted. She did undergo a CT scan of the abdomen and pelvis with contrast on the same day again was noted to have a 2.3 x 2.7 cm left hepatic lesion which was worrisome for metastatic disease and again of course skeletal metastases were noted. Telemedicine visit again on May 5, 2020. April 27, 2020 she did undergo liver biopsy. Pathology did reveal metastatic adenocarcinoma from a breast primary. Tumor was still positive for ER 50% NC 40%. HER-2 was equivocal by IHC though negative by FISH. May 6, 2020 had long discussion with her and her family. We talked about progressive nature of the disease. We talked about future plan including liver biopsy material being sent to [facility] 1 analysis. We talked about the need for us to change her treatment to a combination of Faslodex, Zoladex and add Verzenio. We also discussed possibly doing a PET scan. We do know she has disease involving the breast, bones and now liver. Our intention is that if indeed she has an isolated lesion in the liver we might consider doing local treatment to the liver besides this new systemic therapy. PET scan done on May 21, 7239 revealed metabolically active metastases in the left hepatic lobe no other metabolic activity was noted in the known skeletal metastases mammogram revealed stable appearance no new sites of malignancy were noted. Treatment was changed to Faslodex Zoladex and Verzenio in May 2020    [facility] analysis from the liver lesion did reveal a tumor to be positive for PI K3 CA. Otherwise microsatellite status was stable and no other alterations were noted especially in BRCA1 and BRCA2 as well as HER-2      As PET scan had revealed an isolated lesion involving the liver she did undergo radio frequency ablation of the lesion on June 24, 2020, tolerated procedure fairly well though had some discomfort afterwards.  CT scan of the abdomen done without contrast on June 25, 2020 did reveal sequela of treated metastatic lesion in the left lobe of the liver with central high attenuation area measuring 2.6 x 2.4 cm and the peripheral low-attenuation rim measuring around 4.8 x 4.1 cm related to the postprocedural edema. Again osseous metastatic disease was again noted without pathologic fracture. July 17, 2020 she was overall doing fairly decent had recovered from the discomfort related to the radiofrequency ablation. We talked about continuing on this present regimen does do plan to check her [address] [place] [address] levels to see if we can hold off giving her Zoladex. We of course will continue with Faslodex, Verzenio and Zometa. We of course will continue to monitor her progress through exams as well as CAT scans.     FSH level done on July 30, 2020 was 9.4, premenopausal.     Visit here on September 15, 2020. Was still being treated with Faslodex Zoladex and Verzenio.       Studies done on October 9, 2020 fairly normal CBC, chemistries reveal creatinine of 1.2 CA 27-29 levels were mildly elevated to 47. A CT scan of the abdomen done on October 15, 2020 revealed 3.5 x 3.1 cm ablation zone, which had decreased in size since immediate post ablation changes done on June 25, 2020. It is difficult to assess residual tremor dedicated multiphasic CT or MRI was recommended. Multifocal osseous sclerotic metastases were felt to be stable.     October 27, 2020 overall she was feeling about the same there was some concern about slight increase in tumor marker CA 27-29. [place] Plan was to to multiphasic CT scan of the abdomen to assess the disease status. Also she was seriously considering having her unilateral oophorectomy I think that will be reasonable.   I     CT scan of the abdomen done on November 3, 2020 overall it was felt that there was stable present appearance of liver no suspicious contrast enhancement to indicate presence of residual tumor no new hepatic abnormality was noted again stable appearance of osteoblastic metastatic disease of the lower lumbar and thoracic spine.        On December 17, 2020 she did undergo left oophorectomy. She also the same day underwent removal of Meckel's diverticulum. Tolerated the procedure well. Pathology from the ovary did not show any significant pathologic changes.     CA 27-29 levels done on November 25, 2020 was 47.2 not significantly different from before.     Visit here on January 5, 2020 she was still being treated with Faslodex and Verzenio. Of course Zoladex was stopped after her undergoing oophorectomy.          CA 27-29 levels done on February 5, 2021 was slightly higher than before, 54.5.     With her continued elevation of CA 27-29, though minimally, wanted to do a PET scan, was not approved by [employer]. Underwent a CT scan of the chest abdomen and pelvis on March 24, 2021. There was no new metastatic disease noted in the chest abdomen or pelvis the treated left hepatic mass was stable in appearance. Also stable appearance of treated skeletal metastatic disease. Small bilateral pulmonary nodules too were a stable.     Visit here on April 1, 2021 all in all she was feeling quite well still tolerating therapy with Faslodex and Verzenio extremely well. Counts have been fairly reasonable.     Seen  here on 6/3/2021 she reported numbness and scar tissue formation in the gluteal area secondary to the Faslodex injection. Reported that she continues to have hot flashes. Reported chronic headaches secondary to migraine. Also reported blurred vision which is not extensive and has LasiK 20 years ago, has an follow-up appointment with ophthalmology. No chest pain or shortness of breath. No new breast masses. Reported that her legs hurt when she sits for long period Of time and tries  to get up.    6/26/21: Venous u/l LE; no DVT. She had swelling in the LLE after the injection here. Received one dose of lovenox and also muscle relaxant and sx resolved.      7/8/21 mammogram no evidence of malignancy. Dexa scan normal     9/2021 Ca 27-29 was 70    10/25/21: Ct chest, abdomen and pelvis:  Chest: Stable chest CT with treated osseous metastatic disease, with several   sclerotic bone lesions. [place] also a few tiny pulmonary nodules 4 mm and   less.  No new findings of metastatic disease.       12 mm masslike focus of the right breast which appears slightly increased in   conspicuity from March 2021.  This may relate to differences in technique but   diagnostic mammogram is recommended along with correlation with the   mammography results from July 2021       Abdomen/pelvis: Stable CT of the abdomen and pelvis.  Unchanged low-density 3   cm lesion of the liver and unchanged treated osseous metastatic disease with   several sclerotic foci.  No new findings. 12/2/21: CA 27-29 was 77.6    2/3/22: CA 27-29 63    4/19/22: Ct chest, abdomen and pelvis:  1.  Stable appearance of chest, abdomen, and pelvis with no new metastatic   disease noted.       2.  Stable appearance of 2.6 cm left hepatic lobe mass likely related to   treated metastatic lesion.       3.  Stable osseous metastatic disease.       4.  Stable 0.3 cm bilateral pulmonary nodules.         April 2022 Ca 27-29 was 50    Past Medical History  None other than breast cancer    Surgical History    1. Abdominal hysterectomy with unilateral oophorectomy for endometriosis in the year 2000. 2. Surgery on right elbow in 2013. Social History  Smoking Status Former smokerUsed to smoke, quit in 2004. Negative for significant ethanol intake. Occasional social usage. .  works at a manufacturing plant. Two sons ages 32 and 25. 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     Family History  [place]    Interval history:4/27/22: Alone to the clinic today. Overall  Feeling fine now but about 7-10 days ago she had abdominal discomfort and dizziness. And Sx got better with consuming protein shakes. No nausea, emesis.  Intermittent mild diarrhea. No bleeding. No new breast mass or axillary lad. Reported tolerable hot flashes , has run out of effexor which was helping with the sx. Review of Systems   Per interval history; otherwise 10 point ROS is negative              Vital Signs:  /74 (Site: Right Upper Arm, Position: Sitting, Cuff Size: Medium Adult)   Pulse 68   Temp 97.9 °F (36.6 °C) (Infrared)   Resp 16   Ht 5' 5\" (1.651 m)   Wt 219 lb 6.4 oz (99.5 kg)   SpO2 95%   BMI 36.51 kg/m²     Physical Exam:  Was negative for any new palpable nodes and/or masses involving either breast.  No hepatosplenomegaly no abnormal cardiac or pulmonary findings no new skin or neurologic issues or findings.       Labs:    Hematology:  Lab Results   Component Value Date    WBC 2.5 (L) 04/18/2022    RBC 3.61 (L) 04/18/2022    HGB 12.8 04/18/2022    HCT 37.1 04/18/2022    .8 (H) 04/18/2022    MCH 35.5 (H) 04/18/2022    MCHC 34.6 04/18/2022    RDW 13.2 04/18/2022     04/18/2022    MPV 7.6 04/18/2022    SEGSPCT 34.2 (L) 02/03/2022    EOSRELPCT 0.8 04/18/2022    BASOPCT 1.1 04/18/2022    LYMPHOPCT 19.0 04/18/2022    MONOPCT 5.2 04/18/2022    SEGSABS 1.5 02/03/2022    EOSABS 0.0 04/18/2022    BASOSABS 0.0 04/18/2022    LYMPHSABS 0.5 (L) 04/18/2022    MONOSABS 0.1 04/18/2022    DIFFTYPE  02/03/2022     AUTOMATED DIFF RESULTS CONFIRMED BY SMEAR REVIEW  AUTOMATED DIFFERENTIAL      PLTM ADEQUATE 02/03/2022     Lab Results   Component Value Date    ESR 10 10/09/2017       Chemistry:  Lab Results   Component Value Date     04/18/2022    K 4.0 04/18/2022     04/18/2022    CO2 20 (L) 04/18/2022    BUN 18 04/18/2022    CREATININE 1.1 04/18/2022    GLUCOSE 89 04/18/2022    CALCIUM 9.4 04/18/2022    PROT 7.3 04/18/2022    LABALBU 4.8 04/18/2022    BILITOT 0.3 04/18/2022    ALKPHOS 67 04/18/2022    AST 23 04/18/2022    ALT 26 04/18/2022    LABGLOM 52 (A) 04/18/2022    GFRAA >60 04/18/2022    AGRATIO 1.9 04/18/2022     Lab Results Component Value Date     10/09/2017     No components found for: LD  Lab Results   Component Value Date    TSHHS 2.100 08/27/2014    T4FREE 1.22 08/08/2013    FT3 3.3 08/08/2013       Immunology:  Lab Results   Component Value Date    PROT 7.3 04/18/2022     Lab Results   Component Value Date    KAPPAUVOL 1.47 10/09/2017    LAMBDAUVOL 1.16 10/09/2017    KLFLCR 1.27 10/09/2017     No results found for: B2M    Coagulation Panel:  Lab Results   Component Value Date    PROTIME 12.0 06/26/2021    INR 0.90 06/26/2021    APTT 25.1 06/26/2021    DDIMER 0.58 (H) 06/26/2021       Anemia Panel:  Lab Results   Component Value Date    PNIEGGOL79 338 08/08/2013       Tumor Markers:  Lab Results   Component Value Date    LABCA2 63.4 (H) 02/03/2022         Imaging: Reviewed     Pathology:Reviewed     Observations:  Performance Status: ECOG   Depression Status: No data recorded          Assessment & Plan:  Extremely pleasant lady patient of Dr. Elise Mclean, overall blessed with good health. Saw him in September for vague discomfort involving the sternum which led to chest x-ray and x-rays of the sternum revealing questionable age-indeterminate fracture of the mid to superior sternum. CT scan revealed mottled appearance of sternum. Etiology could be related to earlier trauma and/or even metastatic disease. Bone scan showed increased activity in the sternum area with the same differential. Mammogram in October 2016 was unremarkable. October 10, 2017, I did spend time with her and her sister. We did talk about future course of action including possibly just repeating the studies in a couple of months. Would consider doing biopsy of the sternal area. She I think would feel comfortable if we could have better answer for her and thus for that reason I did advise her to be seen in interventional radiology for possible biopsy.    October 13, 2017, CT guided biopsy from the sternum revealed changes consistent with metastatic carcinoma from breast biopsy. The tumor is strongly positive for ER and MN and negative for HER2. October 25, 2017, mammogram did reveal a lesion in the right breast of 1.8 cm in the upper outer quadrant. Biopsy for which did reveal invasive mammary carcinoma. October 27, 2017, I did discuss the findings with her and her extended family. We talked about basically the finding of carcinoma involving the right breast as well as bones. Biopsy from the sternum was positive. A bone scan also showed questionable changes involving the T12, though CT scan of the chest otherwise was negative for any pulmonary or hepatic metastases. Biopsy from the right breast October 27, 2017, also revealed invasive mammary carcinoma, strongly positive ER, MN, and HER-2. PET scan done on November 2, 2017, revealed hypermetabolic right breast as well as right axillary nodes and osseous metastases involving sternum, right scapula, and L4 vertebral body. She was considered a premenopausal lady with metastatic disease involving the bones. Treatment choices were discussed with her and on November 10, 2017, was started on treatment with Zoladex, Faslodex, and Zometa. Visit here on November 20, 2017, she was tolerating these treatments well following the Zometa. December 7, 2017, bony pains were better, but she was concerned about possibly a lump involving the 6 oclock position right breast. Mammogram and ultrasound did not reveal any mass in that area. She was still noted to have lesion in the upper outer quadrant which had been biopsied before. The PET scan done on May 14, 2018, had revealed improvement in disease involving the breast, axilla, as well as bones. Previous PET scan had shown changes involving the sternum, scapula, and L4 areas; those areas did show improvement on the PET scan.       Visit here on February 26, 2019 overall I think she was doing fair but we were concerned about her starting to have more discomfort in her left elbow as well as the right hip area. She had not had a PET scan since May of last year and that too had revealed a partial response, we felt possibly repeating the PET scan would be helpful. In the meantime she was going to continue to receive the same treatments as before including Faslodex, Zoladex and Zometa  She was not able to undergo a PET scan but insurance. Did undergo a bone scan on April 4, 2019 revealing a stable bone scan without evidence of new osseous metastases. May , 2019 was evaluated for symptoms of chest pain including a cardiac work-up including echo and stress test afterwards and those were unremarkable. She did have a CTA of the chest done looking for pulmonary embolism on May 4, 2019 which was negative for pulmonary embolism no other acute pulmonary illness. She still of course was noted to have several sclerotic lesions within the spine which were felt to be stable as compared to the prior PET scan which was done in May 2018. [facility] September was 17, 2019 we talked about still being pre-or perimenopausal Darst will continue to stay on Zoladex along with Faslodex. Talked about disease overall having responded. Will possibly extend intervals between Zometa to may be once a year, may be due in July 2020. Did start her on Effexor for hot flashes  March 12, 2020 overall was doing fairly well Effexor was helpful with hot flashes. Denied significant new issues except for discomfort in the breast as well as rib cage area. Is due to have her bone scan mammogram and CT scan of the chest done to look for the discomfort in the chest area. Otherwise will continue on the same regimen. She will see me after the studies are completed. Telemedicine visit on April 23, 2020, as described above new issues being no obvious progression noted on a bone scan and blood work including tumor markers.  CT scan of the chest as well as CT scan of the abdomen showing 2.7 cm lesion in the left hepatic lobe quite worrisome for possible metastatic disease. Clinically she was still doing well. I did encourage her to undergo a biopsy of the liver lesion. And following the biopsy could decide about if we need to send material for [facility] 1 study also    Telemedicine visit again on May 5, 2020. April 27, 2020 she did undergo liver biopsy. Pathology did reveal metastatic adenocarcinoma from a breast primary. Tumor was still positive for ER 50% OH 40%. HER-2 was equivocal by IHC though negative by FISH. May 6, 2020 had long discussion with her and her family. We talked about progressive nature of the disease. We talked about future plan including liver biopsy material being sent to [facility] 1 analysis. We talked about the need for us to change her treatment to a combination of Faslodex, Zoladex and add Verzenio. We also discussed possibly doing a PET scan. We do know she has disease involving the breast, bones and now liver. Our intention is that if indeed she has an isolated lesion in the liver we might consider doing local treatment to the liver besides this new systemic therapy. June 1, 2020 I did spend time with her and her sister. We talked about PET scan revealing stable bony metastases and an isolated lesion involving the liver, biopsy-proven malignancy. Mammogram was unchanged from before. Couple weeks prior to this visit had started taking Verzenio along with Faslodex and Zoladex. We did talk about staying on the present systemic therapy and of course will continue to monitor her progress. We also discussed with her about possibly doing radiofrequency ablation treatment to the single lesion involving the liver. As described above on June 24, 2020 she did undergo radiofrequency ablation of the isolated lesion in the liver. July 17, 2020 she was overall doing fairly decent had recovered from the discomfort related to the radiofrequency ablation.  We talked about continuing on this present regimen does do plan to check her [address] [place] [address] levels to see if we can hold off giving her Zoladex. We of course will continue with Faslodex, Verzenio and Zometa. We of course will continue to monitor her progress through exams as well as CAT scans.     September 15, 2020 was still being treated with Faslodex Zoladex and Verzenio. FSH levels were in premenopausal levels. We also planning to give zometa from time to time. She in general was feeling fairly well major issue was discomfort primarily from Faslodex injections. We talked about checking her blood work with the next injection on October 9 including chemistries and CA-27-29. We also talked about checking her a CT scan of the abdomen to see the status of the lesion following the radiofrequency ablation. Otherwise as before we will continue to monitor her blood progress exams CAT scans and possibly PET scans.     January 5, 2021 spend time with her on the few issues as described below. #1 regarding metastatic carcinoma of the breast, premenopausal, s/p  oophorectomy, currently being treated with Faslodex and Verzenio. She was has had locally treatment for isolated liver metastases done on June 24, 2020. CBC was drawn today counts were reasonable that she could continue on the same dosages of Verzenio. Advised her to stay on the present therapy and will recheck her blood tumor markers in February.     February 16, 2021 telephone conversation with her. Clinically she was still doing quite well still on same medications as described above including Faslodex and Verzenio that she was tolerating well. Concern being is further rise of CA 27-29 levels. We talked about rechecking the level again in 3 weeks along with possibly PET scan if we could and if we could confirm progression of the disease then we might consider changing [facility] with Faslodex as the foundation 1 studies done before had revealed the presence of PIK 3 CA mutation.   Other wise there was stable microsatellite status tumor mutational burden was not elevated.     She could not undergo a PET scan instead did a CT scan of the chest abdomen and pelvis on March 24, 2021 and those studies were negative for any obvious signs of progression of the disease.     April 1, 2021 had long discussion with her. Clinically she was feeling well exam was unremarkable at least CAT scan studies are fairly unremarkable. Only concern being mild elevation of CA 27-29 levels. As all in all she was doing quite well we felt comfortable keeping her on treatment with Faslodex and Verzenio continue to monitor her CBC and chemistries possibly recheck her tumor markers in 8 weeks and still the plan remains the same if there is documented progression we might consider changing treatment to [facility].     She is going to be seen here in 8 weeks possibly by one of my partners. Zandra 3, 2021, clinically she seems to be stable(stable liver, bone and pulmonary mets). Currently on Verzenio and Faslodex. We will repeat CA 27-29 and if it remains stable then will continue the same treatment. If CA 27-29 markedly elevated then prior test, will consider switching to [facility]. Recommend DEXA scan and mammogram.  Also continue bisphosphonates. At some point she would like to transition to letrozole from Faslodex secondary to the side effects. Is on venlafaxine for hot flashes, deferred any other intervention at this point. Seen here on August third 2021 with no new symptoms except for the swelling at the injection site associated with pain. Discussed that CA 27-29 is relatively stable and mammogram with no new findings, would like to continue the current regimen but she wants to switch to oral AI. Discussed the adverse effects of AI  Return to clinic in 3 months or earlier if new symptoms. Note DEXA scan is normal.  Continue Zometa yearly. Recommend increase Effexor to 75 mg for vasogenic symptoms.   Repeat CA 27-29 in 6 to 8 weeks and also will monitor for any adverse effects. Visit here on 10/4/2021 when she reported headache, and has been dropping things. Also reported tenderness in the right breast area. Clinical exam with no palpable masses. But note CA 27-29 elevated to 70.9 in September 2021 compared to 53.8 in June 2021. Is currently on letrozole(which has been changed from Faslodex in August 2021) and Verzenio. Note PIK3A positive. Recommend further evaluation with CT scan of the chest abdomen pelvis and also MRI of the brain. Neutropenia most probably secondary to Verzenio. Avoid sick contacts. Recommend not to work in the [employer]. Dose adjustments as needed. Further recommendations pending above. Note mammogram and DEXA scan in July 2021 were normal  She has received her Covid vaccine. Visit here on 10/27/2021 we discussed about the results of the MRI of the brain which did not reveal any metastatic disease. Also discussed the results of the CT scan of the chest abdomen pelvis with stable osseous, pulmonary, liver lesions with slight increase in the right breast lesion. Note CA 27-29 was 70.9 in September 2021. Will discuss with radiation oncology for possible SBRT of the breast mass. Will repeat CA 27-29 in a month in November and if rising then will consider changing to [address] [place] [address] if progressive disease. Other medical care. To clinic December 2021 or earlier if new symptoms. Dictated today on 12 9 2021 I discussed the results of the CA 27-29 that it continues to rise slowly. Clinically is the same. Very reluctant to go back on the Faslodex. Reported that I would recommend repeat CA 27-29 in a month and if rising then will recommend PET scan for further evaluation and treatment planning. The options would be at that time to go back on Faslodex or start her on PIK3A inhibitor if evidence of progressive disease.  Discussed with the radiation oncology regarding SBRT to the breast but they deferred at this point. Leukopenia with ANC of about thousand most probably secondary to abemaciclib. Continue to monitor closely and dose adjustments as needed. Return to clinic in January 2022    Visit here on 2/9/2022, clinically stable Seems to be tolerating fairly well. Note CA 27-29 in February 2022 mildly decreased. Continue current regimen. Monitor for adverse effects. Plan to repeat imaging and tumor marker in April 2022. She was seen on April 27 2022, overall doing good. Ca 27-29 was 50 and Ct chest, abdomen and pelvis in April 2022 with stable findings. Continue Letrozole and Abemaciclib. Tolerating fairly well. Recommend Effexor for vasomotor Sx relief. Refills for letrozole provided. Dexa scan July 2021 normal. Plan to repeat imaging in 6M. Ca  27-29 every 2 Months. Continue Zometa every 6M. Leukopenia/neutropenia most probably sec to Abemaciclib. Will monitor for now. Mild renal insufficiency: Adequate fluids and avoid nephrotoxic medications.      RTC in 2M   LILIAM

## 2022-04-27 NOTE — PROGRESS NOTES
MA Rooming Questions  Patient: Judy Weber  MRN: [id number]    Date: 4/27/2022        1. Do you have any new issues?   no         2. Do you need any refills on medications?    no    3. Have you had any imaging done since your last visit? yes - CT    4. Have you been hospitalized or seen in the emergency room since your last visit here?   no    5. Did the patient have a depression screening completed today?  Yes    No data recorded     PHQ-9 Given to (if applicable):               PHQ-9 Score (if applicable):                     [] Positive     []  Negative              Does question #9 need addressed (if applicable)                     [] Yes    []  No               Shawanda Carter CMA

## 2022-05-05 DIAGNOSIS — C50.411 MALIGNANT NEOPLASM OF UPPER-OUTER QUADRANT OF RIGHT FEMALE BREAST, UNSPECIFIED ESTROGEN RECEPTOR STATUS (HCC): Primary | ICD-10-CM

## 2022-05-09 RX ORDER — SODIUM CHLORIDE 0.9 % (FLUSH) 0.9 %
5-40 SYRINGE (ML) INJECTION PRN
Status: CANCELLED | OUTPATIENT
Start: 2022-05-11

## 2022-05-09 RX ORDER — SODIUM CHLORIDE 9 MG/ML
25 INJECTION, SOLUTION INTRAVENOUS PRN
Status: CANCELLED | OUTPATIENT
Start: 2022-05-11

## 2022-05-09 RX ORDER — DIPHENHYDRAMINE HYDROCHLORIDE 50 MG/ML
50 INJECTION INTRAMUSCULAR; INTRAVENOUS ONCE
Status: CANCELLED | OUTPATIENT
Start: 2022-05-11 | End: 2022-05-11

## 2022-05-09 RX ORDER — SODIUM CHLORIDE 9 MG/ML
20 INJECTION, SOLUTION INTRAVENOUS ONCE
Status: CANCELLED | OUTPATIENT
Start: 2022-05-11 | End: 2022-05-11

## 2022-05-09 RX ORDER — METHYLPREDNISOLONE SODIUM SUCCINATE 125 MG/2ML
125 INJECTION, POWDER, LYOPHILIZED, FOR SOLUTION INTRAMUSCULAR; INTRAVENOUS ONCE
Status: CANCELLED | OUTPATIENT
Start: 2022-05-11 | End: 2022-05-11

## 2022-05-09 RX ORDER — SODIUM CHLORIDE 9 MG/ML
INJECTION, SOLUTION INTRAVENOUS CONTINUOUS
Status: CANCELLED | OUTPATIENT
Start: 2022-05-11

## 2022-05-09 RX ORDER — HEPARIN SODIUM (PORCINE) LOCK FLUSH IV SOLN 100 UNIT/ML 100 UNIT/ML
500 SOLUTION INTRAVENOUS PRN
Status: CANCELLED | OUTPATIENT
Start: 2022-05-11

## 2022-05-09 RX ORDER — EPINEPHRINE 1 MG/ML
0.3 INJECTION, SOLUTION, CONCENTRATE INTRAVENOUS PRN
Status: CANCELLED | OUTPATIENT
Start: 2022-05-11

## 2022-05-11 ENCOUNTER — HOSPITAL ENCOUNTER (OUTPATIENT)
Dept: INFUSION THERAPY | Age: 52
Discharge: HOME | End: 2022-05-11
Payer: COMMERCIAL

## 2022-05-11 VITALS
WEIGHT: 219 LBS | TEMPERATURE: 97.3 F | HEIGHT: 65 IN | SYSTOLIC BLOOD PRESSURE: 114 MMHG | BODY MASS INDEX: 36.49 KG/M2 | OXYGEN SATURATION: 96 % | HEART RATE: 66 BPM | DIASTOLIC BLOOD PRESSURE: 77 MMHG

## 2022-05-11 DIAGNOSIS — C79.51 SECONDARY MALIGNANT NEOPLASM OF BONE (HCC): ICD-10-CM

## 2022-05-11 DIAGNOSIS — C78.7 SECONDARY MALIGNANT NEOPLASM OF LIVER AND INTRAHEPATIC BILE DUCT (HCC): ICD-10-CM

## 2022-05-11 DIAGNOSIS — C50.411 MALIGNANT NEOPLASM OF UPPER-OUTER QUADRANT OF RIGHT FEMALE BREAST, UNSPECIFIED ESTROGEN RECEPTOR STATUS (HCC): Primary | ICD-10-CM

## 2022-05-11 LAB
ALBUMIN SERPL-MCNC: 4.4 GM/DL (ref 3.4–5)
ALP BLD-CCNC: 60 IU/L (ref 40–128)
ALT SERPL-CCNC: 23 U/L (ref 10–40)
ANION GAP SERPL CALCULATED.3IONS-SCNC: 14 MMOL/L (ref 4–16)
AST SERPL-CCNC: 22 IU/L (ref 15–37)
BASOPHILS ABSOLUTE: 0.1 K/CU MM
BASOPHILS RELATIVE PERCENT: 1.2 % (ref 0–1)
BILIRUB SERPL-MCNC: 0.3 MG/DL (ref 0–1)
BUN BLDV-MCNC: 20 MG/DL (ref 6–23)
CALCIUM SERPL-MCNC: 9.7 MG/DL (ref 8.3–10.6)
CHLORIDE BLD-SCNC: 103 MMOL/L (ref 99–110)
CO2: 19 MMOL/L (ref 21–32)
CREAT SERPL-MCNC: 1 MG/DL (ref 0.6–1.1)
DIFFERENTIAL TYPE: ABNORMAL
EOSINOPHILS ABSOLUTE: 0.1 K/CU MM
EOSINOPHILS RELATIVE PERCENT: 1.2 % (ref 0–3)
GFR AFRICAN AMERICAN: 52 ML/MIN/1.73M2
GFR AFRICAN AMERICAN: >60 ML/MIN/1.73M2
GFR NON-AFRICAN AMERICAN: 43 ML/MIN/1.73M2
GFR NON-AFRICAN AMERICAN: 58 ML/MIN/1.73M2
GLUCOSE BLD-MCNC: 77 MG/DL (ref 70–99)
GLUCOSE BLD-MCNC: 81 MG/DL (ref 70–99)
HCT VFR BLD CALC: 37.6 % (ref 37–47)
HEMOGLOBIN: 13 GM/DL (ref 12.5–16)
LYMPHOCYTES ABSOLUTE: 2.2 K/CU MM
LYMPHOCYTES RELATIVE PERCENT: 54.4 % (ref 24–44)
MCH RBC QN AUTO: 34.2 PG (ref 27–31)
MCHC RBC AUTO-ENTMCNC: 34.6 % (ref 32–36)
MCV RBC AUTO: 98.9 FL (ref 78–100)
MONOCYTES ABSOLUTE: 0.3 K/CU MM
MONOCYTES RELATIVE PERCENT: 7 % (ref 0–4)
PDW BLD-RTO: 12.2 % (ref 11.7–14.9)
PLATELET # BLD: 197 K/CU MM (ref 140–440)
PMV BLD AUTO: 9.2 FL (ref 7.5–11.1)
POC BUN: 18 MG/DL (ref 8–26)
POC CALCIUM: 1.27 MMOL/L (ref 1.12–1.32)
POC CHLORIDE: 108 MMOL/L (ref 98–109)
POC CO2: 22 MMOL/L (ref 21–32)
POC CREATININE: 1.3 MG/DL (ref 0.6–1.1)
POTASSIUM SERPL-SCNC: 4 MMOL/L (ref 3.5–4.5)
POTASSIUM SERPL-SCNC: 4.1 MMOL/L (ref 3.5–5.1)
RBC # BLD: 3.8 M/CU MM (ref 4.2–5.4)
SEGMENTED NEUTROPHILS ABSOLUTE COUNT: 1.5 K/CU MM
SEGMENTED NEUTROPHILS RELATIVE PERCENT: 36.2 % (ref 36–66)
SODIUM BLD-SCNC: 136 MMOL/L (ref 135–145)
SODIUM BLD-SCNC: 140 MMOL/L (ref 138–146)
SOURCE, BLOOD GAS: ABNORMAL
TOTAL PROTEIN: 6.9 GM/DL (ref 6.4–8.2)
WBC # BLD: 4 K/CU MM (ref 4–10.5)

## 2022-05-11 PROCEDURE — 6360000002 HC RX W HCPCS: Performed by: INTERNAL MEDICINE

## 2022-05-11 PROCEDURE — 86300 IMMUNOASSAY TUMOR CA 15-3: CPT

## 2022-05-11 PROCEDURE — 96365 THER/PROPH/DIAG IV INF INIT: CPT

## 2022-05-11 PROCEDURE — 85025 COMPLETE CBC W/AUTO DIFF WBC: CPT

## 2022-05-11 PROCEDURE — 80053 COMPREHEN METABOLIC PANEL: CPT

## 2022-05-11 PROCEDURE — 2580000003 HC RX 258: Performed by: INTERNAL MEDICINE

## 2022-05-11 RX ORDER — SODIUM CHLORIDE 9 MG/ML
20 INJECTION, SOLUTION INTRAVENOUS ONCE
Status: COMPLETED | OUTPATIENT
Start: 2022-05-11 | End: 2022-05-11

## 2022-05-11 RX ADMIN — ZOLEDRONIC ACID 4 MG: 4 INJECTION, SOLUTION, CONCENTRATE INTRAVENOUS at 15:01

## 2022-05-11 RX ADMIN — SODIUM CHLORIDE 20 ML/HR: 9 INJECTION, SOLUTION INTRAVENOUS at 15:01

## 2022-05-11 NOTE — PROGRESS NOTES
Pt ambulated into treatment area for zometa infusion. Pt states that she has had infusion at [facility] with no issues. PIV started in RT anterior forearm, Labs drawn and sent to lab for processing. Treatment approved and administered as ordered, Pt tolerated well and left treatment area ambulatory and in stable condition.

## 2022-05-14 LAB — CA 27.29: 53.3 U/ML

## 2022-06-23 DIAGNOSIS — C50.911 PRIMARY CANCER OF RIGHT BREAST WITH METASTASIS TO OTHER SITE (HCC): ICD-10-CM

## 2022-06-23 RX ORDER — ABEMACICLIB 150 MG/1
TABLET ORAL
Qty: 60 TABLET | Refills: 5 | Status: ACTIVE | OUTPATIENT
Start: 2022-06-23

## 2022-06-23 NOTE — PROGRESS NOTES
[facility]    Date: 06/23/22    Medication is scheduled to ship on 6/28/22. PA good through 5/24/23. Please call us with any questions at [phone] opt.  2.
I have reviewed and confirmed nurses' notes for patient's medications, allergies, medical history, and surgical history.

## 2022-07-06 ENCOUNTER — HOSPITAL ENCOUNTER (OUTPATIENT)
Dept: INFUSION THERAPY | Age: 52
Discharge: HOME | End: 2022-07-06
Payer: COMMERCIAL

## 2022-07-06 DIAGNOSIS — C79.51 SECONDARY MALIGNANT NEOPLASM OF BONE (HCC): ICD-10-CM

## 2022-07-06 DIAGNOSIS — D70.9 NEUTROPENIA, UNSPECIFIED TYPE (HCC): ICD-10-CM

## 2022-07-06 DIAGNOSIS — C50.411 MALIGNANT NEOPLASM OF UPPER-OUTER QUADRANT OF RIGHT FEMALE BREAST, UNSPECIFIED ESTROGEN RECEPTOR STATUS (HCC): ICD-10-CM

## 2022-07-06 DIAGNOSIS — C78.7 SECONDARY MALIGNANT NEOPLASM OF LIVER AND INTRAHEPATIC BILE DUCT (HCC): ICD-10-CM

## 2022-07-06 LAB
ALBUMIN SERPL-MCNC: 4.6 GM/DL (ref 3.4–5)
ALP BLD-CCNC: 60 IU/L (ref 40–128)
ALT SERPL-CCNC: 21 U/L (ref 10–40)
ANION GAP SERPL CALCULATED.3IONS-SCNC: 9 MMOL/L (ref 4–16)
AST SERPL-CCNC: 26 IU/L (ref 15–37)
BASOPHILS ABSOLUTE: 0.1 K/CU MM
BASOPHILS RELATIVE PERCENT: 2.3 % (ref 0–1)
BILIRUB SERPL-MCNC: 0.4 MG/DL (ref 0–1)
BUN BLDV-MCNC: 14 MG/DL (ref 6–23)
CALCIUM SERPL-MCNC: 9.8 MG/DL (ref 8.3–10.6)
CHLORIDE BLD-SCNC: 104 MMOL/L (ref 99–110)
CO2: 27 MMOL/L (ref 21–32)
CREAT SERPL-MCNC: 1.2 MG/DL (ref 0.6–1.1)
DIFFERENTIAL TYPE: ABNORMAL
EOSINOPHILS ABSOLUTE: 0.1 K/CU MM
EOSINOPHILS RELATIVE PERCENT: 2 % (ref 0–3)
GFR AFRICAN AMERICAN: 57 ML/MIN/1.73M2
GFR NON-AFRICAN AMERICAN: 47 ML/MIN/1.73M2
GLUCOSE BLD-MCNC: 85 MG/DL (ref 70–99)
HCT VFR BLD CALC: 38.4 % (ref 37–47)
HEMOGLOBIN: 13.6 GM/DL (ref 12.5–16)
LYMPHOCYTES ABSOLUTE: 2.2 K/CU MM
LYMPHOCYTES RELATIVE PERCENT: 61.4 % (ref 24–44)
MCH RBC QN AUTO: 34.7 PG (ref 27–31)
MCHC RBC AUTO-ENTMCNC: 35.4 % (ref 32–36)
MCV RBC AUTO: 98 FL (ref 78–100)
MONOCYTES ABSOLUTE: 0.4 K/CU MM
MONOCYTES RELATIVE PERCENT: 10.4 % (ref 0–4)
PDW BLD-RTO: 12.2 % (ref 11.7–14.9)
PLATELET # BLD: 201 K/CU MM (ref 140–440)
PMV BLD AUTO: 9.2 FL (ref 7.5–11.1)
POTASSIUM SERPL-SCNC: 4.3 MMOL/L (ref 3.5–5.1)
RBC # BLD: 3.92 M/CU MM (ref 4.2–5.4)
SEGMENTED NEUTROPHILS ABSOLUTE COUNT: 0.9 K/CU MM
SEGMENTED NEUTROPHILS RELATIVE PERCENT: 23.9 % (ref 36–66)
SODIUM BLD-SCNC: 140 MMOL/L (ref 135–145)
TOTAL PROTEIN: 7.3 GM/DL (ref 6.4–8.2)
WBC # BLD: 3.6 K/CU MM (ref 4–10.5)

## 2022-07-06 PROCEDURE — 36415 COLL VENOUS BLD VENIPUNCTURE: CPT

## 2022-07-06 PROCEDURE — 80053 COMPREHEN METABOLIC PANEL: CPT

## 2022-07-06 PROCEDURE — 85025 COMPLETE CBC W/AUTO DIFF WBC: CPT

## 2022-07-06 PROCEDURE — 86300 IMMUNOASSAY TUMOR CA 15-3: CPT

## 2022-07-08 LAB — CA 27.29: 73.3 U/ML

## 2022-07-12 ENCOUNTER — OFFICE VISIT (OUTPATIENT)
Dept: ONCOLOGY | Age: 52
End: 2022-07-12
Payer: COMMERCIAL

## 2022-07-12 VITALS
SYSTOLIC BLOOD PRESSURE: 139 MMHG | BODY MASS INDEX: 35.72 KG/M2 | RESPIRATION RATE: 18 BRPM | DIASTOLIC BLOOD PRESSURE: 80 MMHG | HEART RATE: 62 BPM | OXYGEN SATURATION: 98 % | HEIGHT: 65 IN | TEMPERATURE: 97.9 F | WEIGHT: 214.4 LBS

## 2022-07-12 DIAGNOSIS — C78.7 SECONDARY MALIGNANT NEOPLASM OF LIVER AND INTRAHEPATIC BILE DUCT (HCC): ICD-10-CM

## 2022-07-12 DIAGNOSIS — C79.51 SECONDARY MALIGNANT NEOPLASM OF BONE (HCC): ICD-10-CM

## 2022-07-12 DIAGNOSIS — C50.411 MALIGNANT NEOPLASM OF UPPER-OUTER QUADRANT OF RIGHT FEMALE BREAST, UNSPECIFIED ESTROGEN RECEPTOR STATUS (HCC): Primary | ICD-10-CM

## 2022-07-12 PROCEDURE — 99214 OFFICE O/P EST MOD 30 MIN: CPT | Performed by: INTERNAL MEDICINE

## 2022-07-12 NOTE — PROGRESS NOTES
Patient Name: Amber Kerns  Patient : 1970  Patient MRN: [id number]     Primary Oncologist: Gaby Malik MD       Date of Service: 2022      Chief Complaint:   Chief Complaint   Patient presents with    Follow-up        Active Problem list  1. Malignant neoplasm of upper-outer quadrant of right female breast, unspecified estrogen receptor status ([facility] [address]    2. Bone metastasis    3. Metastasis to liver    Problem List  · Breast cancer, female ( Stage Date: Unknown, Stage IV (Right breast upper-outer quadrant, T1c, N0, M1)-Clinical  Date of Dx:10/2017 Extent of Disease: Evidence of metastatic disease; Disease State: Initial diagnosis; Metastatic Sites: Bone; Metastatic Sites: Liver; ER Status: Positive; HI Status: Positive; HER-2/jus Value-FISH: Negative; Menopausal Status: Premenopausal; PIK3CA gene: Positive)   · Abnormal radiological finding   · Bony metastasis ( Date of Dx:10/2017 )   · Chest pain   Metastasis to liver       HPI:        Extremely pleasant lady patient of Dr. Stella Chauhan RN at [facility]. Evaluated in 2017 for some discomfort involving the sternum that had been present for a few weeks. She could not remember any injury to that area except maybe a few months earlier she had fallen face-forward. She did not remember having significant discomfort to the chest area involving the fall. 2017, chest x-ray was reported to be fairly unremarkable. X-rays of the sternum revealed questionable age-indeterminate factor of the mid to superior sternum. Additional CT was recommended which was done on 2017, revealing mottled appearance of the sternum. Findings could represent related to earlier trauma or even osteomyelitis or metastatic disease. Nonspecific sclerotic changes in the T12 vertebral body were noted too.      Triple phase bone scan done on 2017, had revealed only focal uptake in the body of the sternum corresponding to the changes noted on earlier CAT scan. Similar differential was again suggested. Visit here on October 9, 2017, though, she stated that she in general was doing actually better and pain was not severe and she did not have to take any pain medications and had been continuing to work on a full time basis and also remained fairly active. CMP on October 09, 2017, was normal and so was LDL of 183. Sed rate was 10. Kappa Lambda light chain ratios were normal. Immunoelectrophoresis was negative. CBC had revealed fairly normal numbers. CT biopsy of the sternum done on October 17, 2017, did reveal changes of metastatic carcinoma, possibly suggestive of breast primary. Biopsy from the sternum done on October 13, 2017, revealed metastatic carcinoma quite likely from breast primary. The tumor was strongly positive for estrogen receptor 95 percent and progesterone receptor is 95 percent, and negative for HER2. Visit here on October 20, 2017, those findings were discussed with her. October 25, 2017, diagnostic digital bilateral breast mammogram revealed 1.8 cm irregular, spiculated hyperechoic mass at the 10 oclock position in the right breast correlating to the mammographic area. Findings were highly suspicious for neoplasm. Ultrasound guided biopsy on October 27, 2017, did reveal invasive mammary carcinoma. Ultrasound-guided biopsy October 27, 2017, from this mass revealed invasive mammary carcinoma. Tumor was again strongly positive 95 percent for ER, 95 percent ND, HER-2 was negative both by IHC and FISH.      PET scan done on November 2, 2017, revealed hypermetabolic right breast mass as well as prominent right axillary lymph nodes and scattered osseous metastatic lesions involving the sternum, right scapula, left L4 vertebral body, and bilateral iliac crest.     Visit here on October 27, 2017, and afterwards metastatic disease involving the bones were discussed with her and clinically she was still considered to be premenopausal as she was not having any symptoms suggestive of menopausal state. She was started on treatment with Zoladex, Faslodex, and Zometa starting on November 10, 2017. She had to receive these treatments in outpatient facility at the hospital primarily because of insurance reasons. Saw me on December 7, 2017, sooner than her scheduled appointment, was concerned about possibly feeling a lump involving the right breast at around 6 oclock position. Otherwise she had not noticed any new issues. Mammogram and ultrasound done on December 8, 2017, showed dense breast tissue where she was feeling a mass. There was no sonographic evidence for any mass in that area too. MRI was suggested if needed to. Otherwise she was again noted to have known malignancy in the upper outer quadrant of the breast.     PET scan on May 14, 2018, revealed partial treatment response with improvement in the right axillary nodes as well as skeletal metastases. Metabolic activity associated with lytic bone lesions had improved with increased sclerosis of the previously seen lesions. Mild residual activity was noted at the L4 vertebral body. There were no new persistent or metabolically active aggressive osseous lesions. Visit here on February 26, 2019 overall I think she was doing fair but we were concerned about her starting to have more discomfort in her left elbow as well as the right hip area. She had not had a PET scan since May of last year and that too had revealed a partial response, we felt possibly repeating the PET scan would be helpful. In the meantime she was going to continue to receive the same treatments as before including Faslodex, Zoladex and Zometa    She was not able to undergo a PET scan but insurance. Did undergo a bone scan on April 4, 2019 revealing a stable bone scan without evidence of new osseous metastases.     May fall, 2019 was evaluated for symptoms of chest pain including a cardiac work-up including echo and stress test afterwards and those were unremarkable. She did have a CTA of the chest done looking for pulmonary embolism on May 4, 2019 which was negative for pulmonary embolism no other acute pulmonary illness. She still of course was noted to have several sclerotic lesions within the spine which were felt to be stable as compared to the prior PET scan which was done in May 2018. [facility] levels done on July 16, 8762 was 51.7, follicular phase. CA-27-29 level same day was 26, stable. [address] [facility] [address] September was 17, 2019 we talked about still being pre-or perimenopausal Darst will continue to stay on Zoladex along with Faslodex. Talked about disease overall having responded. Will possibly extend intervals between Zometa to may be once a year, may be due in July 2020. Did start her on Effexor for hot flashes    March 12, 2020 still being treated with same agents, still working on a long hours still in general feeling well only issue being occasional discomfort in the right rib cage as well as right breast area. Could not feel any distinct masses. Denied any history of injury. Otherwise negative for any new systemic complaints and overall both physically and emotionally was doing quite well    Telemedicine visit on April 23, 2020. We discussed results those were obtained on her previous visit on March 12, 2020. Chemistries CBC were fairly unremarkable CA-27-29 levels were still normal. She had undergone CT scan of the chest on March 24, 2020 was noted to have a stable bony lesions. But she was described to have a 2.8 cm lesion in liver which was worrisome for possibly metastatic disease. Following those studies she was advised to undergo PET scan and that was refused by insurance. She did undergo a bone scan on April 28, 2020 was noted to have stable appearance of skeletal metastatic disease no new metastatic lesions were noted.  She did undergo a CT scan of the abdomen and pelvis with contrast on the same day again was noted to have a 2.3 x 2.7 cm left hepatic lesion which was worrisome for metastatic disease and again of course skeletal metastases were noted. Telemedicine visit again on May 5, 2020. April 27, 2020 she did undergo liver biopsy. Pathology did reveal metastatic adenocarcinoma from a breast primary. Tumor was still positive for ER 50% MS 40%. HER-2 was equivocal by IHC though negative by FISH. May 6, 2020 had long discussion with her and her family. We talked about progressive nature of the disease. We talked about future plan including liver biopsy material being sent to [facility] 1 analysis. We talked about the need for us to change her treatment to a combination of Faslodex, Zoladex and add Verzenio. We also discussed possibly doing a PET scan. We do know she has disease involving the breast, bones and now liver. Our intention is that if indeed she has an isolated lesion in the liver we might consider doing local treatment to the liver besides this new systemic therapy. PET scan done on May 21, 9535 revealed metabolically active metastases in the left hepatic lobe no other metabolic activity was noted in the known skeletal metastases mammogram revealed stable appearance no new sites of malignancy were noted. Treatment was changed to Faslodex Zoladex and Verzenio in May 2020    Foundation 1 analysis from the liver lesion did reveal a tumor to be positive for PI K3 CA. Otherwise microsatellite status was stable and no other alterations were noted especially in BRCA1 and BRCA2 as well as HER-2      As PET scan had revealed an isolated lesion involving the liver she did undergo radio frequency ablation of the lesion on June 24, 2020, tolerated procedure fairly well though had some discomfort afterwards.  CT scan of the abdomen done without contrast on June 25, 2020 did reveal sequela of treated metastatic lesion in the left lobe of the liver with central high attenuation area measuring 2.6 x 2.4 cm and the peripheral low-attenuation rim measuring around 4.8 x 4.1 cm related to the postprocedural edema. Again osseous metastatic disease was again noted without pathologic fracture. July 17, 2020 she was overall doing fairly decent had recovered from the discomfort related to the radiofrequency ablation. We talked about continuing on this present regimen does do plan to check her [address] [place] [address] levels to see if we can hold off giving her Zoladex. We of course will continue with Faslodex, Verzenio and Zometa. We of course will continue to monitor her progress through exams as well as CAT scans.     FSH level done on July 30, 2020 was 9.4, premenopausal.     Visit here on September 15, 2020. Was still being treated with Faslodex Zoladex and Verzenio.       Studies done on October 9, 2020 fairly normal CBC, chemistries reveal creatinine of 1.2 CA 27-29 levels were mildly elevated to 47. A CT scan of the abdomen done on October 15, 2020 revealed 3.5 x 3.1 cm ablation zone, which had decreased in size since immediate post ablation changes done on June 25, 2020. It is difficult to assess residual tremor dedicated multiphasic CT or MRI was recommended. Multifocal osseous sclerotic metastases were felt to be stable.     October 27, 2020 overall she was feeling about the same there was some concern about slight increase in tumor marker CA 27-29. [facility] Plan was to to multiphasic CT scan of the abdomen to assess the disease status. Also she was seriously considering having her unilateral oophorectomy I think that will be reasonable.   I     CT scan of the abdomen done on November 3, 2020 overall it was felt that there was stable present appearance of liver no suspicious contrast enhancement to indicate presence of residual tumor no new hepatic abnormality was noted again stable appearance of osteoblastic metastatic disease of the lower lumbar and thoracic spine.        On December 17, 2020 she did undergo left oophorectomy. She also the same day underwent removal of Meckel's diverticulum. Tolerated the procedure well. Pathology from the ovary did not show any significant pathologic changes.     CA 27-29 levels done on November 25, 2020 was 47.2 not significantly different from before.     Visit here on January 5, 2020 she was still being treated with Faslodex and Verzenio. Of course Zoladex was stopped after her undergoing oophorectomy.          CA 27-29 levels done on February 5, 2021 was slightly higher than before, 54.5.     With her continued elevation of CA 27-29, though minimally, wanted to do a PET scan, was not approved by [employer]. Underwent a CT scan of the chest abdomen and pelvis on March 24, 2021. There was no new metastatic disease noted in the chest abdomen or pelvis the treated left hepatic mass was stable in appearance. Also stable appearance of treated skeletal metastatic disease. Small bilateral pulmonary nodules too were a stable.     Visit here on April 1, 2021 all in all she was feeling quite well still tolerating therapy with Faslodex and Verzenio extremely well. Counts have been fairly reasonable.     Seen  here on 6/3/2021 she reported numbness and scar tissue formation in the gluteal area secondary to the Faslodex injection. Reported that she continues to have hot flashes. Reported chronic headaches secondary to migraine. Also reported blurred vision which is not extensive and has LasiK 20 years ago, has an follow-up appointment with ophthalmology. No chest pain or shortness of breath. No new breast masses. Reported that her legs hurt when she sits for long period Of time and tries  to get up.    6/26/21: Venous u/l LE; no DVT. She had swelling in the LLE after the injection here. Received one dose of lovenox and also muscle relaxant and sx resolved. 7/8/21 mammogram no evidence of malignancy.  Dexa scan normal     9/2021 Ca 27-29 was 70    10/25/21: Ct chest, abdomen and pelvis:  Chest: Stable chest CT with treated osseous metastatic disease, with several   sclerotic bone lesions.  There also a few tiny pulmonary nodules 4 mm and   less.  No new findings of metastatic disease.       12 mm masslike focus of the right breast which appears slightly increased in   conspicuity from March 2021.  This may relate to differences in technique but   diagnostic mammogram is recommended along with correlation with the   mammography results from July 2021       Abdomen/pelvis: Stable CT of the abdomen and pelvis.  Unchanged low-density 3   cm lesion of the liver and unchanged treated osseous metastatic disease with   several sclerotic foci.  No new findings. 12/2/21: CA 27-29 was 77.6    2/3/22: CA 27-29 63    4/19/22: Ct chest, abdomen and pelvis:  1.  Stable appearance of chest, abdomen, and pelvis with no new metastatic   disease noted.       2.  Stable appearance of 2.6 cm left hepatic lobe mass likely related to   treated metastatic lesion.       3.  Stable osseous metastatic disease.       4.  Stable 0.3 cm bilateral pulmonary nodules.         April 2022 Ca 27-29 was 50    Past Medical History  None other than breast cancer    Surgical History    1. Abdominal hysterectomy with unilateral oophorectomy for endometriosis in the year 2000. 2. Surgery on right elbow in 2013. Social History  Smoking Status Former smokerUsed to smoke, quit in 2004. Negative for significant ethanol intake. Occasional social usage. .  works at a manufacturing plant. Two sons ages 32 and 25. 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     Family History  NC    Interval history:7/12/22: Alone to the clinic today. Reported increased pain in the right knee and bilateral hip mostly left hip for the past month or so. Also reported pain/soreness  in the right breast sometimes radiating to the right arm. Intermittent discomfort in the sternal area.  No Lab Results   Component Value Date     10/09/2017     No components found for: LD  Lab Results   Component Value Date    TSHHS 2.100 08/27/2014    T4FREE 1.22 08/08/2013    FT3 3.3 08/08/2013       Immunology:  Lab Results   Component Value Date    PROT 7.3 07/06/2022     Lab Results   Component Value Date    KAPPAUVOL 1.47 10/09/2017    LAMBDAUVOL 1.16 10/09/2017    KLFLCR 1.27 10/09/2017     No results found for: B2M    Coagulation Panel:  Lab Results   Component Value Date    PROTIME 12.0 06/26/2021    INR 0.90 06/26/2021    APTT 25.1 06/26/2021    DDIMER 0.58 (H) 06/26/2021       Anemia Panel:  Lab Results   Component Value Date    CBEBQYUL27 982 08/08/2013       Tumor Markers:  Lab Results   Component Value Date    LABCA2 73.3 (H) 07/06/2022         Imaging: Reviewed     Pathology:Reviewed     Observations:  Performance Status: ECOG   Depression Status: No data recorded          Assessment & Plan:  Extremely pleasant lady patient of Dr. Whit Rosa, overall blessed with good health. Saw him in September for vague discomfort involving the sternum which led to chest x-ray and x-rays of the sternum revealing questionable age-indeterminate fracture of the mid to superior sternum. CT scan revealed mottled appearance of sternum. Etiology could be related to earlier trauma and/or even metastatic disease. Bone scan showed increased activity in the sternum area with the same differential. Mammogram in October 2016 was unremarkable. October 10, 2017, I did spend time with her and her sister. We did talk about future course of action including possibly just repeating the studies in a couple of months. Would consider doing biopsy of the sternal area. She I think would feel comfortable if we could have better answer for her and thus for that reason I did advise her to be seen in interventional radiology for possible biopsy.    October 13, 2017, CT guided biopsy from the sternum revealed changes consistent with metastatic carcinoma from breast biopsy. The tumor is strongly positive for ER and NE and negative for HER2. October 25, 2017, mammogram did reveal a lesion in the right breast of 1.8 cm in the upper outer quadrant. Biopsy for which did reveal invasive mammary carcinoma. October 27, 2017, I did discuss the findings with her and her extended family. We talked about basically the finding of carcinoma involving the right breast as well as bones. Biopsy from the sternum was positive. A bone scan also showed questionable changes involving the T12, though CT scan of the chest otherwise was negative for any pulmonary or hepatic metastases. Biopsy from the right breast October 27, 2017, also revealed invasive mammary carcinoma, strongly positive ER, NE, and HER-2. PET scan done on November 2, 2017, revealed hypermetabolic right breast as well as right axillary nodes and osseous metastases involving sternum, right scapula, and L4 vertebral body. She was considered a premenopausal lady with metastatic disease involving the bones. Treatment choices were discussed with her and on November 10, 2017, was started on treatment with Zoladex, Faslodex, and Zometa. Visit here on November 20, 2017, she was tolerating these treatments well following the Zometa. December 7, 2017, bony pains were better, but she was concerned about possibly a lump involving the 6 oclock position right breast. Mammogram and ultrasound did not reveal any mass in that area. She was still noted to have lesion in the upper outer quadrant which had been biopsied before. The PET scan done on May 14, 2018, had revealed improvement in disease involving the breast, axilla, as well as bones. Previous PET scan had shown changes involving the sternum, scapula, and L4 areas; those areas did show improvement on the PET scan.       Visit here on February 26, 2019 overall I think she was doing fair but we were concerned about her starting to have more discomfort in her left elbow as well as the right hip area. She had not had a PET scan since May of last year and that too had revealed a partial response, we felt possibly repeating the PET scan would be helpful. In the meantime she was going to continue to receive the same treatments as before including Faslodex, Zoladex and Zometa  She was not able to undergo a PET scan but insurance. Did undergo a bone scan on April 4, 2019 revealing a stable bone scan without evidence of new osseous metastases. May , 2019 was evaluated for symptoms of chest pain including a cardiac work-up including echo and stress test afterwards and those were unremarkable. She did have a CTA of the chest done looking for pulmonary embolism on May 4, 2019 which was negative for pulmonary embolism no other acute pulmonary illness. She still of course was noted to have several sclerotic lesions within the spine which were felt to be stable as compared to the prior PET scan which was done in May 2018. [facility] September was 17, 2019 we talked about still being pre-or perimenopausal Dar will continue to stay on Zoladex along with Faslodex. Talked about disease overall having responded. Will possibly extend intervals between Zometa to may be once a year, may be due in July 2020. Did start her on Effexor for hot flashes  March 12, 2020 overall was doing fairly well Effexor was helpful with hot flashes. Denied significant new issues except for discomfort in the breast as well as rib cage area. Is due to have her bone scan mammogram and CT scan of the chest done to look for the discomfort in the chest area. Otherwise will continue on the same regimen. She will see me after the studies are completed. Telemedicine visit on April 23, 2020, as described above new issues being no obvious progression noted on a bone scan and blood work including tumor markers.  CT scan of the chest as well as CT scan of the abdomen showing 2.7 cm lesion in the left hepatic lobe quite worrisome for possible metastatic disease. Clinically she was still doing well. I did encourage her to undergo a biopsy of the liver lesion. And following the biopsy could decide about if we need to send material for foundation 1 study also    Telemedicine visit again on May 5, 2020. April 27, 2020 she did undergo liver biopsy. Pathology did reveal metastatic adenocarcinoma from a breast primary. Tumor was still positive for ER 50% ND 40%. HER-2 was equivocal by IHC though negative by FISH. May 6, 2020 had long discussion with her and her family. We talked about progressive nature of the disease. We talked about future plan including liver biopsy material being sent to foundation 1 analysis. We talked about the need for us to change her treatment to a combination of Faslodex, Zoladex and add Verzenio. We also discussed possibly doing a PET scan. We do know she has disease involving the breast, bones and now liver. Our intention is that if indeed she has an isolated lesion in the liver we might consider doing local treatment to the liver besides this new systemic therapy. June 1, 2020 I did spend time with her and her sister. We talked about PET scan revealing stable bony metastases and an isolated lesion involving the liver, biopsy-proven malignancy. Mammogram was unchanged from before. Couple weeks prior to this visit had started taking Verzenio along with Faslodex and Zoladex. We did talk about staying on the present systemic therapy and of course will continue to monitor her progress. We also discussed with her about possibly doing radiofrequency ablation treatment to the single lesion involving the liver. As described above on June 24, 2020 she did undergo radiofrequency ablation of the isolated lesion in the liver. July 17, 2020 she was overall doing fairly decent had recovered from the discomfort related to the radiofrequency ablation.  We talked about continuing on this present regimen does do plan to check her [address] [place] [address] levels to see if we can hold off giving her Zoladex. We of course will continue with Faslodex, Verzenio and Zometa. We of course will continue to monitor her progress through exams as well as CAT scans.     September 15, 2020 was still being treated with Faslodex Zoladex and Verzenio. FSH levels were in premenopausal levels. We also planning to give zometa from time to time. She in general was feeling fairly well major issue was discomfort primarily from Faslodex injections. We talked about checking her blood work with the next injection on October 9 including chemistries and CA-27-29. We also talked about checking her a CT scan of the abdomen to see the status of the lesion following the radiofrequency ablation. Otherwise as before we will continue to monitor her blood progress exams CAT scans and possibly PET scans.     January 5, 2021 spend time with her on the few issues as described below. #1 regarding metastatic carcinoma of the breast, premenopausal, s/p  oophorectomy, currently being treated with Faslodex and Verzenio. She was has had locally treatment for isolated liver metastases done on June 24, 2020. CBC was drawn today counts were reasonable that she could continue on the same dosages of Verzenio. Advised her to stay on the present therapy and will recheck her blood tumor markers in February.     February 16, 2021 telephone conversation with her. Clinically she was still doing quite well still on same medications as described above including Faslodex and Verzenio that she was tolerating well. Concern being is further rise of CA 27-29 levels. We talked about rechecking the level again in 3 weeks along with possibly PET scan if we could and if we could confirm progression of the disease then we might consider changing her treatment to [facility] along with Faslodex as the foundation 1 studies done before had revealed the presence of PIK 3 CA mutation. Other wise there was stable microsatellite status tumor mutational burden was not elevated.     She could not undergo a PET scan instead did a CT scan of the chest abdomen and pelvis on March 24, 2021 and those studies were negative for any obvious signs of progression of the disease.     April 1, 2021 had long discussion with her. Clinically she was feeling well exam was unremarkable at least CAT scan studies are fairly unremarkable. Only concern being mild elevation of CA 27-29 levels. As all in all she was doing quite well we felt comfortable keeping her on treatment with Faslodex and Verzenio continue to monitor her CBC and chemistries possibly recheck her tumor markers in 8 weeks and still the plan remains the same if there is documented progression we might consider changing treatment to [facility].     She is going to be seen here in 8 weeks possibly by one of my partners. Zandra 3, 2021, clinically she seems to be stable(stable liver, bone and pulmonary mets). Currently on Verzenio and Faslodex. We will repeat CA 27-29 and if it remains stable then will continue the same treatment. If CA 27-29 markedly elevated then prior test, will consider switching to [facility]. Recommend DEXA scan and mammogram.  Also continue bisphosphonates. At some point she would like to transition to letrozole from Faslodex secondary to the side effects. Is on venlafaxine for hot flashes, deferred any other intervention at this point. Seen here on August third 2021 with no new symptoms except for the swelling at the injection site associated with pain. Discussed that CA 27-29 is relatively stable and mammogram with no new findings, would like to continue the current regimen but she wants to switch to oral AI. Discussed the adverse effects of AI  Return to clinic in 3 months or earlier if new symptoms. Note DEXA scan is normal.  Continue Zometa yearly. Recommend increase Effexor to 75 mg for vasogenic symptoms.   Repeat CA 27-29 in 6 to 8 weeks and also will monitor for any adverse effects. Visit here on 10/4/2021 when she reported headache, and has been dropping things. Also reported tenderness in the right breast area. Clinical exam with no palpable masses. But note CA 27-29 elevated to 70.9 in September 2021 compared to 53.8 in June 2021. Is currently on letrozole(which has been changed from Faslodex in August 2021) and Verzenio. Note PIK3A positive. Recommend further evaluation with CT scan of the chest abdomen pelvis and also MRI of the brain. Neutropenia most probably secondary to Verzenio. Avoid sick contacts. Recommend not to work in the [employer]. Dose adjustments as needed. Further recommendations pending above. Note mammogram and DEXA scan in July 2021 were normal  She has received her Covid vaccine. Visit here on 10/27/2021 we discussed about the results of the MRI of the brain which did not reveal any metastatic disease. Also discussed the results of the CT scan of the chest abdomen pelvis with stable osseous, pulmonary, liver lesions with slight increase in the right breast lesion. Note CA 27-29 was 70.9 in September 2021. Will discuss with radiation oncology for possible SBRT of the breast mass. Will repeat CA 27-29 in a month in November and if rising then will consider changing to [address] [place] [address] if progressive disease. Other medical care. To clinic December 2021 or earlier if new symptoms. Dictated today on 12 9 2021 I discussed the results of the CA 27-29 that it continues to rise slowly. Clinically is the same. Very reluctant to go back on the Faslodex. Reported that I would recommend repeat CA 27-29 in a month and if rising then will recommend PET scan for further evaluation and treatment planning. The options would be at that time to go back on Faslodex or start her on PIK3A inhibitor if evidence of progressive disease.  Discussed with the radiation oncology regarding SBRT to the breast but they deferred at this point. Leukopenia with ANC of about thousand most probably secondary to abemaciclib. Continue to monitor closely and dose adjustments as needed. Return to clinic in January 2022    Visit here on 2/9/2022, clinically stable Seems to be tolerating fairly well. Note CA 27-29 in February 2022 mildly decreased. Continue current regimen. Monitor for adverse effects. Plan to repeat imaging and tumor marker in April 2022. She was seen on April 27 2022, overall doing good. Ca 27-29 was 50 and Ct chest, abdomen and pelvis in April 2022 with stable findings. Continue Letrozole and Abemaciclib. Tolerating fairly well. Recommend Effexor for vasomotor Sx relief. Refills for letrozole provided. Dexa scan July 2021 normal. Plan to repeat imaging in 6M. Ca  27-29 every 2 Months. Continue Zometa every 6M. Leukopenia/neutropenia most probably sec to Abemaciclib. Will monitor for now. Mild renal insufficiency: Adequate fluids and avoid nephrotoxic medications. DR Aldair Naranjo Notes:   ----------------------------    [facility] breast cancer: Mets to liver and bones  Course as described above. Is currently on verzinio and letrozole  CT chest, abdomen and pelvis in April 2022 with stable disease. Ca 27-29 increased to 70 from 50. No new significant sx. Will repeat Ca 27-29 in 6 weeks and if rising then will repeat imaging and consider changing to [facility] - if disease progession. Recommend antidiarrhea agents and recommend adequate fluid intake. Avoid nephrotoxic medication. Dexa scan in July 2021 normal. Is on calcium and vitamin D and on Zometa for bony mets. Mild renal insufficiency:L Recommend adequate fluid intake and avoid nephrotoxic medications. Neutropenia most probably sec to verzinio. ANC 0.9. Continue current dose and monitor for now    Continue other medical care    Recommend healthy diet, exercise and weight loss if possible. Recommend follow up with PCP and other specialists.      RTC

## 2022-07-12 NOTE — PROGRESS NOTES
MA Rooming Questions  Patient: Abhinav Shah  MRN: [id number]    Date: 7/12/2022        1. Do you have any new issues? yes - pain         2. Do you need any refills on medications?    no    3. Have you had any imaging done since your last visit?   no    4. Have you been hospitalized or seen in the emergency room since your last visit here?   no    5. Did the patient have a depression screening completed today?  No    No data recorded     PHQ-9 Given to (if applicable):               PHQ-9 Score (if applicable):                     [] Positive     []  Negative              Does question #9 need addressed (if applicable)                     [] Yes    []  No               Marjamare Ends, CMA

## 2022-08-24 ENCOUNTER — HOSPITAL ENCOUNTER (OUTPATIENT)
Dept: INFUSION THERAPY | Age: 52
Discharge: HOME | End: 2022-08-24
Payer: COMMERCIAL

## 2022-08-24 DIAGNOSIS — C78.7 SECONDARY MALIGNANT NEOPLASM OF LIVER AND INTRAHEPATIC BILE DUCT (HCC): ICD-10-CM

## 2022-08-24 DIAGNOSIS — C79.51 SECONDARY MALIGNANT NEOPLASM OF BONE (HCC): ICD-10-CM

## 2022-08-24 DIAGNOSIS — C50.411 MALIGNANT NEOPLASM OF UPPER-OUTER QUADRANT OF RIGHT FEMALE BREAST, UNSPECIFIED ESTROGEN RECEPTOR STATUS (HCC): ICD-10-CM

## 2022-08-24 LAB
ALBUMIN SERPL-MCNC: 5.1 GM/DL (ref 3.4–5)
ALP BLD-CCNC: 58 IU/L (ref 40–129)
ALT SERPL-CCNC: 22 U/L (ref 10–40)
ANION GAP SERPL CALCULATED.3IONS-SCNC: 12 MMOL/L (ref 4–16)
AST SERPL-CCNC: 27 IU/L (ref 15–37)
BASOPHILS ABSOLUTE: 0.1 K/CU MM
BASOPHILS RELATIVE PERCENT: 1.3 % (ref 0–1)
BILIRUB SERPL-MCNC: 0.4 MG/DL (ref 0–1)
BUN BLDV-MCNC: 18 MG/DL (ref 6–23)
CALCIUM SERPL-MCNC: 10.6 MG/DL (ref 8.3–10.6)
CHLORIDE BLD-SCNC: 98 MMOL/L (ref 99–110)
CO2: 26 MMOL/L (ref 21–32)
CREAT SERPL-MCNC: 1.2 MG/DL (ref 0.6–1.1)
DIFFERENTIAL TYPE: ABNORMAL
EOSINOPHILS ABSOLUTE: 0.1 K/CU MM
EOSINOPHILS RELATIVE PERCENT: 0.9 % (ref 0–3)
GFR AFRICAN AMERICAN: 57 ML/MIN/1.73M2
GFR NON-AFRICAN AMERICAN: 47 ML/MIN/1.73M2
GLUCOSE BLD-MCNC: 91 MG/DL (ref 70–99)
HCT VFR BLD CALC: 38.4 % (ref 37–47)
HEMOGLOBIN: 13.5 GM/DL (ref 12.5–16)
LYMPHOCYTES ABSOLUTE: 2.9 K/CU MM
LYMPHOCYTES RELATIVE PERCENT: 52.1 % (ref 24–44)
MCH RBC QN AUTO: 34.8 PG (ref 27–31)
MCHC RBC AUTO-ENTMCNC: 35.2 % (ref 32–36)
MCV RBC AUTO: 99 FL (ref 78–100)
MONOCYTES ABSOLUTE: 0.5 K/CU MM
MONOCYTES RELATIVE PERCENT: 8.6 % (ref 0–4)
PDW BLD-RTO: 12.2 % (ref 11.7–14.9)
PLATELET # BLD: 204 K/CU MM (ref 140–440)
PMV BLD AUTO: 9.3 FL (ref 7.5–11.1)
POTASSIUM SERPL-SCNC: 4.2 MMOL/L (ref 3.5–5.1)
RBC # BLD: 3.88 M/CU MM (ref 4.2–5.4)
SEGMENTED NEUTROPHILS ABSOLUTE COUNT: 2 K/CU MM
SEGMENTED NEUTROPHILS RELATIVE PERCENT: 37.1 % (ref 36–66)
SODIUM BLD-SCNC: 136 MMOL/L (ref 135–145)
TOTAL PROTEIN: 7.5 GM/DL (ref 6.4–8.2)
WBC # BLD: 5.5 K/CU MM (ref 4–10.5)

## 2022-08-24 PROCEDURE — 85025 COMPLETE CBC W/AUTO DIFF WBC: CPT

## 2022-08-24 PROCEDURE — 36415 COLL VENOUS BLD VENIPUNCTURE: CPT

## 2022-08-24 PROCEDURE — 86300 IMMUNOASSAY TUMOR CA 15-3: CPT

## 2022-08-24 PROCEDURE — 80053 COMPREHEN METABOLIC PANEL: CPT

## 2022-08-27 LAB — CA 27.29: 69.2 U/ML

## 2022-08-31 ENCOUNTER — OFFICE VISIT (OUTPATIENT)
Dept: ONCOLOGY | Age: 52
End: 2022-08-31
Payer: COMMERCIAL

## 2022-08-31 ENCOUNTER — HOSPITAL ENCOUNTER (OUTPATIENT)
Dept: INFUSION THERAPY | Age: 52
Discharge: HOME | End: 2022-08-31
Payer: COMMERCIAL

## 2022-08-31 VITALS
HEIGHT: 65 IN | SYSTOLIC BLOOD PRESSURE: 137 MMHG | DIASTOLIC BLOOD PRESSURE: 80 MMHG | OXYGEN SATURATION: 98 % | TEMPERATURE: 97.4 F | BODY MASS INDEX: 35.74 KG/M2 | WEIGHT: 214.5 LBS | HEART RATE: 73 BPM

## 2022-08-31 DIAGNOSIS — C50.411 MALIGNANT NEOPLASM OF UPPER-OUTER QUADRANT OF RIGHT FEMALE BREAST, UNSPECIFIED ESTROGEN RECEPTOR STATUS (HCC): Primary | ICD-10-CM

## 2022-08-31 DIAGNOSIS — D70.9 NEUTROPENIA, UNSPECIFIED TYPE (HCC): ICD-10-CM

## 2022-08-31 DIAGNOSIS — C78.7 SECONDARY MALIGNANT NEOPLASM OF LIVER AND INTRAHEPATIC BILE DUCT (HCC): ICD-10-CM

## 2022-08-31 DIAGNOSIS — C79.51 SECONDARY MALIGNANT NEOPLASM OF BONE (HCC): ICD-10-CM

## 2022-08-31 PROCEDURE — 99211 OFF/OP EST MAY X REQ PHY/QHP: CPT

## 2022-08-31 PROCEDURE — 99214 OFFICE O/P EST MOD 30 MIN: CPT | Performed by: INTERNAL MEDICINE

## 2022-08-31 ASSESSMENT — PATIENT HEALTH QUESTIONNAIRE - PHQ9
SUM OF ALL RESPONSES TO PHQ QUESTIONS 1-9: 0
1. LITTLE INTEREST OR PLEASURE IN DOING THINGS: 0
SUM OF ALL RESPONSES TO PHQ QUESTIONS 1-9: 0
SUM OF ALL RESPONSES TO PHQ QUESTIONS 1-9: 0
SUM OF ALL RESPONSES TO PHQ9 QUESTIONS 1 & 2: 0
SUM OF ALL RESPONSES TO PHQ QUESTIONS 1-9: 0
2. FEELING DOWN, DEPRESSED OR HOPELESS: 0

## 2022-08-31 NOTE — PROGRESS NOTES
Patient Name: Yanci Gomes  Patient : 1970  Patient MRN: [id number]     Primary Oncologist: Mele Coughlin MD       Date of Service: 2022      Chief Complaint:   Chief Complaint   Patient presents with    Follow-up        Active Problem list  1. Malignant neoplasm of upper-outer quadrant of right female breast, unspecified estrogen receptor status ([facility] [address]    2. Bone metastasis    3. Neutropenia, unspecified type ([address]    4. Metastasis to liver    Problem List  Breast cancer, female ( Stage Date: Unknown, Stage IV (Right breast upper-outer quadrant, T1c, N0, M1)-Clinical  Date of Dx:10/2017 Extent of Disease: Evidence of metastatic disease; Disease State: Initial diagnosis; Metastatic Sites: Bone; Metastatic Sites: Liver; ER Status: Positive; AL Status: Positive; HER-2/jus Value-FISH: Negative; Menopausal Status: Premenopausal; PIK3CA gene: Positive)   Abnormal radiological finding   Bony metastasis ( Date of Dx:10/2017 )   Chest pain   Metastasis to liver       HPI:        Extremely pleasant lady patient of Dr. Lolis Clement. RN at [facility]. Evaluated in 2017 for some discomfort involving the sternum that had been present for a few weeks. She could not remember any injury to that area except maybe a few months earlier she had fallen face-forward. She did not remember having significant discomfort to the chest area involving the fall. 2017, chest x-ray was reported to be fairly unremarkable. X-rays of the sternum revealed questionable age-indeterminate factor of the mid to superior sternum. Additional CT was recommended which was done on 2017, revealing mottled appearance of the sternum. Findings could represent related to earlier trauma or even osteomyelitis or metastatic disease. Nonspecific sclerotic changes in the T12 vertebral body were noted too.      Triple phase bone scan done on 2017, had revealed only focal uptake in the body of the sternum corresponding to the changes noted on earlier CAT scan. Similar differential was again suggested. Visit here on October 9, 2017, though, she stated that she in general was doing actually better and pain was not severe and she did not have to take any pain medications and had been continuing to work on a full time basis and also remained fairly active. CMP on October 09, 2017, was normal and so was LDL of 183. Sed rate was 10. Kappa Lambda light chain ratios were normal. Immunoelectrophoresis was negative. CBC had revealed fairly normal numbers. CT biopsy of the sternum done on October 17, 2017, did reveal changes of metastatic carcinoma, possibly suggestive of breast primary. Biopsy from the sternum done on October 13, 2017, revealed metastatic carcinoma quite likely from breast primary. The tumor was strongly positive for estrogen receptor 95 percent and progesterone receptor is 95 percent, and negative for HER2. Visit here on October 20, 2017, those findings were discussed with her. October 25, 2017, diagnostic digital bilateral breast mammogram revealed 1.8 cm irregular, spiculated hyperechoic mass at the 10 oclock position in the right breast correlating to the mammographic area. Findings were highly suspicious for neoplasm. Ultrasound guided biopsy on October 27, 2017, did reveal invasive mammary carcinoma. Ultrasound-guided biopsy October 27, 2017, from this mass revealed invasive mammary carcinoma. Tumor was again strongly positive 95 percent for ER, 95 percent ID, HER-2 was negative both by IHC and FISH.      PET scan done on November 2, 2017, revealed hypermetabolic right breast mass as well as prominent right axillary lymph nodes and scattered osseous metastatic lesions involving the sternum, right scapula, left L4 vertebral body, and bilateral iliac crest.     Visit here on October 27, 2017, and afterwards metastatic disease involving the bones were discussed with her and clinically she was still considered to be premenopausal as she was not having any symptoms suggestive of menopausal state. She was started on treatment with Zoladex, Faslodex, and Zometa starting on November 10, 2017. She had to receive these treatments in outpatient facility at the hospital primarily because of insurance reasons. Saw me on December 7, 2017, sooner than her scheduled appointment, was concerned about possibly feeling a lump involving the right breast at around 6 oclock position. Otherwise she had not noticed any new issues. Mammogram and ultrasound done on December 8, 2017, showed dense breast tissue where she was feeling a mass. There was no sonographic evidence for any mass in that area too. MRI was suggested if needed to. Otherwise she was again noted to have known malignancy in the upper outer quadrant of the breast.     PET scan on May 14, 2018, revealed partial treatment response with improvement in the right axillary nodes as well as skeletal metastases. Metabolic activity associated with lytic bone lesions had improved with increased sclerosis of the previously seen lesions. Mild residual activity was noted at the L4 vertebral body. There were no new persistent or metabolically active aggressive osseous lesions. Visit here on February 26, 2019 overall I think she was doing fair but we were concerned about her starting to have more discomfort in her left elbow as well as the right hip area. She had not had a PET scan since May of last year and that too had revealed a partial response, we felt possibly repeating the PET scan would be helpful. In the meantime she was going to continue to receive the same treatments as before including Faslodex, Zoladex and Zometa    She was not able to undergo a PET scan but insurance. Did undergo a bone scan on April 4, 2019 revealing a stable bone scan without evidence of new osseous metastases.     May fall, 2019 was evaluated for symptoms of chest pain including a cardiac work-up including echo and stress test afterwards and those were unremarkable. She did have a CTA of the chest done looking for pulmonary embolism on May 4, 2019 which was negative for pulmonary embolism no other acute pulmonary illness. She still of course was noted to have several sclerotic lesions within the spine which were felt to be stable as compared to the prior PET scan which was done in May 2018. [facility] levels done on July 16, 1487 was 66.9, follicular phase. CA-27-29 level same day was 26, stable. [address] September was 17, 2019 we talked about still being pre-or perimenopausal Darst will continue to stay on Zoladex along with Faslodex. Talked about disease overall having responded. Will possibly extend intervals between Zometa to may be once a year, may be due in July 2020. Did start her on Effexor for hot flashes    March 12, 2020 still being treated with same agents, still working on a long hours still in general feeling well only issue being occasional discomfort in the right rib cage as well as right breast area. Could not feel any distinct masses. Denied any history of injury. Otherwise negative for any new systemic complaints and overall both physically and emotionally was doing quite well    Telemedicine visit on April 23, 2020. We discussed results those were obtained on her previous visit on March 12, 2020. Chemistries CBC were fairly unremarkable CA-27-29 levels were still normal. She had undergone CT scan of the chest on March 24, 2020 was noted to have a stable bony lesions. But she was described to have a 2.8 cm lesion in liver which was worrisome for possibly metastatic disease. Following those studies she was advised to undergo PET scan and that was refused by insurance. She did undergo a bone scan on April 28, 2020 was noted to have stable appearance of skeletal metastatic disease no new metastatic lesions were noted.  She did undergo a CT scan of the abdomen and pelvis with contrast on the same day again was noted to have a 2.3 x 2.7 cm left hepatic lesion which was worrisome for metastatic disease and again of course skeletal metastases were noted. Telemedicine visit again on May 5, 2020. April 27, 2020 she did undergo liver biopsy. Pathology did reveal metastatic adenocarcinoma from a breast primary. Tumor was still positive for ER 50% W[place] 40%. HER-2 was equivocal by IHC though negative by FISH. May 6, 2020 had long discussion with her and her family. We talked about progressive nature of the disease. We talked about future plan including liver biopsy material being sent to [facility] 1 analysis. We talked about the need for us to change her treatment to a combination of Faslodex, Zoladex and add Verzenio. We also discussed possibly doing a PET scan. We do know she has disease involving the breast, bones and now liver. Our intention is that if indeed she has an isolated lesion in the liver we might consider doing local treatment to the liver besides this new systemic therapy. PET scan done on May 21, 1620 revealed metabolically active metastases in the left hepatic lobe no other metabolic activity was noted in the known skeletal metastases mammogram revealed stable appearance no new sites of malignancy were noted. Treatment was changed to Faslodex Zoladex and Verzenio in May 2020    Foundation 1 analysis from the liver lesion did reveal a tumor to be positive for PI K3 CA. Otherwise microsatellite status was stable and no other alterations were noted especially in BRCA1 and BRCA2 as well as HER-2      As PET scan had revealed an isolated lesion involving the liver she did undergo radio frequency ablation of the lesion on June 24, 2020, tolerated procedure fairly well though had some discomfort afterwards.  CT scan of the abdomen done without contrast on June 25, 2020 did reveal sequela of treated metastatic lesion in the left lobe of the liver with central high attenuation area measuring 2.6 x 2.4 cm and the peripheral low-attenuation rim measuring around 4.8 x 4.1 cm related to the postprocedural edema. Again osseous metastatic disease was again noted without pathologic fracture. July 17, 2020 she was overall doing fairly decent had recovered from the discomfort related to the radiofrequency ablation. We talked about continuing on this present regimen does do plan to check her [facility] levels to see if we can hold off giving her Zoladex. We of course will continue with Faslodex, Verzenio and Zometa. We of course will continue to monitor her progress through exams as well as CAT scans. [facility] level done on July 30, 2020 was 9.4, premenopausal.     Visit here on September 15, 2020. Was still being treated with Faslodex Zoladex and Verzenio. Studies done on October 9, 2020 fairly normal CBC, chemistries reveal creatinine of 1.2 CA 27-29 levels were mildly elevated to 47. A CT scan of the abdomen done on October 15, 2020 revealed 3.5 x 3.1 cm ablation zone, which had decreased in size since immediate post ablation changes done on June 25, 2020. It is difficult to assess residual tremor dedicated multiphasic CT or MRI was recommended. Multifocal osseous sclerotic metastases were felt to be stable. October 27, 2020 overall she was feeling about the same there was some concern about slight increase in tumor marker CA 27-29. Mahad Jama Plan was to to multiphasic CT scan of the abdomen to assess the disease status. Also she was seriously considering having her unilateral oophorectomy I think that will be reasonable.   I     CT scan of the abdomen done on November 3, 2020 overall it was felt that there was stable present appearance of liver no suspicious contrast enhancement to indicate presence of residual tumor no new hepatic abnormality was noted again stable appearance of osteoblastic metastatic disease of the lower lumbar and thoracic spine.        On December 17, 2020 she did undergo left oophorectomy. She also the same day underwent removal of Meckel's diverticulum. Tolerated the procedure well. Pathology from the ovary did not show any significant pathologic changes. CA 27-29 levels done on November 25, 2020 was 47.2 not significantly different from before. Visit here on January 5, 2020 she was still being treated with Faslodex and Verzenio. Of course Zoladex was stopped after her undergoing oophorectomy. CA 27-29 levels done on February 5, 2021 was slightly higher than before, 54.5. With her continued elevation of CA 27-29, though minimally, wanted to do a PET scan, was not approved by [employer]. Underwent a CT scan of the chest abdomen and pelvis on March 24, 2021. There was no new metastatic disease noted in the chest abdomen or pelvis the treated left hepatic mass was stable in appearance. Also stable appearance of treated skeletal metastatic disease. Small bilateral pulmonary nodules too were a stable. Visit here on April 1, 2021 all in all she was feeling quite well still tolerating therapy with Faslodex and Verzenio extremely well. Counts have been fairly reasonable. Seen  here on 6/3/2021 she reported numbness and scar tissue formation in the gluteal area secondary to the Faslodex injection. Reported that she continues to have hot flashes. Reported chronic headaches secondary to migraine. Also reported blurred vision which is not extensive and has LasiK 20 years ago, has an follow-up appointment with ophthalmology. No chest pain or shortness of breath. No new breast masses. Reported that her legs hurt when she sits for long period Of time and tries  to get up.    6/26/21: Venous u/l LE; no DVT. She had swelling in the LLE after the injection here. Received one dose of lovenox and also muscle relaxant and sx resolved. 7/8/21 mammogram no evidence of malignancy.  Dexa scan normal 9/2021 Ca 27-29 was 70    10/25/21: Ct chest, abdomen and pelvis:  Chest: Stable chest CT with treated osseous metastatic disease, with several   sclerotic bone lesions. There also a few tiny pulmonary nodules 4 mm and   less. No new findings of metastatic disease. 12 mm masslike focus of the right breast which appears slightly increased in   conspicuity from March 2021. This may relate to differences in technique but   diagnostic mammogram is recommended along with correlation with the   mammography results from July 2021       Abdomen/pelvis: Stable CT of the abdomen and pelvis. Unchanged low-density 3   cm lesion of the liver and unchanged treated osseous metastatic disease with   several sclerotic foci. No new findings. 12/2/21: CA 27-29 was 77.6    2/3/22: CA 27-29 63    4/19/22: Ct chest, abdomen and pelvis:  1. Stable appearance of chest, abdomen, and pelvis with no new metastatic   disease noted. 2.  Stable appearance of 2.6 cm left hepatic lobe mass likely related to   treated metastatic lesion. 3.  Stable osseous metastatic disease. 4.  Stable 0.3 cm bilateral pulmonary nodules. May 2022 ca 27-29 53    Past Medical History  None other than breast cancer    Surgical History    1. Abdominal hysterectomy with unilateral oophorectomy for endometriosis in the year 2000. 2. Surgery on right elbow in 2013. Social History  Smoking Status Former smokerUsed to smoke, quit in 2004. Negative for significant ethanol intake. Occasional social usage. .  works at a manufacturing plant. Two sons ages 32 and 25. 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     Family History  [place]    Interval history:8/31/22: Alone to the clinic today. Reported increased joint pain in the knees and  hips. Diarrhea 2-3 days a week, imodium helps. Associated with abdominal cramps. No new breast mass, axillary lad. No chest pain, increased sob. Reported dizziness, gait changes. 05/11/2022    POCGLU 77 05/11/2022     Lab Results   Component Value Date     10/09/2017     No components found for: LD  Lab Results   Component Value Date    TSHHS 2.100 08/27/2014    T4FREE 1.22 08/08/2013    FT3 3.3 08/08/2013       Immunology:  Lab Results   Component Value Date    PROT 7.5 08/24/2022     Lab Results   Component Value Date    KAPPAUVOL 1.47 10/09/2017    LAMBDAUVOL 1.16 10/09/2017    KLFLCR 1.27 10/09/2017     No results found for: B2M    Coagulation Panel:  Lab Results   Component Value Date    PROTIME 12.0 06/26/2021    INR 0.90 06/26/2021    APTT 25.1 06/26/2021    DDIMER 0.58 (H) 06/26/2021       Anemia Panel:  Lab Results   Component Value Date    YASPCIVG09 879 08/08/2013       Tumor Markers:  Lab Results   Component Value Date    LABCA2 69.2 (H) 08/24/2022         Imaging: Reviewed     Pathology:Reviewed     Observations:  Performance Status: ECOG   Depression Status: PHQ-9 Total Score: 0 (8/31/2022 12:59 PM)          Assessment & Plan:  Extremely pleasant lady patient of Dr. Tomeka Guevara, overall blessed with good health. Saw him in September for vague discomfort involving the sternum which led to chest x-ray and x-rays of the sternum revealing questionable age-indeterminate fracture of the mid to superior sternum. CT scan revealed mottled appearance of sternum. Etiology could be related to earlier trauma and/or even metastatic disease. Bone scan showed increased activity in the sternum area with the same differential. Mammogram in October 2016 was unremarkable. October 10, 2017, I did spend time with her and her sister. We did talk about future course of action including possibly just repeating the studies in a couple of months. Would consider doing biopsy of the sternal area. She I think would feel comfortable if we could have better answer for her and thus for that reason I did advise her to be seen in interventional radiology for possible biopsy.    October 13, 2017, CT guided biopsy from the sternum revealed changes consistent with metastatic carcinoma from breast biopsy. The tumor is strongly positive for ER and ID and negative for HER2. October 25, 2017, mammogram did reveal a lesion in the right breast of 1.8 cm in the upper outer quadrant. Biopsy for which did reveal invasive mammary carcinoma. October 27, 2017, I did discuss the findings with her and her extended family. We talked about basically the finding of carcinoma involving the right breast as well as bones. Biopsy from the sternum was positive. A bone scan also showed questionable changes involving the T12, though CT scan of the chest otherwise was negative for any pulmonary or hepatic metastases. Biopsy from the right breast October 27, 2017, also revealed invasive mammary carcinoma, strongly positive ER, ID, and HER-2. PET scan done on November 2, 2017, revealed hypermetabolic right breast as well as right axillary nodes and osseous metastases involving sternum, right scapula, and L4 vertebral body. She was considered a premenopausal lady with metastatic disease involving the bones. Treatment choices were discussed with her and on November 10, 2017, was started on treatment with Zoladex, Faslodex, and Zometa. Visit here on November 20, 2017, she was tolerating these treatments well following the Zometa. December 7, 2017, bony pains were better, but she was concerned about possibly a lump involving the 6 oclock position right breast. Mammogram and ultrasound did not reveal any mass in that area. She was still noted to have lesion in the upper outer quadrant which had been biopsied before. The PET scan done on May 14, 2018, had revealed improvement in disease involving the breast, axilla, as well as bones. Previous PET scan had shown changes involving the sternum, scapula, and L4 areas; those areas did show improvement on the PET scan.       Visit here on February 26, 2019 overall I think she was doing fair but we were concerned about her starting to have more discomfort in her left elbow as well as the right hip area. She had not had a PET scan since May of last year and that too had revealed a partial response, we felt possibly repeating the PET scan would be helpful. In the meantime she was going to continue to receive the same treatments as before including Faslodex, Zoladex and Zometa  She was not able to undergo a PET scan but insurance. Did undergo a bone scan on April 4, 2019 revealing a stable bone scan without evidence of new osseous metastases. May , 2019 was evaluated for symptoms of chest pain including a cardiac work-up including echo and stress test afterwards and those were unremarkable. She did have a CTA of the chest done looking for pulmonary embolism on May 4, 2019 which was negative for pulmonary embolism no other acute pulmonary illness. She still of course was noted to have several sclerotic lesions within the spine which were felt to be stable as compared to the prior PET scan which was done in May 2018. [facility] September was 17, 2019 we talked about still being pre-or perimenopausal Darst will continue to stay on Zoladex along with Faslodex. Talked about disease overall having responded. Will possibly extend intervals between Zometa to may be once a year, may be due in July 2020. Did start her on Effexor for hot flashes  March 12, 2020 overall was doing fairly well Effexor was helpful with hot flashes. Denied significant new issues except for discomfort in the breast as well as rib cage area. Is due to have her bone scan mammogram and CT scan of the chest done to look for the discomfort in the chest area. Otherwise will continue on the same regimen. She will see me after the studies are completed. Telemedicine visit on April 23, 2020, as described above new issues being no obvious progression noted on a bone scan and blood work including tumor markers.  CT scan of the chest as well as CT scan of the abdomen showing 2.7 cm lesion in the left hepatic lobe quite worrisome for possible metastatic disease. Clinically she was still doing well. I did encourage her to undergo a biopsy of the liver lesion. And following the biopsy could decide about if we need to send material for foundation 1 study also    Telemedicine visit again on May 5, 2020. April 27, 2020 she did undergo liver biopsy. Pathology did reveal metastatic adenocarcinoma from a breast primary. Tumor was still positive for ER 50% ND 40%. HER-2 was equivocal by IHC though negative by FISH. May 6, 2020 had long discussion with her and her family. We talked about progressive nature of the disease. We talked about future plan including liver biopsy material being sent to foundation 1 analysis. We talked about the need for us to change her treatment to a combination of Faslodex, Zoladex and add Verzenio. We also discussed possibly doing a PET scan. We do know she has disease involving the breast, bones and now liver. Our intention is that if indeed she has an isolated lesion in the liver we might consider doing local treatment to the liver besides this new systemic therapy. June 1, 2020 I did spend time with her and her sister. We talked about PET scan revealing stable bony metastases and an isolated lesion involving the liver, biopsy-proven malignancy. Mammogram was unchanged from before. Couple weeks prior to this visit had started taking Verzenio along with Faslodex and Zoladex. We did talk about staying on the present systemic therapy and of course will continue to monitor her progress. We also discussed with her about possibly doing radiofrequency ablation treatment to the single lesion involving the liver. As described above on June 24, 2020 she did undergo radiofrequency ablation of the isolated lesion in the liver.     July 17, 2020 she was overall doing fairly decent had recovered from the discomfort related to the radiofrequency ablation. We talked about continuing on this present regimen does do plan to check her [address] [place] [address] levels to see if we can hold off giving her Zoladex. We of course will continue with Faslodex, Verzenio and Zometa. We of course will continue to monitor her progress through exams as well as CAT scans. September 15, 2020 was still being treated with Faslodex Zoladex and Verzenio. FSH levels were in premenopausal levels. We also planning to give zometa from time to time. She in general was feeling fairly well major issue was discomfort primarily from Faslodex injections. We talked about checking her blood work with the next injection on October 9 including chemistries and CA-27-29. We also talked about checking her a CT scan of the abdomen to see the status of the lesion following the radiofrequency ablation. Otherwise as before we will continue to monitor her blood progress exams CAT scans and possibly PET scans. January 5, 2021 spend time with her on the few issues as described below. #1 regarding metastatic carcinoma of the breast, premenopausal, s/p  oophorectomy, currently being treated with Faslodex and Verzenio. She was has had locally treatment for isolated liver metastases done on June 24, 2020. CBC was drawn today counts were reasonable that she could continue on the same dosages of Verzenio. Advised her to stay on the present therapy and will recheck her blood tumor markers in February. February 16, 2021 telephone conversation with her. Clinically she was still doing quite well still on same medications as described above including Faslodex and Verzenio that she was tolerating well. Concern being is further rise of CA 27-29 levels.   We talked about rechecking the level again in 3 weeks along with possibly PET scan if we could and if we could confirm progression of the disease then we might consider changing her treatment to [facility] along with Faslodex as the foundation 1 studies done before had revealed the presence of PIK 3 CA mutation. Other wise there was stable microsatellite status tumor mutational burden was not elevated. She could not undergo a PET scan instead did a CT scan of the chest abdomen and pelvis on March 24, 2021 and those studies were negative for any obvious signs of progression of the disease. April 1, 2021 had long discussion with her. Clinically she was feeling well exam was unremarkable at least CAT scan studies are fairly unremarkable. Only concern being mild elevation of CA 27-29 levels. As all in all she was doing quite well we felt comfortable keeping her on treatment with Faslodex and Verzenio continue to monitor her CBC and chemistries possibly recheck her tumor markers in 8 weeks and still the plan remains the same if there is documented progression we might consider changing treatment to [address] She is going to be seen here in 8 weeks possibly by one of my partners. Zandra 3, 2021, clinically she seems to be stable(stable liver, bone and pulmonary mets). Currently on Verzenio and Faslodex. We will repeat CA 27-29 and if it remains stable then will continue the same treatment. If CA 27-29 markedly elevated then prior test, will consider switching to [address]. Recommend DEXA scan and mammogram.  Also continue bisphosphonates. At some point she would like to transition to letrozole from Faslodex secondary to the side effects. Is on venlafaxine for hot flashes, deferred any other intervention at this point. Seen here on August third 2021 with no new symptoms except for the swelling at the injection site associated with pain. Discussed that CA 27-29 is relatively stable and mammogram with no new findings, would like to continue the current regimen but she wants to switch to oral AI. Discussed the adverse effects of AI  Return to clinic in 3 months or earlier if new symptoms. Note DEXA scan is normal.  Continue Zometa yearly. Recommend increase Effexor to 75 mg for vasogenic symptoms. Repeat CA 27-29 in 6 to 8 weeks and also will monitor for any adverse effects. Visit here on 10/4/2021 when she reported headache, and has been dropping things. Also reported tenderness in the right breast area. Clinical exam with no palpable masses. But note CA 27-29 elevated to 70.9 in September 2021 compared to 53.8 in June 2021. Is currently on letrozole(which has been changed from Faslodex in August 2021) and Verzenio. Note PIK3A positive. Recommend further evaluation with CT scan of the chest abdomen pelvis and also MRI of the brain. Neutropenia most probably secondary to Verzenio. Avoid sick contacts. Recommend not to work in the [place]. Dose adjustments as needed. Further recommendations pending above. Note mammogram and DEXA scan in July 2021 were normal  She has received her Covid vaccine. Visit here on 10/27/2021 we discussed about the results of the MRI of the brain which did not reveal any metastatic disease. Also discussed the results of the CT scan of the chest abdomen pelvis with stable osseous, pulmonary, liver lesions with slight increase in the right breast lesion. Note CA 27-29 was 70.9 in September 2021. Will discuss with radiation oncology for possible SBRT of the breast mass. Will repeat CA 27-29 in a month in November and if rising then will consider changing to [address] [place] [address] if progressive disease. Other medical care. To clinic December 2021 or earlier if new symptoms. Dictated today on 12 9 2021 I discussed the results of the CA 27-29 that it continues to rise slowly. Clinically is the same. Very reluctant to go back on the Faslodex. Reported that I would recommend repeat CA 27-29 in a month and if rising then will recommend PET scan for further evaluation and treatment planning. The options would be at that time to go back on Faslodex or start her on PIK3A inhibitor if evidence of progressive disease. Discussed with the radiation oncology regarding SBRT to the breast but they deferred at this point. Leukopenia with ANC of about thousand most probably secondary to abemaciclib. Continue to monitor closely and dose adjustments as needed. Return to clinic in January 2022    Visit here on 2/9/2022, clinically stable Seems to be tolerating fairly well. Note CA 27-29 in February 2022 mildly decreased. Continue current regimen. Monitor for adverse effects. Plan to repeat imaging and tumor marker in April 2022. She was seen on April 27 2022, overall doing good. Ca 27-29 was 50 and Ct chest, abdomen and pelvis in April 2022 with stable findings. Continue Letrozole and Abemaciclib. Tolerating fairly well. Recommend Effexor for vasomotor Sx relief. Refills for letrozole provided. Dexa scan July 2021 normal. Plan to repeat imaging in 6M. Ca  27-29 every 2 Months. Continue Zometa every 6M. Leukopenia/neutropenia most probably sec to Abemaciclib. Will monitor for now. Mild renal insufficiency: Adequate fluids and avoid nephrotoxic medications. DR Zi Hoover Notes:   ----------------------------    [facility] breast cancer: Mets to liver and bones  Course as described above. Is currently on verzinio and letrozole  CT chest, abdomen and pelvis in April 2022 with stable disease. Ca 27-29 august 2022 69.2  No new significant sx. Continue current regimen and plan to repeat CT imaging in October 2022   Recommend adequate  antidiarrhea agents and recommend adequate fluid intake. Avoid nephrotoxic medication. HA/Unsteady gait, vision changes, dizziness: R/O brain mets. Dexa scan in July 2021 normal. Is on calcium and vitamin D and on Zometa for bony mets. Mild renal insufficiency:L Recommend adequate fluid intake and avoid nephrotoxic medications. Neutropenia most probably sec to verzinio. [address] [address].  Continue current dose and monitor for now    Continue other medical care    Recommend healthy diet, exercise and weight loss if possible. Recommend follow up with PCP and other specialists.      RTC in 2M or earlier if new Sx.      [address]

## 2022-08-31 NOTE — PROGRESS NOTES
MA Rooming Questions  Patient: Molly Philip  MRN: [id number]    Date: 8/31/2022        1. Do you have any new issues?   no         2. Do you need any refills on medications?    no    3. Have you had any imaging done since your last visit?   no    4. Have you been hospitalized or seen in the emergency room since your last visit here?   no    5. Did the patient have a depression screening completed today?  Yes    PHQ-9 Total Score: 0 (8/31/2022 12:59 PM)       PHQ-9 Given to (if applicable):               PHQ-9 Score (if applicable):                     [] Positive     [x]  Negative              Does question #9 need addressed (if applicable)                     [] Yes    []  No               Laverne Garcia CMA

## 2022-09-06 ENCOUNTER — TELEPHONE (OUTPATIENT)
Dept: INFUSION THERAPY | Age: 52
End: 2022-09-06

## 2022-09-06 NOTE — TELEPHONE ENCOUNTER
Returned call to pt & moved her tx date from 11/9 to 11/18 @ 1:45 per her request; SURYAM for pt with this info; asked her to call my direct line with any additional concerns.

## 2022-09-12 LAB
CHOLESTEROL: 284 MG/DL
GLUCOSE BLD-MCNC: 85 MG/DL (ref 70–99)
HDLC SERPL-MCNC: 94 MG/DL
LDL CHOLESTEROL CALCULATED: 163 MG/DL
PATIENT FASTING?: YES
TRIGL SERPL-MCNC: 134 MG/DL

## 2022-09-17 ENCOUNTER — HOSPITAL ENCOUNTER (OUTPATIENT)
Dept: MRI IMAGING | Age: 52
Discharge: HOME | End: 2022-09-17
Payer: COMMERCIAL

## 2022-09-17 DIAGNOSIS — C50.411 MALIGNANT NEOPLASM OF UPPER-OUTER QUADRANT OF RIGHT FEMALE BREAST, UNSPECIFIED ESTROGEN RECEPTOR STATUS (HCC): ICD-10-CM

## 2022-09-17 PROCEDURE — 6360000004 HC RX CONTRAST MEDICATION: Performed by: INTERNAL MEDICINE

## 2022-09-17 PROCEDURE — A9577 INJ MULTIHANCE: HCPCS | Performed by: INTERNAL MEDICINE

## 2022-09-17 PROCEDURE — 70553 MRI BRAIN STEM W/O & W/DYE: CPT

## 2022-09-17 RX ADMIN — GADOBENATE DIMEGLUMINE 10 ML: 529 INJECTION, SOLUTION INTRAVENOUS at 11:19

## 2022-09-26 RX ORDER — PANTOPRAZOLE SODIUM 40 MG/1
40 TABLET, DELAYED RELEASE ORAL DAILY
Qty: 30 TABLET | Refills: 5 | Status: SHIPPED | OUTPATIENT
Start: 2022-09-26 | End: 2022-10-26

## 2022-10-24 ENCOUNTER — HOSPITAL ENCOUNTER (OUTPATIENT)
Dept: INFUSION THERAPY | Age: 52
Discharge: HOME | End: 2022-10-24
Payer: COMMERCIAL

## 2022-10-24 DIAGNOSIS — C79.51 SECONDARY MALIGNANT NEOPLASM OF BONE (HCC): ICD-10-CM

## 2022-10-24 DIAGNOSIS — D70.9 NEUTROPENIA, UNSPECIFIED TYPE (HCC): ICD-10-CM

## 2022-10-24 DIAGNOSIS — C50.411 MALIGNANT NEOPLASM OF UPPER-OUTER QUADRANT OF RIGHT FEMALE BREAST, UNSPECIFIED ESTROGEN RECEPTOR STATUS (HCC): ICD-10-CM

## 2022-10-24 DIAGNOSIS — C78.7 SECONDARY MALIGNANT NEOPLASM OF LIVER AND INTRAHEPATIC BILE DUCT (HCC): ICD-10-CM

## 2022-10-24 LAB
ALBUMIN SERPL-MCNC: 4.4 GM/DL (ref 3.4–5)
ALP BLD-CCNC: 62 IU/L (ref 40–128)
ALT SERPL-CCNC: 23 U/L (ref 10–40)
ANION GAP SERPL CALCULATED.3IONS-SCNC: 9 MMOL/L (ref 4–16)
AST SERPL-CCNC: 24 IU/L (ref 15–37)
BASOPHILS ABSOLUTE: 0.1 K/CU MM
BASOPHILS RELATIVE PERCENT: 1.4 % (ref 0–1)
BILIRUB SERPL-MCNC: 0.3 MG/DL (ref 0–1)
BUN BLDV-MCNC: 16 MG/DL (ref 6–23)
CALCIUM SERPL-MCNC: 9.2 MG/DL (ref 8.3–10.6)
CHLORIDE BLD-SCNC: 104 MMOL/L (ref 99–110)
CO2: 24 MMOL/L (ref 21–32)
CREAT SERPL-MCNC: 1.2 MG/DL (ref 0.6–1.1)
DIFFERENTIAL TYPE: ABNORMAL
EOSINOPHILS ABSOLUTE: 0 K/CU MM
EOSINOPHILS RELATIVE PERCENT: 0.7 % (ref 0–3)
GFR SERPL CREATININE-BSD FRML MDRD: 54 ML/MIN/1.73M2
GLUCOSE BLD-MCNC: 90 MG/DL (ref 70–99)
HCT VFR BLD CALC: 38.2 % (ref 37–47)
HEMOGLOBIN: 13.1 GM/DL (ref 12.5–16)
LYMPHOCYTES ABSOLUTE: 2.5 K/CU MM
LYMPHOCYTES RELATIVE PERCENT: 59.9 % (ref 24–44)
MCH RBC QN AUTO: 35 PG (ref 27–31)
MCHC RBC AUTO-ENTMCNC: 34.3 % (ref 32–36)
MCV RBC AUTO: 102.1 FL (ref 78–100)
MONOCYTES ABSOLUTE: 0.3 K/CU MM
MONOCYTES RELATIVE PERCENT: 7.6 % (ref 0–4)
PDW BLD-RTO: 12.2 % (ref 11.7–14.9)
PLATELET # BLD: 176 K/CU MM (ref 140–440)
PMV BLD AUTO: 9.1 FL (ref 7.5–11.1)
POTASSIUM SERPL-SCNC: 4.2 MMOL/L (ref 3.5–5.1)
RBC # BLD: 3.74 M/CU MM (ref 4.2–5.4)
SEGMENTED NEUTROPHILS ABSOLUTE COUNT: 1.3 K/CU MM
SEGMENTED NEUTROPHILS RELATIVE PERCENT: 30.4 % (ref 36–66)
SODIUM BLD-SCNC: 137 MMOL/L (ref 135–145)
TOTAL PROTEIN: 6.8 GM/DL (ref 6.4–8.2)
WBC # BLD: 4.2 K/CU MM (ref 4–10.5)

## 2022-10-24 PROCEDURE — 80053 COMPREHEN METABOLIC PANEL: CPT

## 2022-10-24 PROCEDURE — 85025 COMPLETE CBC W/AUTO DIFF WBC: CPT

## 2022-10-24 PROCEDURE — 36415 COLL VENOUS BLD VENIPUNCTURE: CPT

## 2022-10-24 PROCEDURE — 86300 IMMUNOASSAY TUMOR CA 15-3: CPT

## 2022-10-26 LAB — CA 27.29: 69.5 U/ML

## 2022-10-27 ENCOUNTER — HOSPITAL ENCOUNTER (OUTPATIENT)
Dept: CT IMAGING | Age: 52
Discharge: HOME | End: 2022-10-27
Payer: COMMERCIAL

## 2022-10-27 ENCOUNTER — APPOINTMENT (OUTPATIENT)
Dept: CT IMAGING | Age: 52
End: 2022-10-27
Payer: COMMERCIAL

## 2022-10-27 DIAGNOSIS — C50.411 MALIGNANT NEOPLASM OF UPPER-OUTER QUADRANT OF RIGHT FEMALE BREAST, UNSPECIFIED ESTROGEN RECEPTOR STATUS (HCC): ICD-10-CM

## 2022-10-27 DIAGNOSIS — C78.7 SECONDARY MALIGNANT NEOPLASM OF LIVER AND INTRAHEPATIC BILE DUCT (HCC): ICD-10-CM

## 2022-10-27 PROCEDURE — 74177 CT ABD & PELVIS W/CONTRAST: CPT

## 2022-10-27 PROCEDURE — 6360000004 HC RX CONTRAST MEDICATION: Performed by: INTERNAL MEDICINE

## 2022-10-27 PROCEDURE — 71260 CT THORAX DX C+: CPT

## 2022-10-27 PROCEDURE — A4641 RADIOPHARM DX AGENT NOC: HCPCS | Performed by: INTERNAL MEDICINE

## 2022-10-27 RX ORDER — SODIUM CHLORIDE 0.9 % (FLUSH) 0.9 %
5-40 SYRINGE (ML) INJECTION PRN
Status: DISCONTINUED | OUTPATIENT
Start: 2022-10-27 | End: 2022-10-28 | Stop reason: HOSPADM

## 2022-10-27 RX ADMIN — BARIUM SULFATE 900 ML: 20 SUSPENSION ORAL at 08:34

## 2022-10-27 RX ADMIN — IOPAMIDOL 75 ML: 755 INJECTION, SOLUTION INTRAVENOUS at 09:45

## 2022-11-09 DIAGNOSIS — C50.411 MALIGNANT NEOPLASM OF UPPER-OUTER QUADRANT OF RIGHT FEMALE BREAST, UNSPECIFIED ESTROGEN RECEPTOR STATUS (HCC): Primary | ICD-10-CM

## 2022-11-15 ENCOUNTER — OFFICE VISIT (OUTPATIENT)
Dept: INTERNAL MEDICINE CLINIC | Age: 52
End: 2022-11-15
Payer: COMMERCIAL

## 2022-11-15 VITALS
SYSTOLIC BLOOD PRESSURE: 122 MMHG | DIASTOLIC BLOOD PRESSURE: 84 MMHG | HEART RATE: 67 BPM | OXYGEN SATURATION: 97 % | HEIGHT: 65 IN | BODY MASS INDEX: 36.85 KG/M2 | WEIGHT: 221.2 LBS

## 2022-11-15 DIAGNOSIS — M25.552 LEFT HIP PAIN: ICD-10-CM

## 2022-11-15 DIAGNOSIS — J45.20 MILD INTERMITTENT ASTHMA WITHOUT COMPLICATION: ICD-10-CM

## 2022-11-15 DIAGNOSIS — G89.29 CHRONIC BILATERAL LOW BACK PAIN, UNSPECIFIED WHETHER SCIATICA PRESENT: ICD-10-CM

## 2022-11-15 DIAGNOSIS — Z00.00 ANNUAL PHYSICAL EXAM: Primary | ICD-10-CM

## 2022-11-15 DIAGNOSIS — N18.31 STAGE 3A CHRONIC KIDNEY DISEASE (HCC): ICD-10-CM

## 2022-11-15 DIAGNOSIS — E78.2 MIXED HYPERLIPIDEMIA: ICD-10-CM

## 2022-11-15 DIAGNOSIS — C50.411 MALIGNANT NEOPLASM OF UPPER-OUTER QUADRANT OF RIGHT FEMALE BREAST, UNSPECIFIED ESTROGEN RECEPTOR STATUS (HCC): ICD-10-CM

## 2022-11-15 DIAGNOSIS — E66.09 CLASS 2 OBESITY DUE TO EXCESS CALORIES WITHOUT SERIOUS COMORBIDITY WITH BODY MASS INDEX (BMI) OF 36.0 TO 36.9 IN ADULT: ICD-10-CM

## 2022-11-15 DIAGNOSIS — F51.01 PRIMARY INSOMNIA: ICD-10-CM

## 2022-11-15 DIAGNOSIS — M54.50 CHRONIC BILATERAL LOW BACK PAIN, UNSPECIFIED WHETHER SCIATICA PRESENT: ICD-10-CM

## 2022-11-15 PROCEDURE — 99396 PREV VISIT EST AGE 40-64: CPT | Performed by: FAMILY MEDICINE

## 2022-11-15 PROCEDURE — 99213 OFFICE O/P EST LOW 20 MIN: CPT | Performed by: FAMILY MEDICINE

## 2022-11-15 RX ORDER — PHENTERMINE HYDROCHLORIDE 37.5 MG/1
37.5 TABLET ORAL
Qty: 30 TABLET | Refills: 0 | Status: SHIPPED | OUTPATIENT
Start: 2022-11-15 | End: 2022-12-15

## 2022-11-15 RX ORDER — HYDROCODONE BITARTRATE AND ACETAMINOPHEN 5; 325 MG/1; MG/1
1 TABLET ORAL EVERY 6 HOURS PRN
Qty: 12 TABLET | Refills: 0 | Status: SHIPPED | OUTPATIENT
Start: 2022-11-15 | End: 2022-11-18

## 2022-11-15 RX ORDER — TRAZODONE HYDROCHLORIDE 50 MG/1
50 TABLET ORAL NIGHTLY PRN
Qty: 30 TABLET | Refills: 2 | Status: SHIPPED | OUTPATIENT
Start: 2022-11-15

## 2022-11-15 RX ORDER — ALBUTEROL SULFATE 90 UG/1
2 AEROSOL, METERED RESPIRATORY (INHALATION) EVERY 6 HOURS PRN
Qty: 1 EACH | Refills: 3 | Status: SHIPPED | OUTPATIENT
Start: 2022-11-15

## 2022-11-15 SDOH — ECONOMIC STABILITY: FOOD INSECURITY: WITHIN THE PAST 12 MONTHS, THE FOOD YOU BOUGHT JUST DIDN'T LAST AND YOU DIDN'T HAVE MONEY TO GET MORE.: NEVER TRUE

## 2022-11-15 SDOH — ECONOMIC STABILITY: FOOD INSECURITY: WITHIN THE PAST 12 MONTHS, YOU WORRIED THAT YOUR FOOD WOULD RUN OUT BEFORE YOU GOT MONEY TO BUY MORE.: NEVER TRUE

## 2022-11-15 ASSESSMENT — SOCIAL DETERMINANTS OF HEALTH (SDOH): HOW HARD IS IT FOR YOU TO PAY FOR THE VERY BASICS LIKE FOOD, HOUSING, MEDICAL CARE, AND HEATING?: NOT HARD AT ALL

## 2022-11-15 ASSESSMENT — ENCOUNTER SYMPTOMS
ABDOMINAL PAIN: 0
DIARRHEA: 0
BLOOD IN STOOL: 0
CONSTIPATION: 0
SHORTNESS OF BREATH: 0
COUGH: 0
NAUSEA: 0

## 2022-11-15 NOTE — LETTER
CONTROLLED SUBSTANCE MEDICATION AGREEMENT     Patient Name: Tisha Austin  Patient YOB: 1970   I understand, that controlled substance medications may be used to help better manage my symptoms and to improve my ability to function at home, work and in social settings. However, I also understand that these medications do have risks, which have been discussed with me, including possible development of physical or psychological dependence. I understand that successful treatment requires mutual trust and honesty between me and my provider. I understand and agree that following this Medication Agreement is necessary in continuing my provider-patient relationship and the success of my treatment plan. Explanation from my Provider: Benefits and Goals of Controlled Substance Medications: There are two potential goals for your treatment: (1) decreased pain and suffering (2) improved daily life functions. There are many possible treatments for your chronic condition(s). Alternatives such as physical therapy, yoga, massage, home daily exercise, meditation, relaxation techniques, injections, chiropractic manipulations, surgery, cognitive therapy, hypnosis and many medications that are not habit-forming may be used. Use of controlled substance medications may be helpful, but they are unlikely to resolve all symptoms or restore all function. Explanation from my Provider: Risks of Controlled Substance Medications:  Opioid pain medications: These medications can lead to problems such as addiction/dependence, sedation, lightheadedness/dizziness, memory issues, falls, constipation, nausea, or vomiting. They may also impair the ability to drive or operate machinery. Additionally, these medications may lower testosterone levels, leading to loss of bone strength, stamina and sex drive.   They may cause problems with breathing, sleep apnea and reduced coughing, which is especially dangerous for patients with lung disease. Overdose or dangerous interactions with alcohol and other medications may occur, leading to death. Hyperalgesia may develop, which means patients receiving opioids for the treatment of pain may become more sensitive to certain painful stimuli, and in some cases, experience pain from ordinarily non-painful stimuli. Women between the ages of 14-53 who could become pregnant should carefully weigh the risks and benefits of opioids with their physicians, as these medications increase the risk of pregnancy complications, including miscarriage,  delivery and stillbirth. It is also possible for babies to be born addicted to opioids. Opioid dependence withdrawal symptoms may include; feelings of uneasiness, increased pain, irritability, belly pain, diarrhea, sweats and goose-flesh. Benzodiazepines and non-benzodiazepine sleep medications: These medications can lead to problems such as addiction/dependence, sedation, fatigue, lightheadedness, dizziness, incoordination, falls, depression, hallucinations, and impaired judgment, memory and concentration. The ability to drive and operate machinery may also be affected. Abnormal sleep-related behaviors have been reported, including sleepwalking, driving, making telephone calls, eating, or having sex while not fully awake. These medications can suppress breathing and worsen sleep apnea, particularly when combined with alcohol or other sedating medications, potentially leading to death. Dependence withdrawal symptoms may include tremors, anxiety, hallucinations and seizures. Stimulants:  Common adverse effects include addiction/dependence, increased blood  pressure and heart rate, decreased appetite, nausea, involuntary weight loss, insomnia,                                                                                                                     Initials:_______   irritability, and headaches.   These risks may increase when these medications are combined with other stimulants, such as caffeine pills or energy drinks, certain weight loss supplements and oral decongestants. Dependence withdrawal symptoms may include depressed mood, loss of interest, suicidal thoughts, anxiety, fatigue, appetite changes and agitation. Testosterone replacement therapy:  Potential side effects include increased risk of stroke and heart attack, blood clots, increased blood pressure, increased cholesterol, enlarged prostate, sleep apnea, irritability/aggression and other mood disorders, and decreased fertility. I agree and understand that I and my prescriber have the following rights and responsibilities regarding my treatment plan:     1. MY RIGHTS:  To be informed of my treatment and medication plan. To be an active participant in my health and wellbeing. 2. MY RESPONSIBILITY AND UNDERSTANDING FOR USE OF MEDICATIONS   I will take medications at the dose and frequency as directed. For my safety, I will not increase or change how I take my medications without the recommendation of my healthcare provider.  I will actively participate in any program recommended by my provider which may improve function, including social, physical, psychological programs.  I will not take my medications with alcohol or other drugs not prescribed to me. I understand that drinking alcohol with my medications increases the chances of side effects, including reduced breathing rate and could lead to personal injury when operating machinery.  I understand that if I have a history of substance use disorders, including alcohol or other illicit drugs, that I may be at increased risk of addiction to my medications.  I agree to notify my provider immediately if I should become pregnant so that my treatment plan can be adjusted.    I agree and understand that I shall only receive controlled substance medications from the prescriber that signed this agreement unless there is written agreement among other prescribers of controlled substances outlining the responsibility of the medications being prescribed.  I understand that the if the controlled medication is not helping to achieve goals, the dosage may be tapered and no longer prescribed. 3. MY RESPONSIBILITY FOR COMMUNICATION / PRESCRIPTION RENEWALS   I agree that all controlled substance medications that I take will be prescribed only by my provider. If another healthcare provider prescribes me medication in an emergency, I will notify my provider within seventy-two (72) hours.  I will arrange for refills at the prescribed interval ONLY during regular office hours. I will not ask for refills earlier than agreed, after-hours, on holidays or weekends. Refills may take up to 72 hours for processing and prescriptions to reach the pharmacy.  I will inform my other health care providers that I am taking these medications and of the existence of this [id number]. In the event of an emergency, I will provide the same information to the emergency department prescribers.  I will keep my provider updated on the pharmacy I am using for controlled medication prescription filling. Initials:_______  4. MY RESPONSIBILITY FOR PROTECTING MEDICATIONS   I will protect my prescriptions and medications. I understand that lost or misplaced prescriptions will not be replaced.  I will keep medications only for my own use and will not share them with others. I will keep all medications away from children.  I agree that if my medications are adjusted or discontinued, I will properly dispose of any remaining medications. I understand that I will be required to dispose of any remaining controlled medications as, directed by my prescriber, prior to being provided with any prescriptions for other controlled medications.   Medication drop box locations can be found at: [employer]    5. MY RESPONSIBILITY WITH ILLEGAL DRUGS    I will not use illegal or street drugs or another person's prescription medications not prescribed to me.  If there are identified addiction type symptoms, then referral to a program may be provided by my provider and I agree to follow through with this recommendation. 6. MY RESPONSIBILITY FOR COOPERATION WITH INVESTIGATIONS   I understand that my provider will comply with any applicable law and may discuss my use and/or possible misuse/abuse of controlled substances and alcohol, as appropriate, with any health care provider involved in my care, pharmacist, or legal authority.  I authorize my provider and pharmacy to cooperate fully with law enforcement agencies (as permitted by law) in the investigation of any possible misuse, sale, or other diversion of my controlled substances.  I agree to waive any applicable privilege or right of privacy or confidentiality with respect to these authorizations. 7. PROVIDERS RIGHT TO MONITOR FOR SAFETY: PRESCRIPTION MONITORING / DRUG TESTING   I consent to drug/toxicology screening and will submit to a drug screen upon my providers request to assure I am only taking the prescribed drugs for my safety monitoring. I understand that a drug screen is a laboratory test in which a sample of my urine, blood or saliva is checked to see what drugs I have been taking. This may entail an observed urine specimen, which means that a nurse or other health care provider may watch me provide urine, and I will cooperate if I am asked to provide an observed specimen.  I understand that my provider will check a copy of my State Prescription Monitoring Program () Report in order to safely prescribe medications.  Pill Counts: I consent to pill counts when requested.   I may be asked to bring all my prescribed controlled substance medications, in their original bottles, to all of my scheduled appointments. In addition, my provider may ask me to come to the practice at any time for a random pill count. 8. TERMINATION OF THIS AGREEMENT  For my safety, my prescriber has the right to stop prescribing controlled substance medications and may end this agreement. Initials:_______   Conditions that may result in termination of this agreement:  a. I do not show any improvement in pain, or my activity has not improved. b. I develop rapid tolerance or loss of improvement, as described in my treatment plan.  c. I develop significant side effects from the medication. d. My behavior is not consistent with the responsibilities outlined above, thereby causing safety concerns to continue prescribing controlled substance medications. e. I fail to follow the terms of this agreement. f. Other:____________________________       UNDERSTANDING THIS MEDICATION AGREEMENT:    I have read the above and have had all my questions answered. For chronic disease management, I know that my symptoms can be managed with many types of treatments. A chronic medication trial may be part of my treatment, but I must be an active participant in my care. Medication therapy is only one part of my symptom management plan. In some cases, there may be limited scientific evidence to support the chronic use of certain medications to improve symptoms and daily function. Furthermore, in certain circumstances, there may be scientific information that suggests that the use of chronic controlled substances may worsen my symptoms and increase my risk of unintentional death directly related to this medication therapy. I know that if my provider feels my risk from controlled medications is greater than my benefit, I will have my controlled substance medication(s) compassionately lowered or removed altogether.      I further agree to allow this office to contact my HIPAA contact if there are concerns about my safety and use of the controlled medications. I have agreed to use the prescribed controlled substance medications to me as instructed by my provider and as stated in this Medication Agreement. My initial on each page and my signature below shows that I have read each page and I have had the opportunity to ask questions with answers provided by my provider.     Patient Name (Printed): _____________________________________  Patient Signature:  ______________________   Date: _____________    Prescriber Name (Printed): ___________________________________  Prescriber Signature: _____________________  Date: _____________

## 2022-11-15 NOTE — PROGRESS NOTES
Well Adult Note  Name: Tico Chowdhury Date: 2022   MRN: [id number] Sex: Female   Age: 46 y.o. Ethnicity: Non- / Non    : 1970 Race: White (non-)      Dorothea Nieves is here for well adult exam.  History:  Patient Active Problem List    Diagnosis Date Noted    Neutropenia ([facility] [address] 10/04/2021    Breast cancer, female 2020    Abnormal radiological finding 2020    Bone metastasis 2020    Metastasis to liver 2020    Liver disease 2020    Liver lesion 2020    Dilated aortic root ([facility] [address] 2019    Dyslipidemia 2019    Asymptomatic varicose veins 2019    Primary cancer of right breast with metastasis to other site [facility]) 2019    FHx: coronary artery disease     Chest pain     Cubital tunnel syndrome on right 2013     C-scope 2021- Recall 10 years, DEBORAH with BSO  Breast cancer- on letrozole and  Verzinio  Obesity and weight gain. She would like to use Adipex. She has been trying to change her diet  She c//o of L hip pain and chronic low back pain. She has a history of bone metastasis from her breast cancer  Insomnia- she is having trouble sleeping. This is chronic  Patient have zoledronic acid every six months  GERD- she is on Protonix 40 and Zofran  HLD- patient trying to change diet  CKD stage 3 a  On Effexor for hot flashes  She continues to work in nursing at [employer]   Constitutional:  Positive for unexpected weight change (gain). Negative for diaphoresis and fever. HENT: Negative. Eyes:  Negative for visual disturbance. Respiratory:  Negative for cough and shortness of breath. Cardiovascular:  Negative for chest pain and palpitations. Gastrointestinal:  Negative for abdominal pain, blood in stool, constipation, diarrhea and nausea. Genitourinary:  Negative for difficulty urinating and dysuria.    Musculoskeletal:  Positive for arthralgias (Left hip) and back pain (chronic). Neurological:  Negative for dizziness, light-headedness and headaches. Psychiatric/Behavioral:  Positive for sleep disturbance. Negative for dysphoric mood. Allergies   Allergen Reactions    Sulfa Antibiotics Other (See Comments)     Stop breathing. Nickel Rash         Prior to Visit Medications    Medication Sig Taking? Authorizing Provider   albuterol sulfate HFA (PROVENTIL;VENTOLIN;PROAIR) 108 (90 Base) MCG/ACT inhaler Inhale 2 puffs into the lungs every 6 hours as needed for Wheezing Yes Halima Larose DO   phentermine (ADIPEX-P) 37.5 MG tablet Take 1 tablet by mouth every morning (before breakfast) for 30 days.  Yes Halima Larose DO   traZODone (DESYREL) 50 MG tablet Take 1 tablet by mouth nightly as needed for Sleep Yes Kathie Rai DO   VERZENIO 150 MG Jorge Luis Donaldson MD   venlafaxine (EFFEXOR XR) 37.5 MG extended release capsule Take 1 capsule by mouth daily Yes Chelsi Osorio MD   ondansetron (ZOFRAN) 4 MG tablet Take 1 tablet by mouth every 8 hours as needed for Nausea or Vomiting Yes Chelsi Osorio MD   ibuprofen (ADVIL;MOTRIN) 800 MG tablet Take 1 tablet by mouth every 8 hours as needed for Pain Yes Rocco Smith MD   zoledronic acid (ZOMETA) 4 MG/100ML SOLN infusion Infuse 4 mg intravenously once Every year Yes Historical Provider, MD   diphenhydrAMINE (BENADRYL) 25 MG capsule 1-2 capsules by mouth every 6 hours as needed for rash Yes SARAH Mcfarland   pantoprazole (PROTONIX) 40 MG tablet Take 1 tablet by mouth daily  Chelsi Osorio MD   letrozole ([address]) 2.5 MG tablet Take 1 tablet by mouth daily  Chelsi Osorio MD         Past Medical History:   Diagnosis Date    Asthma     last flare up winter 2016    Breast CA [facility])     right    Breast cancer ([address] 2017    right    Chest pain     \"had chest pain last time 2015- saw Dr Emma Rogers- all ok\"    FHx: coronary artery disease     H/O Doppler lower venous ultrasound 2019    No DVT,  Significant reflux noted of the Right & Left Deep Venous System. H/O echocardiogram 2019    EF 55-60%, Essentially normal echocardiogram. Dilated aortic root at 4.0 cm. History of exercise stress test 2019    Treadmill, EKG portion is negative for ischemia by diagnostic criteria    History of migraine     last HA  2020    Hx antineoplastic chemo 2017    still taking    HX OTHER MEDICAL     \"since 2016- having some pain mid sternum- xray thought fx, then did CT and bone scan now having bx done\"    Hyperlipemia     Primary cancer of right breast with metastasis to other site ([facility] [address] 3/24/2019    bone and liver       Past Surgical History:   Procedure Laterality Date     SECTION      COLONOSCOPY N/A 2021    COLORECTAL CANCER SCREENING, NOT HIGH RISK performed by Tova Randhawa MD at [facility]  age 28    LASIK both eyes    HYSTERECTOMY (CERVIX STATUS UNKNOWN)  age 27    BSO. Due to endometriosis    LAPAROSCOPY N/A 2020    LAPAROSCOPY MECKLE'S DIVERTICULECTOMY performed by Constantin Araujo MD at [address] [facility]    then ablation    OTHER SURGICAL HISTORY Right 2013    right ulnar nerve decompression    SALPINGO-OOPHORECTOMY Left 2020    EXPLORATORY LAPAROSCOPY, LEFT SALPINGO OOPHORECTOMY LAPAROSCOPIC performed by Constantin Araujo MD at [address] [facility]         Family History   Problem Relation Age of Onset    Heart Attack Mother          from [place] @ age 61.     Coronary Art Dis Mother     Other Mother         Low blood pressure, endometriosis - hyster    High Cholesterol Mother     High Blood Pressure Father     Heart Attack Father     Coronary Art Dis Father     Arthritis Father         hip replacement    Arthritis Sister     Liver Disease Sister         medication & hx of alcohol abuse    Other Sister         endometriosis - hyster    Depression Brother Other Sister         endometriosis - hyster    Other Sister         endometriosis - hyster    Migraines Sister     Other Sister         endometriosis - hyster    Other Sister         endometriosis - hyster, hypothyroidism    High Blood Pressure Sister     Breast Cancer Paternal Cousin        Social History     Tobacco Use    Smoking status: Former     Packs/day: 0.50     Years: 16.00     Pack years: 8.00     Types: Cigarettes     Quit date: 2004     Years since quittin.2    Smokeless tobacco: Never    Tobacco comments:     Quit smoking 9 years ago. Vaping Use    Vaping Use: Never used   Substance Use Topics    Alcohol use: Yes     Alcohol/week: 2.0 standard drinks     Types: 2 Glasses of wine per week     Comment: Occasionally     CAFFEINE: 1 cup coffee daily/ average one per week    Drug use: No       Objective   /84 (Site: Right Lower Arm, Position: Sitting, Cuff Size: Medium Adult)   Pulse 67   Ht 5' 5\" (1.651 m)   Wt 221 lb 3.2 oz (100.3 kg)   SpO2 97%   BMI 36.81 kg/m²   Wt Readings from Last 3 Encounters:   22 219 lb 9.6 oz (99.6 kg)   11/15/22 221 lb 3.2 oz (100.3 kg)   22 214 lb 8 oz (97.3 kg)       Physical Exam  Vitals reviewed. Constitutional:       General: She is not in acute distress. HENT:      Right Ear: Tympanic membrane normal.      Left Ear: Tympanic membrane normal.   Eyes:      General: No scleral icterus. Extraocular Movements: Extraocular movements intact. Cardiovascular:      Rate and Rhythm: Normal rate and regular rhythm. Pulmonary:      Effort: Pulmonary effort is normal. No respiratory distress. Breath sounds: Normal breath sounds. Abdominal:      Palpations: Abdomen is soft. Tenderness: There is no abdominal tenderness. Musculoskeletal:         General: Tenderness (L hip and lower back) present. Cervical back: Neck supple. Right lower leg: No edema. Left lower leg: No edema. Skin:     Findings: No rash. Neurological:      Mental Status: She is alert and oriented to person, place, and time. Sensory: No sensory deficit. Motor: No weakness. Psychiatric:         Mood and Affect: Mood normal.         Assessment   Plan   1. Annual physical exam    2. Class 2 obesity due to excess calories without serious comorbidity with body mass index (BMI) of 36.0 to 36.9 in adult    -Start:  - phentermine (ADIPEX-P) 37.5 MG tablet; Take 1 tablet by mouth every morning (before breakfast) for 30 days. Dispense: 30 tablet; Refill: 0  - TSH with Reflex; Future  Oarrs checked    3. Mild intermittent asthma without complication  - albuterol sulfate HFA (PROVENTIL;VENTOLIN;PROAIR) 108 (90 Base) MCG/ACT inhaler; Inhale 2 puffs into the lungs every 6 hours as needed for Wheezing  Dispense: 1 each; Refill: 3    4. Primary insomnia  -Start:  - traZODone (DESYREL) 50 MG tablet; Take 1 tablet by mouth nightly as needed for Sleep  Dispense: 30 tablet; Refill: 2    5. Left hip pain  - XR HIP 2-3 VW W PELVIS LEFT; Future  -Start:  - HYDROcodone-acetaminophen (NORCO) 5-325 MG per tablet; Take 1 tablet by mouth every 6 hours as needed for Pain for up to 3 days. Intended supply: 3 days. Take lowest dose possible to manage pain  Dispense: 12 tablet; Refill: 0  ADR's explained  Oarrs checked    6. Chronic bilateral low back pain, unspecified whether sciatica present  - XR LUMBAR SPINE (MIN 4 VIEWS); Future  - HYDROcodone-acetaminophen (NORCO) 5-325 MG per tablet; Take 1 tablet by mouth every 6 hours as needed for Pain for up to 3 days. Intended supply: 3 days. Take lowest dose possible to manage pain  Dispense: 12 tablet; Refill: 0    7. Mixed hyperlipidemia  The patient is asked to make an attempt to improve diet and exercise patterns   - TSH with Reflex; Future    8. Stage 3a chronic kidney disease ([address]    9.  Malignant neoplasm of upper-outer quadrant of right female breast, unspecified estrogen receptor status ([address]  Keep f/u with oncology    Recommend vaccinations as discussed  Persist RTO or call    Personalized Preventive Plan   Current Health Maintenance Status  Immunization History   Administered Date(s) Administered    COVID-19, [place] Vaccine, Vaccine Unspecified 01/07/2021, 02/04/2021    Influenza Virus Vaccine 10/10/2018, 10/04/2019, 10/13/2020, 10/07/2021    Tdap (Boostrix, Adacel) 04/19/2010, 09/11/2018        Health Maintenance   Topic Date Due    Pneumococcal 0-64 years Vaccine (1 - PCV) Never done    Shingles vaccine (1 of 2) Never done    COVID-19 Vaccine (3 - Mixed Product risk series) 03/04/2021    Flu vaccine (1) 11/15/2023 (Originally 8/1/2022)    Depression Screen  08/31/2023    DTaP/Tdap/Td vaccine (3 - Td or Tdap) 09/11/2028    Hepatitis A vaccine  Aged Out    Hib vaccine  Aged Out    Meningococcal (ACWY) vaccine  Aged Out     Recommendations for Dsg.nr Due: see orders and patient instructions/AVS.    Return in about 4 weeks (around 12/13/2022) for Obesity. This dictation was generated by voice recognition computer software. Although all attempts are made to edit the dictation for accuracy, there may be errors in the transcription that are not intended.

## 2022-11-16 RX ORDER — SODIUM CHLORIDE 9 MG/ML
INJECTION, SOLUTION INTRAVENOUS CONTINUOUS
Status: CANCELLED | OUTPATIENT
Start: 2022-11-18

## 2022-11-16 RX ORDER — HEPARIN SODIUM (PORCINE) LOCK FLUSH IV SOLN 100 UNIT/ML 100 UNIT/ML
500 SOLUTION INTRAVENOUS PRN
Status: CANCELLED | OUTPATIENT
Start: 2022-11-18

## 2022-11-16 RX ORDER — DIPHENHYDRAMINE HYDROCHLORIDE 50 MG/ML
50 INJECTION INTRAMUSCULAR; INTRAVENOUS ONCE
Status: CANCELLED | OUTPATIENT
Start: 2022-11-18 | End: 2022-11-18

## 2022-11-16 RX ORDER — WATER 1000 ML/1000ML
2.2 INJECTION, SOLUTION INTRAVENOUS ONCE
Status: CANCELLED | OUTPATIENT
Start: 2022-11-18 | End: 2022-11-18

## 2022-11-16 RX ORDER — EPINEPHRINE 1 MG/ML
0.3 INJECTION, SOLUTION, CONCENTRATE INTRAVENOUS PRN
Status: CANCELLED | OUTPATIENT
Start: 2022-11-18

## 2022-11-16 RX ORDER — SODIUM CHLORIDE 9 MG/ML
20 INJECTION, SOLUTION INTRAVENOUS ONCE
Status: CANCELLED | OUTPATIENT
Start: 2022-11-18 | End: 2022-11-18

## 2022-11-16 RX ORDER — SODIUM CHLORIDE 9 MG/ML
25 INJECTION, SOLUTION INTRAVENOUS PRN
Status: CANCELLED | OUTPATIENT
Start: 2022-11-18

## 2022-11-16 RX ORDER — METHYLPREDNISOLONE SODIUM SUCCINATE 125 MG/2ML
125 INJECTION, POWDER, LYOPHILIZED, FOR SOLUTION INTRAMUSCULAR; INTRAVENOUS ONCE
Status: CANCELLED | OUTPATIENT
Start: 2022-11-18 | End: 2022-11-18

## 2022-11-16 RX ORDER — SODIUM CHLORIDE 0.9 % (FLUSH) 0.9 %
5-40 SYRINGE (ML) INJECTION PRN
Status: CANCELLED | OUTPATIENT
Start: 2022-11-18

## 2022-11-18 ENCOUNTER — HOSPITAL ENCOUNTER (OUTPATIENT)
Dept: INFUSION THERAPY | Age: 52
Discharge: HOME | End: 2022-11-18
Payer: COMMERCIAL

## 2022-11-18 VITALS
HEIGHT: 65 IN | BODY MASS INDEX: 36.59 KG/M2 | HEART RATE: 71 BPM | DIASTOLIC BLOOD PRESSURE: 77 MMHG | RESPIRATION RATE: 16 BRPM | OXYGEN SATURATION: 99 % | WEIGHT: 219.6 LBS | TEMPERATURE: 97.8 F | SYSTOLIC BLOOD PRESSURE: 127 MMHG

## 2022-11-18 DIAGNOSIS — C79.51 SECONDARY MALIGNANT NEOPLASM OF BONE (HCC): ICD-10-CM

## 2022-11-18 DIAGNOSIS — C50.411 MALIGNANT NEOPLASM OF UPPER-OUTER QUADRANT OF RIGHT FEMALE BREAST, UNSPECIFIED ESTROGEN RECEPTOR STATUS (HCC): Primary | ICD-10-CM

## 2022-11-18 LAB
ALBUMIN SERPL-MCNC: 4.7 GM/DL (ref 3.4–5)
ALP BLD-CCNC: 60 IU/L (ref 40–128)
ALT SERPL-CCNC: 21 U/L (ref 10–40)
ANION GAP SERPL CALCULATED.3IONS-SCNC: 11 MMOL/L (ref 4–16)
AST SERPL-CCNC: 25 IU/L (ref 15–37)
BILIRUB SERPL-MCNC: 0.4 MG/DL (ref 0–1)
BUN BLDV-MCNC: 10 MG/DL (ref 6–23)
CALCIUM SERPL-MCNC: 9.4 MG/DL (ref 8.3–10.6)
CHLORIDE BLD-SCNC: 100 MMOL/L (ref 99–110)
CO2: 27 MMOL/L (ref 21–32)
CREAT SERPL-MCNC: 1.1 MG/DL (ref 0.6–1.1)
EGFR, POC: 54 ML/MIN/1.73M2
GFR SERPL CREATININE-BSD FRML MDRD: >60 ML/MIN/1.73M2
GLUCOSE BLD-MCNC: 70 MG/DL (ref 70–99)
GLUCOSE BLD-MCNC: 79 MG/DL (ref 70–99)
POC BUN: 11 MG/DL (ref 8–26)
POC CALCIUM: 1.15 MMOL/L (ref 1.12–1.32)
POC CHLORIDE: 104 MMOL/L (ref 98–109)
POC CO2: 25 MMOL/L (ref 21–32)
POC CREATININE: 1.2 MG/DL (ref 0.6–1.1)
POTASSIUM SERPL-SCNC: 3.9 MMOL/L (ref 3.5–4.5)
POTASSIUM SERPL-SCNC: 4.1 MMOL/L (ref 3.5–5.1)
SODIUM BLD-SCNC: 138 MMOL/L (ref 135–145)
SODIUM BLD-SCNC: 140 MMOL/L (ref 138–146)
SOURCE, BLOOD GAS: ABNORMAL
TOTAL PROTEIN: 7.1 GM/DL (ref 6.4–8.2)

## 2022-11-18 PROCEDURE — 2580000003 HC RX 258: Performed by: INTERNAL MEDICINE

## 2022-11-18 PROCEDURE — 6360000002 HC RX W HCPCS: Performed by: INTERNAL MEDICINE

## 2022-11-18 PROCEDURE — 96365 THER/PROPH/DIAG IV INF INIT: CPT

## 2022-11-18 PROCEDURE — 80053 COMPREHEN METABOLIC PANEL: CPT

## 2022-11-18 RX ORDER — SODIUM CHLORIDE 9 MG/ML
20 INJECTION, SOLUTION INTRAVENOUS ONCE
Status: DISCONTINUED | OUTPATIENT
Start: 2022-11-18 | End: 2022-11-19 | Stop reason: HOSPADM

## 2022-11-18 RX ADMIN — SODIUM CHLORIDE 20 ML/HR: 9 INJECTION, SOLUTION INTRAVENOUS at 14:24

## 2022-11-18 RX ADMIN — ZOLEDRONIC ACID 4 MG: 4 INJECTION, SOLUTION, CONCENTRATE INTRAVENOUS at 14:25

## 2022-11-20 ASSESSMENT — ENCOUNTER SYMPTOMS: BACK PAIN: 1

## 2022-11-21 ENCOUNTER — HOSPITAL ENCOUNTER (OUTPATIENT)
Dept: INFUSION THERAPY | Age: 52
Discharge: HOME | End: 2022-11-21
Payer: COMMERCIAL

## 2022-11-21 ENCOUNTER — OFFICE VISIT (OUTPATIENT)
Dept: ONCOLOGY | Age: 52
End: 2022-11-21
Payer: COMMERCIAL

## 2022-11-21 VITALS
TEMPERATURE: 97.1 F | OXYGEN SATURATION: 97 % | WEIGHT: 219 LBS | BODY MASS INDEX: 36.49 KG/M2 | RESPIRATION RATE: 18 BRPM | HEIGHT: 65 IN | HEART RATE: 103 BPM | DIASTOLIC BLOOD PRESSURE: 71 MMHG | SYSTOLIC BLOOD PRESSURE: 130 MMHG

## 2022-11-21 DIAGNOSIS — C78.7 SECONDARY MALIGNANT NEOPLASM OF LIVER AND INTRAHEPATIC BILE DUCT (HCC): ICD-10-CM

## 2022-11-21 DIAGNOSIS — C50.411 MALIGNANT NEOPLASM OF UPPER-OUTER QUADRANT OF RIGHT FEMALE BREAST, UNSPECIFIED ESTROGEN RECEPTOR STATUS (HCC): Primary | ICD-10-CM

## 2022-11-21 DIAGNOSIS — C79.51 SECONDARY MALIGNANT NEOPLASM OF BONE (HCC): ICD-10-CM

## 2022-11-21 PROCEDURE — 99214 OFFICE O/P EST MOD 30 MIN: CPT | Performed by: INTERNAL MEDICINE

## 2022-11-21 PROCEDURE — 99211 OFF/OP EST MAY X REQ PHY/QHP: CPT

## 2022-11-21 NOTE — PROGRESS NOTES
MA Rooming Questions  Patient: Kaley Sr  MRN: [id number]    Date: 11/21/2022        1. Do you have any new issues?   no         2. Do you need any refills on medications?    no    3. Have you had any imaging done since your last visit? yes - MRI Brain 9/17 & CT 10/27    4. Have you been hospitalized or seen in the emergency room since your last visit here?   no    5. Did the patient have a depression screening completed today?  No    No data recorded     PHQ-9 Given to (if applicable):               PHQ-9 Score (if applicable):                     [] Positive     []  Negative              Does question #9 need addressed (if applicable)                     [] Yes    []  No               Srikanth Crowley MA

## 2022-11-21 NOTE — PROGRESS NOTES
Patient Name: Dary Goodwin  Patient : 1970  Patient MRN: [id number]     Primary Oncologist: Arleen Hartmann MD       Date of Service: 2022      Chief Complaint:   Chief Complaint   Patient presents with    Follow-up        Active Problem list  1. 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 Bone metastasis    3. Metastasis to liver    Problem List  Breast cancer, female ( Stage Date: Unknown, Stage IV (Right breast upper-outer quadrant, T1c, N0, M1)-Clinical  Date of Dx:10/2017 Extent of Disease: Evidence of metastatic disease; Disease State: Initial diagnosis; Metastatic Sites: Bone; Metastatic Sites: Liver; ER Status: Positive; TX Status: Positive; HER-2/jus Value-FISH: Negative; Menopausal Status: Premenopausal; PIK3CA gene: Positive)   Abnormal radiological finding   Bony metastasis ( Date of Dx:10/2017 )   Chest pain   Metastasis to liver       HPI:        Extremely pleasant lady patient of Dr. Jayant El. [facility] at [facility]. Evaluated in 2017 for some discomfort involving the sternum that had been present for a few weeks. She could not remember any injury to that area except maybe a few months earlier she had fallen face-forward. She did not remember having significant discomfort to the chest area involving the fall. 2017, chest x-ray was reported to be fairly unremarkable. X-rays of the sternum revealed questionable age-indeterminate factor of the mid to superior sternum. Additional CT was recommended which was done on 2017, revealing mottled appearance of the sternum. Findings could represent related to earlier trauma or even osteomyelitis or metastatic disease. Nonspecific sclerotic changes in the T12 vertebral body were noted too.      Triple phase bone scan done on 2017, had revealed only focal uptake in the body of the sternum corresponding to the changes noted on earlier CAT scan. Similar differential was again suggested. Visit here on October 9, 2017, though, she stated that she in general was doing actually better and pain was not severe and she did not have to take any pain medications and had been continuing to work on a full time basis and also remained fairly active. CMP on October 09, 2017, was normal and so was LDL of 183. Sed rate was 10. Kappa Lambda light chain ratios were normal. Immunoelectrophoresis was negative. CBC had revealed fairly normal numbers. CT biopsy of the sternum done on October 17, 2017, did reveal changes of metastatic carcinoma, possibly suggestive of breast primary. Biopsy from the sternum done on October 13, 2017, revealed metastatic carcinoma quite likely from breast primary. The tumor was strongly positive for estrogen receptor 95 percent and progesterone receptor is 95 percent, and negative for HER2. Visit here on October 20, 2017, those findings were discussed with her. October 25, 2017, diagnostic digital bilateral breast mammogram revealed 1.8 cm irregular, spiculated hyperechoic mass at the 10 oclock position in the right breast correlating to the mammographic area. Findings were highly suspicious for neoplasm. Ultrasound guided biopsy on October 27, 2017, did reveal invasive mammary carcinoma. Ultrasound-guided biopsy October 27, 2017, from this mass revealed invasive mammary carcinoma. Tumor was again strongly positive 95 percent for ER, 95 percent CO, HER-2 was negative both by IHC and FISH.      PET scan done on November 2, 2017, revealed hypermetabolic right breast mass as well as prominent right axillary lymph nodes and scattered osseous metastatic lesions involving the sternum, right scapula, left L4 vertebral body, and bilateral iliac crest.     Visit here on October 27, 2017, and afterwards metastatic disease involving the bones were discussed with her and clinically she was still considered to be premenopausal as she was not having any symptoms suggestive of menopausal state. She was started on treatment with Zoladex, Faslodex, and Zometa starting on November 10, 2017. She had to receive these treatments in outpatient facility at the hospital primarily because of insurance reasons. Saw me on December 7, 2017, sooner than her scheduled appointment, was concerned about possibly feeling a lump involving the right breast at around 6 oclock position. Otherwise she had not noticed any new issues. Mammogram and ultrasound done on December 8, 2017, showed dense breast tissue where she was feeling a mass. There was no sonographic evidence for any mass in that area too. MRI was suggested if needed to. Otherwise she was again noted to have known malignancy in the upper outer quadrant of the breast.     PET scan on May 14, 2018, revealed partial treatment response with improvement in the right axillary nodes as well as skeletal metastases. Metabolic activity associated with lytic bone lesions had improved with increased sclerosis of the previously seen lesions. Mild residual activity was noted at the L4 vertebral body. There were no new persistent or metabolically active aggressive osseous lesions. Visit here on February 26, 2019 overall I think she was doing fair but we were concerned about her starting to have more discomfort in her left elbow as well as the right hip area. She had not had a PET scan since May of last year and that too had revealed a partial response, we felt possibly repeating the PET scan would be helpful. In the meantime she was going to continue to receive the same treatments as before including Faslodex, Zoladex and Zometa    She was not able to undergo a PET scan but insurance. Did undergo a bone scan on April 4, 2019 revealing a stable bone scan without evidence of new osseous metastases.     May fall, 2019 was evaluated for symptoms of chest pain including a cardiac work-up including echo and stress test afterwards and those were unremarkable. She did have a CTA of the chest done looking for pulmonary embolism on May 4, 2019 which was negative for pulmonary embolism no other acute pulmonary illness. She still of course was noted to have several sclerotic lesions within the spine which were felt to be stable as compared to the prior PET scan which was done in May 2018. [facility] levels done on July 16, 4160 was 63.0, follicular phase. CA-27-29 level same day was 26, stable. [facility] September was 17, 2019 we talked about still being pre-or perimenopausal Darst will continue to stay on Zoladex along with Faslodex. Talked about disease overall having responded. Will possibly extend intervals between Zometa to may be once a year, may be due in July 2020. Did start her on Effexor for hot flashes    March 12, 2020 still being treated with same agents, still working on a long hours still in general feeling well only issue being occasional discomfort in the right rib cage as well as right breast area. Could not feel any distinct masses. Denied any history of injury. Otherwise negative for any new systemic complaints and overall both physically and emotionally was doing quite well    Telemedicine visit on April 23, 2020. We discussed results those were obtained on her previous visit on March 12, 2020. Chemistries CBC were fairly unremarkable CA-27-29 levels were still normal. She had undergone CT scan of the chest on March 24, 2020 was noted to have a stable bony lesions. But she was described to have a 2.8 cm lesion in liver which was worrisome for possibly metastatic disease. Following those studies she was advised to undergo PET scan and that was refused by insurance. She did undergo a bone scan on April 28, 2020 was noted to have stable appearance of skeletal metastatic disease no new metastatic lesions were noted.  She did undergo a CT scan of the abdomen and pelvis with contrast on the same day again was noted to have a 2.3 x 2.7 cm left hepatic lesion which was worrisome for metastatic disease and again of course skeletal metastases were noted. Telemedicine visit again on May 5, 2020. April 27, 2020 she did undergo liver biopsy. Pathology did reveal metastatic adenocarcinoma from a breast primary. Tumor was still positive for ER 50% KS 40%. HER-2 was equivocal by IHC though negative by FISH. May 6, 2020 had long discussion with her and her family. We talked about progressive nature of the disease. We talked about future plan including liver biopsy material being sent to [facility] 1 analysis. We talked about the need for us to change her treatment to a combination of Faslodex, Zoladex and add Verzenio. We also discussed possibly doing a PET scan. We do know she has disease involving the breast, bones and now liver. Our intention is that if indeed she has an isolated lesion in the liver we might consider doing local treatment to the liver besides this new systemic therapy. PET scan done on May 21, 0892 revealed metabolically active metastases in the left hepatic lobe no other metabolic activity was noted in the known skeletal metastases mammogram revealed stable appearance no new sites of malignancy were noted. Treatment was changed to Faslodex Zoladex and Verzenio in May 2020    [facility] analysis from the liver lesion did reveal a tumor to be positive for PI K3 CA. Otherwise microsatellite status was stable and no other alterations were noted especially in BRCA1 and BRCA2 as well as HER-2      As PET scan had revealed an isolated lesion involving the liver she did undergo radio frequency ablation of the lesion on June 24, 2020, tolerated procedure fairly well though had some discomfort afterwards.  CT scan of the abdomen done without contrast on June 25, 2020 did reveal sequela of treated metastatic lesion in the left lobe of the liver with central high attenuation area measuring 2.6 x 2.4 cm and the peripheral low-attenuation rim measuring around 4.8 x 4.1 cm related to the postprocedural edema. Again osseous metastatic disease was again noted without pathologic fracture. July 17, 2020 she was overall doing fairly decent had recovered from the discomfort related to the radiofrequency ablation. We talked about continuing on this present regimen does do plan to check her [facility] levels to see if we can hold off giving her Zoladex. We of course will continue with Faslodex, Verzenio and Zometa. We of course will continue to monitor her progress through exams as well as CAT scans. [facility] level done on July 30, 2020 was 9.4, premenopausal.     Visit here on September 15, 2020. Was still being treated with Faslodex Zoladex and Verzenio. Studies done on October 9, 2020 fairly normal CBC, chemistries reveal creatinine of 1.2 CA 27-29 levels were mildly elevated to 47. A CT scan of the abdomen done on October 15, 2020 revealed 3.5 x 3.1 cm ablation zone, which had decreased in size since immediate post ablation changes done on June 25, 2020. It is difficult to assess residual tremor dedicated multiphasic CT or MRI was recommended. Multifocal osseous sclerotic metastases were felt to be stable. October 27, 2020 overall she was feeling about the same there was some concern about slight increase in tumor marker CA 27-29. Lucyann Push Plan was to to multiphasic CT scan of the abdomen to assess the disease status. Also she was seriously considering having her unilateral oophorectomy I think that will be reasonable. I     CT scan of the abdomen done on November 3, 2020 overall it was felt that there was stable present appearance of liver no suspicious contrast enhancement to indicate presence of residual tumor no new hepatic abnormality was noted again stable appearance of osteoblastic metastatic disease of the lower lumbar and thoracic spine.         On December 17, 2020 she did undergo left oophorectomy. She also the same day underwent removal of Meckel's diverticulum. Tolerated the procedure well. Pathology from the ovary did not show any significant pathologic changes. CA 27-29 levels done on November 25, 2020 was 47.2 not significantly different from before. Visit here on January 5, 2020 she was still being treated with Faslodex and Verzenio. Of course Zoladex was stopped after her undergoing oophorectomy. CA 27-29 levels done on February 5, 2021 was slightly higher than before, 54.5. With her continued elevation of CA 27-29, though minimally, wanted to do a PET scan, was not approved by [employer]. Underwent a CT scan of the chest abdomen and pelvis on March 24, 2021. There was no new metastatic disease noted in the chest abdomen or pelvis the treated left hepatic mass was stable in appearance. Also stable appearance of treated skeletal metastatic disease. Small bilateral pulmonary nodules too were a stable. Visit here on April 1, 2021 all in all she was feeling quite well still tolerating therapy with Faslodex and Verzenio extremely well. Counts have been fairly reasonable. Seen  here on 6/3/2021 she reported numbness and scar tissue formation in the gluteal area secondary to the Faslodex injection. Reported that she continues to have hot flashes. Reported chronic headaches secondary to migraine. Also reported blurred vision which is not extensive and has LasiK 20 years ago, has an follow-up appointment with ophthalmology. No chest pain or shortness of breath. No new breast masses. Reported that her legs hurt when she sits for long period Of time and tries  to get up.    6/26/21: Venous u/l LE; no DVT. She had swelling in the LLE after the injection here. Received one dose of lovenox and also muscle relaxant and sx resolved. 7/8/21 mammogram no evidence of malignancy.  Dexa scan normal     9/2021 Ca 27-29 was 70    10/25/21: Ct chest, abdomen and pelvis:  Chest: Stable chest CT with treated osseous metastatic disease, with several   sclerotic bone lesions. There also a few tiny pulmonary nodules 4 mm and   less. No new findings of metastatic disease. 12 mm masslike focus of the right breast which appears slightly increased in   conspicuity from March 2021. This may relate to differences in technique but   diagnostic mammogram is recommended along with correlation with the   mammography results from July 2021       Abdomen/pelvis: Stable CT of the abdomen and pelvis. Unchanged low-density 3   cm lesion of the liver and unchanged treated osseous metastatic disease with   several sclerotic foci. No new findings. 12/2/21: CA 27-29 was 77.6    2/3/22: CA 27-29 63    4/19/22: Ct chest, abdomen and pelvis:  1. Stable appearance of chest, abdomen, and pelvis with no new metastatic   disease noted. 2.  Stable appearance of 2.6 cm left hepatic lobe mass likely related to   treated metastatic lesion. 3.  Stable osseous metastatic disease. 4.  Stable 0.3 cm bilateral pulmonary nodules. May 2022 ca 27-29 53    9/21/22: MRI brain:  Motion artifact mildly degrades the images. There are a few areas of high   signal in the periventricular white matter that are likely related to chronic   small vessel ischemic disease. No acute brain parenchymal abnormality. No areas of abnormal enhancement to suggest brain parenchymal metastases at   this time. 10/9/22: CT chest, abdomen  and pelvis:  1. Multiple tiny nodules seen bilaterally were previously seen as well and   appear stable. No evidence of growth in size or number. 2. Stable mild thoracic and lumbar bony metastatic disease. 3. Stable hepatic lesion measuring 2.6 cm. No evidence of new lesions or   increase in size of the single lesion. 4. No acute abnormalities in the chest abdomen or pelvis.      10/24/22: CA 27-29 is 71    Past Medical History  None other than breast cancer    Surgical History    1. Abdominal hysterectomy with unilateral oophorectomy for endometriosis in the year 2000. 2. Surgery on right elbow in 2013. Social History  Smoking Status Former smokerUsed to smoke, quit in 2004. Negative for significant ethanol intake. Occasional social usage. .  works at a manufacturing plant. Two sons ages 32 and 25. 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     Family History  NC    Interval history:11/21/22: Alone to the clinic today. Reported mild lower abdominal pain when she has diarrhea. Nausea is intermittent and stable without any emesis. Occurs once or twice a week. No bleeding. No major weight loss. With the breast area. No fatigue. No headaches or any vision changes. Reported that she has intermittent dizziness. Review of Systems   Per interval history; otherwise 10 point ROS is negative              Vital Signs:  /71 (Site: Left Upper Arm, Position: Sitting, Cuff Size: Large Adult)   Pulse (!) 103   Temp 97.1 °F (36.2 °C) (Infrared)   Resp 18   Ht 5' 5\" (1.651 m)   Wt 219 lb (99.3 kg)   SpO2 97%   BMI 36.44 kg/m²     Physical Exam:  Was negative for any new palpable nodes and/or masses involving either breast but dense breasts. No hepatosplenomegaly no abnormal cardiac or pulmonary findings no new skin or neurologic issues or findings.       Labs:    Hematology:  Lab Results   Component Value Date    WBC 4.2 10/24/2022    RBC 3.74 (L) 10/24/2022    HGB 13.1 10/24/2022    HCT 38.2 10/24/2022    .1 (H) 10/24/2022    MCH 35.0 (H) 10/24/2022    MCHC 34.3 10/24/2022    RDW 12.2 10/24/2022     10/24/2022    MPV 9.1 10/24/2022    SEGSPCT 30.4 (L) 10/24/2022    EOSRELPCT 0.7 10/24/2022    BASOPCT 1.4 (H) 10/24/2022    LYMPHOPCT 59.9 (H) 10/24/2022    MONOPCT 7.6 (H) 10/24/2022    SEGSABS 1.3 10/24/2022    EOSABS 0.0 10/24/2022    BASOSABS 0.1 10/24/2022    LYMPHSABS 2.5 10/24/2022    MONOSABS 0.3 10/24/2022    DIFFTYPE AUTOMATED DIFFERENTIAL 10/24/2022    PLTM ADEQUATE 02/03/2022     Lab Results   Component Value Date    ESR 10 10/09/2017       Chemistry:  Lab Results   Component Value Date     11/18/2022    K 3.9 11/18/2022     11/18/2022    CO2 27 11/18/2022    BUN 10 11/18/2022    CREATININE 1.2 (H) 11/18/2022    GLUCOSE 70 11/18/2022    CALCIUM 9.4 11/18/2022    PROT 7.1 11/18/2022    LABALBU 4.7 11/18/2022    BILITOT 0.4 11/18/2022    ALKPHOS 60 11/18/2022    AST 25 11/18/2022    ALT 21 11/18/2022    LABGLOM >60 11/18/2022    GFRAA 57 (L) 08/24/2022    AGRATIO 1.9 04/18/2022    POCCA 1.15 11/18/2022    POCGLU 79 11/18/2022     Lab Results   Component Value Date     10/09/2017     No components found for: LD  Lab Results   Component Value Date    TSHHS 2.100 08/27/2014    T4FREE 1.22 08/08/2013    FT3 3.3 08/08/2013       Immunology:  Lab Results   Component Value Date    PROT 7.1 11/18/2022     Lab Results   Component Value Date    KAPPAUVOL 1.47 10/09/2017    LAMBDAUVOL 1.16 10/09/2017    KLFLCR 1.27 10/09/2017     No results found for: B2M    Coagulation Panel:  Lab Results   Component Value Date    PROTIME 12.0 06/26/2021    INR 0.90 06/26/2021    APTT 25.1 06/26/2021    DDIMER 0.58 (H) 06/26/2021       Anemia Panel:  Lab Results   Component Value Date    FGBZKXGD79 721 08/08/2013       Tumor Markers:  Lab Results   Component Value Date    LABCA2 69.5 (H) 10/24/2022         Imaging: Reviewed     Pathology:Reviewed     Observations:  Performance Status: ECOG   Depression Status: No data recorded          Assessment & Plan:  Extremely pleasant lady patient of Dr. Diana Martinez, overall blessed with good health. Saw him in September for vague discomfort involving the sternum which led to chest x-ray and x-rays of the sternum revealing questionable age-indeterminate fracture of the mid to superior sternum. CT scan revealed mottled appearance of sternum.  Etiology could be related to earlier trauma and/or even metastatic disease. Bone scan showed increased activity in the sternum area with the same differential. Mammogram in October 2016 was unremarkable. October 10, 2017, I did spend time with her and her sister. We did talk about future course of action including possibly just repeating the studies in a couple of months. Would consider doing biopsy of the sternal area. She I think would feel comfortable if we could have better answer for her and thus for that reason I did advise her to be seen in interventional radiology for possible biopsy. October 13, 2017, CT guided biopsy from the sternum revealed changes consistent with metastatic carcinoma from breast biopsy. The tumor is strongly positive for ER and DE and negative for HER2. October 25, 2017, mammogram did reveal a lesion in the right breast of 1.8 cm in the upper outer quadrant. Biopsy for which did reveal invasive mammary carcinoma. October 27, 2017, I did discuss the findings with her and her extended family. We talked about basically the finding of carcinoma involving the right breast as well as bones. Biopsy from the sternum was positive. A bone scan also showed questionable changes involving the T12, though CT scan of the chest otherwise was negative for any pulmonary or hepatic metastases. Biopsy from the right breast October 27, 2017, also revealed invasive mammary carcinoma, strongly positive ER, DE, and HER-2. PET scan done on November 2, 2017, revealed hypermetabolic right breast as well as right axillary nodes and osseous metastases involving sternum, right scapula, and L4 vertebral body. She was considered a premenopausal lady with metastatic disease involving the bones. Treatment choices were discussed with her and on November 10, 2017, was started on treatment with Zoladex, Faslodex, and Zometa.      Visit here on November 20, 2017, she was tolerating these treatments well following the Zometa. December 7, 2017, bony pains were better, but she was concerned about possibly a lump involving the 6 oclock position right breast. Mammogram and ultrasound did not reveal any mass in that area. She was still noted to have lesion in the upper outer quadrant which had been biopsied before. The PET scan done on May 14, 2018, had revealed improvement in disease involving the breast, axilla, as well as bones. Previous PET scan had shown changes involving the sternum, scapula, and L4 areas; those areas did show improvement on the PET scan. Visit here on February 26, 2019 overall I think she was doing fair but we were concerned about her starting to have more discomfort in her left elbow as well as the right hip area. She had not had a PET scan since May of last year and that too had revealed a partial response, we felt possibly repeating the PET scan would be helpful. In the meantime she was going to continue to receive the same treatments as before including Faslodex, Zoladex and Zometa  She was not able to undergo a PET scan but insurance. Did undergo a bone scan on April 4, 2019 revealing a stable bone scan without evidence of new osseous metastases. May , 2019 was evaluated for symptoms of chest pain including a cardiac work-up including echo and stress test afterwards and those were unremarkable. She did have a CTA of the chest done looking for pulmonary embolism on May 4, 2019 which was negative for pulmonary embolism no other acute pulmonary illness. She still of course was noted to have several sclerotic lesions within the spine which were felt to be stable as compared to the prior PET scan which was done in May 2018. [facility] September was 17, 2019 we talked about still being pre-or perimenopausal Darst will continue to stay on Zoladex along with Faslodex. Talked about disease overall having responded.  Will possibly extend intervals between Zometa to may be once a year, may be due in July 2020. Did start her on Effexor for hot flashes  March 12, 2020 overall was doing fairly well Effexor was helpful with hot flashes. Denied significant new issues except for discomfort in the breast as well as rib cage area. Is due to have her bone scan mammogram and CT scan of the chest done to look for the discomfort in the chest area. Otherwise will continue on the same regimen. She will see me after the studies are completed. Telemedicine visit on April 23, 2020, as described above new issues being no obvious progression noted on a bone scan and blood work including tumor markers. CT scan of the chest as well as CT scan of the abdomen showing 2.7 cm lesion in the left hepatic lobe quite worrisome for possible metastatic disease. Clinically she was still doing well. I did encourage her to undergo a biopsy of the liver lesion. And following the biopsy could decide about if we need to send material for foundation 1 study also    Telemedicine visit again on May 5, 2020. April 27, 2020 she did undergo liver biopsy. Pathology did reveal metastatic adenocarcinoma from a breast primary. Tumor was still positive for ER 50% SC 40%. HER-2 was equivocal by IHC though negative by FISH. May 6, 2020 had long discussion with her and her family. We talked about progressive nature of the disease. We talked about future plan including liver biopsy material being sent to foundation 1 analysis. We talked about the need for us to change her treatment to a combination of Faslodex, Zoladex and add Verzenio. We also discussed possibly doing a PET scan. We do know she has disease involving the breast, bones and now liver. Our intention is that if indeed she has an isolated lesion in the liver we might consider doing local treatment to the liver besides this new systemic therapy. June 1, 2020 I did spend time with her and her sister.  We talked about PET scan revealing stable bony metastases and an isolated lesion involving the liver, biopsy-proven malignancy. Mammogram was unchanged from before. Couple weeks prior to this visit had started taking Verzenio along with Faslodex and Zoladex. We did talk about staying on the present systemic therapy and of course will continue to monitor her progress. We also discussed with her about possibly doing radiofrequency ablation treatment to the single lesion involving the liver. As described above on June 24, 2020 she did undergo radiofrequency ablation of the isolated lesion in the liver. July 17, 2020 she was overall doing fairly decent had recovered from the discomfort related to the radiofrequency ablation. We talked about continuing on this present regimen does do plan to check her [address] [place] [address] levels to see if we can hold off giving her Zoladex. We of course will continue with Faslodex, Verzenio and Zometa. We of course will continue to monitor her progress through exams as well as CAT scans. September 15, 2020 was still being treated with Faslodex Zoladex and Verzenio. FSH levels were in premenopausal levels. We also planning to give zometa from time to time. She in general was feeling fairly well major issue was discomfort primarily from Faslodex injections. We talked about checking her blood work with the next injection on October 9 including chemistries and CA-27-29. We also talked about checking her a CT scan of the abdomen to see the status of the lesion following the radiofrequency ablation. Otherwise as before we will continue to monitor her blood progress exams CAT scans and possibly PET scans. January 5, 2021 spend time with her on the few issues as described below. #1 regarding metastatic carcinoma of the breast, premenopausal, s/p  oophorectomy, currently being treated with Faslodex and Verzenio. She was has had locally treatment for isolated liver metastases done on June 24, 2020.   CBC was drawn today counts were reasonable that she could continue on the same dosages of Verzenio. Advised her to stay on the present therapy and will recheck her blood tumor markers in February. February 16, 2021 telephone conversation with her. Clinically she was still doing quite well still on same medications as described above including Faslodex and Verzenio that she was tolerating well. Concern being is further rise of CA 27-29 levels. We talked about rechecking the level again in 3 weeks along with possibly PET scan if we could and if we could confirm progression of the disease then we might consider changing her treatment to [facility] along with Faslodex as the foundation 1 studies done before had revealed the presence of PIK 3 CA mutation. Other wise there was stable microsatellite status tumor mutational burden was not elevated. She could not undergo a PET scan instead did a CT scan of the chest abdomen and pelvis on March 24, 2021 and those studies were negative for any obvious signs of progression of the disease. April 1, 2021 had long discussion with her. Clinically she was feeling well exam was unremarkable at least CAT scan studies are fairly unremarkable. Only concern being mild elevation of CA 27-29 levels. As all in all she was doing quite well we felt comfortable keeping her on treatment with Faslodex and Verzenio continue to monitor her CBC and chemistries possibly recheck her tumor markers in 8 weeks and still the plan remains the same if there is documented progression we might consider changing treatment to [facility]. She is going to be seen here in 8 weeks possibly by one of my partners. Zandra 3, 2021, clinically she seems to be stable(stable liver, bone and pulmonary mets). Currently on Verzenio and Faslodex. We will repeat CA 27-29 and if it remains stable then will continue the same treatment. If CA 27-29 markedly elevated then prior test, will consider switching to [facility].   Recommend DEXA scan and mammogram.  Also continue bisphosphonates. At some point she would like to transition to letrozole from Faslodex secondary to the side effects. Is on venlafaxine for hot flashes, deferred any other intervention at this point. Seen here on August third 2021 with no new symptoms except for the swelling at the injection site associated with pain. Discussed that CA 27-29 is relatively stable and mammogram with no new findings, would like to continue the current regimen but she wants to switch to oral AI. Discussed the adverse effects of AI  Return to clinic in 3 months or earlier if new symptoms. Note DEXA scan is normal.  Continue Zometa yearly. Recommend increase Effexor to 75 mg for vasogenic symptoms. Repeat CA 27-29 in 6 to 8 weeks and also will monitor for any adverse effects. Visit here on 10/4/2021 when she reported headache, and has been dropping things. Also reported tenderness in the right breast area. Clinical exam with no palpable masses. But note CA 27-29 elevated to 70.9 in September 2021 compared to 53.8 in June 2021. Is currently on letrozole(which has been changed from Faslodex in August 2021) and Verzenio. Note PIK3A positive. Recommend further evaluation with CT scan of the chest abdomen pelvis and also MRI of the brain. Neutropenia most probably secondary to Verzenio. Avoid sick contacts. Recommend not to work in the [employer]. Dose adjustments as needed. Further recommendations pending above. Note mammogram and DEXA scan in July 2021 were normal  She has received her Covid vaccine. Visit here on 10/27/2021 we discussed about the results of the MRI of the brain which did not reveal any metastatic disease. Also discussed the results of the CT scan of the chest abdomen pelvis with stable osseous, pulmonary, liver lesions with slight increase in the right breast lesion. Note CA 27-29 was 70.9 in September 2021. Will discuss with radiation oncology for possible SBRT of the breast mass.   Will repeat CA 27-29 in a month in November and if rising then will consider changing to PIK if progressive disease. Other medical care. To clinic December 2021 or earlier if new symptoms. Dictated today on 12 9 2021 I discussed the results of the CA 27-29 that it continues to rise slowly. Clinically is the same. Very reluctant to go back on the Faslodex. Reported that I would recommend repeat CA 27-29 in a month and if rising then will recommend PET scan for further evaluation and treatment planning. The options would be at that time to go back on Faslodex or start her on PIK3A inhibitor if evidence of progressive disease. Discussed with the radiation oncology regarding SBRT to the breast but they deferred at this point. Leukopenia with ANC of about thousand most probably secondary to abemaciclib. Continue to monitor closely and dose adjustments as needed. Return to clinic in January 2022    Visit here on 2/9/2022, clinically stable Seems to be tolerating fairly well. Note CA 27-29 in February 2022 mildly decreased. Continue current regimen. Monitor for adverse effects. Plan to repeat imaging and tumor marker in April 2022. She was seen on April 27 2022, overall doing good. Ca 27-29 was 50 and Ct chest, abdomen and pelvis in April 2022 with stable findings. Continue Letrozole and Abemaciclib. Tolerating fairly well. Recommend Effexor for vasomotor Sx relief. Refills for letrozole provided. Dexa scan July 2021 normal. Plan to repeat imaging in 6M. Ca  27-29 every 2 Months. Continue Zometa every 6M. Leukopenia/neutropenia most probably sec to Abemaciclib. Will monitor for now. Mild renal insufficiency: Adequate fluids and avoid nephrotoxic medications. DR Dylon Enrique Notes:   ----------------------------    IDC breast cancer: Mets to liver and bones  Course as described above. Is currently on verzinio and letrozole  CT chest, abdomen and pelvis in October 2022  with stable disease.   Ca 27-29 October was 69  No new significant sx. Continue current regimen and plan to repeat CT imaging in 6M  Recommend adequate  antidiarrhea/antiemetic  agents and recommend adequate fluid intake. Avoid nephrotoxic medication. HA/Unsteady gait, vision changes, dizziness:MRI brain in sep 2022 neg for metastatic disease    Dexa scan in July 2021 normal. Is on calcium and vitamin D and on Zometa for bony mets. Mild renal insufficiency:L Recommend adequate fluid intake and avoid nephrotoxic medications. Mild intermittent Neutropenia most probably sec to verzinio. Continue current dose and monitor for now    Continue other medical care    Recommend healthy diet, exercise and weight loss if possible. Recommend follow up with PCP and other specialists.      RTC in feb 2023 or earlier if new Sx.      Ryan Gupta

## 2022-12-07 ENCOUNTER — HOSPITAL ENCOUNTER (OUTPATIENT)
Dept: GENERAL RADIOLOGY | Age: 52
Discharge: HOME | End: 2022-12-07
Payer: COMMERCIAL

## 2022-12-07 ENCOUNTER — HOSPITAL ENCOUNTER (OUTPATIENT)
Age: 52
Discharge: HOME | End: 2022-12-07
Payer: COMMERCIAL

## 2022-12-07 DIAGNOSIS — E66.09 CLASS 2 OBESITY DUE TO EXCESS CALORIES WITHOUT SERIOUS COMORBIDITY WITH BODY MASS INDEX (BMI) OF 36.0 TO 36.9 IN ADULT: ICD-10-CM

## 2022-12-07 DIAGNOSIS — E78.2 MIXED HYPERLIPIDEMIA: ICD-10-CM

## 2022-12-07 DIAGNOSIS — G89.29 CHRONIC BILATERAL LOW BACK PAIN, UNSPECIFIED WHETHER SCIATICA PRESENT: ICD-10-CM

## 2022-12-07 DIAGNOSIS — M54.50 CHRONIC BILATERAL LOW BACK PAIN, UNSPECIFIED WHETHER SCIATICA PRESENT: ICD-10-CM

## 2022-12-07 DIAGNOSIS — M25.552 LEFT HIP PAIN: ICD-10-CM

## 2022-12-07 LAB — TSH HIGH SENSITIVITY: 0.9 UIU/ML (ref 0.27–4.2)

## 2022-12-07 PROCEDURE — 36415 COLL VENOUS BLD VENIPUNCTURE: CPT

## 2022-12-07 PROCEDURE — 72110 X-RAY EXAM L-2 SPINE 4/>VWS: CPT

## 2022-12-07 PROCEDURE — 73502 X-RAY EXAM HIP UNI 2-3 VIEWS: CPT

## 2022-12-07 PROCEDURE — 84443 ASSAY THYROID STIM HORMONE: CPT

## 2022-12-13 ENCOUNTER — TELEPHONE (OUTPATIENT)
Dept: GASTROENTEROLOGY | Age: 52
End: 2022-12-13

## 2022-12-13 ENCOUNTER — OFFICE VISIT (OUTPATIENT)
Dept: INTERNAL MEDICINE CLINIC | Age: 52
End: 2022-12-13
Payer: COMMERCIAL

## 2022-12-13 VITALS
HEART RATE: 76 BPM | OXYGEN SATURATION: 97 % | SYSTOLIC BLOOD PRESSURE: 124 MMHG | WEIGHT: 208.2 LBS | DIASTOLIC BLOOD PRESSURE: 82 MMHG | BODY MASS INDEX: 34.69 KG/M2 | HEIGHT: 65 IN

## 2022-12-13 DIAGNOSIS — E66.09 CLASS 1 OBESITY DUE TO EXCESS CALORIES WITHOUT SERIOUS COMORBIDITY WITH BODY MASS INDEX (BMI) OF 34.0 TO 34.9 IN ADULT: ICD-10-CM

## 2022-12-13 DIAGNOSIS — R11.0 NAUSEA: ICD-10-CM

## 2022-12-13 DIAGNOSIS — K92.1 MELENA: Primary | ICD-10-CM

## 2022-12-13 DIAGNOSIS — C50.911 PRIMARY CANCER OF RIGHT BREAST WITH METASTASIS TO OTHER SITE (HCC): ICD-10-CM

## 2022-12-13 PROCEDURE — 99213 OFFICE O/P EST LOW 20 MIN: CPT | Performed by: FAMILY MEDICINE

## 2022-12-13 RX ORDER — ABEMACICLIB 150 MG/1
TABLET ORAL
Qty: 60 TABLET | Refills: 5 | Status: ACTIVE | OUTPATIENT
Start: 2022-12-13

## 2022-12-13 RX ORDER — PHENTERMINE HYDROCHLORIDE 37.5 MG/1
37.5 TABLET ORAL
Qty: 30 TABLET | Refills: 0 | Status: SHIPPED | OUTPATIENT
Start: 2022-12-13 | End: 2023-01-12

## 2022-12-13 ASSESSMENT — ENCOUNTER SYMPTOMS
COUGH: 0
SHORTNESS OF BREATH: 0
ABDOMINAL PAIN: 0
NAUSEA: 0

## 2022-12-13 NOTE — PROGRESS NOTES
Michael Tobin (:  1970) is a 46 y.o. female,established patient, here for evaluation of the following chief complaint(s):  Follow-up and Obesity         ASSESSMENT/PLAN:  1. Melena  Patient established with GI. Plans to make an appointment with Dr. Ivania Horton    2. Nausea  Continue Zofran    3. Class 1 obesity due to excess calories without serious comorbidity with body mass index (BMI) of 34.0 to 34.9 in adult  - phentermine (ADIPEX-P) 37.5 MG tablet; Take 1 tablet by mouth every morning (before breakfast) for 30 days. Dispense: 30 tablet; Refill: 0  Oarrs checked    Persist RTO or call  Return in about 4 weeks (around 1/10/2023) for Obesity.        Lab Results   Component Value Date    WBC 4.2 10/24/2022    HGB 13.1 10/24/2022    HCT 38.2 10/24/2022    .1 (H) 10/24/2022     10/24/2022     Lab Results   Component Value Date    CHOL 284 (H) 2022     Lab Results   Component Value Date    TRIG 134 2022     Lab Results   Component Value Date    HDL 94 2022     Lab Results   Component Value Date    LDLCALC 163 (H) 2022       Lab Results   Component Value Date     2022    K 3.9 2022     2022    CO2 27 2022    BUN 10 2022    CREATININE 1.2 (H) 2022    GLUCOSE 70 2022    CALCIUM 9.4 2022    PROT 7.1 2022    LABALBU 4.7 2022    BILITOT 0.4 2022    ALKPHOS 60 2022    AST 25 2022    ALT 21 2022    LABGLOM >60 2022    GFRAA 57 (L) 2022    AGRATIO 1.9 2022       Subjective   SUBJECTIVE/OBJECTIVE:    HISTORY OF PRESENT ILLNESS:  This is a 46 y.o. female here for the following:  Patient Active Problem List    Diagnosis Date Noted    Black stools 2022    Dyspepsia 2022    Neutropenia ([address] 10/04/2021    Breast cancer, female 2020    Abnormal radiological finding 2020    Bone metastasis 2020    Metastasis to liver 2020    Liver disease 06/24/2020    Liver lesion 06/24/2020    Dilated aortic root ([address] 06/18/2019    Dyslipidemia 05/14/2019    Asymptomatic varicose veins 05/14/2019    Primary cancer of right breast with metastasis to other site [facility]) 03/24/2019    FHx: coronary artery disease     Chest pain     Cubital tunnel syndrome on right 08/05/2013      Obesity - on Adipex x 1 month. Patient lost  11 lbs. She denies problems on the medication. Thyroid normal  Stool was black for 2 days. (12/1-2)She can feel nauseated and full feeling. On Protonix for GERD. Patient to call Dr. Margery Romberg. 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 Respiratory:  Negative for cough and shortness of breath. Cardiovascular:  Negative for chest pain and palpitations. Gastrointestinal:  Negative for abdominal pain and nausea. Neurological:  Negative for dizziness and headaches. Psychiatric/Behavioral:  Negative for dysphoric mood. Allergies   Allergen Reactions    Sulfa Antibiotics Other (See Comments)     Stop breathing. Nickel Rash     Current Outpatient Medications   Medication Sig Dispense Refill    phentermine (ADIPEX-P) 37.5 MG tablet Take 1 tablet by mouth every morning (before breakfast) for 30 days.  30 tablet 0    albuterol sulfate HFA (PROVENTIL;VENTOLIN;PROAIR) 108 (90 Base) MCG/ACT inhaler Inhale 2 puffs into the lungs every 6 hours as needed for Wheezing 1 each 3    traZODone (DESYREL) 50 MG tablet Take 1 tablet by mouth nightly as needed for Sleep 30 tablet 2    venlafaxine (EFFEXOR XR) 37.5 MG extended release capsule Take 1 capsule by mouth daily 30 capsule 5    ondansetron (ZOFRAN) 4 MG tablet Take 1 tablet by mouth every 8 hours as needed for Nausea or Vomiting 30 tablet 0    ibuprofen (ADVIL;MOTRIN) 800 MG tablet Take 1 tablet by mouth every 8 hours as needed for Pain 30 tablet 1    zoledronic acid (ZOMETA) 4 MG/100ML SOLN infusion Infuse 4 mg intravenously once Every year      diphenhydrAMINE (BENADRYL) 25 MG capsule 1-2 capsules by mouth every 6 hours as needed for rash 30 capsule 0    VERZENIO 150 MG TABS TAKE 1 TABLET BY MOUTH 2 TIMES A DAY 60 tablet 5    pantoprazole (PROTONIX) 40 MG tablet Take 1 tablet by mouth daily 30 tablet 5    letrozole (FEMARA) 2.5 MG tablet Take 1 tablet by mouth daily 90 tablet 5     No current facility-administered medications for this visit. Vitals:    12/13/22 1058   BP: 124/82   Site: Right Upper Arm   Position: Sitting   Cuff Size: Medium Adult   Pulse: 76   SpO2: 97%   Weight: 208 lb 3.2 oz (94.4 kg)   Height: 5' 5\" (1.651 m)     Objective   Physical Exam  Vitals reviewed. Constitutional:       General: She is not in acute distress. Cardiovascular:      Rate and Rhythm: Normal rate and regular rhythm. Pulmonary:      Effort: Pulmonary effort is normal. No respiratory distress. Breath sounds: Normal breath sounds. Abdominal:      Palpations: Abdomen is soft. Tenderness: There is no abdominal tenderness. Musculoskeletal:      Cervical back: Neck supple. Right lower leg: No edema. Left lower leg: No edema. Neurological:      Mental Status: She is alert and oriented to person, place, and time. Psychiatric:         Mood and Affect: Mood normal.              An electronic signature was used to authenticate this note. --Petar Giraldo DO     This dictation was generated by voice recognition computer software. Although all attempts are made to edit the dictation for accuracy, there may be errors in the transcription that are not intended.

## 2022-12-13 NOTE — TELEPHONE ENCOUNTER
Patient left a message stating that two weeks ago she had two days of black water stools. Please advise.

## 2022-12-16 NOTE — PROGRESS NOTES
Patient will arrive at 0700 at [facility] on 12/20/2022 for her procedure at 0800. 1. Do not eat or drink anything after 12 midnight (or____hours) unless instructed by your doctor prior to surgery. This includes no water, chewing gum or mints. NO chewing tobacco.  2. Follow your directions as prescribed by the doctor for your procedure and medications. 3.Check with your Doctor regarding stopping Plavix, Coumadin, Lovenox,Effient,Pradaxa,Xarelto, Fragmin or other blood thinners and follow their instructions. 4. Do not smoke, and do not drink any alcoholic beverages 24 hours prior to surgery. This includes NA Beer. 5. You may brush your teeth and gargle the morning of surgery. DO NOT SWALLOW WATER  6. You MUST make arrangements for a responsible adult to take you home after your surgery and be able to check on you every couple hours for the day. You will not be allowed     to leave alone or drive yourself home. It is strongly suggested someone stay with you the first 24 hrs. Your surgery will be cancelled if you do not have a ride home. 7. Please wear simple, loose fitting clothing to the hospital.  Valarie Wu not bring valuables (money, credit cards, checkbooks, etc.) Do not wear any makeup (including no eye makeup) or nail polish on your fingers or toes. 8. DO NOT wear any jewelry or piercings on day of surgery. All body piercing jewelry must be removed  9. If you have dentures, they will be removed before going to the OR; we will provide you a container. If you wear contact lenses or glasses, they will be removed; please bring a case for them. 10. If you  have a Living Will and Durable Power of  for Healthcare, please bring in a copy. 11 Please bring picture ID,  insurance card, paperwork from the doctors office    (H & P, Consent, & card for implantable devices). Patient will take her protonix and effexor the morning of her procedure, she will bring her rescue inhaler carlson=ith her.  Patient had no questions concerning egd instructions at this time.

## 2022-12-19 ENCOUNTER — ANESTHESIA EVENT (OUTPATIENT)
Dept: OPERATING ROOM | Age: 52
End: 2022-12-19
Payer: COMMERCIAL

## 2022-12-19 NOTE — ANESTHESIA PRE PROCEDURE
Department of Anesthesiology  Preprocedure Note       Name:  Nadia See   Age:  46 y.o.  :  1970                                          MRN:  [id number]         Date:  2022      Surgeon: Anson Cardenas):  Astrid Horta MD    Procedure: Procedure(s):  EGD ESOPHAGOGASTRODUODENOSCOPY    Medications prior to admission:   Prior to Admission medications    Medication Sig Start Date End Date Taking? Authorizing Provider   phentermine (ADIPEX-P) 37.5 MG tablet Take 1 tablet by mouth every morning (before breakfast) for 30 days. 22  Kathie Rai DO   VERZENIO 150 MG TABS TAKE 1 TABLET BY MOUTH 2 TIMES A DAY 22   Elzbieta Irwin MD   albuterol sulfate HFA (PROVENTIL;VENTOLIN;PROAIR) 108 (90 Base) MCG/ACT inhaler Inhale 2 puffs into the lungs every 6 hours as needed for Wheezing 11/15/22   August Hernández DO   traZODone (DESYREL) 50 MG tablet Take 1 tablet by mouth nightly as needed for Sleep 11/15/22   August Hernández DO   pantoprazole (PROTONIX) 40 MG tablet Take 1 tablet by mouth daily 9/26/22 10/26/22  Elzbieta Irwin MD   venlafaxine (EFFEXOR XR) 37.5 MG extended release capsule Take 1 capsule by mouth daily 22   Elzbieta Irwin MD   letrozole [facility]) 2.5 MG tablet Take 1 tablet by mouth daily 22  Elzbieta Irwin MD   ondansetron (ZOFRAN) 4 MG tablet Take 1 tablet by mouth every 8 hours as needed for Nausea or Vomiting 8/3/21   Elzbieta Irwin MD   ibuprofen (ADVIL;MOTRIN) 800 MG tablet Take 1 tablet by mouth every 8 hours as needed for Pain 20   Carol Souza MD   zoledronic acid (ZOMETA) 4 MG/100ML SOLN infusion Infuse 4 mg intravenously once Every year    Historical Provider, MD   diphenhydrAMINE (BENADRYL) 25 MG capsule 1-2 capsules by mouth every 6 hours as needed for rash 18   SARAH Narayanan       Current medications:    No current facility-administered medications for this encounter.      Current Outpatient Medications Medication Sig Dispense Refill    phentermine (ADIPEX-P) 37.5 MG tablet Take 1 tablet by mouth every morning (before breakfast) for 30 days. 30 tablet 0    VERZENIO 150 MG TABS TAKE 1 TABLET BY MOUTH 2 TIMES A DAY 60 tablet 5    albuterol sulfate HFA (PROVENTIL;VENTOLIN;PROAIR) 108 (90 Base) MCG/ACT inhaler Inhale 2 puffs into the lungs every 6 hours as needed for Wheezing 1 each 3    traZODone (DESYREL) 50 MG tablet Take 1 tablet by mouth nightly as needed for Sleep 30 tablet 2    pantoprazole (PROTONIX) 40 MG tablet Take 1 tablet by mouth daily 30 tablet 5    venlafaxine (EFFEXOR XR) 37.5 MG extended release capsule Take 1 capsule by mouth daily 30 capsule 5    letrozole (FEMARA) 2.5 MG tablet Take 1 tablet by mouth daily 90 tablet 5    ondansetron (ZOFRAN) 4 MG tablet Take 1 tablet by mouth every 8 hours as needed for Nausea or Vomiting 30 tablet 0    ibuprofen (ADVIL;MOTRIN) 800 MG tablet Take 1 tablet by mouth every 8 hours as needed for Pain 30 tablet 1    zoledronic acid (ZOMETA) 4 MG/100ML SOLN infusion Infuse 4 mg intravenously once Every year      diphenhydrAMINE (BENADRYL) 25 MG capsule 1-2 capsules by mouth every 6 hours as needed for rash 30 capsule 0       Allergies: Allergies   Allergen Reactions    Sulfa Antibiotics Other (See Comments)     Stop breathing.     Nickel Rash       Problem List:    Patient Active Problem List   Diagnosis Code    Cubital tunnel syndrome on right G56.21    FHx: coronary artery disease Z82.49    Chest pain R07.9    Primary cancer of right breast with metastasis to other site [facility]) C50.911    Dyslipidemia E78.5    Asymptomatic varicose veins I83.90    Dilated aortic root ([address] I77.810    Liver disease K76.9    Liver lesion K76.9    Breast cancer, female C50.411    Abnormal radiological finding R93.7    Bone metastasis C79.51    Metastasis to liver C78.7    Neutropenia ([address] D70.9       Past Medical History:        Diagnosis Date    Asthma last flare up winter 2016    Breast CA [facility])     right    Breast cancer ([facility] 2017    right    Chest pain     \"had chest pain last time - saw Dr Sidra Sloan- all ok\"    FHx: coronary artery disease     H/O Doppler lower venous ultrasound 2019    No DVT,  Significant reflux noted of the Right & Left Deep Venous System.  H/O echocardiogram 2019    EF 55-60%, Essentially normal echocardiogram. Dilated aortic root at 4.0 cm.  History of exercise stress test 2019    Treadmill, EKG portion is negative for ischemia by diagnostic criteria    History of migraine     last HA  2020    Hx antineoplastic chemo 2017    still taking    HX OTHER MEDICAL     \"since 2016- having some pain mid sternum- xray thought fx, then did CT and bone scan now having bx done\"    Hyperlipemia     Primary cancer of right breast with metastasis to other site ([facility] 3/24/2019    bone and liver       Past Surgical History:        Procedure Laterality Date     SECTION      COLONOSCOPY N/A 2021    COLORECTAL CANCER SCREENING, NOT HIGH RISK performed by Carlitos Saab MD at [address] [place] [address]  age 28    LASIK both eyes    HYSTERECTOMY (CERVIX STATUS UNKNOWN)  age 27    BSO.  Due to endometriosis    LAPAROSCOPY N/A 2020    LAPAROSCOPY MECKLE'S DIVERTICULECTOMY performed by Delphine Marquez MD at [address] [place] [address]      then ablation    OTHER SURGICAL HISTORY Right 2013    right ulnar nerve decompression    SALPINGO-OOPHORECTOMY Left 2020    EXPLORATORY LAPAROSCOPY, LEFT SALPINGO OOPHORECTOMY LAPAROSCOPIC performed by Delhpine Marquez MD at [address] [place] [address]       Social History:    Social History     Tobacco Use    Smoking status: Former     Packs/day: 0.50     Years: 16.00     Pack years: 8.00     Types: Cigarettes     Quit date: 2004     Years since quittin.3    Smokeless tobacco: Never    Tobacco comments:     Quit smoking 9 years ago. Substance Use Topics    Alcohol use: Yes     Alcohol/week: 2.0 standard drinks     Types: 2 Glasses of wine per week     Comment: Occasionally     CAFFEINE: 1 cup coffee daily/ average one per week                                Counseling given: Not Answered  Tobacco comments: Quit smoking 9 years ago. Vital Signs (Current):   Vitals:    12/16/22 1016   Weight: 208 lb (94.3 kg)   Height: 5' 5.5\" (1.664 m)                                              BP Readings from Last 3 Encounters:   12/13/22 124/82   11/21/22 130/71   11/18/22 127/77       NPO Status:                                                                                 BMI:   Wt Readings from Last 3 Encounters:   12/13/22 208 lb 3.2 oz (94.4 kg)   11/21/22 219 lb (99.3 kg)   11/18/22 219 lb 9.6 oz (99.6 kg)     Body mass index is 34.09 kg/m². CBC:   Lab Results   Component Value Date/Time    WBC 4.2 10/24/2022 08:06 AM    RBC 3.74 10/24/2022 08:06 AM    HGB 13.1 10/24/2022 08:06 AM    HCT 38.2 10/24/2022 08:06 AM    .1 10/24/2022 08:06 AM    RDW 12.2 10/24/2022 08:06 AM     10/24/2022 08:06 AM       CMP:   Lab Results   Component Value Date/Time     11/18/2022 01:58 PM    K 3.9 11/18/2022 01:58 PM     11/18/2022 01:39 PM    CO2 27 11/18/2022 01:39 PM    BUN 10 11/18/2022 01:39 PM    CREATININE 1.2 11/18/2022 01:58 PM    CREATININE 1.1 11/18/2022 01:39 PM    GFRAA 57 08/24/2022 08:06 AM    AGRATIO 1.9 04/18/2022 01:57 PM    LABGLOM >60 11/18/2022 01:39 PM    GLUCOSE 70 11/18/2022 01:39 PM    PROT 7.1 11/18/2022 01:39 PM    CALCIUM 9.4 11/18/2022 01:39 PM    BILITOT 0.4 11/18/2022 01:39 PM    ALKPHOS 60 11/18/2022 01:39 PM    AST 25 11/18/2022 01:39 PM    ALT 21 11/18/2022 01:39 PM       POC Tests: No results for input(s): POCGLU, POCNA, POCK, POCCL, POCBUN, POCHEMO, POCHCT in the last 72 hours.     Coags:   Lab Results   Component Value Date/Time    PROTIME 12.0 06/26/2021 09:33 PM    INR 0.90 2021 09:33 PM    APTT 25.1 2021 09:33 PM       HCG (If Applicable): No results found for: PREGTESTUR, PREGSERUM, HCG, HCGQUANT     ABGs: No results found for: PHART, PO2ART, SAA6WNY, WEE3NHL, BEART, I2BQRXJT     Type & Screen (If Applicable):  No results found for: LABABO, LABRH    Drug/Infectious Status (If Applicable):  No results found for: HIV, HEPCAB    COVID-19 Screening (If Applicable):   Lab Results   Component Value Date/Time    COVID19 NOT DETECTED 2021 09:00 AM           Anesthesia Evaluation  Patient summary reviewed  Airway:           Dental:          Pulmonary:   (+) asthma:                           ROS comment: Smoking Status: Former - 8 pack years  Quit Smokin04       Cardiovascular:    (+) CAD:,          Beta Blocker:  Not on Beta Blocker      ROS comment: Stress test 2019:  Summary   Normal stress test   Peak Bp was 142/74 , Peak Hr was 172   completed 10.9 METS and 9.3 mins of exercise   Appropriate hemodynamic response to exercise. No significant ST T wave   changes with exercise. EKG portion is negative for ischemia by diagnostic   criteria. Echo 2019:  Conclusions      Summary   Normal left ventricle structure and systolic function with an ejection   fraction of 55-60%. No significant valvular disease noted. Normal pulmonary artery pressure. No evidence of pericardial effusion. Dilated aortic root at 4.0 cm. Aortic arch not visualized. Neuro/Psych:   (+) headaches: migraine headaches,             GI/Hepatic/Renal:   (+) liver disease:, morbid obesity          Endo/Other:    (+) malignancy/cancer. ROS comment: Breast cancer  Bone metastasis  Metastasis to liver     Abdominal:             Vascular: negative vascular ROS. Other Findings:           Anesthesia Plan      MAC     ASA 3       Induction: intravenous.                             KIMBERLY Person - CRNA   2022       Pre Anesthesia Assessment complete.  Chart reviewed on 12/19/2022

## 2022-12-19 NOTE — PROGRESS NOTES
Spoke with patient and she will arrive at 0700 at [facility] on 12/20/2022 for her procedure at 0800.

## 2022-12-20 ENCOUNTER — HOSPITAL ENCOUNTER (OUTPATIENT)
Age: 52
Setting detail: OUTPATIENT SURGERY
Discharge: HOME | End: 2022-12-20
Attending: SPECIALIST | Admitting: SPECIALIST
Payer: COMMERCIAL

## 2022-12-20 ENCOUNTER — ANESTHESIA (OUTPATIENT)
Dept: OPERATING ROOM | Age: 52
End: 2022-12-20
Payer: COMMERCIAL

## 2022-12-20 VITALS
WEIGHT: 208 LBS | HEART RATE: 60 BPM | SYSTOLIC BLOOD PRESSURE: 109 MMHG | RESPIRATION RATE: 16 BRPM | TEMPERATURE: 97.5 F | OXYGEN SATURATION: 100 % | BODY MASS INDEX: 33.43 KG/M2 | DIASTOLIC BLOOD PRESSURE: 73 MMHG | HEIGHT: 66 IN

## 2022-12-20 DIAGNOSIS — K92.1 BLACK STOOLS: ICD-10-CM

## 2022-12-20 PROBLEM — R10.13 DYSPEPSIA: Status: ACTIVE | Noted: 2022-12-20

## 2022-12-20 PROCEDURE — 43239 EGD BIOPSY SINGLE/MULTIPLE: CPT | Performed by: SPECIALIST

## 2022-12-20 PROCEDURE — 6360000002 HC RX W HCPCS: Performed by: NURSE ANESTHETIST, CERTIFIED REGISTERED

## 2022-12-20 PROCEDURE — 2709999900 HC NON-CHARGEABLE SUPPLY: Performed by: SPECIALIST

## 2022-12-20 PROCEDURE — 3700000000 HC ANESTHESIA ATTENDED CARE: Performed by: SPECIALIST

## 2022-12-20 PROCEDURE — 7100000010 HC PHASE II RECOVERY - FIRST 15 MIN: Performed by: SPECIALIST

## 2022-12-20 PROCEDURE — 2580000003 HC RX 258: Performed by: SPECIALIST

## 2022-12-20 PROCEDURE — 7100000011 HC PHASE II RECOVERY - ADDTL 15 MIN: Performed by: SPECIALIST

## 2022-12-20 PROCEDURE — 87077 CULTURE AEROBIC IDENTIFY: CPT

## 2022-12-20 PROCEDURE — 3700000001 HC ADD 15 MINUTES (ANESTHESIA): Performed by: SPECIALIST

## 2022-12-20 PROCEDURE — 3609012400 HC EGD TRANSORAL BIOPSY SINGLE/MULTIPLE: Performed by: SPECIALIST

## 2022-12-20 RX ORDER — PROPOFOL 10 MG/ML
INJECTION, EMULSION INTRAVENOUS PRN
Status: DISCONTINUED | OUTPATIENT
Start: 2022-12-20 | End: 2022-12-20 | Stop reason: SDUPTHER

## 2022-12-20 RX ORDER — SODIUM CHLORIDE, SODIUM LACTATE, POTASSIUM CHLORIDE, CALCIUM CHLORIDE 600; 310; 30; 20 MG/100ML; MG/100ML; MG/100ML; MG/100ML
INJECTION, SOLUTION INTRAVENOUS CONTINUOUS
Status: DISCONTINUED | OUTPATIENT
Start: 2022-12-20 | End: 2022-12-20 | Stop reason: HOSPADM

## 2022-12-20 RX ORDER — LIDOCAINE HYDROCHLORIDE 20 MG/ML
INJECTION, SOLUTION INTRAVENOUS PRN
Status: DISCONTINUED | OUTPATIENT
Start: 2022-12-20 | End: 2022-12-20 | Stop reason: SDUPTHER

## 2022-12-20 RX ADMIN — PROPOFOL 180 MG: 10 INJECTION, EMULSION INTRAVENOUS at 08:36

## 2022-12-20 RX ADMIN — LIDOCAINE HYDROCHLORIDE 100 MG: 20 INJECTION, SOLUTION INTRAVENOUS at 08:36

## 2022-12-20 RX ADMIN — SODIUM CHLORIDE, POTASSIUM CHLORIDE, SODIUM LACTATE AND CALCIUM CHLORIDE: 600; 310; 30; 20 INJECTION, SOLUTION INTRAVENOUS at 07:45

## 2022-12-20 ASSESSMENT — PAIN SCALES - GENERAL: PAINLEVEL_OUTOF10: 0

## 2022-12-20 ASSESSMENT — PAIN - FUNCTIONAL ASSESSMENT: PAIN_FUNCTIONAL_ASSESSMENT: 0-10

## 2022-12-20 NOTE — PROGRESS NOTES
Returned to room Q1. Report received from [facility]. Alert and oriented. Respirations even and unlabored. Color pink. Abdomen soft and nontender. Beverage provided. Call light in reach.  supportive at bedside.

## 2022-12-20 NOTE — ANESTHESIA POSTPROCEDURE EVALUATION
Department of Anesthesiology  Postprocedure Note    Patient: Daryl Edwards  MRN: [id number]  YOB: 1970  Date of evaluation: 12/20/2022      Procedure Summary     Date: 12/20/22 Room / Location: [address]    Anesthesia Start: 4975 Anesthesia Stop: 0848    Procedure: EGD BIOPSY Diagnosis:       Black stools      (Black stools [K92.1])    Surgeons: Minda Escobar MD Responsible Provider: KIMBERLY Sainz CRNA    Anesthesia Type: MAC ASA Status: 3          Anesthesia Type: No value filed.     David Phase I:      David Phase II:        Anesthesia Post Evaluation    Patient location during evaluation: bedside  Patient participation: complete - patient participated  Level of consciousness: awake and alert  Pain score: 0  Airway patency: patent  Nausea & Vomiting: no vomiting and no nausea  Complications: no  Cardiovascular status: blood pressure returned to baseline and hemodynamically stable  Respiratory status: acceptable, room air, spontaneous ventilation and nonlabored ventilation  Hydration status: stable

## 2022-12-20 NOTE — ANESTHESIA PRE PROCEDURE
Department of Anesthesiology  Preprocedure Note       Name:  Amarjit Grant   Age:  46 y.o.  :  1970                                          MRN:  [id number]         Date:  2022      Surgeon: Miller Chaidez):  Ernie Hardy MD    Procedure: Procedure(s):  EGD ESOPHAGOGASTRODUODENOSCOPY    Medications prior to admission:   Prior to Admission medications    Medication Sig Start Date End Date Taking? Authorizing Provider   phentermine (ADIPEX-P) 37.5 MG tablet Take 1 tablet by mouth every morning (before breakfast) for 30 days. 22  Kathie Rai DO   VERZENIO 150 MG TABS TAKE 1 TABLET BY MOUTH 2 TIMES A DAY 22   Jameson Estevez MD   albuterol sulfate HFA (PROVENTIL;VENTOLIN;PROAIR) 108 (90 Base) MCG/ACT inhaler Inhale 2 puffs into the lungs every 6 hours as needed for Wheezing 11/15/22   Redd Jose DO   traZODone (DESYREL) 50 MG tablet Take 1 tablet by mouth nightly as needed for Sleep 11/15/22   Redd Jose DO   pantoprazole (PROTONIX) 40 MG tablet Take 1 tablet by mouth daily 22  Jameson Estevez MD   venlafaxine (EFFEXOR XR) 37.5 MG extended release capsule Take 1 capsule by mouth daily 22   Jamesonmax Estevez MD   letrozole [facility]) 2.5 MG tablet Take 1 tablet by mouth daily 22  Jameson Estevez MD   ondansetron (ZOFRAN) 4 MG tablet Take 1 tablet by mouth every 8 hours as needed for Nausea or Vomiting 8/3/21   Jameson Estevez MD   ibuprofen (ADVIL;MOTRIN) 800 MG tablet Take 1 tablet by mouth every 8 hours as needed for Pain 20   Jacob Gifford MD   zoledronic acid (ZOMETA) 4 MG/100ML SOLN infusion Infuse 4 mg intravenously once Every year    Historical Provider, MD   diphenhydrAMINE (BENADRYL) 25 MG capsule 1-2 capsules by mouth every 6 hours as needed for rash 18   SARAH Mosqueda       Current medications:    No current outpatient medications on file.      No current facility-administered medications for this visit. Allergies: Allergies   Allergen Reactions    Sulfa Antibiotics Other (See Comments)     Stop breathing.  Nickel Rash       Problem List:    Patient Active Problem List   Diagnosis Code    Cubital tunnel syndrome on right G56.21    FHx: coronary artery disease Z82.49    Chest pain R07.9    Primary cancer of right breast with metastasis to other site ([address] C50.911    Dyslipidemia E78.5    Asymptomatic varicose veins I83.90    Dilated aortic root ([address] I77.810    Liver disease K76.9    Liver lesion K76.9    Breast cancer, female C50.411    Abnormal radiological finding R93.7    Bone metastasis C79.51    Metastasis to liver C78.7    Neutropenia ([address] D70.9       Past Medical History:        Diagnosis Date    Asthma     last flare up winter 2016    Breast CA [facility])     right    Breast cancer ([address] 2017    right    Chest pain     \"had chest pain last time - saw Dr Tresa Mcnally- all ok\"    FHx: coronary artery disease     H/O Doppler lower venous ultrasound 2019    No DVT,  Significant reflux noted of the Right & Left Deep Venous System.  H/O echocardiogram 2019    EF 55-60%, Essentially normal echocardiogram. Dilated aortic root at 4.0 cm.     History of exercise stress test 2019    Treadmill, EKG portion is negative for ischemia by diagnostic criteria    History of migraine     last HA  2020    Hx antineoplastic chemo 2017    still taking    HX OTHER MEDICAL     \"since 2016- having some pain mid sternum- xray thought fx, then did CT and bone scan now having bx done\"    Hyperlipemia     Primary cancer of right breast with metastasis to other site ([address] 3/24/2019    bone and liver       Past Surgical History:        Procedure Laterality Date     SECTION      COLONOSCOPY N/A 2021    COLORECTAL CANCER SCREENING, NOT HIGH RISK performed by Joel Edgar MD at [address] [place] [address] Rd  age 28    LASIK both eyes    HYSTERECTOMY (CERVIX STATUS UNKNOWN)  age 27    BSO. Due to endometriosis    LAPAROSCOPY N/A 2020    LAPAROSCOPY MECKLE'S DIVERTICULECTOMY performed by Mary Grace Crespo MD at [address]  2020    then ablation    OTHER SURGICAL HISTORY Right 2013    right ulnar nerve decompression    SALPINGO-OOPHORECTOMY Left 2020    EXPLORATORY LAPAROSCOPY, LEFT SALPINGO OOPHORECTOMY LAPAROSCOPIC performed by Mary Grace Crespo MD at [facility]       Social History:    Social History     Tobacco Use    Smoking status: Former     Packs/day: 0.50     Years: 16.00     Pack years: 8.00     Types: Cigarettes     Quit date: 2004     Years since quittin.3    Smokeless tobacco: Never    Tobacco comments:     Quit smoking 9 years ago. Substance Use Topics    Alcohol use: Yes     Alcohol/week: 2.0 standard drinks     Types: 2 Glasses of wine per week     Comment: Occasionally     CAFFEINE: 1 cup coffee daily/ average one per week                                Counseling given: Not Answered  Tobacco comments: Quit smoking 9 years ago. Vital Signs (Current): There were no vitals filed for this visit.                                            BP Readings from Last 3 Encounters:   22 113/78   22 124/82   22 130/71       NPO Status:                                                                                 BMI:   Wt Readings from Last 3 Encounters:   22 208 lb (94.3 kg)   22 208 lb 3.2 oz (94.4 kg)   22 219 lb (99.3 kg)     There is no height or weight on file to calculate BMI.    CBC:   Lab Results   Component Value Date/Time    WBC 4.2 10/24/2022 08:06 AM    RBC 3.74 10/24/2022 08:06 AM    HGB 13.1 10/24/2022 08:06 AM    HCT 38.2 10/24/2022 08:06 AM    .1 10/24/2022 08:06 AM    RDW 12.2 10/24/2022 08:06 AM     10/24/2022 08:06 AM       CMP:   Lab Results   Component Value Date/Time     2022 01:58 PM    K 3.9 2022 01:58 PM     2022 01:39 PM    CO2 27 2022 01:39 PM    BUN 10 2022 01:39 PM    CREATININE 1.2 2022 01:58 PM    CREATININE 1.1 2022 01:39 PM    GFRAA 57 2022 08:06 AM    AGRATIO 1.9 2022 01:57 PM    LABGLOM >60 2022 01:39 PM    GLUCOSE 70 2022 01:39 PM    PROT 7.1 2022 01:39 PM    CALCIUM 9.4 2022 01:39 PM    BILITOT 0.4 2022 01:39 PM    ALKPHOS 60 2022 01:39 PM    AST 25 2022 01:39 PM    ALT 21 2022 01:39 PM       POC Tests: No results for input(s): POCGLU, POCNA, POCK, POCCL, POCBUN, POCHEMO, POCHCT in the last 72 hours. Coags:   Lab Results   Component Value Date/Time    PROTIME 12.0 2021 09:33 PM    INR 0.90 2021 09:33 PM    APTT 25.1 2021 09:33 PM       HCG (If Applicable): No results found for: PREGTESTUR, PREGSERUM, HCG, HCGQUANT     ABGs: No results found for: PHART, PO2ART, CDP2EAK, VKZ8LVK, BEART, H7WFWEAP     Type & Screen (If Applicable):  No results found for: LABABO, LABRH    Drug/Infectious Status (If Applicable):  No results found for: HIV, HEPCAB    COVID-19 Screening (If Applicable):   Lab Results   Component Value Date/Time    COVID19 NOT DETECTED 2021 09:00 AM           Anesthesia Evaluation  Patient summary reviewed  Airway: Mallampati: II  TM distance: >3 FB   Neck ROM: full     Dental:          Pulmonary:normal exam    (+) asthma:                           ROS comment: Smoking Status: Former - 8 pack years  Quit Smokin04       Cardiovascular:  Exercise tolerance: good (>4 METS),   (+) CAD:,         Rhythm: regular  Rate: normal           Beta Blocker:  Not on Beta Blocker      ROS comment: Stress test 2019:  Summary   Normal stress test   Peak Bp was 142/74 , Peak Hr was 172   completed 10.9 METS and 9.3 mins of exercise   Appropriate hemodynamic response to exercise. No significant ST T wave   changes with exercise.  EKG portion is negative for ischemia by diagnostic   criteria. Echo 6/2019:  Conclusions      Summary   Normal left ventricle structure and systolic function with an ejection   fraction of 55-60%. No significant valvular disease noted. Normal pulmonary artery pressure. No evidence of pericardial effusion. Dilated aortic root at 4.0 cm. Aortic arch not visualized. Neuro/Psych:   (+) headaches: migraine headaches,             GI/Hepatic/Renal:   (+) liver disease:, morbid obesity          Endo/Other:    (+) malignancy/cancer. ROS comment: Breast cancer  Bone metastasis  Metastasis to liver     Abdominal:             Vascular: negative vascular ROS. Other Findings:             Anesthesia Plan      MAC     ASA 3       Induction: intravenous. Anesthetic plan and risks discussed with patient. Plan discussed with CRNA. KIMBERLY Boyer - CRNA   12/20/2022       Pre Anesthesia Assessment complete.  Chart reviewed on 12/20/2022

## 2022-12-20 NOTE — DISCHARGE INSTRUCTIONS
EGD    DR. Neftaly Gudino     OFFICE NUMBER [id number]    FOLLOW UP APPOINTMENT: call office in 2 weeks for pathology results     REPEAT PROCEDURE  AS  NEEDED. TEST ORDERED:NONE     What to Expect at Home  Your Recovery:  The only discomfort after your EGD is generally limited to a mild soreness of the throat, which may last a day or two. Call your physician immediately if you have severe chest pain, shortness of breath or a temperature of 100 degrees or higher if taken orally. How can you care for yourself at home? Activity  Rest as much as you need to after you go home. You should be able to go back to your usual activities the day after the test.  Diet  Follow your doctor's directions for eating after the test.  Drink plenty of fluids (unless your doctor has told you not to). Medications  If you have a sore throat the day after the test, use an over-the-counter spray to numb your throat. Your doctor will tell you if and when you can restart your medicines. He or she will also give you instructions about taking any new medicines. If you take blood thinners, such as warfarin (Coumadin), clopidogrel (Plavix), or aspirin, be sure to talk to your doctor. He or she will tell you if and when to start taking those medicines again. Make sure that you understand exactly what your doctor wants you to do. If a biopsy was done during the test, your doctor may tell you not to take aspirin or other anti-inflammatory medicines for a few days. These include ibuprofen (Advil, Motrin) and naproxen (Aleve). DO NOT DRINK ANYTHING WITH ALCOHOL TODAY. Other instructions:Anesthesia  For your safety, do not drive or operate machinery for 24 hours. Do not sign legal documents or make major decisions for 24 hours. The anesthesia can make it hard for you to fully understand what you are agreeing to. Follow-up care is a key part of your treatment and safety.  Be sure to make and go to all appointments, and call your doctor if you are having problems. It's also a good idea to know your test results and keep a list of the medicines you take. When should you call for help? [facility] [phone] OR [facility]  OUTPATIENT SURGERY [phone]  Call 911 anytime you think you may need emergency care. For example, call if:  You passed out (lost consciousness). You cough up blood. You vomit blood or what looks like coffee grounds. You pass maroon or very bloody stools. Call your doctor now or seek immediate medical care if:  You have trouble swallowing. You have belly pain. Your stools are black and tarlike or have streaks of blood. You are sick to your stomach or cannot keep fluids down. Watch closely for changes in your health, and be sure to contact your doctor if:  Your throat still hurts after a day or two. You do not get better as expected. Where can you learn more? Go to https://[employer].goTaja.com. org and sign in to your [employer] account. Enter Z735 in the [employer] box to learn more about Upper GI Endoscopy: What to Expect at Home.     If you do not have an account, please click on the Sign Up Now link. © 8604-4712 Healthwise, Incorporated. Care instructions adapted under license by [facility] ([facility]). This care instruction is for use with your licensed healthcare professional. If you have questions about a medical condition or this instruction, always ask your healthcare professional. [place] [address] any warranty or liability for your use of this information. Content Version: 55.4.109995; Current as of: November 20, 2015             [facility]  [phone]    Do not drive, work around [address] [place], [place] [address] or use equipment. Do not drink any alcoholic beverages. Do not smoke while alone. Avoid making important decisions. Plan to spend a quiet, relaxed evening @ home. Resume normal activities as you begin to feel better.   Eat lightly for your first meal, then gradually increase your diet to what is normal for you. In case of nausea, avoid food and drink only clear liquids. Resume food as nausea ceases. Notify your surgeon if you experience fever, chills, large amount of bleeding, difficulty breathing, persistent nausea and vomiting or any other disturbing problem. Call for a follow-up appointment with your surgeon.

## 2022-12-21 LAB — CLOTEST: NEGATIVE

## 2023-01-09 ENCOUNTER — OFFICE VISIT (OUTPATIENT)
Dept: INTERNAL MEDICINE CLINIC | Age: 53
End: 2023-01-09
Payer: COMMERCIAL

## 2023-01-09 VITALS
OXYGEN SATURATION: 98 % | WEIGHT: 202.8 LBS | HEART RATE: 73 BPM | HEIGHT: 66 IN | BODY MASS INDEX: 32.59 KG/M2 | DIASTOLIC BLOOD PRESSURE: 84 MMHG | SYSTOLIC BLOOD PRESSURE: 122 MMHG

## 2023-01-09 DIAGNOSIS — F51.01 PRIMARY INSOMNIA: Primary | ICD-10-CM

## 2023-01-09 DIAGNOSIS — G43.909 MIGRAINE WITHOUT STATUS MIGRAINOSUS, NOT INTRACTABLE, UNSPECIFIED MIGRAINE TYPE: ICD-10-CM

## 2023-01-09 DIAGNOSIS — E66.09 CLASS 1 OBESITY DUE TO EXCESS CALORIES WITHOUT SERIOUS COMORBIDITY WITH BODY MASS INDEX (BMI) OF 33.0 TO 33.9 IN ADULT: ICD-10-CM

## 2023-01-09 PROCEDURE — 99213 OFFICE O/P EST LOW 20 MIN: CPT | Performed by: FAMILY MEDICINE

## 2023-01-09 RX ORDER — TOPIRAMATE 25 MG/1
TABLET ORAL
Qty: 70 TABLET | Refills: 0 | Status: SHIPPED | OUTPATIENT
Start: 2023-01-09

## 2023-01-09 RX ORDER — PHENTERMINE HYDROCHLORIDE 37.5 MG/1
37.5 TABLET ORAL
Qty: 30 TABLET | Refills: 0 | Status: SHIPPED | OUTPATIENT
Start: 2023-01-09 | End: 2023-02-08

## 2023-01-09 ASSESSMENT — ENCOUNTER SYMPTOMS
ABDOMINAL PAIN: 0
NAUSEA: 0
COUGH: 0
SHORTNESS OF BREATH: 0

## 2023-01-09 ASSESSMENT — PATIENT HEALTH QUESTIONNAIRE - PHQ9
DEPRESSION UNABLE TO ASSESS: FUNCTIONAL CAPACITY MOTIVATION LIMITS ACCURACY
SUM OF ALL RESPONSES TO PHQ QUESTIONS 1-9: 0
1. LITTLE INTEREST OR PLEASURE IN DOING THINGS: 0
2. FEELING DOWN, DEPRESSED OR HOPELESS: 0
SUM OF ALL RESPONSES TO PHQ QUESTIONS 1-9: 0
SUM OF ALL RESPONSES TO PHQ QUESTIONS 1-9: 0
SUM OF ALL RESPONSES TO PHQ9 QUESTIONS 1 & 2: 0
SUM OF ALL RESPONSES TO PHQ QUESTIONS 1-9: 0

## 2023-01-09 NOTE — PROGRESS NOTES
Lia Riedel (:  1970) is a 46 y.o. female,established patient, here for evaluation of the following chief complaint(s):  Migraine and Obesity         ASSESSMENT/PLAN:  1. Primary insomnia  Continue Trazodone    2. Class 1 obesity due to excess calories without serious comorbidity with body mass index (BMI) of 33.0 to 33.9 in adult  - phentermine (ADIPEX-P) 37.5 MG tablet; Take 1 tablet by mouth every morning (before breakfast) for 30 days. Dispense: 30 tablet; Refill: 0  Oarrs checked  ADR's explained    3. Migraine without status migrainosus, not intractable, unspecified migraine type  -Start Topamax  - topiramate (TOPAMAX) 25 MG tablet; Week 1: Take one tablet by mouth in the evening, Week 2: 1 tablet twice a day,  Week 3: take one tablet in the morning and two tablets  at night, Week 4 : 2 tablets twice  a day  Dispense: 70 tablet; Refill: 0  ADR's explained. She has used before    Stay hydrated  This is her last month for Adipex  Return in about 3 months (around 2023) for migraines, insomnia.        Lab Results   Component Value Date    WBC 4.2 10/24/2022    HGB 13.1 10/24/2022    HCT 38.2 10/24/2022    .1 (H) 10/24/2022     10/24/2022     Lab Results   Component Value Date    CHOL 284 (H) 2022     Lab Results   Component Value Date    TRIG 134 2022     Lab Results   Component Value Date    HDL 94 2022     Lab Results   Component Value Date    LDLCALC 163 (H) 2022       Lab Results   Component Value Date     2022    K 3.9 2022     2022    CO2 27 2022    BUN 10 2022    CREATININE 1.2 (H) 2022    GLUCOSE 70 2022    CALCIUM 9.4 2022    PROT 7.1 2022    LABALBU 4.7 2022    BILITOT 0.4 2022    ALKPHOS 60 2022    AST 25 2022    ALT 21 2022    LABGLOM >60 2022    GFRAA 57 (L) 2022    AGRATIO 1.9 2022       Subjective SUBJECTIVE/OBJECTIVE:    HISTORY OF PRESENT ILLNESS:  This is a 46 y.o. female here for the following:  Patient Active Problem List    Diagnosis Date Noted    Migraine without status migrainosus, not intractable 01/09/2023    Black stools 12/20/2022    Dyspepsia 12/20/2022    Neutropenia ([facility] [address] 10/04/2021    Breast cancer, female 07/22/2020    Abnormal radiological finding 07/22/2020    Bone metastasis 07/22/2020    Metastasis to liver 07/22/2020    Liver disease 06/24/2020    Liver lesion 06/24/2020    Dilated aortic root ([facility] [address] 06/18/2019    Dyslipidemia 05/14/2019    Asymptomatic varicose veins 05/14/2019    Primary cancer of right breast with metastasis to other site [facility]) 03/24/2019    FHx: coronary artery disease     Chest pain     Cubital tunnel syndrome on right 08/05/2013        Migraines- used Topamax in the past and would like to restart  Insomnia- on Trazodone prn  Obesity- On Adipex. This will be her last month. Down 6 lbs from last visit      Review of Systems   Constitutional:  Negative for diaphoresis and fever. Respiratory:  Negative for cough and shortness of breath. Cardiovascular:  Negative for chest pain and palpitations. Gastrointestinal:  Negative for abdominal pain and nausea. Neurological:  Positive for headaches (migraines). Negative for dizziness. Allergies   Allergen Reactions    Sulfa Antibiotics Other (See Comments)     Stop breathing. Nickel Rash     Current Outpatient Medications   Medication Sig Dispense Refill    phentermine (ADIPEX-P) 37.5 MG tablet Take 1 tablet by mouth every morning (before breakfast) for 30 days.  30 tablet 0    topiramate (TOPAMAX) 25 MG tablet Week 1: Take one tablet by mouth in the evening, Week 2: 1 tablet twice a day,  Week 3: take one tablet in the morning and two tablets  at night, Week 4 : 2 tablets twice  a day 70 tablet 0    VERZENIO 150 MG TABS TAKE 1 TABLET BY MOUTH 2 TIMES A DAY 60 tablet 5    albuterol sulfate HFA (PROVENTIL;VENTOLIN;PROAIR) 108 (90 Base) MCG/ACT inhaler Inhale 2 puffs into the lungs every 6 hours as needed for Wheezing 1 each 3    traZODone (DESYREL) 50 MG tablet Take 1 tablet by mouth nightly as needed for Sleep 30 tablet 2    venlafaxine (EFFEXOR XR) 37.5 MG extended release capsule Take 1 capsule by mouth daily 30 capsule 5    ondansetron (ZOFRAN) 4 MG tablet Take 1 tablet by mouth every 8 hours as needed for Nausea or Vomiting 30 tablet 0    ibuprofen (ADVIL;MOTRIN) 800 MG tablet Take 1 tablet by mouth every 8 hours as needed for Pain 30 tablet 1    zoledronic acid (ZOMETA) 4 MG/100ML SOLN infusion Infuse 4 mg intravenously once Every year      diphenhydrAMINE (BENADRYL) 25 MG capsule 1-2 capsules by mouth every 6 hours as needed for rash 30 capsule 0    pantoprazole (PROTONIX) 40 MG tablet Take 1 tablet by mouth daily 30 tablet 5    letrozole (FEMARA) 2.5 MG tablet Take 1 tablet by mouth daily 90 tablet 5     No current facility-administered medications for this visit. Vitals:    01/09/23 1047   BP: 122/84   Site: Left Upper Arm   Position: Sitting   Cuff Size: Medium Adult   Pulse: 73   SpO2: 98%   Weight: 202 lb 12.8 oz (92 kg)   Height: 5' 5.5\" (1.664 m)     Objective   Physical Exam  Vitals reviewed. Constitutional:       General: She is not in acute distress. Cardiovascular:      Rate and Rhythm: Normal rate and regular rhythm. Pulmonary:      Effort: Pulmonary effort is normal. No respiratory distress. Breath sounds: Normal breath sounds. Abdominal:      Palpations: Abdomen is soft. Tenderness: There is no abdominal tenderness. Musculoskeletal:      Cervical back: Neck supple. Right lower leg: No edema. Left lower leg: No edema. Neurological:      Mental Status: She is alert and oriented to person, place, and time. An electronic signature was used to authenticate this note.     --Walter Dakin,      This dictation was generated by voice recognition computer software. Although all attempts are made to edit the dictation for accuracy, there may be errors in the transcription that are not intended.

## 2023-02-09 ENCOUNTER — TELEPHONE (OUTPATIENT)
Dept: ONCOLOGY | Age: 53
End: 2023-02-09

## 2023-02-09 NOTE — TELEPHONE ENCOUNTER
2/9/23 - left pt vm - had to move the 2/27/23 Piedad appt to 3/2/23 at 11:15 - original  appt was in a NP timeslot.

## 2023-02-16 DIAGNOSIS — G43.909 MIGRAINE WITHOUT STATUS MIGRAINOSUS, NOT INTRACTABLE, UNSPECIFIED MIGRAINE TYPE: ICD-10-CM

## 2023-02-16 RX ORDER — TOPIRAMATE 25 MG/1
TABLET ORAL
Qty: 70 TABLET | Refills: 0 | OUTPATIENT
Start: 2023-02-16

## 2023-02-16 RX ORDER — TOPIRAMATE 50 MG/1
50 TABLET, FILM COATED ORAL 2 TIMES DAILY
Qty: 60 TABLET | Refills: 1 | Status: SHIPPED | OUTPATIENT
Start: 2023-02-16

## 2023-02-21 ENCOUNTER — HOSPITAL ENCOUNTER (OUTPATIENT)
Dept: INFUSION THERAPY | Age: 53
Discharge: HOME | End: 2023-02-21
Payer: COMMERCIAL

## 2023-02-21 DIAGNOSIS — C79.51 SECONDARY MALIGNANT NEOPLASM OF BONE (HCC): ICD-10-CM

## 2023-02-21 DIAGNOSIS — C50.411 MALIGNANT NEOPLASM OF UPPER-OUTER QUADRANT OF RIGHT FEMALE BREAST, UNSPECIFIED ESTROGEN RECEPTOR STATUS (HCC): ICD-10-CM

## 2023-02-21 DIAGNOSIS — C78.7 SECONDARY MALIGNANT NEOPLASM OF LIVER AND INTRAHEPATIC BILE DUCT (HCC): ICD-10-CM

## 2023-02-21 LAB
ALBUMIN SERPL-MCNC: 4.4 GM/DL (ref 3.4–5)
ALP BLD-CCNC: 51 IU/L (ref 40–129)
ALT SERPL-CCNC: 15 U/L (ref 10–40)
ANION GAP SERPL CALCULATED.3IONS-SCNC: 10 MMOL/L (ref 4–16)
AST SERPL-CCNC: 19 IU/L (ref 15–37)
BASOPHILS ABSOLUTE: 0 K/CU MM
BASOPHILS RELATIVE PERCENT: 1.2 % (ref 0–1)
BILIRUB SERPL-MCNC: 0.3 MG/DL (ref 0–1)
BUN SERPL-MCNC: 15 MG/DL (ref 6–23)
CALCIUM SERPL-MCNC: 9.3 MG/DL (ref 8.3–10.6)
CHLORIDE BLD-SCNC: 110 MMOL/L (ref 99–110)
CO2: 23 MMOL/L (ref 21–32)
CREAT SERPL-MCNC: 1.2 MG/DL (ref 0.6–1.1)
DIFFERENTIAL TYPE: ABNORMAL
EOSINOPHILS ABSOLUTE: 0 K/CU MM
EOSINOPHILS RELATIVE PERCENT: 1.2 % (ref 0–3)
GFR SERPL CREATININE-BSD FRML MDRD: 54 ML/MIN/1.73M2
GLUCOSE SERPL-MCNC: 100 MG/DL (ref 70–99)
HCT VFR BLD CALC: 35 % (ref 37–47)
HEMOGLOBIN: 12.3 GM/DL (ref 12.5–16)
LYMPHOCYTES ABSOLUTE: 1.9 K/CU MM
LYMPHOCYTES RELATIVE PERCENT: 55.1 % (ref 24–44)
MCH RBC QN AUTO: 34.6 PG (ref 27–31)
MCHC RBC AUTO-ENTMCNC: 35.1 % (ref 32–36)
MCV RBC AUTO: 98.3 FL (ref 78–100)
MONOCYTES ABSOLUTE: 0.3 K/CU MM
MONOCYTES RELATIVE PERCENT: 8.9 % (ref 0–4)
PDW BLD-RTO: 13.2 % (ref 11.7–14.9)
PLATELET # BLD: 194 K/CU MM (ref 140–440)
PMV BLD AUTO: 8.7 FL (ref 7.5–11.1)
POTASSIUM SERPL-SCNC: 3.6 MMOL/L (ref 3.5–5.1)
RBC # BLD: 3.56 M/CU MM (ref 4.2–5.4)
SEGMENTED NEUTROPHILS ABSOLUTE COUNT: 1.1 K/CU MM
SEGMENTED NEUTROPHILS RELATIVE PERCENT: 33.6 % (ref 36–66)
SODIUM BLD-SCNC: 143 MMOL/L (ref 135–145)
TOTAL PROTEIN: 6.8 GM/DL (ref 6.4–8.2)
WBC # BLD: 3.4 K/CU MM (ref 4–10.5)

## 2023-02-21 PROCEDURE — 85025 COMPLETE CBC W/AUTO DIFF WBC: CPT

## 2023-02-21 PROCEDURE — 86300 IMMUNOASSAY TUMOR CA 15-3: CPT

## 2023-02-21 PROCEDURE — 80053 COMPREHEN METABOLIC PANEL: CPT

## 2023-02-21 PROCEDURE — 36415 COLL VENOUS BLD VENIPUNCTURE: CPT

## 2023-02-23 LAB — CANCER AG27-29 SERPL-ACNC: 52.8 U/ML

## 2023-03-02 NOTE — PROGRESS NOTES
Patient Name: Myriam Gu  Patient : 1970  Patient MRN: [id number]     Primary Oncologist: Patric Umana MD       Date of Service: 3/3/2023      Chief Complaint:   Chief Complaint   Patient presents with    Follow-up          Active Problem list  1. 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 Bone metastasis    3. Metastasis to liver    4. Neutropenia, unspecified type ([address]    5. Anemia, unspecified type    6. Mild renal insufficiency      Problem List  Breast cancer, female ( Stage Date: Unknown, Stage IV (Right breast upper-outer quadrant, T1c, N0, M1)-Clinical  Date of Dx:10/2017 Extent of Disease: Evidence of metastatic disease; Disease State: Initial diagnosis; Metastatic Sites: Bone; Metastatic Sites: Liver; ER Status: Positive; NV Status: Positive; HER-2/jus Value-FISH: Negative; Menopausal Status: Premenopausal; PIK3CA gene: Positive)   Abnormal radiological finding   Bony metastasis ( Date of Dx:10/2017 )   Chest pain   Metastasis to liver       HPI:        Extremely pleasant lady patient of Dr. Kd Smart. RN at [facility]. Evaluated in 2017 for some discomfort involving the sternum that had been present for a few weeks. She could not remember any injury to that area except maybe a few months earlier she had fallen face-forward. She did not remember having significant discomfort to the chest area involving the fall. 2017, chest x-ray was reported to be fairly unremarkable. X-rays of the sternum revealed questionable age-indeterminate factor of the mid to superior sternum. Additional CT was recommended which was done on 2017, revealing mottled appearance of the sternum. Findings could represent related to earlier trauma or even osteomyelitis or metastatic disease. Nonspecific sclerotic changes in the T12 vertebral body were noted too. Triple phase bone scan done on October 4, 2017, had revealed only focal uptake in the body of the sternum corresponding to the changes noted on earlier CAT scan. Similar differential was again suggested. Visit here on October 9, 2017, though, she stated that she in general was doing actually better and pain was not severe and she did not have to take any pain medications and had been continuing to work on a full time basis and also remained fairly active. CMP on October 09, 2017, was normal and so was LDL of 183. Sed rate was 10. Kappa Lambda light chain ratios were normal. Immunoelectrophoresis was negative. CBC had revealed fairly normal numbers. CT biopsy of the sternum done on October 17, 2017, did reveal changes of metastatic carcinoma, possibly suggestive of breast primary. Biopsy from the sternum done on October 13, 2017, revealed metastatic carcinoma quite likely from breast primary. The tumor was strongly positive for estrogen receptor 95 percent and progesterone receptor is 95 percent, and negative for HER2. Visit here on October 20, 2017, those findings were discussed with her. October 25, 2017, diagnostic digital bilateral breast mammogram revealed 1.8 cm irregular, spiculated hyperechoic mass at the 10 oclock position in the right breast correlating to the mammographic area. Findings were highly suspicious for neoplasm. Ultrasound guided biopsy on October 27, 2017, did reveal invasive mammary carcinoma. Ultrasound-guided biopsy October 27, 2017, from this mass revealed invasive mammary carcinoma. Tumor was again strongly positive 95 percent for ER, 95 percent RI, HER-2 was negative both by IHC and FISH.      PET scan done on November 2, 2017, revealed hypermetabolic right breast mass as well as prominent right axillary lymph nodes and scattered osseous metastatic lesions involving the sternum, right scapula, left L4 vertebral body, and bilateral iliac crest.     Visit here on October 27, 2017, and afterwards metastatic disease involving the bones were discussed with her and clinically she was still considered to be premenopausal as she was not having any symptoms suggestive of menopausal state. She was started on treatment with Zoladex, Faslodex, and Zometa starting on November 10, 2017. She had to receive these treatments in outpatient facility at the hospital primarily because of insurance reasons. Saw me on December 7, 2017, sooner than her scheduled appointment, was concerned about possibly feeling a lump involving the right breast at around 6 oclock position. Otherwise she had not noticed any new issues. Mammogram and ultrasound done on December 8, 2017, showed dense breast tissue where she was feeling a mass. There was no sonographic evidence for any mass in that area too. MRI was suggested if needed to. Otherwise she was again noted to have known malignancy in the upper outer quadrant of the breast.     PET scan on May 14, 2018, revealed partial treatment response with improvement in the right axillary nodes as well as skeletal metastases. Metabolic activity associated with lytic bone lesions had improved with increased sclerosis of the previously seen lesions. Mild residual activity was noted at the L4 vertebral body. There were no new persistent or metabolically active aggressive osseous lesions. Visit here on February 26, 2019 overall I think she was doing fair but we were concerned about her starting to have more discomfort in her left elbow as well as the right hip area. She had not had a PET scan since May of last year and that too had revealed a partial response, we felt possibly repeating the PET scan would be helpful. In the meantime she was going to continue to receive the same treatments as before including Faslodex, Zoladex and Zometa    She was not able to undergo a PET scan but insurance.  Did undergo a bone scan on April 4, 2019 revealing a stable bone scan without evidence of new osseous metastases. May fall, 2019 was evaluated for symptoms of chest pain including a cardiac work-up including echo and stress test afterwards and those were unremarkable. She did have a CTA of the chest done looking for pulmonary embolism on May 4, 2019 which was negative for pulmonary embolism no other acute pulmonary illness. She still of course was noted to have several sclerotic lesions within the spine which were felt to be stable as compared to the prior PET scan which was done in May 2018. [facility] levels done on July 16, 3887 was 52.8, follicular phase. CA-27-29 level same day was 26, stable. [facility] September was 17, 2019 we talked about still being pre-or perimenopausal Darst will continue to stay on Zoladex along with Faslodex. Talked about disease overall having responded. Will possibly extend intervals between Zometa to may be once a year, may be due in July 2020. Did start her on Effexor for hot flashes    March 12, 2020 still being treated with same agents, still working on a long hours still in general feeling well only issue being occasional discomfort in the right rib cage as well as right breast area. Could not feel any distinct masses. Denied any history of injury. Otherwise negative for any new systemic complaints and overall both physically and emotionally was doing quite well    Telemedicine visit on April 23, 2020. We discussed results those were obtained on her previous visit on March 12, 2020. Chemistries CBC were fairly unremarkable CA-27-29 levels were still normal. She had undergone CT scan of the chest on March 24, 2020 was noted to have a stable bony lesions. But she was described to have a 2.8 cm lesion in liver which was worrisome for possibly metastatic disease. Following those studies she was advised to undergo PET scan and that was refused by insurance.  She did undergo a bone scan on April 28, 2020 was noted to have stable appearance of skeletal metastatic disease no new metastatic lesions were noted. She did undergo a CT scan of the abdomen and pelvis with contrast on the same day again was noted to have a 2.3 x 2.7 cm left hepatic lesion which was worrisome for metastatic disease and again of course skeletal metastases were noted. Telemedicine visit again on May 5, 2020. April 27, 2020 she did undergo liver biopsy. Pathology did reveal metastatic adenocarcinoma from a breast primary. Tumor was still positive for ER 50% HI 40%. HER-2 was equivocal by IHC though negative by FISH. May 6, 2020 had long discussion with her and her family. We talked about progressive nature of the disease. We talked about future plan including liver biopsy material being sent to [facility] analysis. We talked about the need for us to change her treatment to a combination of Faslodex, Zoladex and add Verzenio. We also discussed possibly doing a PET scan. We do know she has disease involving the breast, bones and now liver. Our intention is that if indeed she has an isolated lesion in the liver we might consider doing local treatment to the liver besides this new systemic therapy. PET scan done on May 21, 5836 revealed metabolically active metastases in the left hepatic lobe no other metabolic activity was noted in the known skeletal metastases mammogram revealed stable appearance no new sites of malignancy were noted. Treatment was changed to Faslodex Zoladex and Verzenio in May 2020    [facility] analysis from the liver lesion did reveal a tumor to be positive for PI K3 CA. Otherwise microsatellite status was stable and no other alterations were noted especially in BRCA1 and BRCA2 as well as HER-2      As PET scan had revealed an isolated lesion involving the liver she did undergo radio frequency ablation of the lesion on June 24, 2020, tolerated procedure fairly well though had some discomfort afterwards.  CT scan of the abdomen done without contrast on June 25, 2020 did reveal sequela of treated metastatic lesion in the left lobe of the liver with central high attenuation area measuring 2.6 x 2.4 cm and the peripheral low-attenuation rim measuring around 4.8 x 4.1 cm related to the postprocedural edema. Again osseous metastatic disease was again noted without pathologic fracture. July 17, 2020 she was overall doing fairly decent had recovered from the discomfort related to the radiofrequency ablation. We talked about continuing on this present regimen does do plan to check her [address] levels to see if we can hold off giving her Zoladex. We of course will continue with Faslodex, Verzenio and Zometa. We of course will continue to monitor her progress through exams as well as CAT scans. [address] level done on July 30, 2020 was 9.4, premenopausal.     Visit here on September 15, 2020. Was still being treated with Faslodex Zoladex and Verzenio. Studies done on October 9, 2020 fairly normal CBC, chemistries reveal creatinine of 1.2 CA 27-29 levels were mildly elevated to 47. A CT scan of the abdomen done on October 15, 2020 revealed 3.5 x 3.1 cm ablation zone, which had decreased in size since immediate post ablation changes done on June 25, 2020. It is difficult to assess residual tremor dedicated multiphasic CT or MRI was recommended. Multifocal osseous sclerotic metastases were felt to be stable. October 27, 2020 overall she was feeling about the same there was some concern about slight increase in tumor marker CA 27-29. Rebecca Shetty Plan was to to multiphasic CT scan of the abdomen to assess the disease status. Also she was seriously considering having her unilateral oophorectomy I think that will be reasonable.   I     CT scan of the abdomen done on November 3, 2020 overall it was felt that there was stable present appearance of liver no suspicious contrast enhancement to indicate presence of residual tumor no new hepatic abnormality was noted again stable appearance of osteoblastic metastatic disease of the lower lumbar and thoracic spine. On December 17, 2020 she did undergo left oophorectomy. She also the same day underwent removal of Meckel's diverticulum. Tolerated the procedure well. Pathology from the ovary did not show any significant pathologic changes. CA 27-29 levels done on November 25, 2020 was 47.2 not significantly different from before. Visit here on January 5, 2020 she was still being treated with Faslodex and Verzenio. Of course Zoladex was stopped after her undergoing oophorectomy. CA 27-29 levels done on February 5, 2021 was slightly higher than before, 54.5. With her continued elevation of CA 27-29, though minimally, wanted to do a PET scan, was not approved by [employer]. Underwent a CT scan of the chest abdomen and pelvis on March 24, 2021. There was no new metastatic disease noted in the chest abdomen or pelvis the treated left hepatic mass was stable in appearance. Also stable appearance of treated skeletal metastatic disease. Small bilateral pulmonary nodules too were a stable. Visit here on April 1, 2021 all in all she was feeling quite well still tolerating therapy with Faslodex and Verzenio extremely well. Counts have been fairly reasonable. Seen  here on 6/3/2021 she reported numbness and scar tissue formation in the gluteal area secondary to the Faslodex injection. Reported that she continues to have hot flashes. Reported chronic headaches secondary to migraine. Also reported blurred vision which is not extensive and has LasiK 20 years ago, has an follow-up appointment with ophthalmology. No chest pain or shortness of breath. No new breast masses. Reported that her legs hurt when she sits for long period Of time and tries  to get up.    6/26/21: Venous u/l LE; no DVT. She had swelling in the LLE after the injection here. Received one dose of lovenox and also muscle relaxant and sx resolved. 7/8/21 mammogram no evidence of malignancy. Dexa scan normal     9/2021 Ca 27-29 was 70    10/25/21: Ct chest, abdomen and pelvis:  Chest: Stable chest CT with treated osseous metastatic disease, with several   sclerotic bone lesions. There also a few tiny pulmonary nodules 4 mm and   less. No new findings of metastatic disease. 12 mm masslike focus of the right breast which appears slightly increased in   conspicuity from March 2021. This may relate to differences in technique but   diagnostic mammogram is recommended along with correlation with the   mammography results from July 2021       Abdomen/pelvis: Stable CT of the abdomen and pelvis. Unchanged low-density 3   cm lesion of the liver and unchanged treated osseous metastatic disease with   several sclerotic foci. No new findings. 12/2/21: CA 27-29 was 77.6    2/3/22: CA 27-29 63    4/19/22: Ct chest, abdomen and pelvis:  1. Stable appearance of chest, abdomen, and pelvis with no new metastatic   disease noted. 2.  Stable appearance of 2.6 cm left hepatic lobe mass likely related to   treated metastatic lesion. 3.  Stable osseous metastatic disease. 4.  Stable 0.3 cm bilateral pulmonary nodules. May 2022 ca 27-29 53    9/21/22: MRI brain:  Motion artifact mildly degrades the images. There are a few areas of high   signal in the periventricular white matter that are likely related to chronic   small vessel ischemic disease. No acute brain parenchymal abnormality. No areas of abnormal enhancement to suggest brain parenchymal metastases at   this time. 10/9/22: CT chest, abdomen  and pelvis:  1. Multiple tiny nodules seen bilaterally were previously seen as well and   appear stable. No evidence of growth in size or number. 2. Stable mild thoracic and lumbar bony metastatic disease. 3. Stable hepatic lesion measuring 2.6 cm. No evidence of new lesions or   increase in size of the single lesion. 4. No acute abnormalities in the chest abdomen or pelvis. 10/24/22: CA 27-29 is 69    10/27/22 CT ABD Pelvis Chest  Impression   1. Multiple tiny nodules seen bilaterally were previously seen as well and   appear stable. No evidence of growth in size or number. 2. Stable mild thoracic and lumbar bony metastatic disease. 3. Stable hepatic lesion measuring 2.6 cm. No evidence of new lesions or   increase in size of the single lesion. 4. No acute abnormalities in the chest abdomen or pelvis. 12/7/22 Xray Lumbar Spine  Impression   1. Sclerotic lesion involving the L4 vertebral body, unchanged. This is   compatible with the patient's known history of skeletal metastases. 2. No evidence of acute fracture. 12/20/22 EGD with Dr Clay Watkins - Normal    2/21/23 CBC WBC 3.4 hemoglobin 12.3 hematocrit 35.0 platelets 301  CMP BUN 15 creatinine 1.2 GFR 54  CA 27.29 52.8    Past Medical History  None other than breast cancer    Surgical History    1. Abdominal hysterectomy with unilateral oophorectomy for endometriosis in the year 2000. 2. Surgery on right elbow in 2013. Social History  Smoking Status Former smokerUsed to smoke, quit in 2004. Negative for significant ethanol intake. Occasional social usage. .  works at a manufacturing plant. Two sons ages 32 and 25. 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     Family History  NC    Interval history:3/3/23: Arrived to clinic today alone. No new masses in breast. No new lumps in LN. No CP. No SOB. No new ABD Pain. Intentional weight loss. Down 20 lbs. Hot Flashes improving. No Vision Changes. Reports some diarrhea. No Rash.     Review of Systems   Per interval history; otherwise 10 point ROS is negative              Vital Signs:  /79 (Site: Right Upper Arm, Position: Sitting, Cuff Size: Medium Adult)   Pulse 75   Temp 97.6 °F (36.4 °C) (Infrared)   Ht 5' 5.5\" (1.664 m)   Wt 196 lb 12.8 oz (89.3 kg)   SpO2 98%   BMI 32.25 kg/m²     Physical Exam:  Was negative for any new palpable nodes and/or masses involving either breast but dense breasts. No hepatosplenomegaly no abnormal cardiac or pulmonary findings no new skin or neurologic issues or findings.       Labs:    Hematology:  Lab Results   Component Value Date    WBC 3.4 (L) 02/21/2023    RBC 3.56 (L) 02/21/2023    HGB 12.3 (L) 02/21/2023    HCT 35.0 (L) 02/21/2023    MCV 98.3 02/21/2023    MCH 34.6 (H) 02/21/2023    MCHC 35.1 02/21/2023    RDW 13.2 02/21/2023     02/21/2023    MPV 8.7 02/21/2023    SEGSPCT 33.6 (L) 02/21/2023    EOSRELPCT 1.2 02/21/2023    BASOPCT 1.2 (H) 02/21/2023    LYMPHOPCT 55.1 (H) 02/21/2023    MONOPCT 8.9 (H) 02/21/2023    SEGSABS 1.1 02/21/2023    EOSABS 0.0 02/21/2023    BASOSABS 0.0 02/21/2023    LYMPHSABS 1.9 02/21/2023    MONOSABS 0.3 02/21/2023    DIFFTYPE AUTOMATED DIFFERENTIAL 02/21/2023    PLTM ADEQUATE 02/03/2022     Lab Results   Component Value Date    ESR 10 10/09/2017       Chemistry:  Lab Results   Component Value Date     02/21/2023    K 3.6 02/21/2023     02/21/2023    CO2 23 02/21/2023    BUN 15 02/21/2023    CREATININE 1.2 (H) 02/21/2023    GLUCOSE 100 (H) 02/21/2023    CALCIUM 9.3 02/21/2023    PROT 6.8 02/21/2023    LABALBU 4.4 02/21/2023    BILITOT 0.3 02/21/2023    ALKPHOS 51 02/21/2023    AST 19 02/21/2023    ALT 15 02/21/2023    LABGLOM 54 (L) 02/21/2023    GFRAA 57 (L) 08/24/2022    AGRATIO 1.9 04/18/2022    POCCA 1.15 11/18/2022    POCGLU 79 11/18/2022     Lab Results   Component Value Date     10/09/2017     No components found for: LD  Lab Results   Component Value Date    TSHHS 0.897 12/07/2022    T4FREE 1.22 08/08/2013    FT3 3.3 08/08/2013       Immunology:  Lab Results   Component Value Date    PROT 6.8 02/21/2023     Lab Results   Component Value Date    KAPPAUVOL 1.47 10/09/2017    LAMBDAUVOL 1.16 10/09/2017    KLFLCR 1.27 10/09/2017     No results found for: B2M    Coagulation Panel:  Lab Results Component Value Date    PROTIME 12.0 06/26/2021    INR 0.90 06/26/2021    APTT 25.1 06/26/2021    DDIMER 0.58 (H) 06/26/2021       Anemia Panel:  Lab Results   Component Value Date    FEUQTOHI83 720 08/08/2013       Tumor Markers:  Lab Results   Component Value Date    LABCA2 52.8 (H) 02/21/2023         Imaging: Reviewed     Pathology:Reviewed     Observations:  Performance Status: ECOG   Depression Status: No data recorded          Assessment & Plan:  Extremely pleasant lady patient of Dr. Parul Gillette, overall blessed with good health. Saw him in September for vague discomfort involving the sternum which led to chest x-ray and x-rays of the sternum revealing questionable age-indeterminate fracture of the mid to superior sternum. CT scan revealed mottled appearance of sternum. Etiology could be related to earlier trauma and/or even metastatic disease. Bone scan showed increased activity in the sternum area with the same differential. Mammogram in October 2016 was unremarkable. October 10, 2017, I did spend time with her and her sister. We did talk about future course of action including possibly just repeating the studies in a couple of months. Would consider doing biopsy of the sternal area. She I think would feel comfortable if we could have better answer for her and thus for that reason I did advise her to be seen in interventional radiology for possible biopsy. October 13, 2017, CT guided biopsy from the sternum revealed changes consistent with metastatic carcinoma from breast biopsy. The tumor is strongly positive for ER and CO and negative for HER2. October 25, 2017, mammogram did reveal a lesion in the right breast of 1.8 cm in the upper outer quadrant. Biopsy for which did reveal invasive mammary carcinoma. October 27, 2017, I did discuss the findings with her and her extended family. We talked about basically the finding of carcinoma involving the right breast as well as bones.  Biopsy from the sternum was positive. A bone scan also showed questionable changes involving the T12, though CT scan of the chest otherwise was negative for any pulmonary or hepatic metastases. Biopsy from the right breast October 27, 2017, also revealed invasive mammary carcinoma, strongly positive ER, AL, and HER-2. PET scan done on November 2, 2017, revealed hypermetabolic right breast as well as right axillary nodes and osseous metastases involving sternum, right scapula, and L4 vertebral body. She was considered a premenopausal lady with metastatic disease involving the bones. Treatment choices were discussed with her and on November 10, 2017, was started on treatment with Zoladex, Faslodex, and Zometa. Visit here on November 20, 2017, she was tolerating these treatments well following the Zometa. December 7, 2017, bony pains were better, but she was concerned about possibly a lump involving the 6 oclock position right breast. Mammogram and ultrasound did not reveal any mass in that area. She was still noted to have lesion in the upper outer quadrant which had been biopsied before. The PET scan done on May 14, 2018, had revealed improvement in disease involving the breast, axilla, as well as bones. Previous PET scan had shown changes involving the sternum, scapula, and L4 areas; those areas did show improvement on the PET scan. Visit here on February 26, 2019 overall I think she was doing fair but we were concerned about her starting to have more discomfort in her left elbow as well as the right hip area. She had not had a PET scan since May of last year and that too had revealed a partial response, we felt possibly repeating the PET scan would be helpful. In the meantime she was going to continue to receive the same treatments as before including Faslodex, Zoladex and Zometa  She was not able to undergo a PET scan but insurance.  Did undergo a bone scan on April 4, 2019 revealing a stable bone scan without evidence of new osseous metastases. May , 2019 was evaluated for symptoms of chest pain including a cardiac work-up including echo and stress test afterwards and those were unremarkable. She did have a CTA of the chest done looking for pulmonary embolism on May 4, 2019 which was negative for pulmonary embolism no other acute pulmonary illness. She still of course was noted to have several sclerotic lesions within the spine which were felt to be stable as compared to the prior PET scan which was done in May 2018. [facility] September was 17, 2019 we talked about still being pre-or perimenopausal Darst will continue to stay on Zoladex along with Faslodex. Talked about disease overall having responded. Will possibly extend intervals between Zometa to may be once a year, may be due in July 2020. Did start her on Effexor for hot flashes  March 12, 2020 overall was doing fairly well Effexor was helpful with hot flashes. Denied significant new issues except for discomfort in the breast as well as rib cage area. Is due to have her bone scan mammogram and CT scan of the chest done to look for the discomfort in the chest area. Otherwise will continue on the same regimen. She will see me after the studies are completed. Telemedicine visit on April 23, 2020, as described above new issues being no obvious progression noted on a bone scan and blood work including tumor markers. CT scan of the chest as well as CT scan of the abdomen showing 2.7 cm lesion in the left hepatic lobe quite worrisome for possible metastatic disease. Clinically she was still doing well. I did encourage her to undergo a biopsy of the liver lesion. And following the biopsy could decide about if we need to send material for foundation 1 study also    Telemedicine visit again on May 5, 2020. April 27, 2020 she did undergo liver biopsy. Pathology did reveal metastatic adenocarcinoma from a breast primary.  Tumor was still positive for ER 50% MN 40%. HER-2 was equivocal by IHC though negative by FISH. May 6, 2020 had long discussion with her and her family. We talked about progressive nature of the disease. We talked about future plan including liver biopsy material being sent to [place] [id number] analysis. We talked about the need for us to change her treatment to a combination of Faslodex, Zoladex and add Verzenio. We also discussed possibly doing a PET scan. We do know she has disease involving the breast, bones and now liver. Our intention is that if indeed she has an isolated lesion in the liver we might consider doing local treatment to the liver besides this new systemic therapy. June 1, 2020 I did spend time with her and her sister. We talked about PET scan revealing stable bony metastases and an isolated lesion involving the liver, biopsy-proven malignancy. Mammogram was unchanged from before. Couple weeks prior to this visit had started taking Verzenio along with Faslodex and Zoladex. We did talk about staying on the present systemic therapy and of course will continue to monitor her progress. We also discussed with her about possibly doing radiofrequency ablation treatment to the single lesion involving the liver. As described above on June 24, 2020 she did undergo radiofrequency ablation of the isolated lesion in the liver. July 17, 2020 she was overall doing fairly decent had recovered from the discomfort related to the radiofrequency ablation. We talked about continuing on this present regimen does do plan to check her [address] [place] [address] levels to see if we can hold off giving her Zoladex. We of course will continue with Faslodex, Verzenio and Zometa. We of course will continue to monitor her progress through exams as well as CAT scans. September 15, 2020 was still being treated with Faslodex Zoladex and Verzenio. FSH levels were in premenopausal levels. We also planning to give zometa from time to time.   She in general was feeling fairly well major issue was discomfort primarily from Faslodex injections. We talked about checking her blood work with the next injection on October 9 including chemistries and CA-27-29. We also talked about checking her a CT scan of the abdomen to see the status of the lesion following the radiofrequency ablation. Otherwise as before we will continue to monitor her blood progress exams CAT scans and possibly PET scans. January 5, 2021 spend time with her on the few issues as described below. #1 regarding metastatic carcinoma of the breast, premenopausal, s/p  oophorectomy, currently being treated with Faslodex and Verzenio. She was has had locally treatment for isolated liver metastases done on June 24, 2020. CBC was drawn today counts were reasonable that she could continue on the same dosages of Verzenio. Advised her to stay on the present therapy and will recheck her blood tumor markers in February. February 16, 2021 telephone conversation with her. Clinically she was still doing quite well still on same medications as described above including Faslodex and Verzenio that she was tolerating well. Concern being is further rise of CA 27-29 levels. We talked about rechecking the level again in 3 weeks along with possibly PET scan if we could and if we could confirm progression of the disease then we might consider changing [facility] with Faslodex as the foundation 1 studies done before had revealed the presence of PIK 3 CA mutation. Other wise there was stable microsatellite status tumor mutational burden was not elevated. She could not undergo a PET scan instead did a CT scan of the chest abdomen and pelvis on March 24, 2021 and those studies were negative for any obvious signs of progression of the disease. April 1, 2021 had long discussion with her. Clinically she was feeling well exam was unremarkable at least CAT scan studies are fairly unremarkable.   Only concern being mild elevation of CA 27-29 levels. As all in all she was doing quite well we felt comfortable keeping her on treatment with Faslodex and Verzenio continue to monitor her CBC and chemistries possibly recheck her tumor markers in 8 weeks and still the plan remains the same if there is documented progression we might consider changing treatment to [address] She is going to be seen here in 8 weeks possibly by one of my partners. Zandra 3, 2021, clinically she seems to be stable(stable liver, bone and pulmonary mets). Currently on Verzenio and Faslodex. We will repeat CA 27-29 and if it remains stable then will continue the same treatment. If CA 27-29 markedly elevated then prior test, will consider switching to [address]. Recommend DEXA scan and mammogram.  Also continue bisphosphonates. At some point she would like to transition to letrozole from Faslodex secondary to the side effects. Is on venlafaxine for hot flashes, deferred any other intervention at this point. Seen here on August third 2021 with no new symptoms except for the swelling at the injection site associated with pain. Discussed that CA 27-29 is relatively stable and mammogram with no new findings, would like to continue the current regimen but she wants to switch to oral AI. Discussed the adverse effects of AI  Return to clinic in 3 months or earlier if new symptoms. Note DEXA scan is normal.  Continue Zometa yearly. Recommend increase Effexor to 75 mg for vasogenic symptoms. Repeat CA 27-29 in 6 to 8 weeks and also will monitor for any adverse effects. Visit here on 10/4/2021 when she reported headache, and has been dropping things. Also reported tenderness in the right breast area. Clinical exam with no palpable masses. But note CA 27-29 elevated to 70.9 in September 2021 compared to 53.8 in June 2021. Is currently on letrozole(which has been changed from Faslodex in August 2021) and Verzenio. Note PIK3A positive.   Recommend further evaluation with CT scan of the chest abdomen pelvis and also MRI of the brain. Neutropenia most probably secondary to Verzenio. Avoid sick contacts. Recommend not to work in the [employer] unit. Dose adjustments as needed. Further recommendations pending above. Note mammogram and DEXA scan in July 2021 were normal  She has received her Covid vaccine. Visit here on 10/27/2021 we discussed about the results of the MRI of the brain which did not reveal any metastatic disease. Also discussed the results of the CT scan of the chest abdomen pelvis with stable osseous, pulmonary, liver lesions with slight increase in the right breast lesion. Note CA 27-29 was 70.9 in September 2021. Will discuss with radiation oncology for possible SBRT of the breast mass. Will repeat CA 27-29 in a month in November and if rising then will consider changing to [address] [place] [address] if progressive disease. Other medical care. To clinic December 2021 or earlier if new symptoms. Dictated today on 12 9 2021 I discussed the results of the CA 27-29 that it continues to rise slowly. Clinically is the same. Very reluctant to go back on the Faslodex. Reported that I would recommend repeat CA 27-29 in a month and if rising then will recommend PET scan for further evaluation and treatment planning. The options would be at that time to go back on Faslodex or start her on PIK3A inhibitor if evidence of progressive disease. Discussed with the radiation oncology regarding SBRT to the breast but they deferred at this point. Leukopenia with ANC of about thousand most probably secondary to abemaciclib. Continue to monitor closely and dose adjustments as needed. Return to clinic in January 2022    Visit here on 2/9/2022, clinically stable Seems to be tolerating fairly well. Note CA 27-29 in February 2022 mildly decreased. Continue current regimen. Monitor for adverse effects. Plan to repeat imaging and tumor marker in April 2022.     She was seen on April 27 2022, overall doing good. Ca 27-29 was 50 and Ct chest, abdomen and pelvis in April 2022 with stable findings. Continue Letrozole and Abemaciclib. Tolerating fairly well. Recommend Effexor for vasomotor Sx relief. Refills for letrozole provided. Dexa scan July 2021 normal. Plan to repeat imaging in 6M. Ca  27-29 every 2 Months. Continue Zometa every 6M. Leukopenia/neutropenia most probably sec to Abemaciclib. Will monitor for now. Mild renal insufficiency: Adequate fluids and avoid nephrotoxic medications. DR Ashley Stroud Notes:   ----------------------------    [facility] breast cancer: Mets to liver and bones  Course as described above. Is currently on verzinio and letrozole  CT chest, abdomen and pelvis in October 2022  with stable disease. Ca 27-29 February 2023 was 52.8  No new significant sx. Continue current regimen and plan to repeat CT imaging in May 2023  Recommend adequate  antidiarrhea/antiemetic  agents   Recommend changing Zometa to yearly    Mild renal insufficiency, recommend adequate fluid intake. Avoid nephrotoxic medication. Mild neutropenia and normocytic anemia most probably secondary to  [facility], will follow for now. Dexa scan in July 2021 normal. Is on calcium and vitamin D. Repeat DEXA scan in 2023. Continue other medical care    Recommend healthy diet, exercise and weight loss if possible. Recommend follow up with PCP and other specialists.      RTC in May 2023 or earlier if new Sx.      Shannen Ji

## 2023-03-03 ENCOUNTER — OFFICE VISIT (OUTPATIENT)
Dept: ONCOLOGY | Age: 53
End: 2023-03-03
Payer: COMMERCIAL

## 2023-03-03 ENCOUNTER — HOSPITAL ENCOUNTER (OUTPATIENT)
Dept: INFUSION THERAPY | Age: 53
Discharge: HOME | End: 2023-03-03
Payer: COMMERCIAL

## 2023-03-03 VITALS
TEMPERATURE: 97.6 F | WEIGHT: 196.8 LBS | SYSTOLIC BLOOD PRESSURE: 129 MMHG | OXYGEN SATURATION: 98 % | HEART RATE: 75 BPM | BODY MASS INDEX: 31.63 KG/M2 | DIASTOLIC BLOOD PRESSURE: 79 MMHG | HEIGHT: 66 IN

## 2023-03-03 DIAGNOSIS — D64.9 ANEMIA, UNSPECIFIED TYPE: ICD-10-CM

## 2023-03-03 DIAGNOSIS — D70.9 NEUTROPENIA, UNSPECIFIED TYPE (HCC): ICD-10-CM

## 2023-03-03 DIAGNOSIS — C78.7 SECONDARY MALIGNANT NEOPLASM OF LIVER AND INTRAHEPATIC BILE DUCT (HCC): ICD-10-CM

## 2023-03-03 DIAGNOSIS — Z78.0 MENOPAUSE: Primary | ICD-10-CM

## 2023-03-03 DIAGNOSIS — C79.51 SECONDARY MALIGNANT NEOPLASM OF BONE (HCC): Primary | ICD-10-CM

## 2023-03-03 DIAGNOSIS — C50.411 MALIGNANT NEOPLASM OF UPPER-OUTER QUADRANT OF RIGHT FEMALE BREAST, UNSPECIFIED ESTROGEN RECEPTOR STATUS (HCC): Primary | ICD-10-CM

## 2023-03-03 DIAGNOSIS — C79.51 SECONDARY MALIGNANT NEOPLASM OF BONE (HCC): ICD-10-CM

## 2023-03-03 DIAGNOSIS — C50.911 PRIMARY CANCER OF RIGHT BREAST WITH METASTASIS TO OTHER SITE (HCC): ICD-10-CM

## 2023-03-03 DIAGNOSIS — N28.9 MILD RENAL INSUFFICIENCY: ICD-10-CM

## 2023-03-03 PROCEDURE — 99211 OFF/OP EST MAY X REQ PHY/QHP: CPT

## 2023-03-03 PROCEDURE — 99214 OFFICE O/P EST MOD 30 MIN: CPT | Performed by: INTERNAL MEDICINE

## 2023-03-03 RX ORDER — DIPHENHYDRAMINE HYDROCHLORIDE 50 MG/ML
50 INJECTION INTRAMUSCULAR; INTRAVENOUS
OUTPATIENT
Start: 2023-11-20

## 2023-03-03 RX ORDER — SODIUM CHLORIDE 0.9 % (FLUSH) 0.9 %
5-40 SYRINGE (ML) INJECTION PRN
OUTPATIENT
Start: 2023-11-20

## 2023-03-03 RX ORDER — SODIUM CHLORIDE 9 MG/ML
5-250 INJECTION, SOLUTION INTRAVENOUS PRN
OUTPATIENT
Start: 2023-11-20

## 2023-03-03 RX ORDER — SODIUM CHLORIDE 9 MG/ML
INJECTION, SOLUTION INTRAVENOUS CONTINUOUS
OUTPATIENT
Start: 2023-11-20

## 2023-03-03 RX ORDER — SODIUM CHLORIDE 9 MG/ML
INJECTION, SOLUTION INTRAVENOUS ONCE
OUTPATIENT
Start: 2023-11-20 | End: 2023-11-20

## 2023-03-03 RX ORDER — ALBUTEROL SULFATE 90 UG/1
4 AEROSOL, METERED RESPIRATORY (INHALATION) PRN
OUTPATIENT
Start: 2023-11-20

## 2023-03-03 RX ORDER — ONDANSETRON 2 MG/ML
8 INJECTION INTRAMUSCULAR; INTRAVENOUS
OUTPATIENT
Start: 2023-11-20

## 2023-03-03 RX ORDER — HEPARIN SODIUM (PORCINE) LOCK FLUSH IV SOLN 100 UNIT/ML 100 UNIT/ML
500 SOLUTION INTRAVENOUS PRN
OUTPATIENT
Start: 2023-11-20

## 2023-03-03 RX ORDER — EPINEPHRINE 1 MG/ML
0.3 INJECTION, SOLUTION, CONCENTRATE INTRAVENOUS PRN
OUTPATIENT
Start: 2023-11-20

## 2023-03-03 RX ORDER — ACETAMINOPHEN 325 MG/1
650 TABLET ORAL
OUTPATIENT
Start: 2023-11-20

## 2023-03-03 NOTE — PROGRESS NOTES
MA Rooming Questions  Patient: Verito Foss  MRN: [id number]    Date: 3/3/2023        1. Do you have any new issues?   no         2. Do you need any refills on medications?    no    3. Have you had any imaging done since your last visit?   no    4. Have you been hospitalized or seen in the emergency room since your last visit here?   no    5. Did the patient have a depression screening completed today?  No    No data recorded     PHQ-9 Given to (if applicable):               PHQ-9 Score (if applicable):                     [] Positive     []  Negative              Does question #9 need addressed (if applicable)                     [] Yes    []  No               Emily Ochoa CMA

## 2023-05-08 ENCOUNTER — TELEPHONE (OUTPATIENT)
Dept: ONCOLOGY | Age: 53
End: 2023-05-08

## 2023-05-08 NOTE — TELEPHONE ENCOUNTER
Left pt vm for the 5/16/23 CT scan at [facility] arrival time of 12:00 and NPO 4 hours prior (pt edith CT scan from 5/15/23 to 5/16/23.) I had to move her 5/17/23 Piedad appt to 5/18/23 at 11:00 to allow enough time to get the CT results back. I asked the pt to call back and confirm the appts.

## 2023-05-10 ENCOUNTER — HOSPITAL ENCOUNTER (OUTPATIENT)
Age: 53
Discharge: HOME | End: 2023-05-10
Payer: COMMERCIAL

## 2023-05-10 LAB
ALBUMIN SERPL-MCNC: 4.7 GM/DL (ref 3.4–5)
ALP BLD-CCNC: 67 IU/L (ref 40–128)
ALT SERPL-CCNC: 23 U/L (ref 10–40)
ANION GAP SERPL CALCULATED.3IONS-SCNC: 12 MMOL/L (ref 4–16)
AST SERPL-CCNC: 29 IU/L (ref 15–37)
BASOPHILS ABSOLUTE: 0.1 K/CU MM
BASOPHILS RELATIVE PERCENT: 1.4 % (ref 0–1)
BILIRUB SERPL-MCNC: 0.3 MG/DL (ref 0–1)
BUN SERPL-MCNC: 24 MG/DL (ref 6–23)
CALCIUM SERPL-MCNC: 9.7 MG/DL (ref 8.3–10.6)
CHLORIDE BLD-SCNC: 104 MMOL/L (ref 99–110)
CO2: 21 MMOL/L (ref 21–32)
CREAT SERPL-MCNC: 1.3 MG/DL (ref 0.6–1.1)
DIFFERENTIAL TYPE: ABNORMAL
EOSINOPHILS ABSOLUTE: 0 K/CU MM
EOSINOPHILS RELATIVE PERCENT: 0.6 % (ref 0–3)
GFR SERPL CREATININE-BSD FRML MDRD: 49 ML/MIN/1.73M2
GLUCOSE SERPL-MCNC: 85 MG/DL (ref 70–99)
HCT VFR BLD CALC: 42.5 % (ref 37–47)
HEMOGLOBIN: 13.6 GM/DL (ref 12.5–16)
IMMATURE NEUTROPHIL %: 0.3 % (ref 0–0.43)
LYMPHOCYTES ABSOLUTE: 1.7 K/CU MM
LYMPHOCYTES RELATIVE PERCENT: 48.1 % (ref 24–44)
MCH RBC QN AUTO: 34.7 PG (ref 27–31)
MCHC RBC AUTO-ENTMCNC: 32 % (ref 32–36)
MCV RBC AUTO: 108.4 FL (ref 78–100)
MONOCYTES ABSOLUTE: 0.3 K/CU MM
MONOCYTES RELATIVE PERCENT: 7.8 % (ref 0–4)
NUCLEATED RBC %: 0 %
PDW BLD-RTO: 12.5 % (ref 11.7–14.9)
PLATELET # BLD: 197 K/CU MM (ref 140–440)
PMV BLD AUTO: 9.3 FL (ref 7.5–11.1)
POTASSIUM SERPL-SCNC: 4.1 MMOL/L (ref 3.5–5.1)
RBC # BLD: 3.92 M/CU MM (ref 4.2–5.4)
SEGMENTED NEUTROPHILS ABSOLUTE COUNT: 1.5 K/CU MM
SEGMENTED NEUTROPHILS RELATIVE PERCENT: 41.8 % (ref 36–66)
SODIUM BLD-SCNC: 137 MMOL/L (ref 135–145)
TOTAL IMMATURE NEUTOROPHIL: 0.01 K/CU MM
TOTAL NUCLEATED RBC: 0 K/CU MM
TOTAL PROTEIN: 7.3 GM/DL (ref 6.4–8.2)
WBC # BLD: 3.6 K/CU MM (ref 4–10.5)

## 2023-05-10 PROCEDURE — 86300 IMMUNOASSAY TUMOR CA 15-3: CPT

## 2023-05-10 PROCEDURE — 80053 COMPREHEN METABOLIC PANEL: CPT

## 2023-05-10 PROCEDURE — 85025 COMPLETE CBC W/AUTO DIFF WBC: CPT

## 2023-05-10 PROCEDURE — 36415 COLL VENOUS BLD VENIPUNCTURE: CPT

## 2023-05-13 LAB — CANCER AG27-29 SERPL-ACNC: 70.1 U/ML

## 2023-05-16 ENCOUNTER — HOSPITAL ENCOUNTER (OUTPATIENT)
Dept: CT IMAGING | Age: 53
Discharge: HOME | End: 2023-05-16
Payer: COMMERCIAL

## 2023-05-16 DIAGNOSIS — C50.411 MALIGNANT NEOPLASM OF UPPER-OUTER QUADRANT OF RIGHT FEMALE BREAST, UNSPECIFIED ESTROGEN RECEPTOR STATUS (HCC): ICD-10-CM

## 2023-05-16 DIAGNOSIS — C79.51 SECONDARY MALIGNANT NEOPLASM OF BONE (HCC): ICD-10-CM

## 2023-05-16 DIAGNOSIS — C78.7 SECONDARY MALIGNANT NEOPLASM OF LIVER AND INTRAHEPATIC BILE DUCT (HCC): ICD-10-CM

## 2023-05-16 PROCEDURE — 6360000004 HC RX CONTRAST MEDICATION: Performed by: INTERNAL MEDICINE

## 2023-05-16 PROCEDURE — 71260 CT THORAX DX C+: CPT

## 2023-05-16 PROCEDURE — 2580000003 HC RX 258: Performed by: INTERNAL MEDICINE

## 2023-05-16 PROCEDURE — 2500000003 HC RX 250 WO HCPCS: Performed by: INTERNAL MEDICINE

## 2023-05-16 PROCEDURE — 74177 CT ABD & PELVIS W/CONTRAST: CPT

## 2023-05-16 RX ORDER — 0.9 % SODIUM CHLORIDE 0.9 %
10 VIAL (ML) INJECTION
Status: COMPLETED | OUTPATIENT
Start: 2023-05-16 | End: 2023-05-16

## 2023-05-16 RX ADMIN — BARIUM SULFATE 900 ML: 20 SUSPENSION ORAL at 14:08

## 2023-05-16 RX ADMIN — IOPAMIDOL 75 ML: 755 INJECTION, SOLUTION INTRAVENOUS at 14:09

## 2023-05-16 RX ADMIN — SODIUM CHLORIDE, PRESERVATIVE FREE 10 ML: 5 INJECTION INTRAVENOUS at 14:09

## 2023-05-18 ENCOUNTER — HOSPITAL ENCOUNTER (OUTPATIENT)
Dept: INFUSION THERAPY | Age: 53
Discharge: HOME | End: 2023-05-18
Payer: COMMERCIAL

## 2023-05-18 ENCOUNTER — OFFICE VISIT (OUTPATIENT)
Dept: ONCOLOGY | Age: 53
End: 2023-05-18
Payer: COMMERCIAL

## 2023-05-18 VITALS
RESPIRATION RATE: 16 BRPM | BODY MASS INDEX: 31.95 KG/M2 | OXYGEN SATURATION: 99 % | WEIGHT: 198.8 LBS | HEART RATE: 73 BPM | TEMPERATURE: 98.1 F | DIASTOLIC BLOOD PRESSURE: 65 MMHG | HEIGHT: 66 IN | SYSTOLIC BLOOD PRESSURE: 122 MMHG

## 2023-05-18 DIAGNOSIS — C50.411 MALIGNANT NEOPLASM OF UPPER-OUTER QUADRANT OF RIGHT FEMALE BREAST, UNSPECIFIED ESTROGEN RECEPTOR STATUS (HCC): Primary | ICD-10-CM

## 2023-05-18 DIAGNOSIS — N18.31 STAGE 3A CHRONIC KIDNEY DISEASE (HCC): ICD-10-CM

## 2023-05-18 DIAGNOSIS — C78.7 SECONDARY MALIGNANT NEOPLASM OF LIVER AND INTRAHEPATIC BILE DUCT (HCC): ICD-10-CM

## 2023-05-18 DIAGNOSIS — D70.9 NEUTROPENIA, UNSPECIFIED TYPE (HCC): ICD-10-CM

## 2023-05-18 DIAGNOSIS — C79.51 SECONDARY MALIGNANT NEOPLASM OF BONE (HCC): ICD-10-CM

## 2023-05-18 DIAGNOSIS — D64.9 ANEMIA, UNSPECIFIED TYPE: ICD-10-CM

## 2023-05-18 DIAGNOSIS — C50.411 MALIGNANT NEOPLASM OF UPPER-OUTER QUADRANT OF RIGHT FEMALE BREAST, UNSPECIFIED ESTROGEN RECEPTOR STATUS (HCC): ICD-10-CM

## 2023-05-18 PROBLEM — N18.30 CHRONIC RENAL DISEASE, STAGE III (HCC): Status: ACTIVE | Noted: 2023-05-18

## 2023-05-18 LAB
ANION GAP SERPL CALCULATED.3IONS-SCNC: 9 MMOL/L (ref 4–16)
BUN SERPL-MCNC: 18 MG/DL (ref 6–23)
CALCIUM SERPL-MCNC: 8.8 MG/DL (ref 8.3–10.6)
CHLORIDE BLD-SCNC: 109 MMOL/L (ref 99–110)
CO2: 24 MMOL/L (ref 21–32)
CREAT SERPL-MCNC: 1.3 MG/DL (ref 0.6–1.1)
GFR SERPL CREATININE-BSD FRML MDRD: 49 ML/MIN/1.73M2
GLUCOSE SERPL-MCNC: 71 MG/DL (ref 70–99)
POTASSIUM SERPL-SCNC: 4 MMOL/L (ref 3.5–5.1)
SODIUM BLD-SCNC: 142 MMOL/L (ref 135–145)

## 2023-05-18 PROCEDURE — 80048 BASIC METABOLIC PNL TOTAL CA: CPT

## 2023-05-18 PROCEDURE — 36415 COLL VENOUS BLD VENIPUNCTURE: CPT

## 2023-05-18 PROCEDURE — 99214 OFFICE O/P EST MOD 30 MIN: CPT | Performed by: INTERNAL MEDICINE

## 2023-05-18 PROCEDURE — 99211 OFF/OP EST MAY X REQ PHY/QHP: CPT

## 2023-05-18 NOTE — PROGRESS NOTES
MA Rooming Questions  Patient: Humza Sun  MRN: [id number]    Date: 5/18/2023        1. Do you have any new issues?   no         2. Do you need any refills on medications?    no    3. Have you had any imaging done since your last visit? yes - CT    4. Have you been hospitalized or seen in the emergency room since your last visit here?   no    5. Did the patient have a depression screening completed today?  No    No data recorded     PHQ-9 Given to (if applicable):               PHQ-9 Score (if applicable):                     [] Positive     []  Negative              Does question #9 need addressed (if applicable)                     [] Yes    []  No               Colt Record, CMA
Visit here on October 27, 2017, and afterwards metastatic disease involving the bones were discussed with her and clinically she was still considered to be premenopausal as she was not having any symptoms suggestive of menopausal state. She was started on treatment with Zoladex, Faslodex, and Zometa starting on November 10, 2017. She had to receive these treatments in outpatient facility at the hospital primarily because of insurance reasons. Saw me on December 7, 2017, sooner than her scheduled appointment, was concerned about possibly feeling a lump involving the right breast at around 6 oclock position. Otherwise she had not noticed any new issues. Mammogram and ultrasound done on December 8, 2017, showed dense breast tissue where she was feeling a mass. There was no sonographic evidence for any mass in that area too. MRI was suggested if needed to. Otherwise she was again noted to have known malignancy in the upper outer quadrant of the breast.     PET scan on May 14, 2018, revealed partial treatment response with improvement in the right axillary nodes as well as skeletal metastases. Metabolic activity associated with lytic bone lesions had improved with increased sclerosis of the previously seen lesions. Mild residual activity was noted at the L4 vertebral body. There were no new persistent or metabolically active aggressive osseous lesions. Visit here on February 26, 2019 overall I think she was doing fair but we were concerned about her starting to have more discomfort in her left elbow as well as the right hip area. She had not had a PET scan since May of last year and that too had revealed a partial response, we felt possibly repeating the PET scan would be helpful. In the meantime she was going to continue to receive the same treatments as before including Faslodex, Zoladex and Zometa    She was not able to undergo a PET scan but insurance.  Did undergo a bone scan on April 4, 2019

## 2023-05-19 ENCOUNTER — CLINICAL DOCUMENTATION (OUTPATIENT)
Dept: ONCOLOGY | Age: 53
End: 2023-05-19

## 2023-05-19 NOTE — PROGRESS NOTES
Lab results from 05/18/2023 reviewed. Creatinine is still 1.3. Physician recommending that patient drink plenty of fluids and avoid any nephrotoxic medications. Called patient @ [phone] to notify. Voices understanding. No further needs addressed at this time.

## 2023-05-31 DIAGNOSIS — C50.411 MALIGNANT NEOPLASM OF UPPER-OUTER QUADRANT OF RIGHT FEMALE BREAST, UNSPECIFIED ESTROGEN RECEPTOR STATUS (HCC): ICD-10-CM

## 2023-05-31 RX ORDER — VENLAFAXINE HYDROCHLORIDE 37.5 MG/1
CAPSULE, EXTENDED RELEASE ORAL
Qty: 30 CAPSULE | Refills: 5 | Status: SHIPPED | OUTPATIENT
Start: 2023-05-31

## 2023-05-31 RX ORDER — LETROZOLE 2.5 MG/1
TABLET, FILM COATED ORAL
Qty: 90 TABLET | Refills: 5 | Status: SHIPPED | OUTPATIENT
Start: 2023-05-31

## 2023-07-05 ENCOUNTER — PATIENT MESSAGE (OUTPATIENT)
Dept: INTERNAL MEDICINE CLINIC | Age: 53
End: 2023-07-05

## 2023-07-05 RX ORDER — SCOLOPAMINE TRANSDERMAL SYSTEM 1 MG/1
1 PATCH, EXTENDED RELEASE TRANSDERMAL
Qty: 10 PATCH | Refills: 0 | Status: SHIPPED | OUTPATIENT
Start: 2023-07-05

## 2023-07-07 DIAGNOSIS — C50.911 PRIMARY CANCER OF RIGHT BREAST WITH METASTASIS TO OTHER SITE (HCC): ICD-10-CM

## 2023-07-10 ENCOUNTER — CLINICAL DOCUMENTATION (OUTPATIENT)
Dept: ONCOLOGY | Age: 53
End: 2023-07-10

## 2023-07-10 RX ORDER — ABEMACICLIB 150 MG/1
TABLET ORAL
Qty: 60 TABLET | Refills: 5 | Status: ACTIVE | OUTPATIENT
Start: 2023-07-10

## 2023-07-11 DIAGNOSIS — C50.911 PRIMARY CANCER OF RIGHT BREAST WITH METASTASIS TO OTHER SITE (HCC): ICD-10-CM

## 2023-07-11 RX ORDER — ABEMACICLIB 150 MG/1
TABLET ORAL
Qty: 60 TABLET | Refills: 5 | OUTPATIENT
Start: 2023-07-11

## 2023-08-23 ENCOUNTER — HOSPITAL ENCOUNTER (OUTPATIENT)
Dept: WOMENS IMAGING | Age: 53
Discharge: HOME | End: 2023-08-23
Attending: INTERNAL MEDICINE
Payer: COMMERCIAL

## 2023-08-23 DIAGNOSIS — Z78.0 MENOPAUSE: ICD-10-CM

## 2023-08-23 PROCEDURE — 77080 DXA BONE DENSITY AXIAL: CPT

## 2023-08-28 ENCOUNTER — HOSPITAL ENCOUNTER (OUTPATIENT)
Dept: INFUSION THERAPY | Age: 53
Discharge: HOME | End: 2023-08-28
Payer: COMMERCIAL

## 2023-08-28 DIAGNOSIS — C50.411 MALIGNANT NEOPLASM OF UPPER-OUTER QUADRANT OF RIGHT FEMALE BREAST, UNSPECIFIED ESTROGEN RECEPTOR STATUS (HCC): ICD-10-CM

## 2023-08-28 DIAGNOSIS — C78.7 SECONDARY MALIGNANT NEOPLASM OF LIVER AND INTRAHEPATIC BILE DUCT (HCC): ICD-10-CM

## 2023-08-28 DIAGNOSIS — C79.51 SECONDARY MALIGNANT NEOPLASM OF BONE (HCC): ICD-10-CM

## 2023-08-28 DIAGNOSIS — D64.9 ANEMIA, UNSPECIFIED TYPE: ICD-10-CM

## 2023-08-28 DIAGNOSIS — N18.31 STAGE 3A CHRONIC KIDNEY DISEASE (HCC): ICD-10-CM

## 2023-08-28 DIAGNOSIS — D70.9 NEUTROPENIA, UNSPECIFIED TYPE (HCC): ICD-10-CM

## 2023-08-28 LAB
ALBUMIN SERPL-MCNC: 4.8 GM/DL (ref 3.4–5)
ALP BLD-CCNC: 84 IU/L (ref 40–129)
ALT SERPL-CCNC: 27 U/L (ref 10–40)
ANION GAP SERPL CALCULATED.3IONS-SCNC: 13 MMOL/L (ref 4–16)
AST SERPL-CCNC: 29 IU/L (ref 15–37)
BASOPHILS ABSOLUTE: 0.1 K/CU MM
BASOPHILS RELATIVE PERCENT: 1.3 % (ref 0–1)
BILIRUB SERPL-MCNC: 0.4 MG/DL (ref 0–1)
BUN SERPL-MCNC: 23 MG/DL (ref 6–23)
CALCIUM SERPL-MCNC: 10 MG/DL (ref 8.3–10.6)
CHLORIDE BLD-SCNC: 103 MMOL/L (ref 99–110)
CO2: 23 MMOL/L (ref 21–32)
CREAT SERPL-MCNC: 1.3 MG/DL (ref 0.6–1.1)
DIFFERENTIAL TYPE: ABNORMAL
EOSINOPHILS ABSOLUTE: 0 K/CU MM
EOSINOPHILS RELATIVE PERCENT: 0.7 % (ref 0–3)
GFR SERPL CREATININE-BSD FRML MDRD: 49 ML/MIN/1.73M2
GLUCOSE SERPL-MCNC: 89 MG/DL (ref 70–99)
HCT VFR BLD CALC: 38.9 % (ref 37–47)
HEMOGLOBIN: 13.3 GM/DL (ref 12.5–16)
LYMPHOCYTES ABSOLUTE: 2.5 K/CU MM
LYMPHOCYTES RELATIVE PERCENT: 54.3 % (ref 24–44)
MCH RBC QN AUTO: 34.5 PG (ref 27–31)
MCHC RBC AUTO-ENTMCNC: 34.2 % (ref 32–36)
MCV RBC AUTO: 100.8 FL (ref 78–100)
MONOCYTES ABSOLUTE: 0.5 K/CU MM
MONOCYTES RELATIVE PERCENT: 11.6 % (ref 0–4)
PDW BLD-RTO: 12.4 % (ref 11.7–14.9)
PLATELET # BLD: 189 K/CU MM (ref 140–440)
PMV BLD AUTO: 9.2 FL (ref 7.5–11.1)
POTASSIUM SERPL-SCNC: 4.1 MMOL/L (ref 3.5–5.1)
RBC # BLD: 3.86 M/CU MM (ref 4.2–5.4)
SEGMENTED NEUTROPHILS ABSOLUTE COUNT: 1.5 K/CU MM
SEGMENTED NEUTROPHILS RELATIVE PERCENT: 32.1 % (ref 36–66)
SODIUM BLD-SCNC: 139 MMOL/L (ref 135–145)
TOTAL PROTEIN: 7.2 GM/DL (ref 6.4–8.2)
WBC # BLD: 4.6 K/CU MM (ref 4–10.5)

## 2023-08-28 PROCEDURE — 85025 COMPLETE CBC W/AUTO DIFF WBC: CPT

## 2023-08-28 PROCEDURE — 86300 IMMUNOASSAY TUMOR CA 15-3: CPT

## 2023-08-28 PROCEDURE — 80053 COMPREHEN METABOLIC PANEL: CPT

## 2023-08-28 PROCEDURE — 36415 COLL VENOUS BLD VENIPUNCTURE: CPT

## 2023-08-31 ENCOUNTER — CLINICAL DOCUMENTATION (OUTPATIENT)
Dept: ONCOLOGY | Age: 53
End: 2023-08-31

## 2023-08-31 DIAGNOSIS — C50.911 PRIMARY CANCER OF RIGHT BREAST WITH METASTASIS TO OTHER SITE (HCC): Primary | ICD-10-CM

## 2023-08-31 LAB — CANCER AG27-29 SERPL-ACNC: 94.4 U/ML

## 2023-08-31 NOTE — PROGRESS NOTES
8/31/23 - faxed Whole Pet scan order and office notes to [facility] scheduling to be scheduled at [facility].

## 2023-09-06 ENCOUNTER — OFFICE VISIT (OUTPATIENT)
Dept: INTERNAL MEDICINE CLINIC | Age: 53
End: 2023-09-06
Payer: COMMERCIAL

## 2023-09-06 VITALS
OXYGEN SATURATION: 98 % | BODY MASS INDEX: 33.43 KG/M2 | SYSTOLIC BLOOD PRESSURE: 112 MMHG | HEART RATE: 65 BPM | WEIGHT: 208 LBS | DIASTOLIC BLOOD PRESSURE: 78 MMHG | HEIGHT: 66 IN

## 2023-09-06 DIAGNOSIS — E78.2 MIXED HYPERLIPIDEMIA: Primary | ICD-10-CM

## 2023-09-06 DIAGNOSIS — F51.01 PRIMARY INSOMNIA: ICD-10-CM

## 2023-09-06 DIAGNOSIS — E66.09 CLASS 1 OBESITY DUE TO EXCESS CALORIES WITHOUT SERIOUS COMORBIDITY WITH BODY MASS INDEX (BMI) OF 33.0 TO 33.9 IN ADULT: ICD-10-CM

## 2023-09-06 DIAGNOSIS — I77.810 DILATED AORTIC ROOT (HCC): ICD-10-CM

## 2023-09-06 PROCEDURE — 99213 OFFICE O/P EST LOW 20 MIN: CPT | Performed by: FAMILY MEDICINE

## 2023-09-06 RX ORDER — PHENTERMINE HYDROCHLORIDE 37.5 MG/1
37.5 TABLET ORAL
Qty: 30 TABLET | Refills: 0 | Status: SHIPPED | OUTPATIENT
Start: 2023-09-06 | End: 2023-10-06

## 2023-09-06 ASSESSMENT — ENCOUNTER SYMPTOMS
ABDOMINAL PAIN: 0
NAUSEA: 0
SHORTNESS OF BREATH: 0
COUGH: 0

## 2023-09-06 NOTE — PROGRESS NOTES
This dictation was generated by voice recognition computer software. Although all attempts are made to edit the dictation for accuracy, there may be errors in the transcription that are not intended.

## 2023-09-11 ENCOUNTER — CLINICAL DOCUMENTATION (OUTPATIENT)
Dept: ONCOLOGY | Age: 53
End: 2023-09-11

## 2023-09-11 LAB
CHOLEST SERPL-MCNC: 249 MG/DL
GLUCOSE SERPL-MCNC: 69 MG/DL (ref 70–99)
HDLC SERPL-MCNC: 91 MG/DL
LDLC SERPL CALC-MCNC: 135 MG/DL
PATIENT FASTING?: YES
TRIGL SERPL-MCNC: 114 MG/DL

## 2023-09-11 NOTE — PROGRESS NOTES
Per Dr. Orville Islas - will bring patient in tomorrow, 9/12/23 at 1100 for a blood draw to sent to [address]. Patient aware and voiced understanding.

## 2023-09-12 ENCOUNTER — CLINICAL DOCUMENTATION (OUTPATIENT)
Dept: ONCOLOGY | Age: 53
End: 2023-09-12

## 2023-09-12 ENCOUNTER — HOSPITAL ENCOUNTER (OUTPATIENT)
Dept: INFUSION THERAPY | Age: 53
Discharge: HOME | End: 2023-09-12

## 2023-09-12 NOTE — PROGRESS NOTES
Per Dr. Kassandra Rodriguez - blood drawn for [employer] CDx testing to check for ESR1 mutation for future treatment options. Order filled out on portal - blood samples sent via [facility] to [id number].

## 2023-09-15 ENCOUNTER — HOSPITAL ENCOUNTER (OUTPATIENT)
Dept: PET IMAGING | Age: 53
Discharge: HOME | End: 2023-09-15
Attending: INTERNAL MEDICINE
Payer: COMMERCIAL

## 2023-09-15 DIAGNOSIS — C50.911 PRIMARY CANCER OF RIGHT BREAST WITH METASTASIS TO OTHER SITE (HCC): ICD-10-CM

## 2023-09-15 PROCEDURE — 3430000000 HC RX DIAGNOSTIC RADIOPHARMACEUTICAL: Performed by: INTERNAL MEDICINE

## 2023-09-15 PROCEDURE — 2580000003 HC RX 258: Performed by: INTERNAL MEDICINE

## 2023-09-15 PROCEDURE — A9552 F18 FDG: HCPCS | Performed by: INTERNAL MEDICINE

## 2023-09-15 PROCEDURE — 78815 PET IMAGE W/CT SKULL-THIGH: CPT

## 2023-09-15 RX ORDER — SODIUM CHLORIDE 0.9 % (FLUSH) 0.9 %
10 SYRINGE (ML) INJECTION PRN
Status: COMPLETED | OUTPATIENT
Start: 2023-09-15 | End: 2023-09-15

## 2023-09-15 RX ORDER — FLUDEOXYGLUCOSE F 18 200 MCI/ML
14.05 INJECTION, SOLUTION INTRAVENOUS
Status: COMPLETED | OUTPATIENT
Start: 2023-09-15 | End: 2023-09-15

## 2023-09-15 RX ADMIN — SODIUM CHLORIDE, PRESERVATIVE FREE 10 ML: 5 INJECTION INTRAVENOUS at 09:45

## 2023-09-15 RX ADMIN — FLUDEOXYGLUCOSE F 18 14.05 MILLICURIE: 200 INJECTION, SOLUTION INTRAVENOUS at 09:45

## 2023-09-22 ENCOUNTER — OFFICE VISIT (OUTPATIENT)
Dept: ONCOLOGY | Age: 53
End: 2023-09-22
Payer: COMMERCIAL

## 2023-09-22 ENCOUNTER — HOSPITAL ENCOUNTER (OUTPATIENT)
Dept: INFUSION THERAPY | Age: 53
Discharge: HOME | End: 2023-09-22
Payer: COMMERCIAL

## 2023-09-22 VITALS
HEIGHT: 66 IN | HEART RATE: 80 BPM | SYSTOLIC BLOOD PRESSURE: 116 MMHG | TEMPERATURE: 98 F | WEIGHT: 201 LBS | BODY MASS INDEX: 32.3 KG/M2 | DIASTOLIC BLOOD PRESSURE: 68 MMHG | OXYGEN SATURATION: 97 % | RESPIRATION RATE: 16 BRPM

## 2023-09-22 DIAGNOSIS — C79.51 SECONDARY MALIGNANT NEOPLASM OF BONE (HCC): ICD-10-CM

## 2023-09-22 DIAGNOSIS — C50.911 PRIMARY CANCER OF RIGHT BREAST WITH METASTASIS TO OTHER SITE (HCC): Primary | ICD-10-CM

## 2023-09-22 DIAGNOSIS — N18.31 STAGE 3A CHRONIC KIDNEY DISEASE (HCC): ICD-10-CM

## 2023-09-22 DIAGNOSIS — C50.411 MALIGNANT NEOPLASM OF UPPER-OUTER QUADRANT OF RIGHT FEMALE BREAST, UNSPECIFIED ESTROGEN RECEPTOR STATUS (HCC): ICD-10-CM

## 2023-09-22 PROCEDURE — 99214 OFFICE O/P EST MOD 30 MIN: CPT | Performed by: INTERNAL MEDICINE

## 2023-09-22 PROCEDURE — 99211 OFF/OP EST MAY X REQ PHY/QHP: CPT

## 2023-09-22 NOTE — PROGRESS NOTES
MA Rooming Questions  Patient: Tony Neves  MRN: [id number]    Date: 9/22/2023        1. Do you have any new issues?   no         2. Do you need any refills on medications?    no    3. Have you had any imaging done since your last visit? yes - PET, DEXA    4. Have you been hospitalized or seen in the emergency room since your last visit here?   no    5. Did the patient have a depression screening completed today?  No    No data recorded     PHQ-9 Given to (if applicable):               PHQ-9 Score (if applicable):                     [] Positive     []  Negative              Does question #9 need addressed (if applicable)                     [] Yes    []  No               Rosangela Guzman CMA
reveal sequela of treated metastatic lesion in the left lobe of the liver with central high attenuation area measuring 2.6 x 2.4 cm and the peripheral low-attenuation rim measuring around 4.8 x 4.1 cm related to the postprocedural edema. Again osseous metastatic disease was again noted without pathologic fracture. July 17, 2020 she was overall doing fairly decent had recovered from the discomfort related to the radiofrequency ablation. We talked about continuing on this present regimen does do plan to check her [address] levels to see if we can hold off giving her Zoladex. We of course will continue with Faslodex, Verzenio and Zometa. We of course will continue to monitor her progress through exams as well as CAT scans. [address] level done on July 30, 2020 was 9.4, premenopausal.     Visit here on September 15, 2020. Was still being treated with Faslodex Zoladex and Verzenio. Studies done on October 9, 2020 fairly normal CBC, chemistries reveal creatinine of 1.2 CA 27-29 levels were mildly elevated to 47. A CT scan of the abdomen done on October 15, 2020 revealed 3.5 x 3.1 cm ablation zone, which had decreased in size since immediate post ablation changes done on June 25, 2020. It is difficult to assess residual tremor dedicated multiphasic CT or MRI was recommended. Multifocal osseous sclerotic metastases were felt to be stable. October 27, 2020 overall she was feeling about the same there was some concern about slight increase in tumor marker CA 27-29. Deloris Fabiano Plan was to to multiphasic CT scan of the abdomen to assess the disease status. Also she was seriously considering having her unilateral oophorectomy I think that will be reasonable.   I     CT scan of the abdomen done on November 3, 2020 overall it was felt that there was stable present appearance of liver no suspicious contrast enhancement to indicate presence of residual tumor no new hepatic abnormality was noted again stable appearance of

## 2023-10-03 ENCOUNTER — OFFICE VISIT (OUTPATIENT)
Dept: INTERNAL MEDICINE CLINIC | Age: 53
End: 2023-10-03
Payer: COMMERCIAL

## 2023-10-03 VITALS
HEIGHT: 66 IN | SYSTOLIC BLOOD PRESSURE: 110 MMHG | WEIGHT: 198.8 LBS | OXYGEN SATURATION: 99 % | DIASTOLIC BLOOD PRESSURE: 80 MMHG | BODY MASS INDEX: 31.95 KG/M2 | HEART RATE: 69 BPM

## 2023-10-03 DIAGNOSIS — E66.09 CLASS 1 OBESITY DUE TO EXCESS CALORIES WITHOUT SERIOUS COMORBIDITY WITH BODY MASS INDEX (BMI) OF 33.0 TO 33.9 IN ADULT: ICD-10-CM

## 2023-10-03 DIAGNOSIS — J45.31 MILD PERSISTENT ASTHMA WITH ACUTE EXACERBATION: Primary | ICD-10-CM

## 2023-10-03 DIAGNOSIS — E66.09 CLASS 1 OBESITY DUE TO EXCESS CALORIES WITHOUT SERIOUS COMORBIDITY WITH BODY MASS INDEX (BMI) OF 32.0 TO 32.9 IN ADULT: ICD-10-CM

## 2023-10-03 PROBLEM — J45.909 ASTHMA: Status: ACTIVE | Noted: 2023-10-03

## 2023-10-03 PROCEDURE — 99214 OFFICE O/P EST MOD 30 MIN: CPT | Performed by: FAMILY MEDICINE

## 2023-10-03 RX ORDER — BENZONATATE 200 MG/1
200 CAPSULE ORAL 3 TIMES DAILY PRN
Qty: 30 CAPSULE | Refills: 0 | Status: SHIPPED | OUTPATIENT
Start: 2023-10-03

## 2023-10-03 RX ORDER — PREDNISONE 20 MG/1
20 TABLET ORAL 2 TIMES DAILY
Qty: 8 TABLET | Refills: 0 | Status: SHIPPED | OUTPATIENT
Start: 2023-10-03 | End: 2023-10-07

## 2023-10-03 RX ORDER — PHENTERMINE HYDROCHLORIDE 37.5 MG/1
37.5 TABLET ORAL
Qty: 30 TABLET | Refills: 0 | Status: SHIPPED | OUTPATIENT
Start: 2023-10-03 | End: 2023-11-02

## 2023-10-03 ASSESSMENT — ENCOUNTER SYMPTOMS
COUGH: 1
NAUSEA: 0
SHORTNESS OF BREATH: 0
ABDOMINAL PAIN: 0

## 2023-10-03 NOTE — PROGRESS NOTES
Frandy Frances (:  1970) is a 46 y.o. female,established patient, here for evaluation of the following chief complaint(s):  Follow-up, Cough, and Obesity         ASSESSMENT/PLAN:  1. Mild persistent asthma with acute exacerbation  -Start  - benzonatate (TESSALON) 200 MG capsule; Take 1 capsule by mouth 3 times daily as needed for Cough  Dispense: 30 capsule; Refill: 0  - predniSONE (DELTASONE) 20 MG tablet; Take 1 tablet by mouth 2 times daily for 4 days  Dispense: 8 tablet; Refill: 0  ADR's explained  Continue albuterol    2. Class 1 obesity due to excess calories without serious comorbidity with body mass index (BMI) of 32.0 to 32.9 in adult  - phentermine (ADIPEX-P) 37.5 MG tablet; Take 1 tablet by mouth every morning (before breakfast) for 30 days. Dispense: 30 tablet; Refill: 0      Return in about 4 weeks (around 10/31/2023) for Obesity.        Lab Results   Component Value Date    WBC 4.6 2023    HGB 13.3 2023    HCT 38.9 2023    .8 (H) 2023     2023       Lab Results   Component Value Date     2023    K 4.1 2023     2023    CO2 23 2023    BUN 23 2023    CREATININE 1.3 (H) 2023    GLUCOSE 69 (L) 2023    CALCIUM 10.0 2023    PROT 7.2 2023    LABALBU 4.8 2023    BILITOT 0.4 2023    ALKPHOS 84 2023    AST 29 2023    ALT 27 2023    LABGLOM 49 (L) 2023    GFRAA 57 (L) 2022    AGRATIO 1.9 2022         Subjective   SUBJECTIVE/OBJECTIVE:    HISTORY OF PRESENT ILLNESS:  This is a 46 y.o. female here for the following:  Patient Active Problem List    Diagnosis Date Noted    Migraine without status migrainosus, not intractable 2023    Black stools 2022    Dyspepsia 2022    Asthma 10/03/2023    Chronic renal disease, stage III [facility]) [412879] 2023    Neutropenia ([address]) 10/04/2021    Breast cancer, female 2020

## 2023-10-16 ENCOUNTER — OFFICE VISIT (OUTPATIENT)
Dept: CARDIOLOGY CLINIC | Age: 53
End: 2023-10-16
Payer: COMMERCIAL

## 2023-10-16 VITALS
WEIGHT: 199.6 LBS | DIASTOLIC BLOOD PRESSURE: 70 MMHG | HEIGHT: 66 IN | HEART RATE: 80 BPM | BODY MASS INDEX: 32.08 KG/M2 | SYSTOLIC BLOOD PRESSURE: 110 MMHG

## 2023-10-16 DIAGNOSIS — R42 DIZZINESS: ICD-10-CM

## 2023-10-16 DIAGNOSIS — I77.810 DILATED AORTIC ROOT (HCC): ICD-10-CM

## 2023-10-16 DIAGNOSIS — R00.0 TACHYCARDIA: Primary | ICD-10-CM

## 2023-10-16 DIAGNOSIS — R07.9 CHEST PAIN, UNSPECIFIED TYPE: ICD-10-CM

## 2023-10-16 DIAGNOSIS — C50.911 PRIMARY CANCER OF RIGHT BREAST WITH METASTASIS TO OTHER SITE (HCC): ICD-10-CM

## 2023-10-16 PROCEDURE — 93000 ELECTROCARDIOGRAM COMPLETE: CPT | Performed by: INTERNAL MEDICINE

## 2023-10-16 PROCEDURE — 99214 OFFICE O/P EST MOD 30 MIN: CPT | Performed by: INTERNAL MEDICINE

## 2023-10-16 NOTE — PROGRESS NOTES
significant valvular disease noted. Normal pulmonary artery pressure. No evidence of pericardial effusion. Dilated aortic root at 4.0 cm. Aortic arch not visualized. All labs, medications and tests reviewed by myself including data and history from outside source , patient and available family . Assessment & Plan:      1. Tachycardia    2. Dilated aortic root ([address])    3. Primary cancer of right breast with metastasis to other site [facility])    4. Chest pain, unspecified type    5. Dizziness         Chest pain  He has not had any more episodes of chest pain since her last visit. Stress test with actually a good quality she did not have any evidence of ischemia and completed about 11  METS. Dizziness  Check orthostatic , I Think it is orthostatic event but will get treadmill and echo and 30 day monitor     Asymptomatic varicose veins  More reflux on the left side but she seems to be more symptomatic on the right side advised to wear compression stockings 15 mmHg    Dilated aortic root ([facility])  Measured 4.0 cm on echo we will repeat echo in 1 year     Dyslipidemia :  All available lab work was reviewed. Patient was advised to repeat lab work before next visit      Counseled extensively and medication compliance urged. We discussed that for the  prevention of ASCVD our  goal is aggressive risk modification. Patient is encouraged to exercise even a brisk walk for 30 minutes  at least 3 to 4 times a week   Various goals were discussed and questions answered. Continue current medications. Appropriate prescriptions are addressed and refills ordered. Questions answered and patient verbalizes understanding. Call for any problems, questions, or concerns. Continue all other medications of all above medical condition listed as is. Return in about 6 months (around 4/16/2024).     Please note this report has been partially produced using speech recognition software and may contain errors related to that

## 2023-10-24 ENCOUNTER — PROCEDURE VISIT (OUTPATIENT)
Dept: CARDIOLOGY CLINIC | Age: 53
End: 2023-10-24
Payer: COMMERCIAL

## 2023-10-24 ENCOUNTER — NURSE ONLY (OUTPATIENT)
Dept: CARDIOLOGY CLINIC | Age: 53
End: 2023-10-24

## 2023-10-24 VITALS
BODY MASS INDEX: 31.98 KG/M2 | WEIGHT: 199 LBS | DIASTOLIC BLOOD PRESSURE: 74 MMHG | SYSTOLIC BLOOD PRESSURE: 132 MMHG | HEIGHT: 66 IN | HEART RATE: 79 BPM

## 2023-10-24 DIAGNOSIS — R42 DIZZINESS: ICD-10-CM

## 2023-10-24 DIAGNOSIS — R00.0 TACHYCARDIA: ICD-10-CM

## 2023-10-24 DIAGNOSIS — C50.911 PRIMARY CANCER OF RIGHT BREAST WITH METASTASIS TO OTHER SITE (HCC): ICD-10-CM

## 2023-10-24 DIAGNOSIS — I77.810 DILATED AORTIC ROOT (HCC): ICD-10-CM

## 2023-10-24 DIAGNOSIS — R07.9 CHEST PAIN, UNSPECIFIED TYPE: ICD-10-CM

## 2023-10-24 DIAGNOSIS — R00.0 TACHYCARDIA: Primary | ICD-10-CM

## 2023-10-24 DIAGNOSIS — R00.2 HEART PALPITATIONS: Primary | ICD-10-CM

## 2023-10-24 DIAGNOSIS — R94.31 ABNORMAL EKG: Primary | ICD-10-CM

## 2023-10-24 PROCEDURE — 93015 CV STRESS TEST SUPVJ I&R: CPT | Performed by: INTERNAL MEDICINE

## 2023-10-24 PROCEDURE — 93306 TTE W/DOPPLER COMPLETE: CPT | Performed by: INTERNAL MEDICINE

## 2023-10-24 NOTE — PROGRESS NOTES
30 days e-cardio monitor placed.  # [id number]. Instructed patient on how to record the event and to call monitoring center at [phone] if any problems arise. Instructed patient to disconnect the lead wires from the electrodes before bathing or showering and reattach them afterwards. Instructed patient that the electrodes should be changed every 3 days or if they no longer adhere to the skin. Patient to mail package after the monitor has ended. Patient verbalized understanding.

## 2023-10-25 ENCOUNTER — TELEPHONE (OUTPATIENT)
Dept: CARDIOLOGY CLINIC | Age: 53
End: 2023-10-25

## 2023-10-25 NOTE — TELEPHONE ENCOUNTER
Summary   Left ventricular function and size is normal, EF is estimated at 55-60%. Mild left ventricular hypertrophy. Grade I diastolic dysfunction. No significant valvular disease noted. Mild tricuspid regurgitation; RVSP is 24 mmHg. Dilated aortic root. (4.2cm)   No evidence of pericardial effusion    Spoke to pt regarding results of echo and follow up appt. Patient advised and voices understanding.

## 2023-11-03 ENCOUNTER — HOSPITAL ENCOUNTER (OUTPATIENT)
Dept: INFUSION THERAPY | Age: 53
Discharge: HOME | End: 2023-11-03
Payer: COMMERCIAL

## 2023-11-03 DIAGNOSIS — C50.911 PRIMARY CANCER OF RIGHT BREAST WITH METASTASIS TO OTHER SITE (HCC): ICD-10-CM

## 2023-11-03 DIAGNOSIS — C50.411 MALIGNANT NEOPLASM OF UPPER-OUTER QUADRANT OF RIGHT FEMALE BREAST, UNSPECIFIED ESTROGEN RECEPTOR STATUS (HCC): ICD-10-CM

## 2023-11-03 DIAGNOSIS — N18.31 STAGE 3A CHRONIC KIDNEY DISEASE (HCC): ICD-10-CM

## 2023-11-03 DIAGNOSIS — C79.51 SECONDARY MALIGNANT NEOPLASM OF BONE (HCC): ICD-10-CM

## 2023-11-03 LAB
ALBUMIN SERPL-MCNC: 4.4 GM/DL (ref 3.4–5)
ALP BLD-CCNC: 81 IU/L (ref 40–129)
ALT SERPL-CCNC: 18 U/L (ref 10–40)
ANION GAP SERPL CALCULATED.3IONS-SCNC: 11 MMOL/L (ref 4–16)
AST SERPL-CCNC: 23 IU/L (ref 15–37)
BASOPHILS ABSOLUTE: 0.1 K/CU MM
BASOPHILS RELATIVE PERCENT: 1.9 % (ref 0–1)
BILIRUB SERPL-MCNC: 0.3 MG/DL (ref 0–1)
BUN SERPL-MCNC: 18 MG/DL (ref 6–23)
CALCIUM SERPL-MCNC: 9.5 MG/DL (ref 8.3–10.6)
CHLORIDE BLD-SCNC: 104 MMOL/L (ref 99–110)
CO2: 22 MMOL/L (ref 21–32)
CREAT SERPL-MCNC: 1.2 MG/DL (ref 0.6–1.1)
DIFFERENTIAL TYPE: ABNORMAL
EOSINOPHILS ABSOLUTE: 0.1 K/CU MM
EOSINOPHILS RELATIVE PERCENT: 1.4 % (ref 0–3)
GFR SERPL CREATININE-BSD FRML MDRD: 54 ML/MIN/1.73M2
GLUCOSE SERPL-MCNC: 98 MG/DL (ref 70–99)
HCT VFR BLD CALC: 38.6 % (ref 37–47)
HEMOGLOBIN: 13.5 GM/DL (ref 12.5–16)
LYMPHOCYTES ABSOLUTE: 2 K/CU MM
LYMPHOCYTES RELATIVE PERCENT: 54 % (ref 24–44)
MCH RBC QN AUTO: 34.6 PG (ref 27–31)
MCHC RBC AUTO-ENTMCNC: 35 % (ref 32–36)
MCV RBC AUTO: 99 FL (ref 78–100)
MONOCYTES ABSOLUTE: 0.3 K/CU MM
MONOCYTES RELATIVE PERCENT: 9 % (ref 0–4)
PDW BLD-RTO: 12.4 % (ref 11.7–14.9)
PLATELET # BLD: 206 K/CU MM (ref 140–440)
PMV BLD AUTO: 8.7 FL (ref 7.5–11.1)
POTASSIUM SERPL-SCNC: 4.1 MMOL/L (ref 3.5–5.1)
RBC # BLD: 3.9 M/CU MM (ref 4.2–5.4)
SEGMENTED NEUTROPHILS ABSOLUTE COUNT: 1.2 K/CU MM
SEGMENTED NEUTROPHILS RELATIVE PERCENT: 33.7 % (ref 36–66)
SODIUM BLD-SCNC: 137 MMOL/L (ref 135–145)
TOTAL PROTEIN: 7.1 GM/DL (ref 6.4–8.2)
WBC # BLD: 3.7 K/CU MM (ref 4–10.5)

## 2023-11-03 PROCEDURE — 85025 COMPLETE CBC W/AUTO DIFF WBC: CPT

## 2023-11-03 PROCEDURE — 36415 COLL VENOUS BLD VENIPUNCTURE: CPT

## 2023-11-03 PROCEDURE — 86300 IMMUNOASSAY TUMOR CA 15-3: CPT

## 2023-11-03 PROCEDURE — 80053 COMPREHEN METABOLIC PANEL: CPT

## 2023-11-05 LAB — CANCER AG27-29 SERPL-ACNC: 91.5 U/ML

## 2023-11-10 ENCOUNTER — HOSPITAL ENCOUNTER (OUTPATIENT)
Dept: INFUSION THERAPY | Age: 53
Discharge: HOME | End: 2023-11-10
Payer: COMMERCIAL

## 2023-11-10 ENCOUNTER — OFFICE VISIT (OUTPATIENT)
Dept: ONCOLOGY | Age: 53
End: 2023-11-10
Payer: COMMERCIAL

## 2023-11-10 VITALS
DIASTOLIC BLOOD PRESSURE: 78 MMHG | BODY MASS INDEX: 31.21 KG/M2 | OXYGEN SATURATION: 98 % | HEIGHT: 66 IN | HEART RATE: 72 BPM | SYSTOLIC BLOOD PRESSURE: 116 MMHG | WEIGHT: 194.2 LBS | TEMPERATURE: 97.5 F

## 2023-11-10 DIAGNOSIS — C78.7 SECONDARY MALIGNANT NEOPLASM OF LIVER AND INTRAHEPATIC BILE DUCT (HCC): ICD-10-CM

## 2023-11-10 DIAGNOSIS — C50.911 PRIMARY CANCER OF RIGHT BREAST WITH METASTASIS TO OTHER SITE (HCC): Primary | ICD-10-CM

## 2023-11-10 PROCEDURE — 99211 OFF/OP EST MAY X REQ PHY/QHP: CPT

## 2023-11-10 PROCEDURE — 99214 OFFICE O/P EST MOD 30 MIN: CPT | Performed by: INTERNAL MEDICINE

## 2023-11-10 RX ORDER — PANTOPRAZOLE SODIUM 40 MG/1
40 TABLET, DELAYED RELEASE ORAL DAILY
Qty: 30 TABLET | Refills: 5 | Status: SHIPPED | OUTPATIENT
Start: 2023-11-10 | End: 2024-05-08

## 2023-11-10 NOTE — PROGRESS NOTES
MA Rooming Questions  Patient: Romero Asif  MRN: [id number]    Date: 11/10/2023        1. Do you have any new issues?   no         2. Do you need any refills on medications? yes - Protonix    3. Have you had any imaging done since your last visit?   no    4. Have you been hospitalized or seen in the emergency room since your last visit here?   no    5. Did the patient have a depression screening completed today?  No    No data recorded     PHQ-9 Given to (if applicable):               PHQ-9 Score (if applicable):                     [] Positive     []  Negative              Does question #9 need addressed (if applicable)                     [] Yes    []  No               Palmira Jett MA
discussed the results of the CT scan of the chest abdomen pelvis with stable osseous, pulmonary, liver lesions with slight increase in the right breast lesion. Note CA 27-29 was 70.9 in September 2021. Will discuss with radiation oncology for possible SBRT of the breast mass. Will repeat CA 27-29 in a month in November and if rising then will consider changing to [address] if progressive disease. Other medical care. To clinic December 2021 or earlier if new symptoms. Dictated today on 12 9 2021 I discussed the results of the CA 27-29 that it continues to rise slowly. Clinically is the same. Very reluctant to go back on the Faslodex. Reported that I would recommend repeat CA 27-29 in a month and if rising then will recommend PET scan for further evaluation and treatment planning. The options would be at that time to go back on Faslodex or start her on PIK3A inhibitor if evidence of progressive disease. Discussed with the radiation oncology regarding SBRT to the breast but they deferred at this point. Leukopenia with ANC of about thousand most probably secondary to abemaciclib. Continue to monitor closely and dose adjustments as needed. Return to clinic in January 2022    Visit here on 2/9/2022, clinically stable Seems to be tolerating fairly well. Note CA 27-29 in February 2022 mildly decreased. Continue current regimen. Monitor for adverse effects. Plan to repeat imaging and tumor marker in April 2022. She was seen on April 27 2022, overall doing good. Ca 27-29 was 50 and Ct chest, abdomen and pelvis in April 2022 with stable findings. Continue Letrozole and Abemaciclib. Tolerating fairly well. Recommend Effexor for vasomotor Sx relief. Refills for letrozole provided. Dexa scan July 2021 normal. Plan to repeat imaging in 6M. Ca  27-29 every 2 Months. Continue Zometa every 6M. Leukopenia/neutropenia most probably sec to Abemaciclib. Will monitor for now.    Mild renal insufficiency: Adequate fluids and

## 2023-11-29 ENCOUNTER — HOSPITAL ENCOUNTER (OUTPATIENT)
Dept: INFUSION THERAPY | Age: 53
Discharge: HOME | End: 2023-11-29
Payer: COMMERCIAL

## 2023-11-29 VITALS
HEIGHT: 66 IN | OXYGEN SATURATION: 100 % | SYSTOLIC BLOOD PRESSURE: 153 MMHG | WEIGHT: 197.4 LBS | DIASTOLIC BLOOD PRESSURE: 75 MMHG | HEART RATE: 64 BPM | BODY MASS INDEX: 31.72 KG/M2 | TEMPERATURE: 97.3 F

## 2023-11-29 DIAGNOSIS — C79.51 SECONDARY MALIGNANT NEOPLASM OF BONE (HCC): Primary | ICD-10-CM

## 2023-11-29 DIAGNOSIS — C50.911 PRIMARY CANCER OF RIGHT BREAST WITH METASTASIS TO OTHER SITE (HCC): ICD-10-CM

## 2023-11-29 PROCEDURE — 96365 THER/PROPH/DIAG IV INF INIT: CPT

## 2023-11-29 PROCEDURE — 6360000002 HC RX W HCPCS: Performed by: INTERNAL MEDICINE

## 2023-11-29 PROCEDURE — 2580000003 HC RX 258: Performed by: INTERNAL MEDICINE

## 2023-11-29 RX ORDER — ACETAMINOPHEN 325 MG/1
650 TABLET ORAL
OUTPATIENT
Start: 2024-11-24

## 2023-11-29 RX ORDER — ALBUTEROL SULFATE 90 UG/1
4 AEROSOL, METERED RESPIRATORY (INHALATION) PRN
OUTPATIENT
Start: 2024-11-24

## 2023-11-29 RX ORDER — SODIUM CHLORIDE 9 MG/ML
INJECTION, SOLUTION INTRAVENOUS CONTINUOUS
OUTPATIENT
Start: 2024-11-24

## 2023-11-29 RX ORDER — HEPARIN 100 UNIT/ML
500 SYRINGE INTRAVENOUS PRN
OUTPATIENT
Start: 2024-11-24

## 2023-11-29 RX ORDER — FAMOTIDINE 10 MG/ML
20 INJECTION, SOLUTION INTRAVENOUS
OUTPATIENT
Start: 2024-11-24

## 2023-11-29 RX ORDER — SODIUM CHLORIDE 9 MG/ML
INJECTION, SOLUTION INTRAVENOUS ONCE
OUTPATIENT
Start: 2024-11-24 | End: 2024-11-24

## 2023-11-29 RX ORDER — SODIUM CHLORIDE 0.9 % (FLUSH) 0.9 %
5-40 SYRINGE (ML) INJECTION PRN
OUTPATIENT
Start: 2024-11-24

## 2023-11-29 RX ORDER — DIPHENHYDRAMINE HYDROCHLORIDE 50 MG/ML
50 INJECTION INTRAMUSCULAR; INTRAVENOUS
OUTPATIENT
Start: 2024-11-24

## 2023-11-29 RX ORDER — HEPARIN 100 UNIT/ML
500 SYRINGE INTRAVENOUS PRN
Status: CANCELLED | OUTPATIENT
Start: 2024-11-24

## 2023-11-29 RX ORDER — ONDANSETRON 2 MG/ML
8 INJECTION INTRAMUSCULAR; INTRAVENOUS
OUTPATIENT
Start: 2024-11-24

## 2023-11-29 RX ORDER — SODIUM CHLORIDE 0.9 % (FLUSH) 0.9 %
5-40 SYRINGE (ML) INJECTION PRN
Status: DISCONTINUED | OUTPATIENT
Start: 2023-11-29 | End: 2023-11-30 | Stop reason: HOSPADM

## 2023-11-29 RX ORDER — SODIUM CHLORIDE 9 MG/ML
5-250 INJECTION, SOLUTION INTRAVENOUS PRN
OUTPATIENT
Start: 2024-11-24

## 2023-11-29 RX ORDER — SODIUM CHLORIDE 9 MG/ML
INJECTION, SOLUTION INTRAVENOUS ONCE
Status: COMPLETED | OUTPATIENT
Start: 2023-11-29 | End: 2023-11-29

## 2023-11-29 RX ORDER — EPINEPHRINE 1 MG/ML
0.3 INJECTION, SOLUTION, CONCENTRATE INTRAVENOUS PRN
OUTPATIENT
Start: 2024-11-24

## 2023-11-29 RX ADMIN — ZOLEDRONIC ACID 4 MG: 4 INJECTION, SOLUTION, CONCENTRATE INTRAVENOUS at 11:54

## 2023-11-29 RX ADMIN — SODIUM CHLORIDE: 9 INJECTION, SOLUTION INTRAVENOUS at 11:54

## 2023-11-29 NOTE — PROGRESS NOTES
Arrived to treatment suite for Zometa infusion. PIV placed without difficulty. Calcium 9.5. Treatment plan approved, released and completed as ordered. PIV removed per protocol. Pt tolerated well. Discharge ambulatory in stable condition.   Venancio Betancur RN

## 2023-12-12 ENCOUNTER — OFFICE VISIT (OUTPATIENT)
Dept: CARDIOLOGY CLINIC | Age: 53
End: 2023-12-12
Payer: COMMERCIAL

## 2023-12-12 VITALS
SYSTOLIC BLOOD PRESSURE: 110 MMHG | HEIGHT: 66 IN | BODY MASS INDEX: 31.02 KG/M2 | DIASTOLIC BLOOD PRESSURE: 68 MMHG | HEART RATE: 75 BPM | OXYGEN SATURATION: 97 % | WEIGHT: 193 LBS

## 2023-12-12 DIAGNOSIS — K76.9 LIVER DISEASE: ICD-10-CM

## 2023-12-12 DIAGNOSIS — Z82.49 FHX: CORONARY ARTERY DISEASE: ICD-10-CM

## 2023-12-12 DIAGNOSIS — R10.13 DYSPEPSIA: Primary | ICD-10-CM

## 2023-12-12 DIAGNOSIS — R07.9 CHEST PAIN, UNSPECIFIED TYPE: ICD-10-CM

## 2023-12-12 DIAGNOSIS — R00.0 TACHYCARDIA: ICD-10-CM

## 2023-12-12 DIAGNOSIS — E78.5 DYSLIPIDEMIA: ICD-10-CM

## 2023-12-12 DIAGNOSIS — N18.31 STAGE 3A CHRONIC KIDNEY DISEASE (HCC): ICD-10-CM

## 2023-12-12 DIAGNOSIS — R94.31 ABNORMAL EKG: ICD-10-CM

## 2023-12-12 DIAGNOSIS — I77.810 DILATED AORTIC ROOT (HCC): ICD-10-CM

## 2023-12-12 DIAGNOSIS — R42 DIZZINESS: ICD-10-CM

## 2023-12-12 PROCEDURE — 99214 OFFICE O/P EST MOD 30 MIN: CPT | Performed by: INTERNAL MEDICINE

## 2023-12-12 NOTE — PROGRESS NOTES
tablet by mouth nightly as needed for Sleep 30 tablet 2    ondansetron (ZOFRAN) 4 MG tablet Take 1 tablet by mouth every 8 hours as needed for Nausea or Vomiting 30 tablet 0    ibuprofen (ADVIL;MOTRIN) 800 MG tablet Take 1 tablet by mouth every 8 hours as needed for Pain 30 tablet 1    zoledronic acid (ZOMETA) 4 MG/100ML SOLN infusion Infuse 100 mLs intravenously once Every year      diphenhydrAMINE (BENADRYL) 25 MG capsule 1-2 capsules by mouth every 6 hours as needed for rash 30 capsule 0     No current facility-administered medications for this visit. Allergies:   Sulfa antibiotics and Nickel    Patient History:  Past Medical History:   Diagnosis Date    Asthma     last flare up winter 2016    Breast CA [facility])     right    Breast cancer ([address])     right    Chest pain     \"had chest pain last time - saw Dr Adrienne Peres- all ok\"    FHx: coronary artery disease     H/O Doppler lower venous ultrasound 2019    No DVT,  Significant reflux noted of the Right & Left Deep Venous System. H/O echocardiogram 2019    EF 55-60%, Essentially normal echocardiogram. Dilated aortic root at 4.0 cm. History of exercise stress test 2019    Treadmill, EKG portion is negative for ischemia by diagnostic criteria    History of migraine     last HA  2020    Hx antineoplastic chemo 2017    still taking    HX OTHER MEDICAL     \"since 2016- having some pain mid sternum- xray thought fx, then did CT and bone scan now having bx done\"    Hyperlipemia     Primary cancer of right breast with metastasis to other site ([address]) 3/24/2019    bone and liver     Past Surgical History:   Procedure Laterality Date     SECTION      COLONOSCOPY N/A 2021    COLORECTAL CANCER SCREENING, NOT HIGH RISK performed by Chasdiy Cruz MD at [address]  age 28    LASIK both eyes    HYSTERECTOMY (CERVIX STATUS UNKNOWN)  age 27    BSO.  Due to endometriosis    LAPAROSCOPY N/A 2020

## 2023-12-12 NOTE — PATIENT INSTRUCTIONS
Please be informed that if you contact our office outside of normal business hours the physician on call cannot help with any scheduling or rescheduling issues, procedure instruction questions or any type of medication issue. We advise you for any urgent/emergency that you go to the nearest emergency room! PLEASE CALL OUR OFFICE DURING NORMAL BUSINESS HOURS    Monday - Friday   8 am to 5 pm    [place]: [address]: [phone]    [place]:  [phone]  **It is YOUR responsibilty to bring medication bottles and/or updated medication list to [facility]. This will allow us to better serve you and all your healthcare needs**  Thank you for allowing us to care for you today! We want to ensure we can follow your treatment plan and we strive to give you the best outcomes and experience possible. If you ever have a life threatening emergency and call 911 - for an ambulance (EMS)   Our providers can only care for you at:   [facility] or [facility]. Even if you have someone take you or you drive yourself we can only care for you in a [address] [place] [address] facility. Our providers are not setup at the other healthcare locations! We are committed to providing you the best care possible. If you receive a survey after visiting one of our offices, please take time to share your experience concerning your physician office visit. These surveys are confidential and no health information about you is shared. We are eager to improve for you and we are counting on your feedback to help make that happen.

## 2023-12-26 ENCOUNTER — TELEPHONE (OUTPATIENT)
Dept: CARDIOLOGY CLINIC | Age: 53
End: 2023-12-26

## 2023-12-26 DIAGNOSIS — R94.39 ABNORMAL NUCLEAR STRESS TEST: Primary | ICD-10-CM

## 2023-12-26 DIAGNOSIS — Z01.810 PRE-OPERATIVE CARDIOVASCULAR EXAMINATION: Primary | ICD-10-CM

## 2023-12-26 DIAGNOSIS — R94.31 ABNORMAL EKG: ICD-10-CM

## 2023-12-26 NOTE — TELEPHONE ENCOUNTER
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

## 2023-12-27 ENCOUNTER — HOSPITAL ENCOUNTER (OUTPATIENT)
Age: 53
Setting detail: OUTPATIENT SURGERY
Discharge: HOME | End: 2023-12-27
Attending: INTERNAL MEDICINE | Admitting: INTERNAL MEDICINE
Payer: COMMERCIAL

## 2023-12-27 VITALS
RESPIRATION RATE: 18 BRPM | SYSTOLIC BLOOD PRESSURE: 133 MMHG | HEART RATE: 54 BPM | TEMPERATURE: 98 F | OXYGEN SATURATION: 97 % | DIASTOLIC BLOOD PRESSURE: 84 MMHG

## 2023-12-27 DIAGNOSIS — R94.39 ABNORMAL NUCLEAR STRESS TEST: ICD-10-CM

## 2023-12-27 LAB
ALBUMIN SERPL-MCNC: 4.3 GM/DL (ref 3.4–5)
ALP BLD-CCNC: 69 IU/L (ref 40–129)
ALT SERPL-CCNC: 17 U/L (ref 10–40)
ANION GAP SERPL CALCULATED.3IONS-SCNC: 10 MMOL/L (ref 7–16)
AST SERPL-CCNC: 19 IU/L (ref 15–37)
BASOPHILS ABSOLUTE: 0.1 K/CU MM
BASOPHILS RELATIVE PERCENT: 1.7 % (ref 0–1)
BILIRUB SERPL-MCNC: 0.4 MG/DL (ref 0–1)
BUN SERPL-MCNC: 16 MG/DL (ref 6–23)
CALCIUM SERPL-MCNC: 9 MG/DL (ref 8.3–10.6)
CHLORIDE BLD-SCNC: 108 MMOL/L (ref 99–110)
CO2: 24 MMOL/L (ref 21–32)
CREAT SERPL-MCNC: 1.2 MG/DL (ref 0.6–1.1)
DIFFERENTIAL TYPE: ABNORMAL
EOSINOPHILS ABSOLUTE: 0 K/CU MM
EOSINOPHILS RELATIVE PERCENT: 0.3 % (ref 0–3)
GFR SERPL CREATININE-BSD FRML MDRD: 54 ML/MIN/1.73M2
GLUCOSE SERPL-MCNC: 94 MG/DL (ref 70–99)
HCT VFR BLD CALC: 38.4 % (ref 37–47)
HEMOGLOBIN: 13.1 GM/DL (ref 12.5–16)
IMMATURE NEUTROPHIL %: 0.3 % (ref 0–0.43)
LYMPHOCYTES ABSOLUTE: 1.9 K/CU MM
LYMPHOCYTES RELATIVE PERCENT: 53.6 % (ref 24–44)
MCH RBC QN AUTO: 34.3 PG (ref 27–31)
MCHC RBC AUTO-ENTMCNC: 34.1 % (ref 32–36)
MCV RBC AUTO: 100.5 FL (ref 78–100)
MONOCYTES ABSOLUTE: 0.3 K/CU MM
MONOCYTES RELATIVE PERCENT: 7.5 % (ref 0–4)
NUCLEATED RBC %: 0 %
PDW BLD-RTO: 12.4 % (ref 11.7–14.9)
PLATELET # BLD: 207 K/CU MM (ref 140–440)
PMV BLD AUTO: 9.1 FL (ref 7.5–11.1)
POTASSIUM SERPL-SCNC: 3.9 MMOL/L (ref 3.5–5.1)
RBC # BLD: 3.82 M/CU MM (ref 4.2–5.4)
SEGMENTED NEUTROPHILS ABSOLUTE COUNT: 1.3 K/CU MM
SEGMENTED NEUTROPHILS RELATIVE PERCENT: 36.6 % (ref 36–66)
SODIUM BLD-SCNC: 142 MMOL/L (ref 135–145)
TOTAL IMMATURE NEUTOROPHIL: 0.01 K/CU MM
TOTAL NUCLEATED RBC: 0 K/CU MM
TOTAL PROTEIN: 7.1 GM/DL (ref 6.4–8.2)
WBC # BLD: 3.5 K/CU MM (ref 4–10.5)

## 2023-12-27 PROCEDURE — 85025 COMPLETE CBC W/AUTO DIFF WBC: CPT

## 2023-12-27 PROCEDURE — C1769 GUIDE WIRE: HCPCS | Performed by: INTERNAL MEDICINE

## 2023-12-27 PROCEDURE — 7100000011 HC PHASE II RECOVERY - ADDTL 15 MIN: Performed by: INTERNAL MEDICINE

## 2023-12-27 PROCEDURE — 2500000003 HC RX 250 WO HCPCS: Performed by: INTERNAL MEDICINE

## 2023-12-27 PROCEDURE — 6360000002 HC RX W HCPCS: Performed by: INTERNAL MEDICINE

## 2023-12-27 PROCEDURE — 6360000004 HC RX CONTRAST MEDICATION

## 2023-12-27 PROCEDURE — 80053 COMPREHEN METABOLIC PANEL: CPT

## 2023-12-27 PROCEDURE — 6360000004 HC RX CONTRAST MEDICATION: Performed by: INTERNAL MEDICINE

## 2023-12-27 PROCEDURE — 2709999900 HC NON-CHARGEABLE SUPPLY: Performed by: INTERNAL MEDICINE

## 2023-12-27 PROCEDURE — 93458 L HRT ARTERY/VENTRICLE ANGIO: CPT | Performed by: INTERNAL MEDICINE

## 2023-12-27 PROCEDURE — C1894 INTRO/SHEATH, NON-LASER: HCPCS | Performed by: INTERNAL MEDICINE

## 2023-12-27 PROCEDURE — 2500000003 HC RX 250 WO HCPCS

## 2023-12-27 PROCEDURE — 7100000010 HC PHASE II RECOVERY - FIRST 15 MIN: Performed by: INTERNAL MEDICINE

## 2023-12-27 PROCEDURE — 2580000003 HC RX 258: Performed by: INTERNAL MEDICINE

## 2023-12-27 PROCEDURE — 6360000002 HC RX W HCPCS

## 2023-12-27 RX ORDER — 0.9 % SODIUM CHLORIDE 0.9 %
500 INTRAVENOUS SOLUTION INTRAVENOUS ONCE
Status: DISCONTINUED | OUTPATIENT
Start: 2023-12-27 | End: 2023-12-27 | Stop reason: HOSPADM

## 2023-12-27 RX ORDER — SODIUM CHLORIDE 9 MG/ML
INJECTION, SOLUTION INTRAVENOUS PRN
Status: DISCONTINUED | OUTPATIENT
Start: 2023-12-27 | End: 2023-12-27 | Stop reason: HOSPADM

## 2023-12-27 RX ORDER — SODIUM CHLORIDE 9 MG/ML
INJECTION, SOLUTION INTRAVENOUS CONTINUOUS
Status: DISCONTINUED | OUTPATIENT
Start: 2023-12-27 | End: 2023-12-27 | Stop reason: HOSPADM

## 2023-12-27 RX ORDER — HYDRALAZINE HYDROCHLORIDE 20 MG/ML
10 INJECTION INTRAMUSCULAR; INTRAVENOUS EVERY 10 MIN PRN
Status: DISCONTINUED | OUTPATIENT
Start: 2023-12-27 | End: 2023-12-27 | Stop reason: HOSPADM

## 2023-12-27 RX ORDER — SODIUM CHLORIDE 0.9 % (FLUSH) 0.9 %
5-40 SYRINGE (ML) INJECTION EVERY 12 HOURS SCHEDULED
Status: DISCONTINUED | OUTPATIENT
Start: 2023-12-27 | End: 2023-12-27 | Stop reason: HOSPADM

## 2023-12-27 RX ORDER — 0.9 % SODIUM CHLORIDE 0.9 %
INTRAVENOUS SOLUTION INTRAVENOUS CONTINUOUS PRN
Status: COMPLETED | OUTPATIENT
Start: 2023-12-27 | End: 2023-12-27

## 2023-12-27 RX ORDER — ACETAMINOPHEN 325 MG/1
650 TABLET ORAL EVERY 4 HOURS PRN
Status: DISCONTINUED | OUTPATIENT
Start: 2023-12-27 | End: 2023-12-27 | Stop reason: HOSPADM

## 2023-12-27 RX ORDER — HEPARIN SODIUM 200 [USP'U]/100ML
INJECTION, SOLUTION INTRAVENOUS PRN
Status: DISCONTINUED | OUTPATIENT
Start: 2023-12-27 | End: 2023-12-27 | Stop reason: HOSPADM

## 2023-12-27 RX ORDER — FENTANYL CITRATE 50 UG/ML
INJECTION, SOLUTION INTRAMUSCULAR; INTRAVENOUS PRN
Status: DISCONTINUED | OUTPATIENT
Start: 2023-12-27 | End: 2023-12-27 | Stop reason: HOSPADM

## 2023-12-27 RX ORDER — SODIUM CHLORIDE 0.9 % (FLUSH) 0.9 %
5-40 SYRINGE (ML) INJECTION PRN
Status: DISCONTINUED | OUTPATIENT
Start: 2023-12-27 | End: 2023-12-27 | Stop reason: HOSPADM

## 2023-12-27 RX ORDER — MIDAZOLAM HYDROCHLORIDE 1 MG/ML
INJECTION INTRAMUSCULAR; INTRAVENOUS PRN
Status: DISCONTINUED | OUTPATIENT
Start: 2023-12-27 | End: 2023-12-27 | Stop reason: HOSPADM

## 2023-12-27 NOTE — PROGRESS NOTES
Pt arrived to cath lab holding, VSS stable. IV placed. Labs drawn and sent. All questions answered. Call light in reach.

## 2023-12-27 NOTE — H&P
Colon Naval, 48 y.o., female    Primary care physician:  Mio Mike DO     Chief Complaint: Chest Pain    History of Present Illness:  . Natalia Dillon has episodes of lightheaded once in awhile where \"things go back \" she has to hold on to something ot get her bearing   This is occurring more in last 6 mths  She is on adipex for weight loss   Stress test shows anterior ischemia   . He denies any new episodes of chest pain. She had a stress test  in  which did not show any evidence of ischemia she did very well completing 9.5 METS. Repeat stress test in  showed no ischemia but her exercise capacity has reduced   She does have significant leg reflux worse in the left side than on the right side on venous Doppler. She has a right leg varicose vein she has started wearing compression stockings. She does have history of breast cancer with metastases, which is being managed by oncology. It has been stable. She does have bony metastases as per PET scan . Both her parents had heart problems at young age and had heart attacks   She Is a nurse working frequent night shifts  right Axis on her EKG with poor progression of R wave  Past medical history:    has a past medical history of Asthma, Breast CA ([address]), Breast cancer ([address]), Chest pain, Dyslipidemia, FHx: coronary artery disease, H/O Doppler lower venous ultrasound, H/O echocardiogram, History of exercise stress test, History of migraine, Hx antineoplastic chemo, HX OTHER MEDICAL, Hyperlipemia, and Primary cancer of right breast with metastasis to other site [facility]). Past surgical history:   has a past surgical history that includes Tubal ligation ();  section (); other surgical history (Right, 2013); Hysterectomy (age 27); eye surgery (age 28); liver biopsy (); Salpingo-oophorectomy (Left, 2020); laparoscopy (N/A, 2020);  Colonoscopy (N/A, 2021); and Upper gastrointestinal endoscopy (N/A,

## 2024-01-16 ENCOUNTER — OFFICE VISIT (OUTPATIENT)
Dept: CARDIOLOGY CLINIC | Age: 54
End: 2024-01-16
Payer: COMMERCIAL

## 2024-01-16 VITALS
OXYGEN SATURATION: 96 % | SYSTOLIC BLOOD PRESSURE: 110 MMHG | DIASTOLIC BLOOD PRESSURE: 62 MMHG | WEIGHT: 200 LBS | BODY MASS INDEX: 32.14 KG/M2 | HEART RATE: 65 BPM | HEIGHT: 66 IN

## 2024-01-16 DIAGNOSIS — R07.9 CHEST PAIN, UNSPECIFIED TYPE: Primary | ICD-10-CM

## 2024-01-16 DIAGNOSIS — C50.911 PRIMARY CANCER OF RIGHT BREAST WITH METASTASIS TO OTHER SITE (HCC): ICD-10-CM

## 2024-01-16 DIAGNOSIS — C79.51 SECONDARY MALIGNANT NEOPLASM OF BONE (HCC): ICD-10-CM

## 2024-01-16 DIAGNOSIS — I77.810 DILATED AORTIC ROOT (HCC): ICD-10-CM

## 2024-01-16 PROCEDURE — 99214 OFFICE O/P EST MOD 30 MIN: CPT | Performed by: INTERNAL MEDICINE

## 2024-01-16 NOTE — PROGRESS NOTES
and available family .  Assessment & Plan:      1. Chest pain, unspecified type    2. Dilated aortic root (HCC)    3. Bone metastasis    4. Primary cancer of right breast with metastasis to other site (HCC)         Chest pain  He has not had any more episodes of chest pain since her last visit.  Stress test with actually a good quality she did not have any evidence of ischemia in 209 and 2023 but exercise capacity has reduced 5 beat run of wide complex tachycardia aberrant conduction?  Cath in Dec 2024  showed no cad she has   Few small scattered sclerotic lesions noted in the osseous structures  including the sternum, thoracic spine, lumbar spine and proximal femora  stable since prior examination.  Findings suggest stable osseous metastatic  disease without evidence of progression or pathologic fracture.  I think her pain is from her sternal bone mets   She is taking protonix for gerd   Offered pain management       Breast cancer  PET scan was not active but has mets all over followed by Dr Shah   It think she has pain from mets see Dr shah   She is on  suppressive chemo , I Asked her to d/w Dr shah about increased pain could she have more mets?       Dizziness  Orthostatic? 30 day monitor showed some sinus tachycardia when she was having symptoms? Try epleys? Wear compression stockings  Stress test was normal   Had 5 beat run of wide complex tachy on monitor but that was more aberrant conduction as hr was 110  PET scan was normal     Asymptomatic varicose veins  More reflux on the left side but she seems to be more symptomatic on the right side advised to wear compression stockings 15 mmHg    Dilated aortic root (HCC)  Measured 4.0 cm on echo we will repeat echo in 1 year, repeat study showed 4.2 cm in 2023     Dyslipidemia :  All available lab work was reviewed.  Patient was advised to repeat lab work before next visit      Counseled extensively and medication compliance urged.  We discussed that for the

## 2024-01-26 ENCOUNTER — CLINICAL DOCUMENTATION (OUTPATIENT)
Dept: ONCOLOGY | Age: 54
End: 2024-01-26

## 2024-01-30 DIAGNOSIS — C50.911 PRIMARY CANCER OF RIGHT BREAST WITH METASTASIS TO OTHER SITE (HCC): ICD-10-CM

## 2024-01-30 RX ORDER — ABEMACICLIB 150 MG/1
1 TABLET ORAL 2 TIMES DAILY
Qty: 60 TABLET | Refills: 5 | Status: ACTIVE | OUTPATIENT
Start: 2024-01-30

## 2024-03-03 ENCOUNTER — HOSPITAL ENCOUNTER (EMERGENCY)
Age: 54
Discharge: HOME | End: 2024-03-03
Payer: COMMERCIAL

## 2024-03-03 ENCOUNTER — APPOINTMENT (OUTPATIENT)
Dept: GENERAL RADIOLOGY | Age: 54
End: 2024-03-03
Payer: COMMERCIAL

## 2024-03-03 VITALS
DIASTOLIC BLOOD PRESSURE: 79 MMHG | RESPIRATION RATE: 16 BRPM | TEMPERATURE: 98.7 F | HEART RATE: 104 BPM | SYSTOLIC BLOOD PRESSURE: 114 MMHG | OXYGEN SATURATION: 93 % | HEIGHT: 66 IN | WEIGHT: 203 LBS | BODY MASS INDEX: 32.62 KG/M2

## 2024-03-03 DIAGNOSIS — J10.1 INFLUENZA A: ICD-10-CM

## 2024-03-03 DIAGNOSIS — J22 LOWER RESPIRATORY TRACT INFECTION: Primary | ICD-10-CM

## 2024-03-03 DIAGNOSIS — J45.20 MILD INTERMITTENT ASTHMA WITHOUT COMPLICATION: ICD-10-CM

## 2024-03-03 DIAGNOSIS — H65.02 NON-RECURRENT ACUTE SEROUS OTITIS MEDIA OF LEFT EAR: ICD-10-CM

## 2024-03-03 LAB
ALBUMIN SERPL-MCNC: 4.5 GM/DL (ref 3.4–5)
ALP BLD-CCNC: 76 IU/L (ref 40–128)
ALT SERPL-CCNC: 27 U/L (ref 10–40)
ANION GAP SERPL CALCULATED.3IONS-SCNC: 10 MMOL/L (ref 7–16)
AST SERPL-CCNC: 31 IU/L (ref 15–37)
BASOPHILS ABSOLUTE: 0 K/CU MM
BASOPHILS RELATIVE PERCENT: 0.8 % (ref 0–1)
BILIRUB SERPL-MCNC: 0.2 MG/DL (ref 0–1)
BUN SERPL-MCNC: 26 MG/DL (ref 6–23)
CALCIUM SERPL-MCNC: 9.6 MG/DL (ref 8.3–10.6)
CHLORIDE BLD-SCNC: 103 MMOL/L (ref 99–110)
CO2: 23 MMOL/L (ref 21–32)
CREAT SERPL-MCNC: 1.3 MG/DL (ref 0.6–1.1)
DIFFERENTIAL TYPE: ABNORMAL
EOSINOPHILS ABSOLUTE: 0 K/CU MM
EOSINOPHILS RELATIVE PERCENT: 0.3 % (ref 0–3)
GFR SERPL CREATININE-BSD FRML MDRD: 49 ML/MIN/1.73M2
GLUCOSE SERPL-MCNC: 83 MG/DL (ref 70–99)
HCT VFR BLD CALC: 39.1 % (ref 37–47)
HEMOGLOBIN: 13.2 GM/DL (ref 12.5–16)
IMMATURE NEUTROPHIL %: 0.3 % (ref 0–0.43)
INFLUENZA A ANTIGEN: DETECTED
INFLUENZA B ANTIGEN: NOT DETECTED
LYMPHOCYTES ABSOLUTE: 0.3 K/CU MM
LYMPHOCYTES RELATIVE PERCENT: 8.9 % (ref 24–44)
MCH RBC QN AUTO: 33.8 PG (ref 27–31)
MCHC RBC AUTO-ENTMCNC: 33.8 % (ref 32–36)
MCV RBC AUTO: 100 FL (ref 78–100)
MONOCYTES ABSOLUTE: 0.3 K/CU MM
MONOCYTES RELATIVE PERCENT: 7.8 % (ref 0–4)
PDW BLD-RTO: 12.2 % (ref 11.7–14.9)
PLATELET # BLD: 181 K/CU MM (ref 140–440)
PMV BLD AUTO: 9.2 FL (ref 7.5–11.1)
POTASSIUM SERPL-SCNC: 4.2 MMOL/L (ref 3.5–5.1)
RBC # BLD: 3.91 M/CU MM (ref 4.2–5.4)
SARS-COV-2 RDRP RESP QL NAA+PROBE: NOT DETECTED
SEGMENTED NEUTROPHILS ABSOLUTE COUNT: 2.9 K/CU MM
SEGMENTED NEUTROPHILS RELATIVE PERCENT: 81.9 % (ref 36–66)
SODIUM BLD-SCNC: 136 MMOL/L (ref 135–145)
SOURCE: NORMAL
TOTAL IMMATURE NEUTOROPHIL: 0.01 K/CU MM
TOTAL PROTEIN: 7.7 GM/DL (ref 6.4–8.2)
WBC # BLD: 3.6 K/CU MM (ref 4–10.5)

## 2024-03-03 PROCEDURE — 85027 COMPLETE CBC AUTOMATED: CPT

## 2024-03-03 PROCEDURE — 6370000000 HC RX 637 (ALT 250 FOR IP)

## 2024-03-03 PROCEDURE — 94664 DEMO&/EVAL PT USE INHALER: CPT

## 2024-03-03 PROCEDURE — 99284 EMERGENCY DEPT VISIT MOD MDM: CPT

## 2024-03-03 PROCEDURE — 94640 AIRWAY INHALATION TREATMENT: CPT

## 2024-03-03 PROCEDURE — 85007 BL SMEAR W/DIFF WBC COUNT: CPT

## 2024-03-03 PROCEDURE — 87635 SARS-COV-2 COVID-19 AMP PRB: CPT

## 2024-03-03 PROCEDURE — 87502 INFLUENZA DNA AMP PROBE: CPT

## 2024-03-03 PROCEDURE — 80053 COMPREHEN METABOLIC PANEL: CPT

## 2024-03-03 PROCEDURE — 71045 X-RAY EXAM CHEST 1 VIEW: CPT

## 2024-03-03 RX ORDER — IBUPROFEN 600 MG/1
600 TABLET ORAL 3 TIMES DAILY PRN
Qty: 30 TABLET | Refills: 0 | Status: SHIPPED | OUTPATIENT
Start: 2024-03-03 | End: 2024-03-03

## 2024-03-03 RX ORDER — MORPHINE SULFATE 4 MG/ML
4 INJECTION, SOLUTION INTRAMUSCULAR; INTRAVENOUS ONCE
Status: DISCONTINUED | OUTPATIENT
Start: 2024-03-03 | End: 2024-03-03

## 2024-03-03 RX ORDER — GUAIFENESIN/DEXTROMETHORPHAN 100-10MG/5
5 SYRUP ORAL 3 TIMES DAILY PRN
Qty: 120 ML | Refills: 0 | Status: SHIPPED | OUTPATIENT
Start: 2024-03-03 | End: 2024-03-08

## 2024-03-03 RX ORDER — ALBUTEROL SULFATE 90 UG/1
2 AEROSOL, METERED RESPIRATORY (INHALATION) EVERY 6 HOURS PRN
Qty: 1 EACH | Refills: 3 | Status: SHIPPED | OUTPATIENT
Start: 2024-03-03 | End: 2024-03-03

## 2024-03-03 RX ORDER — ALBUTEROL SULFATE 90 UG/1
2 AEROSOL, METERED RESPIRATORY (INHALATION) EVERY 6 HOURS PRN
Qty: 1 EACH | Refills: 3 | Status: SHIPPED | OUTPATIENT
Start: 2024-03-03

## 2024-03-03 RX ORDER — IBUPROFEN 600 MG/1
600 TABLET ORAL 3 TIMES DAILY PRN
Qty: 30 TABLET | Refills: 0 | Status: SHIPPED | OUTPATIENT
Start: 2024-03-03

## 2024-03-03 RX ORDER — IPRATROPIUM BROMIDE AND ALBUTEROL SULFATE 2.5; .5 MG/3ML; MG/3ML
1 SOLUTION RESPIRATORY (INHALATION) ONCE
Status: COMPLETED | OUTPATIENT
Start: 2024-03-03 | End: 2024-03-03

## 2024-03-03 RX ORDER — ACETAMINOPHEN 325 MG/1
650 TABLET ORAL EVERY 6 HOURS PRN
Qty: 40 TABLET | Refills: 0 | Status: SHIPPED | OUTPATIENT
Start: 2024-03-03

## 2024-03-03 RX ORDER — AMOXICILLIN AND CLAVULANATE POTASSIUM 250; 62.5 MG/5ML; MG/5ML
250 POWDER, FOR SUSPENSION ORAL 2 TIMES DAILY
Qty: 100 ML | Refills: 0 | Status: SHIPPED | OUTPATIENT
Start: 2024-03-03 | End: 2024-03-03

## 2024-03-03 RX ORDER — AMOXICILLIN AND CLAVULANATE POTASSIUM 250; 62.5 MG/5ML; MG/5ML
250 POWDER, FOR SUSPENSION ORAL 2 TIMES DAILY
Qty: 100 ML | Refills: 0 | Status: SHIPPED | OUTPATIENT
Start: 2024-03-03 | End: 2024-03-13

## 2024-03-03 RX ORDER — IBUPROFEN 600 MG/1
600 TABLET ORAL ONCE
Status: COMPLETED | OUTPATIENT
Start: 2024-03-03 | End: 2024-03-03

## 2024-03-03 RX ORDER — ACETAMINOPHEN 325 MG/1
650 TABLET ORAL ONCE
Status: COMPLETED | OUTPATIENT
Start: 2024-03-03 | End: 2024-03-03

## 2024-03-03 RX ORDER — GUAIFENESIN/DEXTROMETHORPHAN 100-10MG/5
5 SYRUP ORAL 3 TIMES DAILY PRN
Qty: 120 ML | Refills: 0 | Status: SHIPPED | OUTPATIENT
Start: 2024-03-03 | End: 2024-03-03

## 2024-03-03 RX ORDER — ACETAMINOPHEN 325 MG/1
650 TABLET ORAL EVERY 6 HOURS PRN
Qty: 40 TABLET | Refills: 0 | Status: SHIPPED | OUTPATIENT
Start: 2024-03-03 | End: 2024-03-03

## 2024-03-03 RX ORDER — GUAIFENESIN/DEXTROMETHORPHAN 100-10MG/5
5 SYRUP ORAL ONCE
Status: COMPLETED | OUTPATIENT
Start: 2024-03-03 | End: 2024-03-03

## 2024-03-03 RX ADMIN — IBUPROFEN 600 MG: 600 TABLET, FILM COATED ORAL at 12:24

## 2024-03-03 RX ADMIN — ACETAMINOPHEN 650 MG: 325 TABLET ORAL at 12:24

## 2024-03-03 RX ADMIN — IPRATROPIUM BROMIDE AND ALBUTEROL SULFATE 1 DOSE: 2.5; .5 SOLUTION RESPIRATORY (INHALATION) at 12:22

## 2024-03-03 RX ADMIN — GUAIFENESIN AND DEXTROMETHORPHAN 5 ML: 100; 10 SYRUP ORAL at 12:23

## 2024-03-03 ASSESSMENT — PAIN SCALES - GENERAL
PAINLEVEL_OUTOF10: 3
PAINLEVEL_OUTOF10: 3

## 2024-03-03 ASSESSMENT — PAIN DESCRIPTION - LOCATION: LOCATION: GENERALIZED

## 2024-03-03 ASSESSMENT — LIFESTYLE VARIABLES
HOW MANY STANDARD DRINKS CONTAINING ALCOHOL DO YOU HAVE ON A TYPICAL DAY: PATIENT DOES NOT DRINK
HOW OFTEN DO YOU HAVE A DRINK CONTAINING ALCOHOL: NEVER

## 2024-03-03 ASSESSMENT — PAIN DESCRIPTION - PAIN TYPE: TYPE: ACUTE PAIN

## 2024-03-03 ASSESSMENT — PAIN - FUNCTIONAL ASSESSMENT: PAIN_FUNCTIONAL_ASSESSMENT: 0-10

## 2024-03-03 NOTE — ED PROVIDER NOTES
precautions for respiratory difficulty fever increased symptoms.  Educated on use of albuterol inhaler Motrin Tylenol Robitussin as needed for symptomatic relief.    Disposition Considerations (tests considered but not done, Admit vs D/C, Shared Decision Making, Pt Expectation of Test or Tx.): Patient discharged stable condition close outpatient follow-up.    The patient tolerated their visit well.  The patient and / or the family were informed of the results of any tests, a time was given to answer questions, a plan was proposed and they agreed with plan.    I am the Primary Clinician of Record.  FINAL IMPRESSION      1. Lower respiratory tract infection    2. Influenza A    3. Non-recurrent acute serous otitis media of left ear    4. Mild intermittent asthma without complication          DISPOSITION/PLAN     DISPOSITION Decision To Discharge 03/03/2024 01:27:13 PM      PATIENT REFERRED TO:  Kathie Rai  [facility]   [facility] [id number]  [phone]    Call   As needed, If symptoms worsen      DISCHARGE MEDICATIONS:  New Prescriptions    ACETAMINOPHEN (AMINOFEN) 325 MG TABLET    Take 2 tablets by mouth every 6 hours as needed for Pain    AMOXICILLIN-CLAVULANATE (AUGMENTIN) 250-62.5 MG/5ML SUSPENSION    Take 5 mLs by mouth 2 times daily for 10 days    GUAIFENESIN-DEXTROMETHORPHAN (ROBITUSSIN DM) 100-10 MG/5ML SYRUP    Take 5 mLs by mouth 3 times daily as needed for Cough    IBUPROFEN (ADVIL;MOTRIN) 600 MG TABLET    Take 1 tablet by mouth 3 times daily as needed for Pain       DISCONTINUED MEDICATIONS:  Discontinued Medications    IBUPROFEN (ADVIL;MOTRIN) 800 MG TABLET    Take 1 tablet by mouth every 8 hours as needed for Pain              (Please note that portions of this note were completed with a voice recognition program.  Efforts were made to edit the dictations but occasionally words are mis-transcribed.)    KIMBERLY Palencia - CNP (electronically signed)        Corry Jackson APRN -

## 2024-03-03 NOTE — ED TRIAGE NOTES
Non-productive cough for about 2 weeks with generalized body aches, congestion, sore throat, and ear pain. Took tylenol today before work around 0630.

## 2024-03-26 ENCOUNTER — HOSPITAL ENCOUNTER (OUTPATIENT)
Dept: INFUSION THERAPY | Age: 54
Discharge: HOME | End: 2024-03-26
Payer: COMMERCIAL

## 2024-03-26 DIAGNOSIS — C78.7 SECONDARY MALIGNANT NEOPLASM OF LIVER AND INTRAHEPATIC BILE DUCT (HCC): ICD-10-CM

## 2024-03-26 DIAGNOSIS — C50.911 PRIMARY CANCER OF RIGHT BREAST WITH METASTASIS TO OTHER SITE (HCC): ICD-10-CM

## 2024-03-26 LAB
ALBUMIN SERPL-MCNC: 4.5 GM/DL (ref 3.4–5)
ALP BLD-CCNC: 58 IU/L (ref 40–128)
ALT SERPL-CCNC: 15 U/L (ref 10–40)
ANION GAP SERPL CALCULATED.3IONS-SCNC: 11 MMOL/L (ref 7–16)
AST SERPL-CCNC: 21 IU/L (ref 15–37)
BASOPHILS ABSOLUTE: 0 K/CU MM
BASOPHILS RELATIVE PERCENT: 1 % (ref 0–1)
BILIRUB SERPL-MCNC: 0.4 MG/DL (ref 0–1)
BUN SERPL-MCNC: 23 MG/DL (ref 6–23)
CALCIUM SERPL-MCNC: 9.5 MG/DL (ref 8.3–10.6)
CHLORIDE BLD-SCNC: 104 MMOL/L (ref 99–110)
CO2: 23 MMOL/L (ref 21–32)
CREAT SERPL-MCNC: 1.4 MG/DL (ref 0.6–1.1)
DIFFERENTIAL TYPE: ABNORMAL
EOSINOPHILS ABSOLUTE: 0 K/CU MM
EOSINOPHILS RELATIVE PERCENT: 0.5 % (ref 0–3)
GFR SERPL CREATININE-BSD FRML MDRD: 45 ML/MIN/1.73M2
GLUCOSE SERPL-MCNC: 82 MG/DL (ref 70–99)
HCT VFR BLD CALC: 37.6 % (ref 37–47)
HEMOGLOBIN: 13.2 GM/DL (ref 12.5–16)
LYMPHOCYTES ABSOLUTE: 2.3 K/CU MM
LYMPHOCYTES RELATIVE PERCENT: 55.4 % (ref 24–44)
MCH RBC QN AUTO: 34.3 PG (ref 27–31)
MCHC RBC AUTO-ENTMCNC: 35.1 % (ref 32–36)
MCV RBC AUTO: 97.7 FL (ref 78–100)
MONOCYTES ABSOLUTE: 0.4 K/CU MM
MONOCYTES RELATIVE PERCENT: 8.5 % (ref 0–4)
PDW BLD-RTO: 12 % (ref 11.7–14.9)
PLATELET # BLD: 185 K/CU MM (ref 140–440)
PMV BLD AUTO: 9 FL (ref 7.5–11.1)
POTASSIUM SERPL-SCNC: 4.2 MMOL/L (ref 3.5–5.1)
RBC # BLD: 3.85 M/CU MM (ref 4.2–5.4)
SEGMENTED NEUTROPHILS ABSOLUTE COUNT: 1.4 K/CU MM
SEGMENTED NEUTROPHILS RELATIVE PERCENT: 34.6 % (ref 36–66)
SODIUM BLD-SCNC: 138 MMOL/L (ref 135–145)
TOTAL PROTEIN: 7.2 GM/DL (ref 6.4–8.2)
WBC # BLD: 4.1 K/CU MM (ref 4–10.5)

## 2024-03-26 PROCEDURE — 80053 COMPREHEN METABOLIC PANEL: CPT

## 2024-03-26 PROCEDURE — 86300 IMMUNOASSAY TUMOR CA 15-3: CPT

## 2024-03-26 PROCEDURE — 36415 COLL VENOUS BLD VENIPUNCTURE: CPT

## 2024-03-26 PROCEDURE — 85025 COMPLETE CBC W/AUTO DIFF WBC: CPT

## 2024-03-27 LAB — CANCER AG27-29 SERPL-ACNC: 65 U/ML

## 2024-04-01 ENCOUNTER — OFFICE VISIT (OUTPATIENT)
Dept: ONCOLOGY | Age: 54
End: 2024-04-01
Payer: COMMERCIAL

## 2024-04-01 ENCOUNTER — HOSPITAL ENCOUNTER (OUTPATIENT)
Dept: INFUSION THERAPY | Age: 54
Discharge: HOME | End: 2024-04-01
Payer: COMMERCIAL

## 2024-04-01 VITALS
WEIGHT: 198.2 LBS | TEMPERATURE: 97.8 F | DIASTOLIC BLOOD PRESSURE: 76 MMHG | BODY MASS INDEX: 31.85 KG/M2 | HEART RATE: 84 BPM | OXYGEN SATURATION: 99 % | HEIGHT: 66 IN | SYSTOLIC BLOOD PRESSURE: 128 MMHG

## 2024-04-01 DIAGNOSIS — D70.9 NEUTROPENIA, UNSPECIFIED TYPE (HCC): ICD-10-CM

## 2024-04-01 DIAGNOSIS — N18.31 STAGE 3A CHRONIC KIDNEY DISEASE (HCC): ICD-10-CM

## 2024-04-01 DIAGNOSIS — C79.51 SECONDARY MALIGNANT NEOPLASM OF BONE (HCC): ICD-10-CM

## 2024-04-01 DIAGNOSIS — C50.911 PRIMARY CANCER OF RIGHT BREAST WITH METASTASIS TO OTHER SITE (HCC): Primary | ICD-10-CM

## 2024-04-01 DIAGNOSIS — C78.7 SECONDARY MALIGNANT NEOPLASM OF LIVER AND INTRAHEPATIC BILE DUCT (HCC): ICD-10-CM

## 2024-04-01 PROCEDURE — 99214 OFFICE O/P EST MOD 30 MIN: CPT | Performed by: INTERNAL MEDICINE

## 2024-04-01 PROCEDURE — 99211 OFF/OP EST MAY X REQ PHY/QHP: CPT

## 2024-04-01 RX ORDER — ONDANSETRON 4 MG/1
4 TABLET, FILM COATED ORAL EVERY 8 HOURS PRN
Qty: 30 TABLET | Refills: 0 | Status: SHIPPED | OUTPATIENT
Start: 2024-04-01

## 2024-04-01 RX ORDER — PANTOPRAZOLE SODIUM 40 MG/1
40 TABLET, DELAYED RELEASE ORAL DAILY
Qty: 30 TABLET | Refills: 5 | Status: SHIPPED | OUTPATIENT
Start: 2024-04-01 | End: 2024-09-28

## 2024-04-01 ASSESSMENT — PATIENT HEALTH QUESTIONNAIRE - PHQ9
SUM OF ALL RESPONSES TO PHQ QUESTIONS 1-9: 0
SUM OF ALL RESPONSES TO PHQ QUESTIONS 1-9: 0
SUM OF ALL RESPONSES TO PHQ9 QUESTIONS 1 & 2: 0
1. LITTLE INTEREST OR PLEASURE IN DOING THINGS: NOT AT ALL
SUM OF ALL RESPONSES TO PHQ QUESTIONS 1-9: 0
2. FEELING DOWN, DEPRESSED OR HOPELESS: NOT AT ALL
SUM OF ALL RESPONSES TO PHQ QUESTIONS 1-9: 0

## 2024-04-01 NOTE — PROGRESS NOTES
MA Rooming Questions  Patient: Sharon Bojorquez  MRN: [id number]    Date: 4/1/2024        1. Do you have any new issues?   no         2. Do you need any refills on medications?    yes - zofran and protonix     3. Have you had any imaging done since your last visit?   yes - labs 3/26    4. Have you been hospitalized or seen in the emergency room since your last visit here?   yes - 3/3    5. Did the patient have a depression screening completed today? Yes    PHQ-9 Total Score: 0 (4/1/2024  9:39 AM)       PHQ-9 Given to (if applicable):               PHQ-9 Score (if applicable):                     [] Positive     []  Negative              Does question #9 need addressed (if applicable)                     [] Yes    []  No               Key Goodwin CMA    
DEXA scan in July 2021 were normal  She has received her Covid vaccine.    Visit here on 10/27/2021 we discussed about the results of the MRI of the brain which did not reveal any metastatic disease.  Also discussed the results of the CT scan of the chest abdomen pelvis with stable osseous, pulmonary, liver lesions with slight increase in the right breast lesion.  Note CA 27-29 was 70.9 in September 2021.  Will discuss with radiation oncology for possible SBRT of the breast mass.  Will repeat CA 27-29 in a month in November and if rising then will consider changing to PIK if progressive disease.  Other medical care.  To clinic December 2021 or earlier if new symptoms.    Dictated today on 12 9 2021 I discussed the results of the CA 27-29 that it continues to rise slowly. Clinically is the same. Very reluctant to go back on the Faslodex. Reported that I would recommend repeat CA 27-29 in a month and if rising then will recommend PET scan for further evaluation and treatment planning. The options would be at that time to go back on Faslodex or start her on PIK3A inhibitor if evidence of progressive disease. Discussed with the radiation oncology regarding SBRT to the breast but they deferred at this point. Leukopenia with ANC of about thousand most probably secondary to abemaciclib. Continue to monitor closely and dose adjustments as needed. Return to clinic in January 2022    Visit here on 2/9/2022, clinically stable Seems to be tolerating fairly well.  Note CA 27-29 in February 2022 mildly decreased.  Continue current regimen.  Monitor for adverse effects.  Plan to repeat imaging and tumor marker in April 2022.    She was seen on April 27 2022, overall doing good. Ca 27-29 was 50 and Ct chest, abdomen and pelvis in April 2022 with stable findings. Continue Letrozole and Abemaciclib. Tolerating fairly well. Recommend Effexor for vasomotor Sx relief. Refills for letrozole provided. Dexa scan July 2021 normal. Plan to

## 2024-04-09 NOTE — ADDENDUM NOTE
Encounter addended by: Valeria Hassan on: 4/9/2024 9:00 AM   Actions taken: Charge Capture section accepted

## 2024-05-21 RX ORDER — VENLAFAXINE HYDROCHLORIDE 37.5 MG/1
37.5 CAPSULE, EXTENDED RELEASE ORAL DAILY
Qty: 90 CAPSULE | Refills: 0 | Status: SHIPPED | OUTPATIENT
Start: 2024-05-21

## 2024-07-16 ENCOUNTER — OFFICE VISIT (OUTPATIENT)
Dept: CARDIOLOGY CLINIC | Age: 54
End: 2024-07-16
Payer: COMMERCIAL

## 2024-07-16 VITALS
OXYGEN SATURATION: 100 % | HEART RATE: 65 BPM | DIASTOLIC BLOOD PRESSURE: 76 MMHG | WEIGHT: 181.8 LBS | BODY MASS INDEX: 29.22 KG/M2 | HEIGHT: 66 IN | SYSTOLIC BLOOD PRESSURE: 114 MMHG

## 2024-07-16 DIAGNOSIS — I71.21 ANEURYSM OF ASCENDING AORTA WITHOUT RUPTURE (HCC): Primary | ICD-10-CM

## 2024-07-16 DIAGNOSIS — I77.810 DILATED AORTIC ROOT (HCC): ICD-10-CM

## 2024-07-16 DIAGNOSIS — E78.5 DYSLIPIDEMIA: ICD-10-CM

## 2024-07-16 DIAGNOSIS — N18.31 STAGE 3A CHRONIC KIDNEY DISEASE (HCC): ICD-10-CM

## 2024-07-16 DIAGNOSIS — I83.90 ASYMPTOMATIC VARICOSE VEINS: ICD-10-CM

## 2024-07-16 PROBLEM — I25.119 ATHEROSCLEROSIS OF NATIVE CORONARY ARTERY OF NATIVE HEART WITH ANGINA PECTORIS (HCC): Status: ACTIVE | Noted: 2024-07-16

## 2024-07-16 PROCEDURE — 99214 OFFICE O/P EST MOD 30 MIN: CPT | Performed by: NURSE PRACTITIONER

## 2024-07-16 ASSESSMENT — ENCOUNTER SYMPTOMS
COUGH: 0
SHORTNESS OF BREATH: 0

## 2024-07-16 NOTE — PROGRESS NOTES
CARDIOLOGY  NOTE    2024    Sharon Bojorquez (:  1970) is a 53 y.o. female,an established patient with Dr. Infante, here for evaluation of the following chief complaint(s):  Follow-up (Pt denies cardiac sx)      SUBJECTIVE/OBJECTIVE:    HPI  Roseline is here to follow up on her cardiovascular health.     She states that she has been doing well. She denies any recent issues. She continues to follow closely with oncology for her breast cancer and mets as she still has the tumors.     Roseline has a history of asthma, asymptomatic venous insufficiency, breast cancer, dyslipidemia, fx CAD, migraine, and tachycardia    She is a former smoker. She denies any issues with obtaining taking or side effects from medications.       Review of Systems   Constitutional:  Negative for fatigue and fever.   Respiratory:  Negative for cough and shortness of breath.    Cardiovascular:  Negative for chest pain, palpitations and leg swelling.   Musculoskeletal:  Negative for arthralgias and gait problem.   Neurological:  Negative for dizziness, syncope, weakness, light-headedness and headaches.       Vitals:    24 0816   BP: 114/76   Site: Left Upper Arm   Position: Sitting   Cuff Size: Medium Adult   Pulse: 65   SpO2: 100%   Weight: 82.5 kg (181 lb 12.8 oz)   Height: 1.676 m (5' 6\")       Wt Readings from Last 3 Encounters:   24 82.5 kg (181 lb 12.8 oz)   24 89.9 kg (198 lb 3.2 oz)   24 92.1 kg (203 lb)       BP Readings from Last 3 Encounters:   24 114/76   24 128/76   24 114/79       Prior to Admission medications    Medication Sig Start Date End Date Taking? Authorizing Provider   venlafaxine (EFFEXOR XR) 37.5 MG extended release capsule Take 1 capsule by mouth daily 24  Yes Paula Herbert MD   ondansetron (ZOFRAN) 4 MG tablet Take 1 tablet by mouth every 8 hours as needed for Nausea or Vomiting 24  Yes Paula Herbert MD   pantoprazole (PROTONIX) 40 MG tablet

## 2024-07-29 ENCOUNTER — HOSPITAL ENCOUNTER (OUTPATIENT)
Dept: INFUSION THERAPY | Age: 54
Discharge: HOME | End: 2024-07-29
Payer: COMMERCIAL

## 2024-07-29 DIAGNOSIS — C78.7 SECONDARY MALIGNANT NEOPLASM OF LIVER AND INTRAHEPATIC BILE DUCT (HCC): ICD-10-CM

## 2024-07-29 DIAGNOSIS — C50.911 PRIMARY CANCER OF RIGHT BREAST WITH METASTASIS TO OTHER SITE (HCC): ICD-10-CM

## 2024-07-29 LAB
ALBUMIN SERPL-MCNC: 4.4 GM/DL (ref 3.4–5)
ALP BLD-CCNC: 64 IU/L (ref 40–129)
ALT SERPL-CCNC: 19 U/L (ref 10–40)
ANION GAP SERPL CALCULATED.3IONS-SCNC: 11 MMOL/L (ref 7–16)
AST SERPL-CCNC: 25 IU/L (ref 15–37)
BASOPHILS ABSOLUTE: 0 K/CU MM
BASOPHILS RELATIVE PERCENT: 1 % (ref 0–1)
BILIRUB SERPL-MCNC: 0.2 MG/DL (ref 0–1)
BUN SERPL-MCNC: 15 MG/DL (ref 6–23)
CALCIUM SERPL-MCNC: 9.7 MG/DL (ref 8.3–10.6)
CHLORIDE BLD-SCNC: 105 MMOL/L (ref 99–110)
CO2: 23 MMOL/L (ref 21–32)
CREAT SERPL-MCNC: 1.2 MG/DL (ref 0.6–1.1)
DIFFERENTIAL TYPE: ABNORMAL
EOSINOPHILS ABSOLUTE: 0 K/CU MM
EOSINOPHILS RELATIVE PERCENT: 0.5 % (ref 0–3)
GFR, ESTIMATED: 54 ML/MIN/1.73M2
GLUCOSE SERPL-MCNC: 100 MG/DL (ref 70–99)
HCT VFR BLD CALC: 37.5 % (ref 37–47)
HEMOGLOBIN: 13.1 GM/DL (ref 12.5–16)
LYMPHOCYTES ABSOLUTE: 2.3 K/CU MM
LYMPHOCYTES RELATIVE PERCENT: 54.9 % (ref 24–44)
MCH RBC QN AUTO: 34.2 PG (ref 27–31)
MCHC RBC AUTO-ENTMCNC: 34.9 % (ref 32–36)
MCV RBC AUTO: 97.9 FL (ref 78–100)
MONOCYTES ABSOLUTE: 0.4 K/CU MM
MONOCYTES RELATIVE PERCENT: 9.3 % (ref 0–4)
NEUTROPHILS ABSOLUTE: 1.5 K/CU MM
NEUTROPHILS RELATIVE PERCENT: 34.3 % (ref 36–66)
PDW BLD-RTO: 12 % (ref 11.7–14.9)
PLATELET # BLD: 181 K/CU MM (ref 140–440)
PMV BLD AUTO: 8.9 FL (ref 7.5–11.1)
POTASSIUM SERPL-SCNC: 4 MMOL/L (ref 3.5–5.1)
RBC # BLD: 3.83 M/CU MM (ref 4.2–5.4)
SODIUM BLD-SCNC: 139 MMOL/L (ref 135–145)
TOTAL PROTEIN: 7 GM/DL (ref 6.4–8.2)
WBC # BLD: 4.2 K/CU MM (ref 4–10.5)

## 2024-07-29 PROCEDURE — 86300 IMMUNOASSAY TUMOR CA 15-3: CPT

## 2024-07-29 PROCEDURE — 85025 COMPLETE CBC W/AUTO DIFF WBC: CPT

## 2024-07-29 PROCEDURE — 36415 COLL VENOUS BLD VENIPUNCTURE: CPT

## 2024-07-29 PROCEDURE — 80053 COMPREHEN METABOLIC PANEL: CPT

## 2024-07-30 LAB — CANCER AG27-29 SERPL-ACNC: 48.7 U/ML

## 2024-08-05 ENCOUNTER — HOSPITAL ENCOUNTER (OUTPATIENT)
Dept: INFUSION THERAPY | Age: 54
Discharge: HOME | End: 2024-08-05
Payer: COMMERCIAL

## 2024-08-05 ENCOUNTER — OFFICE VISIT (OUTPATIENT)
Dept: ONCOLOGY | Age: 54
End: 2024-08-05
Payer: COMMERCIAL

## 2024-08-05 VITALS
HEIGHT: 66 IN | DIASTOLIC BLOOD PRESSURE: 58 MMHG | HEART RATE: 76 BPM | BODY MASS INDEX: 28.93 KG/M2 | RESPIRATION RATE: 18 BRPM | WEIGHT: 180 LBS | OXYGEN SATURATION: 99 % | TEMPERATURE: 98.1 F | SYSTOLIC BLOOD PRESSURE: 125 MMHG

## 2024-08-05 DIAGNOSIS — C78.7 SECONDARY MALIGNANT NEOPLASM OF LIVER AND INTRAHEPATIC BILE DUCT (HCC): ICD-10-CM

## 2024-08-05 DIAGNOSIS — C50.911 PRIMARY CANCER OF RIGHT BREAST WITH METASTASIS TO OTHER SITE (HCC): Primary | ICD-10-CM

## 2024-08-05 PROCEDURE — 99211 OFF/OP EST MAY X REQ PHY/QHP: CPT

## 2024-08-05 PROCEDURE — 99214 OFFICE O/P EST MOD 30 MIN: CPT | Performed by: INTERNAL MEDICINE

## 2024-08-05 NOTE — PROGRESS NOTES
MA Rooming Questions  Patient: Sharon Bojorquez  MRN: [id number]    Date: 8/5/2024        1. Do you have any new issues?   no         2. Do you need any refills on medications?    no    3. Have you had any imaging done since your last visit?   no    4. Have you been hospitalized or seen in the emergency room since your last visit here?   no    5. Did the patient have a depression screening completed today? No    No data recorded     PHQ-9 Given to (if applicable):               PHQ-9 Score (if applicable):                     [] Positive     []  Negative              Does question #9 need addressed (if applicable)                     [] Yes    []  No               Bethany Loomis CMA    
on verzinio and letrozole  CT chest, abdomen and pelvis in October 2022  with stable disease.  Ca 27-29 February 2023 was 52.8  No new significant sx.   Continued regimen and plan to repeat CT imaging in May 2023  CT scan of the chest abdomen pelvis in May 2023 with stable findings.  CA 27-29 was rising and was 70.1 in may 2023. Clinically stable   Ca 27-29 in august 2023 continued to rise and 94.4  Was followed by PET in sep 2023, which did not reveal any hypermetabolic area.   Repeat Ca 27-29 in July 2024 was 48.7  Clinically no signs or symptoms of recurrence.  Discussed that I do not recommend any change in the current treatment plan as there is no measurable disease and also clinically.  Continue vaginal letrozole.  And monitor closely for any adverse effects.  Continue Yearly reclast    Mild renal insufficiency, recommend adequate fluid intake. Avoid nephrotoxic medication.  Discussed IV fluids    Mild neutropenia and normocytic anemia most probably secondary to  Verzenio, will continue to observe    Dizziness: MRI brain sep 2022 was normal. Cardiac w/u negative including, echo/stress test and C    HTN very well controlled     Dexa scan in august 2023 normal, is on yearly Reclast    Vaginal dryness low-dose vaginal estrogen inserts recommended.    Continue other medical care. Uptodate with colonoscopy. Discussed pap smear.     Recommend healthy diet, exercise and weight loss if possible.    Recommend follow up with PCP and other specialists.      Return to clinic November 2024 or earlier if new Sx.     Time Spent with patient for face to face, exam, education, discussing treatment options, reviewing imaging, reviewing labs, decision making, Pre-charting, and documenting today's visit, >30 mins.      LILIAM

## 2024-08-08 ENCOUNTER — CLINICAL DOCUMENTATION (OUTPATIENT)
Dept: ONCOLOGY | Age: 54
End: 2024-08-08

## 2024-08-08 NOTE — PROGRESS NOTES
Physician recommending to order low dose estrogen vaginal insert 4 mcg weekly. RX for imvexxy e-scribed to  [facility]. Called patient @ [phone] to notify. Voices understanding. No further needs addressed at this time.

## 2024-08-12 LAB
CHOLEST SERPL-MCNC: 278 MG/DL
GLUCOSE SERPL-MCNC: 77 MG/DL (ref 70–99)
HDLC SERPL-MCNC: 88 MG/DL
LDLC SERPL CALC-MCNC: 169 MG/DL
PATIENT FASTING?: ABNORMAL
TRIGL SERPL-MCNC: 103 MG/DL

## 2024-09-15 ENCOUNTER — PATIENT MESSAGE (OUTPATIENT)
Dept: INTERNAL MEDICINE CLINIC | Age: 54
End: 2024-09-15

## 2024-09-15 DIAGNOSIS — G43.909 MIGRAINE WITHOUT STATUS MIGRAINOSUS, NOT INTRACTABLE, UNSPECIFIED MIGRAINE TYPE: ICD-10-CM

## 2024-09-16 RX ORDER — TOPIRAMATE 50 MG/1
50 TABLET, FILM COATED ORAL DAILY
Qty: 90 TABLET | Refills: 0 | Status: SHIPPED | OUTPATIENT
Start: 2024-09-16

## 2024-10-01 DIAGNOSIS — C50.911 PRIMARY CANCER OF RIGHT BREAST WITH METASTASIS TO OTHER SITE (HCC): ICD-10-CM

## 2024-10-02 RX ORDER — ABEMACICLIB 150 MG/1
1 TABLET ORAL 2 TIMES DAILY
Qty: 56 TABLET | Refills: 5 | Status: ACTIVE | OUTPATIENT
Start: 2024-10-02

## 2024-11-05 ENCOUNTER — OFFICE VISIT (OUTPATIENT)
Dept: INTERNAL MEDICINE CLINIC | Age: 54
End: 2024-11-05

## 2024-11-05 VITALS
WEIGHT: 177 LBS | BODY MASS INDEX: 28.58 KG/M2 | SYSTOLIC BLOOD PRESSURE: 104 MMHG | HEART RATE: 74 BPM | OXYGEN SATURATION: 98 % | DIASTOLIC BLOOD PRESSURE: 68 MMHG

## 2024-11-05 DIAGNOSIS — L60.9 LONGITUDINAL NAIL RIDGE: ICD-10-CM

## 2024-11-05 DIAGNOSIS — R53.83 FATIGUE, UNSPECIFIED TYPE: ICD-10-CM

## 2024-11-05 DIAGNOSIS — N95.1 MENOPAUSAL SYNDROME (HOT FLASHES): ICD-10-CM

## 2024-11-05 DIAGNOSIS — I77.810 DILATED AORTIC ROOT (HCC): ICD-10-CM

## 2024-11-05 DIAGNOSIS — M25.532 LEFT WRIST PAIN: ICD-10-CM

## 2024-11-05 DIAGNOSIS — K21.9 GASTROESOPHAGEAL REFLUX DISEASE WITHOUT ESOPHAGITIS: ICD-10-CM

## 2024-11-05 DIAGNOSIS — J45.30 MILD PERSISTENT ASTHMA WITHOUT COMPLICATION: ICD-10-CM

## 2024-11-05 DIAGNOSIS — E78.2 MIXED HYPERLIPIDEMIA: ICD-10-CM

## 2024-11-05 DIAGNOSIS — C50.911 PRIMARY CANCER OF RIGHT BREAST WITH METASTASIS TO OTHER SITE (HCC): ICD-10-CM

## 2024-11-05 DIAGNOSIS — Z00.00 ANNUAL PHYSICAL EXAM: Primary | ICD-10-CM

## 2024-11-05 DIAGNOSIS — G43.909 MIGRAINE WITHOUT STATUS MIGRAINOSUS, NOT INTRACTABLE, UNSPECIFIED MIGRAINE TYPE: ICD-10-CM

## 2024-11-05 DIAGNOSIS — N18.31 STAGE 3A CHRONIC KIDNEY DISEASE (HCC): ICD-10-CM

## 2024-11-05 DIAGNOSIS — F51.01 PRIMARY INSOMNIA: ICD-10-CM

## 2024-11-05 SDOH — ECONOMIC STABILITY: FOOD INSECURITY: WITHIN THE PAST 12 MONTHS, THE FOOD YOU BOUGHT JUST DIDN'T LAST AND YOU DIDN'T HAVE MONEY TO GET MORE.: NEVER TRUE

## 2024-11-05 SDOH — ECONOMIC STABILITY: INCOME INSECURITY: HOW HARD IS IT FOR YOU TO PAY FOR THE VERY BASICS LIKE FOOD, HOUSING, MEDICAL CARE, AND HEATING?: NOT HARD AT ALL

## 2024-11-05 SDOH — ECONOMIC STABILITY: FOOD INSECURITY: WITHIN THE PAST 12 MONTHS, YOU WORRIED THAT YOUR FOOD WOULD RUN OUT BEFORE YOU GOT MONEY TO BUY MORE.: NEVER TRUE

## 2024-11-05 ASSESSMENT — ENCOUNTER SYMPTOMS
BLOOD IN STOOL: 0
SHORTNESS OF BREATH: 0
DIARRHEA: 0
ABDOMINAL PAIN: 0
NAUSEA: 0
COUGH: 0
CONSTIPATION: 0
BACK PAIN: 0

## 2024-11-05 NOTE — PROGRESS NOTES
Td or Tdap) 09/11/2028    Hepatitis A vaccine  Aged Out    Hib vaccine  Aged Out    Polio vaccine  Aged Out    Meningococcal (ACWY) vaccine  Aged Out    Lipids  Discontinued    Breast cancer screen  Discontinued    Diabetes screen  Discontinued    Colonoscopy  Discontinued     Recommendations for Preventive Services Due: see orders and patient instructions/AVS.  Persist RTO or call  Return in about 1 year (around 11/5/2025) for annual exam.    This dictation was generated by voice recognition computer software.  Although all attempts are made to edit the dictation for accuracy, there may be errors in the transcription that are not intended.  The patient (or guardian, if applicable) and other individuals in attendance with the patient were advised that Artificial Intelligence will be utilized during this visit to record, process the conversation to generate a clinical note, and support improvement of the AI technology. The patient (or guardian, if applicable) and other individuals in attendance at the appointment consented to the use of AI, including the recording.

## 2024-11-06 PROBLEM — K21.9 GASTROESOPHAGEAL REFLUX DISEASE WITHOUT ESOPHAGITIS: Status: ACTIVE | Noted: 2024-11-06

## 2024-11-06 PROBLEM — F51.01 PRIMARY INSOMNIA: Status: ACTIVE | Noted: 2024-11-06

## 2024-11-06 PROBLEM — N95.1 MENOPAUSAL SYNDROME (HOT FLASHES): Status: ACTIVE | Noted: 2024-11-06

## 2024-11-06 PROBLEM — K92.1 BLACK STOOLS: Status: RESOLVED | Noted: 2022-12-20 | Resolved: 2024-11-06

## 2024-11-11 ENCOUNTER — HOSPITAL ENCOUNTER (OUTPATIENT)
Age: 54
Discharge: HOME | End: 2024-11-11
Payer: COMMERCIAL

## 2024-11-11 ENCOUNTER — HOSPITAL ENCOUNTER (OUTPATIENT)
Dept: INFUSION THERAPY | Age: 54
Discharge: HOME | End: 2024-11-11
Payer: COMMERCIAL

## 2024-11-11 ENCOUNTER — HOSPITAL ENCOUNTER (OUTPATIENT)
Dept: GENERAL RADIOLOGY | Age: 54
Discharge: HOME | End: 2024-11-11
Payer: COMMERCIAL

## 2024-11-11 ENCOUNTER — HOSPITAL ENCOUNTER (OUTPATIENT)
Age: 54
Setting detail: SPECIMEN
Discharge: HOME | End: 2024-11-11
Payer: COMMERCIAL

## 2024-11-11 DIAGNOSIS — C78.7 SECONDARY MALIGNANT NEOPLASM OF LIVER AND INTRAHEPATIC BILE DUCT (HCC): ICD-10-CM

## 2024-11-11 DIAGNOSIS — C50.911 PRIMARY CANCER OF RIGHT BREAST WITH METASTASIS TO OTHER SITE (HCC): ICD-10-CM

## 2024-11-11 DIAGNOSIS — M25.532 LEFT WRIST PAIN: ICD-10-CM

## 2024-11-11 LAB
25(OH)D3 SERPL-MCNC: 23.3 NG/ML (ref 30–150)
ALBUMIN SERPL-MCNC: 4.3 G/DL (ref 3.4–5)
ALBUMIN/GLOB SERPL: 1.7 {RATIO} (ref 1.1–2.2)
ALP SERPL-CCNC: 63 U/L (ref 40–129)
ALT SERPL-CCNC: 18 U/L (ref 10–40)
ANION GAP SERPL CALCULATED.3IONS-SCNC: 13 MMOL/L (ref 9–17)
AST SERPL-CCNC: 23 U/L (ref 15–37)
BASOPHILS # BLD: 0.04 K/UL
BASOPHILS NFR BLD: 1 % (ref 0–1)
BILIRUB SERPL-MCNC: 0.3 MG/DL (ref 0–1)
BUN SERPL-MCNC: 16 MG/DL (ref 7–20)
CALCIUM SERPL-MCNC: 9.7 MG/DL (ref 8.3–10.6)
CHLORIDE SERPL-SCNC: 105 MMOL/L (ref 99–110)
CO2 SERPL-SCNC: 23 MMOL/L (ref 21–32)
CREAT SERPL-MCNC: 1.2 MG/DL (ref 0.6–1.1)
EOSINOPHIL # BLD: 0.01 K/UL
EOSINOPHILS RELATIVE PERCENT: 0 % (ref 0–3)
ERYTHROCYTE [DISTWIDTH] IN BLOOD BY AUTOMATED COUNT: 12.4 % (ref 11.7–14.9)
FOLATE SERPL-MCNC: 9.5 NG/ML (ref 4.8–24.2)
GFR, ESTIMATED: 47 ML/MIN/1.73M2
GLUCOSE SERPL-MCNC: 75 MG/DL (ref 74–99)
HCT VFR BLD AUTO: 39 % (ref 37–47)
HGB BLD-MCNC: 13.4 G/DL (ref 12.5–16)
IRON SATN MFR SERPL: 34 % (ref 15–50)
IRON SERPL-MCNC: 112 UG/DL (ref 37–145)
LYMPHOCYTES NFR BLD: 1.81 K/UL
LYMPHOCYTES RELATIVE PERCENT: 50 % (ref 24–44)
MCH RBC QN AUTO: 34.3 PG (ref 27–31)
MCHC RBC AUTO-ENTMCNC: 34.4 G/DL (ref 32–36)
MCV RBC AUTO: 99.7 FL (ref 78–100)
MONOCYTES NFR BLD: 0.36 K/UL
MONOCYTES NFR BLD: 10 % (ref 0–4)
NEUTROPHILS NFR BLD: 38 % (ref 36–66)
NEUTS SEG NFR BLD: 1.37 K/UL
PLATELET # BLD AUTO: 208 K/UL (ref 140–440)
PMV BLD AUTO: 8.7 FL (ref 7.5–11.1)
POTASSIUM SERPL-SCNC: 3.9 MMOL/L (ref 3.5–5.1)
PROT SERPL-MCNC: 6.7 G/DL (ref 6.4–8.2)
RBC # BLD AUTO: 3.91 M/UL (ref 4.2–5.4)
SODIUM SERPL-SCNC: 141 MMOL/L (ref 136–145)
TIBC SERPL-MCNC: 329 UG/DL (ref 260–445)
TSH SERPL DL<=0.05 MIU/L-ACNC: 1.27 UIU/ML (ref 0.27–4.2)
UNSATURATED IRON BINDING CAPACITY: 217 UG/DL (ref 110–370)
VIT B12 SERPL-MCNC: 542 PG/ML (ref 211–911)
WBC OTHER # BLD: 3.6 K/UL (ref 4–10.5)

## 2024-11-11 PROCEDURE — 82607 VITAMIN B-12: CPT

## 2024-11-11 PROCEDURE — 36415 COLL VENOUS BLD VENIPUNCTURE: CPT

## 2024-11-11 PROCEDURE — 80053 COMPREHEN METABOLIC PANEL: CPT

## 2024-11-11 PROCEDURE — 85025 COMPLETE CBC W/AUTO DIFF WBC: CPT

## 2024-11-11 PROCEDURE — 84443 ASSAY THYROID STIM HORMONE: CPT

## 2024-11-11 PROCEDURE — 83540 ASSAY OF IRON: CPT

## 2024-11-11 PROCEDURE — 83550 IRON BINDING TEST: CPT

## 2024-11-11 PROCEDURE — 86300 IMMUNOASSAY TUMOR CA 15-3: CPT

## 2024-11-11 PROCEDURE — 82746 ASSAY OF FOLIC ACID SERUM: CPT

## 2024-11-11 PROCEDURE — 82306 VITAMIN D 25 HYDROXY: CPT

## 2024-11-11 PROCEDURE — 73110 X-RAY EXAM OF WRIST: CPT

## 2024-11-18 ENCOUNTER — HOSPITAL ENCOUNTER (OUTPATIENT)
Dept: INFUSION THERAPY | Age: 54
Discharge: HOME | End: 2024-11-18
Payer: COMMERCIAL

## 2024-11-18 ENCOUNTER — OFFICE VISIT (OUTPATIENT)
Dept: ONCOLOGY | Age: 54
End: 2024-11-18
Payer: COMMERCIAL

## 2024-11-18 VITALS
OXYGEN SATURATION: 100 % | BODY MASS INDEX: 28.67 KG/M2 | DIASTOLIC BLOOD PRESSURE: 74 MMHG | HEIGHT: 66 IN | WEIGHT: 178.4 LBS | HEART RATE: 72 BPM | SYSTOLIC BLOOD PRESSURE: 122 MMHG

## 2024-11-18 DIAGNOSIS — C79.51 SECONDARY MALIGNANT NEOPLASM OF BONE (HCC): Primary | ICD-10-CM

## 2024-11-18 DIAGNOSIS — C78.7 SECONDARY MALIGNANT NEOPLASM OF LIVER AND INTRAHEPATIC BILE DUCT (HCC): ICD-10-CM

## 2024-11-18 DIAGNOSIS — C50.411 MALIGNANT NEOPLASM OF UPPER-OUTER QUADRANT OF RIGHT FEMALE BREAST, UNSPECIFIED ESTROGEN RECEPTOR STATUS (HCC): ICD-10-CM

## 2024-11-18 LAB — CANCER AG27-29 SERPL-ACNC: 41 U/ML (ref 0–38)

## 2024-11-18 PROCEDURE — 99211 OFF/OP EST MAY X REQ PHY/QHP: CPT

## 2024-11-18 PROCEDURE — 99214 OFFICE O/P EST MOD 30 MIN: CPT | Performed by: INTERNAL MEDICINE

## 2024-11-18 RX ORDER — VENLAFAXINE HYDROCHLORIDE 37.5 MG/1
37.5 CAPSULE, EXTENDED RELEASE ORAL DAILY
Qty: 90 CAPSULE | Refills: 0 | Status: SHIPPED | OUTPATIENT
Start: 2024-11-18

## 2024-11-18 RX ORDER — PANTOPRAZOLE SODIUM 40 MG/1
40 TABLET, DELAYED RELEASE ORAL DAILY
Qty: 30 TABLET | Refills: 5 | Status: SHIPPED | OUTPATIENT
Start: 2024-11-18 | End: 2025-05-17

## 2024-11-18 RX ORDER — LETROZOLE 2.5 MG/1
2.5 TABLET, FILM COATED ORAL DAILY
Qty: 90 TABLET | Refills: 5 | Status: SHIPPED | OUTPATIENT
Start: 2024-11-18

## 2024-11-18 NOTE — PROGRESS NOTES
MA Rooming Questions  Patient: Sharon Bojorquez  MRN: [id number]    Date: 11/18/2024        1. Do you have any new issues?   yes - Would like to du         2. Do you need any refills on medications?    yes - Letrozole , Protonix , Effexor    3. Have you had any imaging done since your last visit?   no    4. Have you been hospitalized or seen in the emergency room since your last visit here?   no    5. Did the patient have a depression screening completed today? No    No data recorded     PHQ-9 Given to (if applicable):               PHQ-9 Score (if applicable):                     [] Positive     []  Negative              Does question #9 need addressed (if applicable)                     [] Yes    []  No               Tanja Rebollar MA      
letrozole provided. Dexa scan July 2021 normal. Plan to repeat imaging in 6M. Ca  27-29 every 2 Months. Continue Zometa every 6M.   Leukopenia/neutropenia most probably sec to Abemaciclib. Will monitor for now.   Mild renal insufficiency: Adequate fluids and avoid nephrotoxic medications.     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   Is currently on verzinio and letrozole  CT chest, abdomen and pelvis in October 2022  with stable disease.  Ca 27-29 February 2023 was 52.8  No new significant sx.   Continued regimen and plan to repeat CT imaging in May 2023  CT scan of the chest abdomen pelvis in May 2023 with stable findings.  CA 27-29 was rising and was 70.1 in may 2023. Clinically stable   Ca 27-29 in august 2023 continued to rise and 94.4  Was followed by PET in sep 2023, which did not reveal any hypermetabolic area.   8/27/2029 in July 2024 was 48.7, November 11, 2024, CA 27-29 is still pending.  But clinically no signs or symptoms of recurrence.  Continue current treatment with letrozole and Verzenio.  And monitor closely for any adverse effects.  Continue Yearly reclast    Mild renal insufficiency, recommend adequate fluid intake. Cr 1.2 as of nov 2024. Avoid nephrotoxic medication.  Discussed IV fluids    Mild neutropenia  most probably secondary to  Verzenio, ANC of 1400.  Will continue to observe the trend and for any infection.    HTN very well controlled     Dexa scan in august 2023 normal, is on yearly Reclast. Recommended teresa and vitamin D supplements     Vaginal dryness low-dose vaginal estrogen inserts recommended, still awaiting insurance approval/coverage. Vasomotor sx will be monitored for now.     Continue other medical care. Uptodate with colonoscopy. Discussed pap smear.     Recommend healthy diet, exercise and weight loss if possible.    Recommend follow up with PCP and other specialists.      Return to clinic 3

## 2024-11-27 ENCOUNTER — TELEPHONE (OUTPATIENT)
Dept: INFUSION THERAPY | Age: 54
End: 2024-11-27

## 2024-11-27 ENCOUNTER — HOSPITAL ENCOUNTER (OUTPATIENT)
Dept: INFUSION THERAPY | Age: 54
Discharge: HOME | End: 2024-11-27

## 2024-11-27 DIAGNOSIS — C50.911 PRIMARY CANCER OF RIGHT BREAST WITH METASTASIS TO OTHER SITE (HCC): ICD-10-CM

## 2024-11-27 DIAGNOSIS — C50.911 PRIMARY CANCER OF RIGHT BREAST WITH METASTASIS TO OTHER SITE (HCC): Primary | ICD-10-CM

## 2024-11-27 NOTE — TELEPHONE ENCOUNTER
Around 10:30 the office realized that the auth for patient's Zometa has not been ran through [facility] insurance for this year. I immediately called patient to inform her and let her know that I would try to obtain auth right now so we do not have to delay treatment. I reached out to [facility] via telephone to complete prior authorization but received an automated message to complete PA online. Attempted to obtain PA and the turn around is 48 hours. Patient was in office by this time and I went out to inform her that we will not have auth for today and we can reach out to the auth team and attempt to obtain auth for treatment next week. She has a very busy work schedule and may not be able to make it in this year. I told her we would call as soon as we receive auth and schedule her for anytime that she is available.

## 2024-12-04 ENCOUNTER — TELEPHONE (OUTPATIENT)
Dept: INFUSION THERAPY | Age: 54
End: 2024-12-04

## 2024-12-05 ENCOUNTER — HOSPITAL ENCOUNTER (OUTPATIENT)
Dept: INFUSION THERAPY | Age: 54
Discharge: HOME | End: 2024-12-05
Payer: COMMERCIAL

## 2024-12-05 VITALS
DIASTOLIC BLOOD PRESSURE: 71 MMHG | HEIGHT: 66 IN | WEIGHT: 177.2 LBS | OXYGEN SATURATION: 99 % | TEMPERATURE: 98.1 F | BODY MASS INDEX: 28.48 KG/M2 | SYSTOLIC BLOOD PRESSURE: 113 MMHG | HEART RATE: 75 BPM

## 2024-12-05 DIAGNOSIS — C79.51 SECONDARY MALIGNANT NEOPLASM OF BONE (HCC): Primary | ICD-10-CM

## 2024-12-05 DIAGNOSIS — C50.911 PRIMARY CANCER OF RIGHT BREAST WITH METASTASIS TO OTHER SITE (HCC): ICD-10-CM

## 2024-12-05 DIAGNOSIS — C50.411 MALIGNANT NEOPLASM OF UPPER-OUTER QUADRANT OF RIGHT FEMALE BREAST, UNSPECIFIED ESTROGEN RECEPTOR STATUS (HCC): ICD-10-CM

## 2024-12-05 DIAGNOSIS — C50.411 MALIGNANT NEOPLASM OF UPPER-OUTER QUADRANT OF RIGHT FEMALE BREAST, UNSPECIFIED ESTROGEN RECEPTOR STATUS (HCC): Primary | ICD-10-CM

## 2024-12-05 LAB
ALBUMIN SERPL-MCNC: 4.4 G/DL (ref 3.4–5)
ALBUMIN/GLOB SERPL: 1.7 {RATIO} (ref 1.1–2.2)
ALP SERPL-CCNC: 63 U/L (ref 40–129)
ALT SERPL-CCNC: 19 U/L (ref 10–40)
ANION GAP SERPL CALCULATED.3IONS-SCNC: 11 MMOL/L (ref 9–17)
AST SERPL-CCNC: 27 U/L (ref 15–37)
BASOPHILS # BLD: 0.04 K/UL
BASOPHILS NFR BLD: 1 % (ref 0–1)
BILIRUB SERPL-MCNC: 0.3 MG/DL (ref 0–1)
BUN BLD-MCNC: 15 MG/DL (ref 7–20)
BUN SERPL-MCNC: 18 MG/DL (ref 7–20)
CA-I BLD-SCNC: 1.21 MMOL/L (ref 1.15–1.33)
CALCIUM SERPL-MCNC: 10 MG/DL (ref 8.3–10.6)
CHLORIDE BLD-SCNC: 104 MMOL/L (ref 99–110)
CHLORIDE SERPL-SCNC: 101 MMOL/L (ref 99–110)
CO2 BLD CALC-SCNC: 22 MMOL/L (ref 21–32)
CO2 SERPL-SCNC: 26 MMOL/L (ref 21–32)
CREAT BLD-MCNC: 1.1 MG/DL (ref 0.5–1.2)
CREAT SERPL-MCNC: 1.2 MG/DL (ref 0.6–1.1)
EGFR, POC: 60 ML/MIN/1.73M2
EOSINOPHIL # BLD: 0.03 K/UL
EOSINOPHILS RELATIVE PERCENT: 1 % (ref 0–3)
ERYTHROCYTE [DISTWIDTH] IN BLOOD BY AUTOMATED COUNT: 11.9 % (ref 11.7–14.9)
GFR, ESTIMATED: 47 ML/MIN/1.73M2
GLUCOSE BLD-MCNC: 89 MG/DL (ref 74–99)
GLUCOSE SERPL-MCNC: 90 MG/DL (ref 74–99)
HCT VFR BLD AUTO: 40.7 % (ref 37–47)
HGB BLD-MCNC: 14.1 G/DL (ref 12.5–16)
LYMPHOCYTES NFR BLD: 1.98 K/UL
LYMPHOCYTES RELATIVE PERCENT: 47 % (ref 24–44)
MAGNESIUM SERPL-MCNC: 2.2 MG/DL (ref 1.8–2.4)
MCH RBC QN AUTO: 34.2 PG (ref 27–31)
MCHC RBC AUTO-ENTMCNC: 34.6 G/DL (ref 32–36)
MCV RBC AUTO: 98.8 FL (ref 78–100)
MONOCYTES NFR BLD: 0.38 K/UL
MONOCYTES NFR BLD: 9 % (ref 0–4)
NEUTROPHILS NFR BLD: 42 % (ref 36–66)
NEUTS SEG NFR BLD: 1.75 K/UL
PHOSPHATE SERPL-MCNC: 3.9 MG/DL (ref 2.5–4.9)
PLATELET # BLD AUTO: 216 K/UL (ref 140–440)
PMV BLD AUTO: 9.1 FL (ref 7.5–11.1)
POC ANION GAP: 14 MMOL/L (ref 7–16)
POTASSIUM BLD-SCNC: 4 MMOL/L (ref 3.5–5.1)
POTASSIUM SERPL-SCNC: 4.3 MMOL/L (ref 3.5–5.1)
PROT SERPL-MCNC: 7 G/DL (ref 6.4–8.2)
RBC # BLD AUTO: 4.12 M/UL (ref 4.2–5.4)
SODIUM BLD-SCNC: 140 MMOL/L (ref 136–145)
SODIUM SERPL-SCNC: 138 MMOL/L (ref 136–145)
WBC OTHER # BLD: 4.2 K/UL (ref 4–10.5)

## 2024-12-05 PROCEDURE — 80047 BASIC METABLC PNL IONIZED CA: CPT

## 2024-12-05 PROCEDURE — 2580000003 HC RX 258: Performed by: INTERNAL MEDICINE

## 2024-12-05 PROCEDURE — 85025 COMPLETE CBC W/AUTO DIFF WBC: CPT

## 2024-12-05 PROCEDURE — 83735 ASSAY OF MAGNESIUM: CPT

## 2024-12-05 PROCEDURE — 80053 COMPREHEN METABOLIC PANEL: CPT

## 2024-12-05 PROCEDURE — 6360000002 HC RX W HCPCS: Performed by: INTERNAL MEDICINE

## 2024-12-05 PROCEDURE — 96365 THER/PROPH/DIAG IV INF INIT: CPT

## 2024-12-05 PROCEDURE — 84100 ASSAY OF PHOSPHORUS: CPT

## 2024-12-05 RX ORDER — FAMOTIDINE 10 MG/ML
20 INJECTION, SOLUTION INTRAVENOUS
Status: CANCELLED | OUTPATIENT
Start: 2024-12-05

## 2024-12-05 RX ORDER — SODIUM CHLORIDE 0.9 % (FLUSH) 0.9 %
5-40 SYRINGE (ML) INJECTION PRN
OUTPATIENT
Start: 2025-11-26

## 2024-12-05 RX ORDER — HYDROCORTISONE SODIUM SUCCINATE 100 MG/2ML
100 INJECTION INTRAMUSCULAR; INTRAVENOUS
Status: CANCELLED | OUTPATIENT
Start: 2025-11-26

## 2024-12-05 RX ORDER — FAMOTIDINE 10 MG/ML
20 INJECTION, SOLUTION INTRAVENOUS
OUTPATIENT
Start: 2025-11-26

## 2024-12-05 RX ORDER — SODIUM CHLORIDE 9 MG/ML
INJECTION, SOLUTION INTRAVENOUS ONCE
Status: DISCONTINUED | OUTPATIENT
Start: 2024-12-05 | End: 2024-12-06 | Stop reason: HOSPADM

## 2024-12-05 RX ORDER — SODIUM CHLORIDE 9 MG/ML
5-250 INJECTION, SOLUTION INTRAVENOUS PRN
Status: CANCELLED | OUTPATIENT
Start: 2025-11-26

## 2024-12-05 RX ORDER — ALBUTEROL SULFATE 90 UG/1
4 INHALANT RESPIRATORY (INHALATION) PRN
OUTPATIENT
Start: 2025-11-26

## 2024-12-05 RX ORDER — ALBUTEROL SULFATE 90 UG/1
4 INHALANT RESPIRATORY (INHALATION) PRN
Status: CANCELLED | OUTPATIENT
Start: 2024-12-05

## 2024-12-05 RX ORDER — HEPARIN 100 UNIT/ML
500 SYRINGE INTRAVENOUS PRN
Status: CANCELLED | OUTPATIENT
Start: 2024-12-05

## 2024-12-05 RX ORDER — ONDANSETRON 2 MG/ML
8 INJECTION INTRAMUSCULAR; INTRAVENOUS
OUTPATIENT
Start: 2025-11-26

## 2024-12-05 RX ORDER — SODIUM CHLORIDE 9 MG/ML
5-250 INJECTION, SOLUTION INTRAVENOUS PRN
OUTPATIENT
Start: 2025-11-26

## 2024-12-05 RX ORDER — SODIUM CHLORIDE 9 MG/ML
INJECTION, SOLUTION INTRAVENOUS ONCE
OUTPATIENT
Start: 2025-11-26 | End: 2025-11-26

## 2024-12-05 RX ORDER — ACETAMINOPHEN 325 MG/1
650 TABLET ORAL
OUTPATIENT
Start: 2025-11-26

## 2024-12-05 RX ORDER — DIPHENHYDRAMINE HYDROCHLORIDE 50 MG/ML
50 INJECTION INTRAMUSCULAR; INTRAVENOUS
Status: CANCELLED | OUTPATIENT
Start: 2024-12-05

## 2024-12-05 RX ORDER — FAMOTIDINE 10 MG/ML
20 INJECTION, SOLUTION INTRAVENOUS
Status: CANCELLED | OUTPATIENT
Start: 2025-11-26

## 2024-12-05 RX ORDER — HEPARIN 100 UNIT/ML
500 SYRINGE INTRAVENOUS PRN
OUTPATIENT
Start: 2025-11-26

## 2024-12-05 RX ORDER — ACETAMINOPHEN 325 MG/1
650 TABLET ORAL
Status: CANCELLED | OUTPATIENT
Start: 2024-12-05

## 2024-12-05 RX ORDER — HEPARIN 100 UNIT/ML
500 SYRINGE INTRAVENOUS PRN
Status: CANCELLED | OUTPATIENT
Start: 2025-11-26

## 2024-12-05 RX ORDER — HYDROCORTISONE SODIUM SUCCINATE 100 MG/2ML
100 INJECTION INTRAMUSCULAR; INTRAVENOUS
Status: CANCELLED | OUTPATIENT
Start: 2024-12-05

## 2024-12-05 RX ORDER — ONDANSETRON 2 MG/ML
8 INJECTION INTRAMUSCULAR; INTRAVENOUS
Status: CANCELLED | OUTPATIENT
Start: 2024-12-05

## 2024-12-05 RX ORDER — ACETAMINOPHEN 325 MG/1
650 TABLET ORAL
Status: CANCELLED | OUTPATIENT
Start: 2025-11-26

## 2024-12-05 RX ORDER — SODIUM CHLORIDE 0.9 % (FLUSH) 0.9 %
5-40 SYRINGE (ML) INJECTION PRN
Status: DISCONTINUED | OUTPATIENT
Start: 2024-12-05 | End: 2024-12-06 | Stop reason: HOSPADM

## 2024-12-05 RX ORDER — HYDROCORTISONE SODIUM SUCCINATE 100 MG/2ML
100 INJECTION INTRAMUSCULAR; INTRAVENOUS
OUTPATIENT
Start: 2025-11-26

## 2024-12-05 RX ORDER — SODIUM CHLORIDE 9 MG/ML
INJECTION, SOLUTION INTRAVENOUS CONTINUOUS
OUTPATIENT
Start: 2025-11-26

## 2024-12-05 RX ORDER — EPINEPHRINE 1 MG/ML
0.3 INJECTION, SOLUTION, CONCENTRATE INTRAVENOUS PRN
Status: CANCELLED | OUTPATIENT
Start: 2024-12-05

## 2024-12-05 RX ORDER — EPINEPHRINE 1 MG/ML
0.3 INJECTION, SOLUTION, CONCENTRATE INTRAVENOUS PRN
Status: CANCELLED | OUTPATIENT
Start: 2025-11-26

## 2024-12-05 RX ORDER — SODIUM CHLORIDE 9 MG/ML
INJECTION, SOLUTION INTRAVENOUS CONTINUOUS
Status: CANCELLED | OUTPATIENT
Start: 2024-12-05

## 2024-12-05 RX ORDER — DIPHENHYDRAMINE HYDROCHLORIDE 50 MG/ML
50 INJECTION INTRAMUSCULAR; INTRAVENOUS
OUTPATIENT
Start: 2025-11-26

## 2024-12-05 RX ORDER — SODIUM CHLORIDE 9 MG/ML
INJECTION, SOLUTION INTRAVENOUS ONCE
Status: CANCELLED | OUTPATIENT
Start: 2024-12-05 | End: 2024-12-05

## 2024-12-05 RX ORDER — EPINEPHRINE 1 MG/ML
0.3 INJECTION, SOLUTION, CONCENTRATE INTRAVENOUS PRN
OUTPATIENT
Start: 2025-11-26

## 2024-12-05 RX ORDER — ALBUTEROL SULFATE 90 UG/1
4 INHALANT RESPIRATORY (INHALATION) PRN
Status: CANCELLED | OUTPATIENT
Start: 2025-11-26

## 2024-12-05 RX ORDER — ONDANSETRON 2 MG/ML
8 INJECTION INTRAMUSCULAR; INTRAVENOUS
Status: CANCELLED | OUTPATIENT
Start: 2025-11-26

## 2024-12-05 RX ORDER — SODIUM CHLORIDE 9 MG/ML
INJECTION, SOLUTION INTRAVENOUS CONTINUOUS
Status: CANCELLED | OUTPATIENT
Start: 2025-11-26

## 2024-12-05 RX ORDER — SODIUM CHLORIDE 0.9 % (FLUSH) 0.9 %
5-40 SYRINGE (ML) INJECTION PRN
Status: CANCELLED | OUTPATIENT
Start: 2024-12-05

## 2024-12-05 RX ORDER — SODIUM CHLORIDE 9 MG/ML
5-250 INJECTION, SOLUTION INTRAVENOUS PRN
Status: CANCELLED | OUTPATIENT
Start: 2024-12-05

## 2024-12-05 RX ORDER — DIPHENHYDRAMINE HYDROCHLORIDE 50 MG/ML
50 INJECTION INTRAMUSCULAR; INTRAVENOUS
Status: CANCELLED | OUTPATIENT
Start: 2025-11-26

## 2024-12-05 RX ADMIN — ZOLEDRONIC ACID 4 MG: 4 INJECTION, SOLUTION, CONCENTRATE INTRAVENOUS at 14:33

## 2024-12-05 RX ADMIN — SODIUM CHLORIDE, PRESERVATIVE FREE 10 ML: 5 INJECTION INTRAVENOUS at 14:51

## 2024-12-05 RX ADMIN — SODIUM CHLORIDE, PRESERVATIVE FREE 20 ML: 5 INJECTION INTRAVENOUS at 14:20

## 2024-12-05 NOTE — PROGRESS NOTES
Ambulated to infusion area, here today for chemotherapy. No concerns at this time. Right AC PIV, positive blood return noted. Labs reviewed, Labs within defined limits.  Chemo administered as ordered. Call light within reach. Tolerated infusion without incident.  Discharge instructions declined.

## 2025-01-07 ENCOUNTER — TELEPHONE (OUTPATIENT)
Dept: CARDIOLOGY CLINIC | Age: 55
End: 2025-01-07

## 2025-01-07 NOTE — TELEPHONE ENCOUNTER
Notified       Echo (TTE) complete       Left Ventricle: Normal left ventricular systolic function with a visually estimated EF of 55 - 60%. Left ventricle size is normal. Mildly increased wall thickness. Normal wall motion. Grade I diastolic dysfunction with normal LAP.    No significant valvular disease or regurgitation noted.    Aorta: Normal sized ascending aorta and abdominal aorta. Mildly dilated aortic root. Ao root diameter is 4.0 cm.    Pericardium: No pericardial effusion.    Image quality is good.

## 2025-01-09 ENCOUNTER — OFFICE VISIT (OUTPATIENT)
Dept: CARDIOLOGY CLINIC | Age: 55
End: 2025-01-09
Payer: COMMERCIAL

## 2025-01-09 VITALS
DIASTOLIC BLOOD PRESSURE: 78 MMHG | SYSTOLIC BLOOD PRESSURE: 118 MMHG | HEIGHT: 66 IN | HEART RATE: 68 BPM | BODY MASS INDEX: 28.12 KG/M2 | WEIGHT: 175 LBS

## 2025-01-09 DIAGNOSIS — E78.5 DYSLIPIDEMIA: ICD-10-CM

## 2025-01-09 DIAGNOSIS — I83.90 ASYMPTOMATIC VARICOSE VEINS: ICD-10-CM

## 2025-01-09 DIAGNOSIS — C50.911 PRIMARY CANCER OF RIGHT BREAST WITH METASTASIS TO OTHER SITE (HCC): ICD-10-CM

## 2025-01-09 DIAGNOSIS — I77.810 DILATED AORTIC ROOT (HCC): ICD-10-CM

## 2025-01-09 PROCEDURE — 99214 OFFICE O/P EST MOD 30 MIN: CPT | Performed by: NURSE PRACTITIONER

## 2025-01-09 PROCEDURE — 93000 ELECTROCARDIOGRAM COMPLETE: CPT | Performed by: NURSE PRACTITIONER

## 2025-01-09 ASSESSMENT — ENCOUNTER SYMPTOMS
COUGH: 0
SHORTNESS OF BREATH: 0

## 2025-01-09 NOTE — PATIENT INSTRUCTIONS
Please be informed that if you contact our office outside of normal business hours the physician on call cannot help with any scheduling or rescheduling issues, procedure instruction questions or any type of medication issue.    We advise you for any urgent/emergency that you go to the nearest emergency room!    PLEASE CALL OUR OFFICE DURING NORMAL BUSINESS HOURS    Monday - Friday   8 am to 5 pm    [place]: [phone]    [place]: [phone]    [place]:  [phone]  Thank you for allowing us to care for you today!   We want to ensure we can follow your treatment plan and we strive to give you the best outcomes and experience possible.   If you ever have a life threatening emergency and call 911 - for an ambulance (EMS)   Our providers can only care for you at:   [facility] or [facility].   Even if you have someone take you or you drive yourself we can only care for you in a [facility] facility. Our providers are not setup at the other healthcare locations!   **It is YOUR responsibilty to bring medication bottles and/or updated medication list to EACH APPOINTMENT. This will allow us to better serve you and all your healthcare needs**  We are committed to providing you the best care possible.    If you receive a survey after visiting one of our offices, please take time to share your experience concerning your physician office visit.  These surveys are confidential and no health information about you is shared.    We are eager to improve for you and we are counting on your feedback to help make that happen.

## 2025-01-09 NOTE — PROGRESS NOTES
CARDIOLOGY  NOTE    2025    Sharon Bojorquez (:  1970) is a 54 y.o. female,an established patient with Dr. Infante, here for evaluation of the following chief complaint(s):  Results (Pt denies any cardiac sx. Pt denies smoking. Pt drinks occ. Pt drinks caffeine every once in a while)      SUBJECTIVE/OBJECTIVE:    HPI  Roseline is here to follow up on her cardiovascular health.     She states that she has been doing well. She denies any recent issues. She continues to follow closely with oncology for her breast cancer and mets as she still has the tumors.     Roseline has a history of asthma, asymptomatic venous insufficiency, breast cancer, dyslipidemia, fx CAD, migraine, and tachycardia    She is a former smoker. She denies any issues with obtaining taking or side effects from medications.       Review of Systems   Constitutional:  Negative for fatigue and fever.   Respiratory:  Negative for cough and shortness of breath.    Cardiovascular:  Negative for chest pain, palpitations and leg swelling.   Musculoskeletal:  Negative for arthralgias and gait problem.   Neurological:  Negative for dizziness, syncope, weakness, light-headedness and headaches.       Vitals:    25 0827   BP: 118/78   Site: Left Upper Arm   Position: Sitting   Cuff Size: Medium Adult   Pulse: 68   Weight: 79.4 kg (175 lb)   Height: 1.676 m (5' 6\")         Wt Readings from Last 3 Encounters:   25 79.4 kg (175 lb)   25 80.3 kg (177 lb)   24 80.4 kg (177 lb 3.2 oz)       BP Readings from Last 3 Encounters:   25 118/78   25 131/71   24 113/71       Prior to Admission medications    Medication Sig Start Date End Date Taking? Authorizing Provider   venlafaxine (EFFEXOR XR) 37.5 MG extended release capsule Take 1 capsule by mouth daily 24  Yes Paula Herbert MD   pantoprazole (PROTONIX) 40 MG tablet Take 1 tablet by mouth daily 24 Yes Paula Herbert MD   letrozole

## 2025-02-18 ENCOUNTER — HOSPITAL ENCOUNTER (OUTPATIENT)
Dept: INFUSION THERAPY | Age: 55
Discharge: HOME | End: 2025-02-18
Payer: COMMERCIAL

## 2025-02-18 DIAGNOSIS — C50.411 MALIGNANT NEOPLASM OF UPPER-OUTER QUADRANT OF RIGHT FEMALE BREAST, UNSPECIFIED ESTROGEN RECEPTOR STATUS (HCC): ICD-10-CM

## 2025-02-18 DIAGNOSIS — C78.7 SECONDARY MALIGNANT NEOPLASM OF LIVER AND INTRAHEPATIC BILE DUCT (HCC): ICD-10-CM

## 2025-02-18 DIAGNOSIS — C79.51 SECONDARY MALIGNANT NEOPLASM OF BONE (HCC): ICD-10-CM

## 2025-02-18 LAB
ALBUMIN SERPL-MCNC: 4.3 G/DL (ref 3.4–5)
ALBUMIN/GLOB SERPL: 1.7 {RATIO} (ref 1.1–2.2)
ALP SERPL-CCNC: 53 U/L (ref 40–129)
ALT SERPL-CCNC: 17 U/L (ref 10–40)
ANION GAP SERPL CALCULATED.3IONS-SCNC: 11 MMOL/L (ref 9–17)
AST SERPL-CCNC: 24 U/L (ref 15–37)
BASOPHILS # BLD: 0.08 K/UL
BASOPHILS NFR BLD: 2 % (ref 0–1)
BILIRUB SERPL-MCNC: 0.4 MG/DL (ref 0–1)
BUN SERPL-MCNC: 16 MG/DL (ref 7–20)
CALCIUM SERPL-MCNC: 9.9 MG/DL (ref 8.3–10.6)
CHLORIDE SERPL-SCNC: 104 MMOL/L (ref 99–110)
CO2 SERPL-SCNC: 24 MMOL/L (ref 21–32)
CREAT SERPL-MCNC: 1.2 MG/DL (ref 0.6–1.1)
EOSINOPHIL # BLD: 0.05 K/UL
EOSINOPHILS RELATIVE PERCENT: 1 % (ref 0–3)
ERYTHROCYTE [DISTWIDTH] IN BLOOD BY AUTOMATED COUNT: 12.7 % (ref 11.7–14.9)
GFR, ESTIMATED: 48 ML/MIN/1.73M2
GLUCOSE SERPL-MCNC: 88 MG/DL (ref 74–99)
HCT VFR BLD AUTO: 36.6 % (ref 37–47)
HGB BLD-MCNC: 12.4 G/DL (ref 12.5–16)
LYMPHOCYTES NFR BLD: 2.13 K/UL
LYMPHOCYTES RELATIVE PERCENT: 50 % (ref 24–44)
MCH RBC QN AUTO: 33.4 PG (ref 27–31)
MCHC RBC AUTO-ENTMCNC: 33.9 G/DL (ref 32–36)
MCV RBC AUTO: 98.7 FL (ref 78–100)
MONOCYTES NFR BLD: 0.43 K/UL
MONOCYTES NFR BLD: 10 % (ref 0–4)
NEUTROPHILS NFR BLD: 36 % (ref 36–66)
NEUTS SEG NFR BLD: 1.54 K/UL
PLATELET # BLD AUTO: 209 K/UL (ref 140–440)
PMV BLD AUTO: 8.6 FL (ref 7.5–11.1)
POTASSIUM SERPL-SCNC: 3.9 MMOL/L (ref 3.5–5.1)
PROT SERPL-MCNC: 6.8 G/DL (ref 6.4–8.2)
RBC # BLD AUTO: 3.71 M/UL (ref 4.2–5.4)
SODIUM SERPL-SCNC: 138 MMOL/L (ref 136–145)
WBC OTHER # BLD: 4.2 K/UL (ref 4–10.5)

## 2025-02-18 PROCEDURE — 80053 COMPREHEN METABOLIC PANEL: CPT

## 2025-02-18 PROCEDURE — 36415 COLL VENOUS BLD VENIPUNCTURE: CPT

## 2025-02-18 PROCEDURE — 85025 COMPLETE CBC W/AUTO DIFF WBC: CPT

## 2025-02-18 PROCEDURE — 86300 IMMUNOASSAY TUMOR CA 15-3: CPT

## 2025-02-21 LAB
MISCELLANEOUS LAB TEST RESULT: ABNORMAL
TEST NAME: ABNORMAL

## 2025-03-18 ENCOUNTER — HOSPITAL ENCOUNTER (OUTPATIENT)
Dept: INFUSION THERAPY | Age: 55
Discharge: HOME | End: 2025-03-18
Payer: COMMERCIAL

## 2025-03-18 ENCOUNTER — OFFICE VISIT (OUTPATIENT)
Dept: ONCOLOGY | Age: 55
End: 2025-03-18
Payer: COMMERCIAL

## 2025-03-18 VITALS
HEIGHT: 66 IN | BODY MASS INDEX: 28.8 KG/M2 | SYSTOLIC BLOOD PRESSURE: 121 MMHG | TEMPERATURE: 98 F | OXYGEN SATURATION: 100 % | WEIGHT: 179.2 LBS | DIASTOLIC BLOOD PRESSURE: 65 MMHG | RESPIRATION RATE: 16 BRPM | HEART RATE: 71 BPM

## 2025-03-18 DIAGNOSIS — C79.51 SECONDARY MALIGNANT NEOPLASM OF BONE (HCC): Primary | ICD-10-CM

## 2025-03-18 DIAGNOSIS — D64.9 ANEMIA, UNSPECIFIED TYPE: ICD-10-CM

## 2025-03-18 DIAGNOSIS — C50.411 MALIGNANT NEOPLASM OF UPPER-OUTER QUADRANT OF RIGHT FEMALE BREAST, UNSPECIFIED ESTROGEN RECEPTOR STATUS: ICD-10-CM

## 2025-03-18 DIAGNOSIS — C78.7 SECONDARY MALIGNANT NEOPLASM OF LIVER AND INTRAHEPATIC BILE DUCT (HCC): ICD-10-CM

## 2025-03-18 PROCEDURE — 99212 OFFICE O/P EST SF 10 MIN: CPT

## 2025-03-18 PROCEDURE — 99214 OFFICE O/P EST MOD 30 MIN: CPT | Performed by: INTERNAL MEDICINE

## 2025-03-18 RX ORDER — LETROZOLE 2.5 MG/1
2.5 TABLET, FILM COATED ORAL DAILY
Qty: 90 TABLET | Refills: 5 | Status: SHIPPED | OUTPATIENT
Start: 2025-03-18 | End: 2025-06-16

## 2025-03-18 NOTE — PROGRESS NOTES
MA Rooming Questions  Patient: Sharon Bojorquez  MRN: [id number]    Date: 3/18/2025        1. Do you have any new issues?   yes - pain in chest area and back sometimes         2. Do you need any refills on medications?    no    3. Have you had any imaging done since your last visit?   no    4. Have you been hospitalized or seen in the emergency room since your last visit here?   no    5. Did the patient have a depression screening completed today? No    No data recorded     PHQ-9 Given to (if applicable):               PHQ-9 Score (if applicable):                     [] Positive     []  Negative              Does question #9 need addressed (if applicable)                     [] Yes    []  No               Bethany Loomis CMA      
adverse effects.  No recommendation for any imaging studies at this point.  Continue Yearly reclast    Mild renal insufficiency, recommend adequate fluid intake. Cr 1.2 as of March 2025. Avoid nephrotoxic medication.  Discussed IV fluids    Mild decline in hemoglobin.  No bleeding.  Will continue to monitor for now and further investigations if continues to decline.  ANC remains above 1500.    HTN very well controlled     Dexa scan in august 2023 normal, is on yearly Reclast. Recommended teresa and vitamin D supplements     Vaginal dryness low-dose vaginal estrogen inserts recommended, still awaiting insurance approval/coverage. Vasomotor sx will be monitored for now.     Continue other medical care. Uptodate with colonoscopy. Discussed pap smear.     Recommend healthy diet, exercise and weight loss if possible.    Recommend follow up with PCP and other specialists.      Return to clinic in 6 months or earlier if new Sx.     Time Spent with patient for face to face, exam, education, discussing treatment options, reviewing imaging, reviewing labs, decision making, Pre-charting, and documenting today's visit, >30 mins.      María ADRIAN, am scribing for and in the presence of Paula Herbert MD. 3/18/25/2:13 PM EDT

## 2025-06-18 ENCOUNTER — HOSPITAL ENCOUNTER (OUTPATIENT)
Dept: INFUSION THERAPY | Age: 55
Discharge: HOME | End: 2025-06-18
Payer: COMMERCIAL

## 2025-06-18 DIAGNOSIS — C78.7 SECONDARY MALIGNANT NEOPLASM OF LIVER AND INTRAHEPATIC BILE DUCT (HCC): ICD-10-CM

## 2025-06-18 DIAGNOSIS — C50.411 MALIGNANT NEOPLASM OF UPPER-OUTER QUADRANT OF RIGHT FEMALE BREAST, UNSPECIFIED ESTROGEN RECEPTOR STATUS (HCC): ICD-10-CM

## 2025-06-18 DIAGNOSIS — D64.9 ANEMIA, UNSPECIFIED TYPE: ICD-10-CM

## 2025-06-18 DIAGNOSIS — C79.51 SECONDARY MALIGNANT NEOPLASM OF BONE (HCC): ICD-10-CM

## 2025-06-18 LAB
BASOPHILS # BLD: 0.07 K/UL
BASOPHILS NFR BLD: 2 % (ref 0–1)
EOSINOPHIL # BLD: 0.03 K/UL
EOSINOPHILS RELATIVE PERCENT: 1 % (ref 0–3)
ERYTHROCYTE [DISTWIDTH] IN BLOOD BY AUTOMATED COUNT: 12.5 % (ref 11.7–14.9)
HCT VFR BLD AUTO: 39.3 % (ref 37–47)
HGB BLD-MCNC: 13.5 G/DL (ref 12.5–16)
LYMPHOCYTES NFR BLD: 2.28 K/UL
LYMPHOCYTES RELATIVE PERCENT: 60 % (ref 24–44)
MCH RBC QN AUTO: 33.7 PG (ref 27–31)
MCHC RBC AUTO-ENTMCNC: 34.4 G/DL (ref 32–36)
MCV RBC AUTO: 98 FL (ref 78–100)
MONOCYTES NFR BLD: 0.35 K/UL
MONOCYTES NFR BLD: 9 % (ref 0–5)
NEUTROPHILS NFR BLD: 28 % (ref 36–66)
NEUTS SEG NFR BLD: 1.08 K/UL
PLATELET # BLD AUTO: 228 K/UL (ref 140–440)
PMV BLD AUTO: 9.1 FL (ref 7.5–11.1)
RBC # BLD AUTO: 4.01 M/UL (ref 4.2–5.4)
WBC OTHER # BLD: 3.8 K/UL (ref 4–10.5)

## 2025-06-18 PROCEDURE — 85025 COMPLETE CBC W/AUTO DIFF WBC: CPT

## 2025-06-18 PROCEDURE — 36415 COLL VENOUS BLD VENIPUNCTURE: CPT

## 2025-07-01 DIAGNOSIS — C50.911 PRIMARY CANCER OF RIGHT BREAST WITH METASTASIS TO OTHER SITE (HCC): ICD-10-CM

## 2025-07-01 RX ORDER — ABEMACICLIB 150 MG/1
1 TABLET ORAL 2 TIMES DAILY
Qty: 60 TABLET | Refills: 2 | Status: ACTIVE | OUTPATIENT
Start: 2025-07-01

## (undated) DEVICE — ELECTRODE ES AD CRDLSS PT RET REM POLYHESIVE

## (undated) DEVICE — Device

## (undated) DEVICE — ENDOSCOPY KIT: Brand: MEDLINE INDUSTRIES, INC.

## (undated) DEVICE — Z DISCONTINUED (USE MFG CAT MVABO)  TUBING GAS SAMPLING STD 6.5 FT FEMALE CONN SMRT CAPNOLINE

## (undated) DEVICE — SOLUTION IV 1000ML 0.9% SOD CHL FOR IRRIG PLAS CONT

## (undated) DEVICE — MARKER SURG SKIN UTIL REGULAR/FINE 2 TIP W/ RUL AND 9 LBL

## (undated) DEVICE — GLOVE SURG SZ 7 CRM LTX FREE POLYISOPRENE POLYMER BEAD ANTI

## (undated) DEVICE — Z DISCONTINUED NO SUB IDED TUBING ETCO2 AD L6.5FT NSL ORAL CVD PRNG NONFLARED TIP OVR

## (undated) DEVICE — SET TBNG DISP TIP FOR AHTO

## (undated) DEVICE — APPLICATOR MEDICATED 26 CC SOLUTION HI LT ORNG CHLORAPREP

## (undated) DEVICE — GUIDEWIRE VASC L260CM DIA0.035IN RAD 3MM J TIP L7CM PTFE

## (undated) DEVICE — VIAL ACCESS CANNULA,SMART TIP: Brand: MONOJECT

## (undated) DEVICE — RADIFOCUS OPTITORQUE ANGIOGRAPHIC CATHETER: Brand: OPTITORQUE

## (undated) DEVICE — GLOVE SURG SZ 65 CRM LTX FREE POLYISOPRENE POLYMER BEAD ANTI

## (undated) DEVICE — TROCAR: Brand: KII® SLEEVE

## (undated) DEVICE — GLIDESHEATH SLENDER ACCESS KIT: Brand: GLIDESHEATH SLENDER

## (undated) DEVICE — LAPAROSCOPIC TROCAR SLEEVE/SINGLE USE: Brand: KII® OPTICAL ACCESS SYSTEM

## (undated) DEVICE — BAG SPEC REM 224ML W4XL6IN DIA10MM 1 HND GYN DISP ENDOPCH

## (undated) DEVICE — TROCAR: Brand: KII SHIELDED BLADED ACCESS SYSTEM

## (undated) DEVICE — TUBING, SUCTION, 9/32" X 10', STRAIGHT: Brand: MEDLINE

## (undated) DEVICE — TUBING INSUFFLATOR HEAT HI FLO SET PNEUMOCLEAR

## (undated) DEVICE — FORCEPS BX L240CM JAW DIA2.8MM L CAP W/ NDL MIC MESH TOOTH

## (undated) DEVICE — TROCAR: Brand: KII FIOS FIRST ENTRY

## (undated) DEVICE — TR BAND RADIAL ARTERY COMPRESSION DEVICE: Brand: TR BAND

## (undated) DEVICE — ANGIOGRAPHY KIT CUST MANIFOLD

## (undated) DEVICE — DRAPE SHEET ULTRAGARD: Brand: MEDLINE

## (undated) DEVICE — GLOVE SURG SZ 75 CRM LTX FREE POLYISOPRENE POLYMER BEAD ANTI

## (undated) DEVICE — TOWEL,OR,DSP,ST,BLUE,STD,6/PK,12PK/CS: Brand: MEDLINE

## (undated) DEVICE — SUTURE VCRL SZ 3-0 L27IN ABSRB UD L24MM FS-1 3/8 CIR REV J442H

## (undated) DEVICE — ADHESIVE SKIN CLSR 0.7ML TOP DERMBND ADV

## (undated) DEVICE — SUTURE PDS II SZ 3-0 L27IN ABSRB VLT L26MM SH 1/2 CIR Z316H

## (undated) DEVICE — SEALER ENDOSCP L37CM NANO COAT BLNT TIP LAP DIV